# Patient Record
Sex: FEMALE | Race: WHITE | NOT HISPANIC OR LATINO | Employment: UNEMPLOYED | URBAN - METROPOLITAN AREA
[De-identification: names, ages, dates, MRNs, and addresses within clinical notes are randomized per-mention and may not be internally consistent; named-entity substitution may affect disease eponyms.]

---

## 2017-01-03 ENCOUNTER — GENERIC CONVERSION - ENCOUNTER (OUTPATIENT)
Dept: OTHER | Facility: OTHER | Age: 57
End: 2017-01-03

## 2017-01-10 ENCOUNTER — ALLSCRIPTS OFFICE VISIT (OUTPATIENT)
Dept: OTHER | Facility: OTHER | Age: 57
End: 2017-01-10

## 2017-04-15 LAB
A/G RATIO (HISTORICAL): 1.9 (ref 1.2–2.2)
ALBUMIN SERPL BCP-MCNC: 4.6 G/DL (ref 3.5–5.5)
ALP SERPL-CCNC: 64 IU/L (ref 39–117)
ALT SERPL W P-5'-P-CCNC: 49 IU/L (ref 0–32)
AST SERPL W P-5'-P-CCNC: 30 IU/L (ref 0–40)
BILIRUB SERPL-MCNC: 0.5 MG/DL (ref 0–1.2)
BUN SERPL-MCNC: 27 MG/DL (ref 6–24)
BUN/CREA RATIO (HISTORICAL): 27 (ref 9–23)
CALCIUM SERPL-MCNC: 9.8 MG/DL (ref 8.7–10.2)
CHLORIDE SERPL-SCNC: 99 MMOL/L (ref 96–106)
CO2 SERPL-SCNC: 19 MMOL/L (ref 18–29)
CREAT SERPL-MCNC: 1.01 MG/DL (ref 0.57–1)
EGFR AFRICAN AMERICAN (HISTORICAL): 71 ML/MIN/1.73
EGFR-AMERICAN CALC (HISTORICAL): 62 ML/MIN/1.73
GLUCOSE SERPL-MCNC: 213 MG/DL (ref 65–99)
HBA1C MFR BLD HPLC: 8 % (ref 4.8–5.6)
POTASSIUM SERPL-SCNC: 4.6 MMOL/L (ref 3.5–5.2)
SODIUM SERPL-SCNC: 139 MMOL/L (ref 134–144)
TOT. GLOBULIN, SERUM (HISTORICAL): 2.4 G/DL (ref 1.5–4.5)
TOTAL PROTEIN (HISTORICAL): 7 G/DL (ref 6–8.5)

## 2017-04-16 ENCOUNTER — GENERIC CONVERSION - ENCOUNTER (OUTPATIENT)
Dept: OTHER | Facility: OTHER | Age: 57
End: 2017-04-16

## 2017-04-16 LAB
CREATININE, URINE (HISTORICAL): 67.6 MG/DL
MICROALBUM.,U,RANDOM (HISTORICAL): 202.9 UG/ML
MICROALBUMIN/CREATININE RATIO (HISTORICAL): 300.1 MG/G CREAT (ref 0–30)

## 2017-04-24 ENCOUNTER — ALLSCRIPTS OFFICE VISIT (OUTPATIENT)
Dept: OTHER | Facility: OTHER | Age: 57
End: 2017-04-24

## 2017-06-07 DIAGNOSIS — M19.019 PRIMARY OSTEOARTHRITIS OF SHOULDER: ICD-10-CM

## 2017-06-07 DIAGNOSIS — M75.82 OTHER SHOULDER LESIONS, LEFT SHOULDER: ICD-10-CM

## 2017-06-07 DIAGNOSIS — Z12.31 ENCOUNTER FOR SCREENING MAMMOGRAM FOR MALIGNANT NEOPLASM OF BREAST: ICD-10-CM

## 2017-06-07 DIAGNOSIS — M25.512 PAIN IN LEFT SHOULDER: ICD-10-CM

## 2017-06-28 ENCOUNTER — HOSPITAL ENCOUNTER (OUTPATIENT)
Dept: RADIOLOGY | Facility: HOSPITAL | Age: 57
Discharge: HOME | End: 2017-06-28
Payer: COMMERCIAL

## 2017-06-28 DIAGNOSIS — Z12.31 ENCOUNTER FOR SCREENING MAMMOGRAM FOR MALIGNANT NEOPLASM OF BREAST: ICD-10-CM

## 2017-06-28 PROCEDURE — G0202 SCR MAMMO BI INCL CAD: HCPCS

## 2017-06-29 ENCOUNTER — GENERIC CONVERSION - ENCOUNTER (OUTPATIENT)
Dept: OTHER | Facility: OTHER | Age: 57
End: 2017-06-29

## 2017-07-05 ENCOUNTER — APPOINTMENT (OUTPATIENT)
Dept: RADIOLOGY | Facility: CLINIC | Age: 57
End: 2017-07-05
Payer: COMMERCIAL

## 2017-07-05 ENCOUNTER — ALLSCRIPTS OFFICE VISIT (OUTPATIENT)
Dept: OTHER | Facility: OTHER | Age: 57
End: 2017-07-05

## 2017-07-05 DIAGNOSIS — M25.512 PAIN IN LEFT SHOULDER: ICD-10-CM

## 2017-07-05 PROCEDURE — 73030 X-RAY EXAM OF SHOULDER: CPT

## 2017-07-19 ENCOUNTER — APPOINTMENT (OUTPATIENT)
Dept: PHYSICAL THERAPY | Facility: CLINIC | Age: 57
End: 2017-07-19
Payer: COMMERCIAL

## 2017-07-19 DIAGNOSIS — M75.82 OTHER SHOULDER LESIONS, LEFT SHOULDER: ICD-10-CM

## 2017-07-19 DIAGNOSIS — M19.019 PRIMARY OSTEOARTHRITIS OF SHOULDER: ICD-10-CM

## 2017-07-19 PROCEDURE — 97161 PT EVAL LOW COMPLEX 20 MIN: CPT

## 2017-07-20 ENCOUNTER — ALLSCRIPTS OFFICE VISIT (OUTPATIENT)
Dept: OTHER | Facility: OTHER | Age: 57
End: 2017-07-20

## 2017-07-31 ENCOUNTER — APPOINTMENT (OUTPATIENT)
Dept: PHYSICAL THERAPY | Facility: CLINIC | Age: 57
End: 2017-07-31
Payer: COMMERCIAL

## 2017-07-31 ENCOUNTER — ALLSCRIPTS OFFICE VISIT (OUTPATIENT)
Dept: OTHER | Facility: OTHER | Age: 57
End: 2017-07-31

## 2017-07-31 PROCEDURE — 97110 THERAPEUTIC EXERCISES: CPT

## 2017-07-31 PROCEDURE — 97140 MANUAL THERAPY 1/> REGIONS: CPT

## 2017-08-02 ENCOUNTER — APPOINTMENT (OUTPATIENT)
Dept: PHYSICAL THERAPY | Facility: CLINIC | Age: 57
End: 2017-08-02
Payer: COMMERCIAL

## 2017-08-02 PROCEDURE — 97110 THERAPEUTIC EXERCISES: CPT

## 2017-08-02 PROCEDURE — 97140 MANUAL THERAPY 1/> REGIONS: CPT

## 2017-08-15 ENCOUNTER — APPOINTMENT (OUTPATIENT)
Dept: PHYSICAL THERAPY | Facility: CLINIC | Age: 57
End: 2017-08-15
Payer: COMMERCIAL

## 2017-08-15 PROCEDURE — 97140 MANUAL THERAPY 1/> REGIONS: CPT

## 2017-08-15 PROCEDURE — 97110 THERAPEUTIC EXERCISES: CPT

## 2017-08-17 ENCOUNTER — APPOINTMENT (OUTPATIENT)
Dept: PHYSICAL THERAPY | Facility: CLINIC | Age: 57
End: 2017-08-17
Payer: COMMERCIAL

## 2017-08-17 PROCEDURE — 97140 MANUAL THERAPY 1/> REGIONS: CPT

## 2017-08-17 PROCEDURE — 97110 THERAPEUTIC EXERCISES: CPT

## 2017-08-22 ENCOUNTER — APPOINTMENT (OUTPATIENT)
Dept: PHYSICAL THERAPY | Facility: CLINIC | Age: 57
End: 2017-08-22
Payer: COMMERCIAL

## 2017-08-22 PROCEDURE — 97110 THERAPEUTIC EXERCISES: CPT

## 2017-08-22 PROCEDURE — 97140 MANUAL THERAPY 1/> REGIONS: CPT

## 2017-08-24 ENCOUNTER — APPOINTMENT (OUTPATIENT)
Dept: PHYSICAL THERAPY | Facility: CLINIC | Age: 57
End: 2017-08-24
Payer: COMMERCIAL

## 2017-08-24 ENCOUNTER — GENERIC CONVERSION - ENCOUNTER (OUTPATIENT)
Dept: OTHER | Facility: OTHER | Age: 57
End: 2017-08-24

## 2017-08-24 DIAGNOSIS — R42 DIZZINESS AND GIDDINESS: ICD-10-CM

## 2017-08-24 PROCEDURE — 97110 THERAPEUTIC EXERCISES: CPT

## 2017-08-24 PROCEDURE — 97140 MANUAL THERAPY 1/> REGIONS: CPT

## 2017-08-29 ENCOUNTER — APPOINTMENT (OUTPATIENT)
Dept: PHYSICAL THERAPY | Facility: CLINIC | Age: 57
End: 2017-08-29
Payer: COMMERCIAL

## 2017-08-29 PROCEDURE — 97140 MANUAL THERAPY 1/> REGIONS: CPT

## 2017-08-29 PROCEDURE — 97110 THERAPEUTIC EXERCISES: CPT

## 2017-08-31 ENCOUNTER — APPOINTMENT (OUTPATIENT)
Dept: PHYSICAL THERAPY | Facility: CLINIC | Age: 57
End: 2017-08-31
Payer: COMMERCIAL

## 2017-08-31 PROCEDURE — 97140 MANUAL THERAPY 1/> REGIONS: CPT

## 2017-08-31 PROCEDURE — 97110 THERAPEUTIC EXERCISES: CPT

## 2017-09-05 ENCOUNTER — APPOINTMENT (OUTPATIENT)
Dept: PHYSICAL THERAPY | Facility: CLINIC | Age: 57
End: 2017-09-05
Payer: COMMERCIAL

## 2017-09-05 PROCEDURE — 97163 PT EVAL HIGH COMPLEX 45 MIN: CPT

## 2017-09-05 PROCEDURE — 97110 THERAPEUTIC EXERCISES: CPT

## 2017-09-06 ENCOUNTER — GENERIC CONVERSION - ENCOUNTER (OUTPATIENT)
Dept: OTHER | Facility: OTHER | Age: 57
End: 2017-09-06

## 2017-09-07 ENCOUNTER — APPOINTMENT (OUTPATIENT)
Dept: PHYSICAL THERAPY | Facility: CLINIC | Age: 57
End: 2017-09-07
Payer: COMMERCIAL

## 2017-09-07 PROCEDURE — 97110 THERAPEUTIC EXERCISES: CPT

## 2017-09-07 PROCEDURE — 97112 NEUROMUSCULAR REEDUCATION: CPT

## 2017-09-11 ENCOUNTER — APPOINTMENT (OUTPATIENT)
Dept: PHYSICAL THERAPY | Facility: CLINIC | Age: 57
End: 2017-09-11
Payer: COMMERCIAL

## 2017-09-11 PROCEDURE — 97110 THERAPEUTIC EXERCISES: CPT

## 2017-09-11 PROCEDURE — 97112 NEUROMUSCULAR REEDUCATION: CPT

## 2017-09-13 ENCOUNTER — APPOINTMENT (OUTPATIENT)
Dept: PHYSICAL THERAPY | Facility: CLINIC | Age: 57
End: 2017-09-13
Payer: COMMERCIAL

## 2017-09-13 ENCOUNTER — GENERIC CONVERSION - ENCOUNTER (OUTPATIENT)
Dept: OTHER | Facility: OTHER | Age: 57
End: 2017-09-13

## 2017-09-13 PROCEDURE — 97110 THERAPEUTIC EXERCISES: CPT

## 2017-09-13 PROCEDURE — 97112 NEUROMUSCULAR REEDUCATION: CPT

## 2017-09-14 ENCOUNTER — GENERIC CONVERSION - ENCOUNTER (OUTPATIENT)
Dept: OTHER | Facility: OTHER | Age: 57
End: 2017-09-14

## 2017-09-14 LAB
A/G RATIO (HISTORICAL): 1.8 (ref 1.2–2.2)
ALBUMIN SERPL BCP-MCNC: 4.4 G/DL (ref 3.5–5.5)
ALP SERPL-CCNC: 56 IU/L (ref 39–117)
ALT SERPL W P-5'-P-CCNC: 56 IU/L (ref 0–32)
AST SERPL W P-5'-P-CCNC: 43 IU/L (ref 0–40)
BILIRUB SERPL-MCNC: 0.5 MG/DL (ref 0–1.2)
BUN SERPL-MCNC: 26 MG/DL (ref 6–24)
BUN/CREA RATIO (HISTORICAL): 28 (ref 9–23)
CALCIUM SERPL-MCNC: 10.1 MG/DL (ref 8.7–10.2)
CHLORIDE SERPL-SCNC: 98 MMOL/L (ref 96–106)
CHOLEST SERPL-MCNC: 194 MG/DL (ref 100–199)
CO2 SERPL-SCNC: 21 MMOL/L (ref 18–29)
CREAT SERPL-MCNC: 0.93 MG/DL (ref 0.57–1)
EGFR AFRICAN AMERICAN (HISTORICAL): 79 ML/MIN/1.73
EGFR-AMERICAN CALC (HISTORICAL): 68 ML/MIN/1.73
GLUCOSE SERPL-MCNC: 190 MG/DL (ref 65–99)
HBA1C MFR BLD HPLC: 7.8 % (ref 4.8–5.6)
HDLC SERPL-MCNC: 40 MG/DL
INTERPRETATION (HISTORICAL): NORMAL
LDLC SERPL CALC-MCNC: 99 MG/DL (ref 0–99)
POTASSIUM SERPL-SCNC: 4.9 MMOL/L (ref 3.5–5.2)
SODIUM SERPL-SCNC: 137 MMOL/L (ref 134–144)
TOT. GLOBULIN, SERUM (HISTORICAL): 2.4 G/DL (ref 1.5–4.5)
TOTAL PROTEIN (HISTORICAL): 6.8 G/DL (ref 6–8.5)
TRIGL SERPL-MCNC: 274 MG/DL (ref 0–149)

## 2017-09-15 ENCOUNTER — GENERIC CONVERSION - ENCOUNTER (OUTPATIENT)
Dept: OTHER | Facility: OTHER | Age: 57
End: 2017-09-15

## 2017-09-18 ENCOUNTER — APPOINTMENT (OUTPATIENT)
Dept: PHYSICAL THERAPY | Facility: CLINIC | Age: 57
End: 2017-09-18
Payer: COMMERCIAL

## 2017-09-20 ENCOUNTER — APPOINTMENT (OUTPATIENT)
Dept: PHYSICAL THERAPY | Facility: CLINIC | Age: 57
End: 2017-09-20
Payer: COMMERCIAL

## 2017-09-20 ENCOUNTER — GENERIC CONVERSION - ENCOUNTER (OUTPATIENT)
Dept: OTHER | Facility: OTHER | Age: 57
End: 2017-09-20

## 2017-09-29 ENCOUNTER — ALLSCRIPTS OFFICE VISIT (OUTPATIENT)
Dept: OTHER | Facility: OTHER | Age: 57
End: 2017-09-29

## 2018-01-09 ENCOUNTER — GENERIC CONVERSION - ENCOUNTER (OUTPATIENT)
Dept: OTHER | Facility: OTHER | Age: 58
End: 2018-01-09

## 2018-01-10 NOTE — RESULT NOTES
Discussion/Summary   Cruz Ambriz,   Your diabetes is uncontrolled, please make an appointment   Dr Gilda Coffey      Verified Results  (1) COMPREHENSIVE METABOLIC PANEL [id number] 62:48NJ Shazia Bhandari     Test Name Result Flag Reference   Glucose, Serum 190 mg/dL H 65-99   BUN 26 mg/dL H 6-24   Creatinine, Serum 0 93 mg/dL  0 57-1 00   BUN/Creatinine Ratio 28 H 9-23   Sodium, Serum 137 mmol/L  134-144   Potassium, Serum 4 9 mmol/L  3 5-5 2   Chloride, Serum 98 mmol/L     Carbon Dioxide, Total 21 mmol/L  18-29   Calcium, Serum 10 1 mg/dL  8 7-10 2   Protein, Total, Serum 6 8 g/dL  6 0-8 5   Albumin, Serum 4 4 g/dL  3 5-5 5   Globulin, Total 2 4 g/dL  1 5-4 5   A/G Ratio 1 8  1 2-2 2   Bilirubin, Total 0 5 mg/dL  0 0-1 2   Alkaline Phosphatase, S 56 IU/L     AST (SGOT) 43 IU/L H 0-40   ALT (SGPT) 56 IU/L H 0-32   eGFR If NonAfricn Am 68 mL/min/1 73  >59   eGFR If Africn Am 79 mL/min/1 73  >59     (1) LIPID PANEL FASTING W DIRECT LDL REFLEX 14Sep2017 09:19AM Shazia Chaoet     Test Name Result Flag Reference   Cholesterol, Total 194 mg/dL  100-199   Triglycerides 274 mg/dL H 0-149   HDL Cholesterol 40 mg/dL  >39   LDL Cholesterol Calc 99 mg/dL  0-99     (1) HEMOGLOBIN A1C 14Sep2017 09:19AM Shazia [employer]     Test Name Result Flag Reference   Hemoglobin A1c 7 8 % H 4 8-5 6   Pre-diabetes: 5 7 - 6 4           Diabetes: >6 4           Glycemic control for adults with diabetes: <7 0     [facility]) Cardiovascular Risk Assessment [id number] 09:19AM Shazia [place]     Test Name Result Flag Reference   Interpretation Note     Supplement report is available  PDF Image

## 2018-01-10 NOTE — RESULT NOTES
Discussion/Summary   Vonnie Cockayne,   Your diabetes has improved, hemoglobin A1c is down to 8 0; but it is still not at goal  Please make an appointment to review your lab work  Dr Janice Vazquez      Verified Results  (1) COMPREHENSIVE METABOLIC PANEL [id number] 80:48GX [employer]     Test Name Result Flag Reference   Glucose, Serum 213 mg/dL H 65-99   BUN 27 mg/dL H 6-24   Creatinine, Serum 1 01 mg/dL H 0 57-1 00   eGFR If NonAfricn Am 62 mL/min/1 73  >59   eGFR If Africn Am 71 mL/min/1 73  >59   BUN/Creatinine Ratio 27 H 9-23   Sodium, Serum 139 mmol/L  134-144   Potassium, Serum 4 6 mmol/L  3 5-5 2   Chloride, Serum 99 mmol/L     Carbon Dioxide, Total 19 mmol/L  18-29   Calcium, Serum 9 8 mg/dL  8 7-10 2   Protein, Total, Serum 7 0 g/dL  6 0-8 5   Albumin, Serum 4 6 g/dL  3 5-5 5   Globulin, Total 2 4 g/dL  1 5-4 5   A/G Ratio 1 9  1 2-2 2   Bilirubin, Total 0 5 mg/dL  0 0-1 2   Alkaline Phosphatase, S 64 IU/L     AST (SGOT) 30 IU/L  0-40   ALT (SGPT) 49 IU/L H 0-32     (1) HEMOGLOBIN A1C 14Apr2017 10:56AM [employer]     Test Name Result Flag Reference   Hemoglobin A1c 8 0 % H 4 8-5 6   Pre-diabetes: 5 7 - 6 4           Diabetes: >6 4           Glycemic control for adults with diabetes: <7 0     (1) MICROALBUMIN CREATININE RATIO, RANDOM URINE 14Apr2017 10:56AM [employer]     Test Name Result Flag Reference   Creatinine, Urine 67 6 mg/dL  Not Estab  Microalbumin, Urine 202 9 ug/mL  Not Estab     Microalb/Creat Ratio 300 1 mg/g creat H 0 0-30 0

## 2018-01-11 NOTE — RESULT NOTES
Message   Please inform the patient that 2 of the polyps were tubular adenomas, the largest polyp did have high-grade dysplasia however it was completely removed  The other polyps were hyperplastic polyps  I recommended repeat colonoscopy in 3 years, please put in for recall  Please have the patient call with any questions or concerns  Verified Results  (1) TISSUE EXAM [id number] 08:21AM Giovanna Diaz     Test Name Result Flag Reference   LAB AP CASE REPORT (Report)     Surgical Pathology Report             Case: [id number]                   Authorizing Provider: Long Jean Baptiste MD      Collected:      09/28/2016 [id number]        Ordering Location:   [facility] Floor Surgery  Received:      09/28/2016 [facility]                                     Pathologist:      Karlene Schlatter, MD                               Specimens:  A) - Polyp, Colorectal, transverse polyp bx                              B) - Polyp, Colorectal, sigmoid polyp -hot snare                            C) - Polyp, Colorectal, sigmoid polyp- cold snare/bx                          D) - Polyp, Colorectal, rectal polyp- cold snare   LAB AP FINAL DIAGNOSIS (Report)     A  Colon, transverse, biopsy:   - Tubular adenoma    B  Colon, sigmoid, biopsy:   - Tubular adenoma with focal high grade dysplasia   - Margins are free of high grade dysplasia    C  Colon, sigmoid, biopsy:   - Hyperplastic polyp    D  Rectum, biopsy:   - Hyperplastic polyp  Electronically signed by Karlene Schlatter, MD on 9/30/2016 at 3:35 PM   LAB AP SURGICAL ADDITIONAL INFORMATION (Report)     These tests were developed and their performance characteristics   determined by [facility] or [facility]  They may not be cleared or approved by the [place]  Food and   Drug Administration  The FDA has determined that such clearance or   approval is not necessary  These tests are used for clinical purposes     They should not be regarded as investigational or for research  This   laboratory has been approved by [place] 88, designated as a high-complexity   laboratory and is qualified to perform these tests  Interpretation performed at [facility], [facility]   LAB AP GROSS DESCRIPTION (Report)     A  The specimen is received in formalin, labeled with the patient's name   and hospital number, and is designated transverse polyp biopsy, are   three irregularly shaped fragments of tan soft tissue measuring 0 7 x 0 5   x 0 1 cm in aggregate  Entirely submitted  One cassette  B  The specimen is received in formalin, labeled with the patient's name   and hospital number, and is designated sigmoid polyp-hot snare, is a 1 0   x 1 0 x 0 6 cm red-brown, lobulated, and glistening polypoid structure  The resection margin is inked blue and the specimen is bisected  Entirely   submitted  One cassette  C  The specimen is received in formalin, labeled with the patient's name   and hospital number, and is designated sigmoid polyp-cold snare/BX, are   two irregularly shaped fragments of pale tan soft tissue measuring 0 3 and   0 2 cm in greatest dimension  Entirely submitted  One cassette  D  The specimen is received in formalin, labeled with the patient's name   and hospital number, and is designated rectal polyp-cold snare, are   three irregularly shaped fragments of tan soft tissue measuring 0 9 x 0 5   x 0 1 cm in aggregate  Entirely submitted  One cassette  Note: The estimated total formalin fixation time based upon information   provided by the submitting clinician and the standard processing schedule   is 20 0 hours  RLR       Discussion/Summary   A tubular adenoma is a precancerous polyp  These polyps, if left alone, have a small risk of developing a cancer over 5-10 years  Your polyp has been completely removed

## 2018-01-12 VITALS
TEMPERATURE: 97.2 F | DIASTOLIC BLOOD PRESSURE: 72 MMHG | RESPIRATION RATE: 18 BRPM | HEART RATE: 88 BPM | HEIGHT: 66 IN | SYSTOLIC BLOOD PRESSURE: 130 MMHG | BODY MASS INDEX: 34.87 KG/M2 | WEIGHT: 217 LBS

## 2018-01-12 NOTE — RESULT NOTES
Verified Results  (1) COMPREHENSIVE METABOLIC PANEL [id number] 66:84NS Shadi Alejandro     Test Name Result Flag Reference   Glucose, Serum 229 mg/dL H 65-99   BUN 19 mg/dL  6-24   Creatinine, Serum 1 00 mg/dL  0 57-1 00   eGFR If NonAfricn Am 63 mL/min/1 73  >59   eGFR If Africn Am 73 mL/min/1 73  >59   BUN/Creatinine Ratio 19  9-23   Sodium, Serum 140 mmol/L  134-144   **Please note reference interval change**   Potassium, Serum 4 7 mmol/L  3 5-5 2   Chloride, Serum 99 mmol/L     **Please note reference interval change**   Carbon Dioxide, Total 23 mmol/L  18-29   Calcium, Serum 9 9 mg/dL  8 7-10 2   Protein, Total, Serum 7 0 g/dL  6 0-8 5   Albumin, Serum 4 3 g/dL  3 5-5 5   Globulin, Total 2 7 g/dL  1 5-4 5   A/G Ratio 1 6  1 1-2 5   Bilirubin, Total 0 5 mg/dL  0 0-1 2   Alkaline Phosphatase, S 60 IU/L     AST (SGOT) 46 IU/L H 0-40   ALT (SGPT) 48 IU/L H 0-32     (1) LIPID PANEL FASTING W DIRECT LDL REFLEX [id number] 09:30AM Shadi Alejandor     Test Name Result Flag Reference   Cholesterol, Total 178 mg/dL  100-199   Triglycerides 244 mg/dL H 0-149   HDL Cholesterol 44 mg/dL  >39   LDL Cholesterol Calc 85 mg/dL  0-99     (1) HEMOGLOBIN A1C [id number] 09:30AM Shadi Alejandro     Test Name Result Flag Reference   Hemoglobin A1c 9 5 % H 4 8-5 6   Pre-diabetes: 5 7 - 6 4           Diabetes: >6 4           Glycemic control for adults with diabetes: <7 0     [facility]) Cardiovascular Risk Assessment [id number] 09:30AM Shadi Alejandro     Test Name Result Flag Reference   Interpretation Note     Supplement report is available  PDF Image   Discussion/Summary   Flores Dash,   Your diabetes is uncontrolled  Please make an appointment to review your results     Dr Diaz Pisano

## 2018-01-13 VITALS
DIASTOLIC BLOOD PRESSURE: 71 MMHG | WEIGHT: 216.13 LBS | SYSTOLIC BLOOD PRESSURE: 106 MMHG | HEIGHT: 65 IN | HEART RATE: 85 BPM | BODY MASS INDEX: 36.01 KG/M2

## 2018-01-13 VITALS
HEIGHT: 65 IN | RESPIRATION RATE: 18 BRPM | WEIGHT: 213 LBS | DIASTOLIC BLOOD PRESSURE: 76 MMHG | HEART RATE: 84 BPM | TEMPERATURE: 98 F | BODY MASS INDEX: 35.49 KG/M2 | SYSTOLIC BLOOD PRESSURE: 122 MMHG

## 2018-01-13 VITALS
BODY MASS INDEX: 35.58 KG/M2 | WEIGHT: 221.38 LBS | DIASTOLIC BLOOD PRESSURE: 76 MMHG | HEIGHT: 66 IN | RESPIRATION RATE: 18 BRPM | SYSTOLIC BLOOD PRESSURE: 142 MMHG | TEMPERATURE: 98 F | HEART RATE: 100 BPM

## 2018-01-13 VITALS
HEIGHT: 65 IN | TEMPERATURE: 97.2 F | WEIGHT: 214 LBS | RESPIRATION RATE: 18 BRPM | BODY MASS INDEX: 35.65 KG/M2 | SYSTOLIC BLOOD PRESSURE: 130 MMHG | DIASTOLIC BLOOD PRESSURE: 80 MMHG | HEART RATE: 82 BPM

## 2018-01-14 VITALS
HEART RATE: 78 BPM | HEIGHT: 65 IN | SYSTOLIC BLOOD PRESSURE: 130 MMHG | WEIGHT: 212 LBS | TEMPERATURE: 97.2 F | BODY MASS INDEX: 35.32 KG/M2 | DIASTOLIC BLOOD PRESSURE: 68 MMHG | RESPIRATION RATE: 18 BRPM

## 2018-01-14 NOTE — RESULT NOTES
Verified Results  (1) HEMOGLOBIN A1C [id number] 09:25AM Linda Martins     Test Name Result Flag Reference   Hemoglobin A1c 7 7 % H 4 8-5 6   Pre-diabetes: 5 7 - 6 4           Diabetes: >6 4           Glycemic control for adults with diabetes: <7 0                       (1) TSH [id number] 09:25AM Linda Martins     Test Name Result Flag Reference   TSH 4 250 uIU/mL  0 450-4 500                 (LC) Thyroxine (T4) Free, Direct, S [id number] 09:25AM Kennedi Carballo     Test Name Result Flag Reference   T4,Free(Direct) 1 43 ng/dL  0 82-1 77       Signatures   Electronically signed by : Julia Ruelas; Jul 21 2016 10:39AM EST                       (Author)

## 2018-01-16 NOTE — RESULT NOTES
Verified Results  * MAMMO SCREENING BILATERAL W CAD [id number] 01:06PM Ally Hodgson Order Number: [id number]     Test Name Result Flag Reference   MAMMO SCREENING BILATERAL W CAD (Report)     Patient History:   Patient is postmenopausal and had first child at age 40  Patient's BMI is 35 8  Reason for exam: screening (asymptomatic)  Mammo Screening Bilateral W CAD: June 2, 2016 - Check In #:    [de-identified]   Bilateral CC and MLO view(s) were taken  Technologist: LAURA Medina   Prior study comparison: May 5, 2015, bilateral screening    mammogram performed at [place] [address]  November 18, 2013, bilateral screening mammogram performed at [place] [address]  January 6, 2012, bilateral    screening mammogram performed at [place] [address]  October 14, 2010, bilateral screening mammogram    performed at [place] [address]  August 4, 2009, bilateral screening mammogram performed at [place] [address]  The breast tissue is almost entirely fat  No new dominant soft    tissue mass, architectural distortion or suspicious    calcifications are noted  The skin and nipple structures are    within normal limits  Benign appearing calcifications are noted  No mammographic evidence of malignancy  No    significant changes when compared with prior studies  ASSESSMENT: BiRad:2 - Benign     A reminder letter will be scheduled  Recommendation:   Routine screening mammogram of both breasts in 1 year  Analyzed by CAD     8-10% of cancers will be missed on mammography  Management of a    palpable abnormality must be based on clinical grounds  Patients   will be notified of their results via letter from our facility  Accredited by [address] [place] [address] of Radiology and FDA       Transcription Location: [facility] [address]: [id number]     Risk Value(s):   Raphael 10 Year: 4 143%, Raphael Lifetime: 12 651%,    Myriad Table: 1 5%, BERNADETTE 5 Year: 1 7%, NCI Lifetime: 10 9%   Signed by:   Panchito Price MD   6/2/16       Plan  Encounter for screening mammogram for malignant neoplasm of breast    · * MAMMO SCREENING BILATERAL W CAD ; every 1 year; Last [id number]; Next  02Jun2017; Status:Active    Discussion/Summary   [address]- your mammogram was normal   Repeat in 1 year is recommended  [address]     Signatures   Electronically signed by : Jackson Currie; Gilberto  3 2017  9:06PM EST                       (Author)

## 2018-01-16 NOTE — RESULT NOTES
Message   appt on 8/4/16 w/ Dr Robert Hercules, please print for her folder     Verified Results  (1) COMPREHENSIVE METABOLIC PANEL [id number] 49:06UI Davied Moment     Test Name Result Flag Reference   Glucose, Serum 204 mg/dL H 65-99   BUN 22 mg/dL  6-24   Creatinine, Serum 0 85 mg/dL  0 57-1 00   eGFR If NonAfricn Am 77 mL/min/1 73  >59   eGFR If Africn Am 89 mL/min/1 73  >59   BUN/Creatinine Ratio 26 H 9-23   Sodium, Serum 138 mmol/L  134-144   Potassium, Serum 4 4 mmol/L  3 5-5 2   Chloride, Serum 99 mmol/L     Carbon Dioxide, Total 21 mmol/L  18-29   Calcium, Serum 9 7 mg/dL  8 7-10 2   Protein, Total, Serum 6 7 g/dL  6 0-8 5   Albumin, Serum 4 3 g/dL  3 5-5 5   Globulin, Total 2 4 g/dL  1 5-4 5   A/G Ratio 1 8  1 1-2 5   Bilirubin, Total 0 5 mg/dL  0 0-1 2   Alkaline Phosphatase, S 61 IU/L     AST (SGOT) 35 IU/L  0-40   ALT (SGPT) 45 IU/L H 0-32   Glucose, Serum 204 mg/dL H 65-99   BUN 22 mg/dL  6-24   Creatinine, Serum 0 85 mg/dL  0 57-1 00   eGFR If NonAfricn Am 77 mL/min/1 73  >59   eGFR If Africn Am 89 mL/min/1 73  >59   BUN/Creatinine Ratio 26 H 9-23   Sodium, Serum 138 mmol/L  134-144   Potassium, Serum 4 4 mmol/L  3 5-5 2   Chloride, Serum 99 mmol/L     Carbon Dioxide, Total 21 mmol/L  18-29   Calcium, Serum 9 7 mg/dL  8 7-10 2   Protein, Total, Serum 6 7 g/dL  6 0-8 5   Albumin, Serum 4 3 g/dL  3 5-5 5   Globulin, Total 2 4 g/dL  1 5-4 5   A/G Ratio 1 8  1 1-2 5   Bilirubin, Total 0 5 mg/dL  0 0-1 2   Alkaline Phosphatase, S 61 IU/L     AST (SGOT) 35 IU/L  0-40   ALT (SGPT) 45 IU/L H 0-32             Signatures   Electronically signed by : Denae Chin; Jul 22 2016  1:39PM EST                       (Author)

## 2018-01-16 NOTE — PROGRESS NOTES
Assessment    1  Left-sided low back pain with left-sided sciatica (724 3) (M54 42)    Plan  Left-sided low back pain with left-sided sciatica    · Cyclobenzaprine HCl - 5 MG Oral Tablet; TAKE 1 TABLET 3 TIMES DAILY AS  NEEDED   · Hydrocodone-Acetaminophen 5-300 MG Oral Tablet; TAKE 1 TABLET EVERY 4 TO  6 HOURS AS NEEDED FOR PAIN   · You may apply heat using a heating pad turned on low or medium ; Status:Complete;    Done: [id number]    Discussion/Summary    Back pain- Recommended heating pad  She take Etodolac BID, advised her to avoid other NSAIDs while taking this medication  Recommended Tylenol q4-6 hours  Will try a short course of Vicodin and Flexeril  If pain is persistent, will order PT  Possible side effects of new medications were reviewed with the patient/guardian today  The treatment plan was reviewed with the patient/guardian  The patient/guardian understands and agrees with the treatment plan      Chief Complaint    1  Leg Pain  Started on Sunday with pain in her left lower back, buttock and down left leg and now her lower leg is starting to feel "numb"  Pain rated 6-7/10 when ambulating  OTC pain meds aren't alleviating her pain JMoyleLPN      History of Present Illness  HPI: Pt c/o left lower back pain that started 2 nights ago  She attributes pain to sitting on the couch with her legs crossed and no back support for a couple of hours  The pain radiates down the left leg and today she notes paresthesia in the left leg  She has tried Aleve, Tylenol, and Advil, none of which have helped  Denies bowel or bladder dysfunction  The pain is severe and she is very uncomfortable  Review of Systems    Constitutional: no fever and no chills  Cardiovascular: no chest pain  Respiratory: no shortness of breath and no cough  Musculoskeletal: as noted in HPI  Neurological: no headache  Active Problems    1  Allergic rhinitis (477 9) (J30 9)   2  Benign essential hypertension (401 1) (I10)   3  Chronic interstitial cystitis (595 1) (N30 10)   4  Colon cancer screening (V76 51) (Z12 11)   5  Diabetes mellitus out of control (250 02) (E11 65)   6  Dysuria (788 1) (R30 0)   7  Encounter for screening mammogram for malignant neoplasm of breast (V76 12)   (Z12 31)   8  Fatty liver (571 8) (K76 0)   9  [place] [employer] (727 41)   10  Hyperlipidemia (272 4) (E78 5)   11  Hypothyroidism (244 9) (E03 9)   12  Morbid or severe obesity due to excess calories (278 01) (E66 01)   13  Obesity (278 00) (E66 9)   14  Osteoarthritis of shoulder region, unspecified laterality   15  Plantar fascial fibromatosis (728 71) (M72 2)   16  Preventive measure (V07 9) (Z41 8)   17  Screening for diabetes mellitus (DM) (V77 1) (Z13 1)   18  Sensorineural hearing loss (389 10) (H90 5)   19  Shoulder joint pain, unspecified laterality (719 41) (M25 519)    Past Medical History    1  History of Achilles tendinitis, unspecified laterality (726 71) (M76 60)   2  Acute conjunctivitis (372 00) (H10 30)   3  Acute maxillary sinusitis (461 0) (J01 00)   4  History of Acute upper respiratory infection (465 9) (J06 9)   5  Acute urinary tract infection (599 0) (N39 0)   6  Allergic rhinitis due to pollen (477 0) (J30 1)   7  History of Cellulitis of toe, unspecified laterality (681 10) (L03 039)   8  Contact dermatitis due to plant (692 6) (L25 5)   9  History of acute sinusitis (V12 69) (Z87 09)   10  History of dermatitis (V13 3) (Z87 2)   11  History of dermatitis (V13 3) (Z87 2)   12  Influenza vaccine needed (V04 81) (Z23)   13  Influenza vaccine needed (V04 81) (Z23)   14  Otitis media, unspecified laterality   15  Urinary tract infection (599 0) (N39 0)   16  Urinary tract infection (599 0) (N39 0)   17  Urinary tract infection (599 0) (N39 0)   18  History of Urinary Tract Infection (V13 02)   19  History of Urinary Tract Infection (V13 02)  Active Problems And Past Medical History Reviewed:    The active problems and past medical history were reviewed and updated today  Family History    1  Family history of cerebrovascular accident (V17 1) (Z82 3)    2  Family history of cardiac disorder (V17 49) (Z80 55)    Social History    · Former smoker  The social history was reviewed and is unchanged  Current Meds   1  Aspir-81 81 MG Oral Tablet Delayed Release; 1 Every Day; Therapy: 87LFU0770 to  Requested for: 34GFM5845 Recorded   2  Atorvastatin Calcium 40 MG Oral Tablet; Take 1 tablet daily; Therapy: 13QGG2408 to (Chloe Higuera)  Requested for: 55BVC9062; Last   Rx:96Ubw1617 Ordered   3  Biotin 1000 MCG Oral Tablet; 1 Every Day; Therapy: 79JJJ9537 to  Requested for: 09WQM6633 Recorded   4  Desloratadine 5 MG Oral Tablet; Take 1 tablet daily; Therapy: 94LGX3529 to (Evaluate:10Mar2016)  Requested for: 74EUE2861; Last   Rx:39Tgh4872 Ordered   5  Etodolac 400 MG Oral Tablet; TAKE 1 TABLET twice a day with meals; Therapy: 24NYU9301 to (Last Rx:57Qkn3812)  Requested for: 18Xys0304 Ordered   6  Fish Oil 1000 MG Oral Capsule; 1 Every Day; Therapy: 92NPE3970 to  Requested for: 66OYW9048 Recorded   7  HydrOXYzine HCl - 25 MG Oral Tablet; 1 at hs as needed  Requested for: 74Syt6960; Last Rx:40Hao9060 Ordered   8  Invokana 300 MG Oral Tablet; Take 1 tablet daily; Therapy: 89DAI9885 to (Evaluate:16Mar2016)  Requested for: 61Nhy7405; Last   Rx:88Niv7757 Ordered   9  Levothyroxine Sodium 88 MCG Oral Tablet; Take 1 tablet daily; Therapy: 72UUF8267 to (Last Rx:96Nwn8589)  Requested for: 73YHY6393 Ordered   10  MetFORMIN HCl - 500 MG Oral Tablet; take 2 tablets twice a day; Therapy: 17WEX2141 to (Evaluate:79Dqc7133)  Requested for: 06Fjo7370; Last    Rx:72Qyv9837 Ordered   11  Metoprolol Succinate ER 25 MG Oral Tablet Extended Release 24 Hour; take 1 tablet    daily at bedtime; Therapy: 59XXW6852 to (Last Rx:33Fsg7435)  Requested for: 07Omm5066 Ordered   12   Montelukast Sodium 10 MG Oral Tablet; TAKE 1 TABLET AT BEDTIME; Therapy: 27DKZ1424 to (Tyler Dominik)  Requested for: 17WEX2131; Last    Rx:06Oct2015 Ordered   13  Nasonex 50 MCG/ACT Nasal Suspension; USE 2 SPRAYS IN EACH NOSTRIL DAILY; Therapy: 33HQV1810 to (Last Rx:38Qat1824)  Requested for: 06Mhb7318 Ordered   14  OneTouch Ultra Blue In Vitro Strip; TEST GLUCOSE ONCE DAILY; Therapy: 43CGN4082 to (Last Rx:77Hjh3806)  Requested for: 62GBN8468 Ordered   15  Quinapril HCl - 40 MG Oral Tablet; Take 1 tablet daily; Therapy: 31PCH6408 to (Evaluate:76Fao2101)  Requested for: 67Yep4593; Last    Rx:33Epu9668 Ordered    The medication list was reviewed and updated today  Allergies    1  No Known Drug Allergies    Vitals   Recorded: 12Jan2016 10:23AM Recorded: 12Jan2016 10:21AM   Temperature    97 9 F   Heart Rate    88   Respiration    20   Systolic    467   Diastolic    74   Patient Refused Weight  Yes Yes    Height Unobtainable  Yes     Pain Scale 6        Physical Exam    Constitutional   General appearance: No acute distress, well appearing and well nourished  Pulmonary   Respiratory effort: No increased work of breathing or signs of respiratory distress  Auscultation of lungs: Clear to auscultation  Cardiovascular   Auscultation of heart: Normal rate and rhythm, normal S1 and S2, without murmurs  Examination of extremities for edema and/or varicosities: Normal     Musculoskeletal limping with ambulation  Inspection/palpation of joints, bones, and muscles: Abnormal   left lower back nontender on palpation  Positive straight leg raise left leg  Skin   Skin and subcutaneous tissue: Normal without rashes or lesions  Psychiatric   Mood and affect: Normal          Attending Note  Collaborating Physician Note: Collaborating Note: I agree with the Advanced Practitioner note        Signatures   Electronically signed by : Tressa Hill; Jan 12 2016 10:49AM EST                       (Author)    Electronically signed by : Ankit Barfield DO; Jan 12 2016 12:50PM EST                       (Author)

## 2018-01-18 NOTE — RESULT NOTES
Verified Results  () Hgb A1c with eAG Estimation 77WZO8064 09:24AM [facility]     Test Name Result Flag Reference   Hemoglobin A1c 8 4 % H 4 8-5 6   Pre-diabetes: 5 7 - 6 4           Diabetes: >6 4           Glycemic control for adults with diabetes: <7 0   Estim  Avg Glu (eAG) 194 mg/dL       (LC) Lipid Panel With LDL/HDL Ratio 11BOY7274 09:24AM [facility]     Test Name Result Flag Reference   Cholesterol, Total 158 mg/dL  100-199   Triglycerides 223 mg/dL H 0-149   HDL Cholesterol 40 mg/dL  >39   According to ATP-III Guidelines, HDL-C >59 mg/dL is considered a  negative risk factor for CHD  VLDL Cholesterol Laron 45 mg/dL H 5-40   LDL Cholesterol Calc 73 mg/dL  0-99   LDL/HDL Ratio 1 8 ratio units  0 0-3 2   LDL/HDL Ratio                                                             Men  Women                                               1/2 Avg  Risk  1 0    1 5                                                   Avg Risk  3 6    3 2                                                2X Avg  Risk  6 2    5 0                                                3X Avg  Risk  8 0    6 1     (1) COMPREHENSIVE METABOLIC PANEL 15ITS8964 50:44SL [facility]     Test Name Result Flag Reference   Glucose, Serum 210 mg/dL H 65-99   BUN 22 mg/dL  6-24   Creatinine, Serum 0 95 mg/dL  0 57-1 00   eGFR If NonAfricn Am 67 mL/min/1 73  >59   eGFR If Africn Am 77 mL/min/1 73  >59   BUN/Creatinine Ratio 23  9-23   Sodium, Serum 136 mmol/L  134-144   Potassium, Serum 4 5 mmol/L  3 5-5 2   Chloride, Serum 99 mmol/L     Carbon Dioxide, Total 22 mmol/L  18-29   Calcium, Serum 9 7 mg/dL  8 7-10 2   Protein, Total, Serum 6 6 g/dL  6 0-8 5   Albumin, Serum 4 3 g/dL  3 5-5 5   Globulin, Total 2 3 g/dL  1 5-4 5   A/G Ratio 1 9  1 1-2 5   Bilirubin, Total 0 7 mg/dL  0 0-1 2   Alkaline Phosphatase, S 55 IU/L     AST (SGOT) 26 IU/L  0-40   ALT (SGPT) 45 IU/L H 0-32     (1) MICROALBUMIN CREATININE RATIO, RANDOM URINE 49ZBN6031 09:24AM [facility]     Test Name Result Flag Reference   Creatinine, Urine 29 5 mg/dL  15 0-278 0   Microalbumin, Urine 73 0 ug/mL H 0 0-17 0   Microalb/Creat Ratio 247 5 mg/g creat H 0 0-30 0     (LC) Cardiovascular Risk Assessment 12UKN3112 09:24AM [facility]     Test Name Result Flag Reference   Interpretation Note     Supplement report is available  PDF Image   Discussion/Summary   Hung Foreman,   Please make an appointment to review your lab work  Your Diabetes is uncontrolled     Dr Kelby Najjar

## 2018-01-18 NOTE — RESULT NOTES
Discussion/Summary   Jarrod Christiansen,   Your mammogram is normal    Dr Earnest Newell CAD [id number] 03:24PM Jorje Hoyt Order Number: [id number]    - Patient Instructions: To schedule this appointment, please contact Central Scheduling at [phone]  Do not wear any perfume, powder, lotion or deodorant on breast or underarm area  Please bring your doctors order, referral (if needed) and insurance information with you on the day of the test  Failure to bring this information may result in this test being rescheduled  Arrive 15 minutes prior to your appointment time to register  On the day of your test, please bring any prior mammogram or breast studies with you that were not performed at a [facility]  Failure to bring prior exams may result in your test needing to be rescheduled  Test Name Result Flag Reference   MAMMO SCREENING BILATERAL W CAD (Report)     Patient History:   Patient is postmenopausal and had first child at age 40  Patient's BMI is 35 8  Reason for exam: screening, asymptomatic  Mammo Screening Bilateral W CAD: June 28, 2017 - Check In #:    [de-identified]   Bilateral CC and MLO view(s) were taken  Technologist: LAURA Gomez   Prior study comparison: June 2, 2016, mammo screening bilateral W   CAD performed at [place] [address]  May 5,    2015, bilateral screening mammogram performed at [place] [address]  November 18, 2013, bilateral screening   mammogram performed at [place] [address]  January 6, 2012, bilateral screening mammogram performed at [place] [address]  The breast tissue is almost entirely fat  No new dominant soft    tissue mass, architectural distortion or suspicious    calcifications are noted  The skin and nipple structures are    within normal limits  Benign appearing calcifications are noted         No mammographic evidence of malignancy  No    significant changes when compared with prior studies  ACR BI-RADSï¾® Assessments: BiRad:2 - Benign     Recommendation:   Routine screening mammogram of both breasts in 1 year  Analyzed by CAD     The patient is scheduled in a reminder system  8-10% of cancers will be missed on mammography  Management of a    palpable abnormality must be based on clinical grounds  Patients   will be notified of their results via letter from our facility  Accredited by [employer] and FDA  Transcription Location: [facility]: [id number]     Risk Value(s):   Tyrer-Cuzick 10 Year: 4 300%, Tyrer-Cuzick Lifetime: 12 300%,    Myriad Table: 1 5%, BERNADETTE 5 Year: 1 8%, NCI Lifetime: 10 7%   Signed by:   Meño Hector MD   6/29/17       Plan  Encounter for screening mammogram for malignant neoplasm of breast    · * MAMMO SCREENING BILATERAL W CAD ; every 1 year;  Last [id number]; Next  [id number]; Status:Active

## 2018-01-24 VITALS
HEART RATE: 100 BPM | TEMPERATURE: 99.4 F | RESPIRATION RATE: 16 BRPM | HEIGHT: 65 IN | SYSTOLIC BLOOD PRESSURE: 144 MMHG | WEIGHT: 215 LBS | DIASTOLIC BLOOD PRESSURE: 88 MMHG | BODY MASS INDEX: 35.82 KG/M2

## 2018-01-25 ENCOUNTER — OFFICE VISIT (OUTPATIENT)
Dept: FAMILY MEDICINE CLINIC | Facility: CLINIC | Age: 58
End: 2018-01-25
Payer: COMMERCIAL

## 2018-01-25 VITALS
DIASTOLIC BLOOD PRESSURE: 72 MMHG | RESPIRATION RATE: 20 BRPM | TEMPERATURE: 97.9 F | HEIGHT: 65 IN | BODY MASS INDEX: 35.65 KG/M2 | HEART RATE: 82 BPM | WEIGHT: 214 LBS | SYSTOLIC BLOOD PRESSURE: 128 MMHG

## 2018-01-25 DIAGNOSIS — J01.00 ACUTE MAXILLARY SINUSITIS, RECURRENCE NOT SPECIFIED: Primary | ICD-10-CM

## 2018-01-25 PROBLEM — J30.9 ALLERGIC RHINITIS: Status: ACTIVE | Noted: 2017-07-20

## 2018-01-25 PROCEDURE — 99213 OFFICE O/P EST LOW 20 MIN: CPT | Performed by: FAMILY MEDICINE

## 2018-01-25 RX ORDER — AMOXICILLIN AND CLAVULANATE POTASSIUM 875; 125 MG/1; MG/1
1 TABLET, FILM COATED ORAL EVERY 12 HOURS SCHEDULED
Qty: 20 TABLET | Refills: 0 | Status: SHIPPED | OUTPATIENT
Start: 2018-01-25 | End: 2018-02-04

## 2018-01-25 RX ORDER — HYDROXYZINE HYDROCHLORIDE 25 MG/1
1 TABLET, FILM COATED ORAL
COMMUNITY
Start: 2017-01-11 | End: 2018-01-25 | Stop reason: SDUPTHER

## 2018-01-25 NOTE — PROGRESS NOTES
Assessment/Plan:     Diagnoses and all orders for this visit:    Acute maxillary sinusitis, recurrence not specified  Comments:  new  Orders:  -     amoxicillin-clavulanate (AUGMENTIN) 875-125 mg per tablet; Take 1 tablet by mouth every 12 (twelve) hours for 10 days          Patient Instructions   Increase fluids and use Mucinex as needed  Return in about 1 month (around 2/25/2018) for Next scheduled follow up  Subjective:      Patient ID: Ramona Roca is a 62 y o  female  Chief Complaint   Patient presents with    Cold Like Symptoms     fever and not feeling better       She has already completed the steroids and antibiotic  She has continued to cough and then developed a low grade fever yesterday  Her sugars have been running a little higher  The following portions of the patient's history were reviewed and updated as appropriate: allergies, current medications, past family history, past medical history, past social history, past surgical history and problem list     Review of Systems   Constitutional: Fever: 99 9 last night  HENT: Positive for sore throat            Current Outpatient Prescriptions   Medication Sig Dispense Refill    aspirin 81 MG tablet Take 81 mg by mouth every evening      atorvastatin (LIPITOR) 40 mg tablet Take 40 mg by mouth every evening      azelastine (ASTELIN) 0 1 % nasal spray 1 spray into each nostril 2 (two) times a day Use in each nostril as directed      BIOTIN PO Take 10,000 mcg by mouth every morning      desloratadine (CLARINEX) 5 MG tablet Take 5 mg by mouth every morning      Empagliflozin (JARDIANCE) 25 MG TABS Take 1 tablet by mouth daily      glipiZIDE (GLUCOTROL) 5 mg tablet Take 5 mg by mouth every morning      Glucose Blood (ONETOUCH ULTRA BLUE VI) by In Vitro route      Levothyroxine Sodium (L-THYROXINE SODIUM) POWD 88 mg by Does not apply route every morning      metFORMIN (GLUCOPHAGE) 500 mg tablet Take 500 mg by mouth 2 (two) times a day with meals 2 tabs each dose =1000mg      metoprolol succinate (TOPROL-XL) 25 mg 24 hr tablet Take 25 mg by mouth every morning      Omega-3 Fatty Acids (FISH OIL PO) Take by mouth every evening      quinapril (ACCUPRIL) 40 MG tablet Take 40 mg by mouth daily at bedtime      amoxicillin-clavulanate (AUGMENTIN) 875-125 mg per tablet Take 1 tablet by mouth every 12 (twelve) hours for 10 days 20 tablet 0    hydrOXYzine pamoate (VISTARIL) 25 mg capsule Take 25 mg by mouth 3 (three) times a day as needed for itching       No current facility-administered medications for this visit  Objective:    /72   Pulse 82   Temp 97 9 °F (36 6 °C)   Resp 20   Ht 5' 5" (1 651 m)   Wt 97 1 kg (214 lb)   BMI 35 61 kg/m²        Physical Exam   Constitutional: She appears well-developed and well-nourished  HENT:   Head: Normocephalic and atraumatic  Right Ear: External ear normal    Left Ear: External ear normal    Mouth/Throat: Oropharynx is clear and moist    Cardiovascular: Normal rate, regular rhythm and normal heart sounds  Exam reveals no friction rub  No murmur heard  Pulmonary/Chest: Effort normal and breath sounds normal  No respiratory distress  She has no wheezes  She has no rales  Musculoskeletal: She exhibits no edema or deformity  Nursing note and vitals reviewed               Alaina Moralez DO

## 2018-02-01 LAB
LEFT EYE DIABETIC RETINOPATHY: NORMAL
RIGHT EYE DIABETIC RETINOPATHY: NORMAL
SEVERITY (EYE EXAM): NORMAL

## 2018-02-21 LAB
ALBUMIN SERPL-MCNC: 4.4 G/DL (ref 3.5–5.5)
ALBUMIN/GLOB SERPL: 1.9 {RATIO} (ref 1.2–2.2)
ALP SERPL-CCNC: 53 IU/L (ref 39–117)
ALT SERPL-CCNC: 52 IU/L (ref 0–32)
AST SERPL-CCNC: 37 IU/L (ref 0–40)
BILIRUB SERPL-MCNC: 0.4 MG/DL (ref 0–1.2)
BUN SERPL-MCNC: 24 MG/DL (ref 6–24)
BUN/CREAT SERPL: 25 (ref 9–23)
CALCIUM SERPL-MCNC: 10 MG/DL (ref 8.7–10.2)
CHLORIDE SERPL-SCNC: 100 MMOL/L (ref 96–106)
CO2 SERPL-SCNC: 22 MMOL/L (ref 18–29)
CREAT SERPL-MCNC: 0.96 MG/DL (ref 0.57–1)
GLOBULIN SER-MCNC: 2.3 G/DL (ref 1.5–4.5)
GLUCOSE SERPL-MCNC: 216 MG/DL (ref 65–99)
HBA1C MFR BLD: 8.4 % (ref 4.8–5.6)
POTASSIUM SERPL-SCNC: 4.8 MMOL/L (ref 3.5–5.2)
PROT SERPL-MCNC: 6.7 G/DL (ref 6–8.5)
SL AMB EGFR AFRICAN AMERICAN: 75
SL AMB EGFR NON AFRICAN AMERICAN: 65
SODIUM SERPL-SCNC: 137 MMOL/L (ref 134–144)
T4 FREE SERPL-MCNC: 1.51 NG/DL (ref 0.82–1.77)
TSH SERPL DL<=0.005 MIU/L-ACNC: 4.02 UIU/ML (ref 0.45–4.5)

## 2018-02-25 NOTE — ASSESSMENT & PLAN NOTE
Worsening control, A1c is up from 7 8 to 8 4  Glipizide dose increased from 5 to 10mg  Low sugar diet and exercise encouraged

## 2018-02-26 ENCOUNTER — OFFICE VISIT (OUTPATIENT)
Dept: FAMILY MEDICINE CLINIC | Facility: CLINIC | Age: 58
End: 2018-02-26
Payer: COMMERCIAL

## 2018-02-26 VITALS
HEART RATE: 78 BPM | HEIGHT: 65 IN | DIASTOLIC BLOOD PRESSURE: 74 MMHG | TEMPERATURE: 97.2 F | BODY MASS INDEX: 35.99 KG/M2 | SYSTOLIC BLOOD PRESSURE: 126 MMHG | WEIGHT: 216 LBS | RESPIRATION RATE: 18 BRPM

## 2018-02-26 DIAGNOSIS — E78.2 MIXED HYPERLIPIDEMIA: ICD-10-CM

## 2018-02-26 DIAGNOSIS — K76.0 FATTY LIVER: ICD-10-CM

## 2018-02-26 DIAGNOSIS — E11.9 TYPE 2 DIABETES MELLITUS WITHOUT COMPLICATION, WITHOUT LONG-TERM CURRENT USE OF INSULIN (HCC): Primary | ICD-10-CM

## 2018-02-26 DIAGNOSIS — Z11.59 NEED FOR HEPATITIS C SCREENING TEST: ICD-10-CM

## 2018-02-26 DIAGNOSIS — I10 BENIGN ESSENTIAL HYPERTENSION: ICD-10-CM

## 2018-02-26 PROCEDURE — 99214 OFFICE O/P EST MOD 30 MIN: CPT | Performed by: FAMILY MEDICINE

## 2018-02-26 RX ORDER — GLIPIZIDE 10 MG/1
10 TABLET ORAL EVERY MORNING
Qty: 90 TABLET | Refills: 1 | Status: SHIPPED | OUTPATIENT
Start: 2018-02-26 | End: 2018-07-01 | Stop reason: ALTCHOICE

## 2018-02-26 NOTE — PROGRESS NOTES
Assessment/Plan:    Type 2 diabetes mellitus without complication, without long-term current use of insulin (HCC)  Worsening control, A1c is up from 7 8 to 8 4  Glipizide dose increased from 5 to 10mg  Low sugar diet and exercise encouraged    Mixed hyperlipidemia  Stable on 40 mg of atrovastatin    Benign essential hypertension  Well controlled    Fatty liver  Liver enzyme is up  Weight loss advised  Diagnoses and all orders for this visit:    Type 2 diabetes mellitus without complication, without long-term current use of insulin (HCC)  -     Hemoglobin A1c; Future  -     Microalbumin / creatinine urine ratio; Future  -     glipiZIDE (GLUCOTROL) 10 mg tablet; Take 1 tablet (10 mg total) by mouth every morning    Benign essential hypertension  -     Comprehensive metabolic panel; Future    Mixed hyperlipidemia  -     Lipid Panel with Direct LDL reflex; Future    Need for hepatitis C screening test  -     Hepatitis C antibody; Future    Fatty liver          There are no Patient Instructions on file for this visit  Return in about 3 months (around 5/26/2018) for Next scheduled follow up  Subjective:      Patient ID: Kamini Dailey is a 62 y o  female  Chief Complaint   Patient presents with    Follow-up     lab work     Blood Pressure Check      rmklpn       She has head congestion  She had her eye exam and it was good by Dr Hector Kenney  Hypertension   This is a chronic problem  The current episode started more than 1 year ago  The problem is controlled  Pertinent negatives include no chest pain or shortness of breath  The current treatment provides moderate improvement  Hyperlipidemia   This is a chronic problem  The current episode started more than 1 year ago  The problem is controlled  Pertinent negatives include no chest pain or shortness of breath  The current treatment provides moderate improvement of lipids  There are no compliance problems          The following portions of the patient's history were reviewed and updated as appropriate: allergies, current medications, past family history, past medical history, past social history, past surgical history and problem list     Review of Systems   Constitutional: Negative for fatigue  Respiratory: Negative for shortness of breath  Cardiovascular: Negative for chest pain  Current Outpatient Prescriptions   Medication Sig Dispense Refill    aspirin 81 MG tablet Take 81 mg by mouth every evening      atorvastatin (LIPITOR) 40 mg tablet Take 40 mg by mouth every evening      azelastine (ASTELIN) 0 1 % nasal spray 1 spray into each nostril 2 (two) times a day Use in each nostril as directed      BIOTIN PO Take 10,000 mcg by mouth every morning      desloratadine (CLARINEX) 5 MG tablet Take 5 mg by mouth every morning      Empagliflozin (JARDIANCE) 25 MG TABS Take 1 tablet by mouth daily      glipiZIDE (GLUCOTROL) 10 mg tablet Take 1 tablet (10 mg total) by mouth every morning 90 tablet 1    Glucose Blood (ONETOUCH ULTRA BLUE VI) by In Vitro route      hydrOXYzine pamoate (VISTARIL) 25 mg capsule Take 25 mg by mouth 3 (three) times a day as needed for itching      Levothyroxine Sodium (L-THYROXINE SODIUM) POWD 88 mg by Does not apply route every morning      metFORMIN (GLUCOPHAGE) 500 mg tablet Take 500 mg by mouth 2 (two) times a day with meals 2 tabs each dose =1000mg      metoprolol succinate (TOPROL-XL) 25 mg 24 hr tablet Take 25 mg by mouth every morning      Omega-3 Fatty Acids (FISH OIL PO) Take by mouth every evening      quinapril (ACCUPRIL) 40 MG tablet Take 40 mg by mouth daily at bedtime       No current facility-administered medications for this visit  Objective:    /74   Pulse 78   Temp (!) 97 2 °F (36 2 °C)   Resp 18   Ht 5' 5" (1 651 m)   Wt 98 kg (216 lb)   BMI 35 94 kg/m²        Physical Exam   Constitutional: She appears well-developed and well-nourished     HENT:   Head: Normocephalic and atraumatic  Right Ear: External ear normal    Left Ear: External ear normal    Mouth/Throat: Oropharynx is clear and moist    Cardiovascular: Normal rate, regular rhythm and normal heart sounds  Exam reveals no friction rub  No murmur heard  Pulmonary/Chest: Effort normal and breath sounds normal  No respiratory distress  She has no wheezes  She has no rales  Musculoskeletal: She exhibits no edema or deformity  Nursing note and vitals reviewed               Frederick Mills DO

## 2018-03-05 ENCOUNTER — TELEPHONE (OUTPATIENT)
Dept: FAMILY MEDICINE CLINIC | Facility: CLINIC | Age: 58
End: 2018-03-05

## 2018-03-14 DIAGNOSIS — E11.9 TYPE 2 DIABETES MELLITUS WITHOUT COMPLICATION, WITHOUT LONG-TERM CURRENT USE OF INSULIN (HCC): Primary | ICD-10-CM

## 2018-03-14 RX ORDER — EMPAGLIFLOZIN 25 MG/1
TABLET, FILM COATED ORAL
Qty: 90 TABLET | Refills: 1 | Status: SHIPPED | OUTPATIENT
Start: 2018-03-14 | End: 2018-09-12 | Stop reason: SDUPTHER

## 2018-03-29 PROCEDURE — 3072F LOW RISK FOR RETINOPATHY: CPT | Performed by: FAMILY MEDICINE

## 2018-05-05 DIAGNOSIS — I10 BENIGN ESSENTIAL HYPERTENSION: Primary | ICD-10-CM

## 2018-05-05 PROCEDURE — 4010F ACE/ARB THERAPY RXD/TAKEN: CPT | Performed by: FAMILY MEDICINE

## 2018-05-05 RX ORDER — QUINAPRIL 40 MG/1
TABLET ORAL
Qty: 90 TABLET | Refills: 1 | Status: SHIPPED | OUTPATIENT
Start: 2018-05-05 | End: 2018-11-01 | Stop reason: SDUPTHER

## 2018-05-29 DIAGNOSIS — E11.9 TYPE 2 DIABETES MELLITUS WITHOUT COMPLICATION, WITHOUT LONG-TERM CURRENT USE OF INSULIN (HCC): Primary | ICD-10-CM

## 2018-05-29 DIAGNOSIS — N30.10 CHRONIC INTERSTITIAL CYSTITIS: ICD-10-CM

## 2018-05-29 DIAGNOSIS — L29.9 PRURITUS: ICD-10-CM

## 2018-05-29 RX ORDER — HYDROXYZINE HYDROCHLORIDE 25 MG/1
TABLET, FILM COATED ORAL
Qty: 90 TABLET | Refills: 0 | Status: SHIPPED | OUTPATIENT
Start: 2018-05-29 | End: 2018-09-12 | Stop reason: SDUPTHER

## 2018-06-14 LAB
ALBUMIN SERPL-MCNC: 4.4 G/DL (ref 3.5–5.5)
ALBUMIN/CREAT UR: 498.2 MG/G CREAT (ref 0–30)
ALBUMIN/GLOB SERPL: 1.8 {RATIO} (ref 1.2–2.2)
ALP SERPL-CCNC: 55 IU/L (ref 39–117)
ALT SERPL-CCNC: 33 IU/L (ref 0–32)
AMBIG ABBREV DEFAULT: NORMAL
AST SERPL-CCNC: 23 IU/L (ref 0–40)
BILIRUB SERPL-MCNC: 0.4 MG/DL (ref 0–1.2)
BUN SERPL-MCNC: 26 MG/DL (ref 6–24)
BUN/CREAT SERPL: 25 (ref 9–23)
CALCIUM SERPL-MCNC: 9.7 MG/DL (ref 8.7–10.2)
CHLORIDE SERPL-SCNC: 99 MMOL/L (ref 96–106)
CHOLEST SERPL-MCNC: 165 MG/DL (ref 100–199)
CO2 SERPL-SCNC: 22 MMOL/L (ref 20–29)
CREAT SERPL-MCNC: 1.04 MG/DL (ref 0.57–1)
CREAT UR-MCNC: 67 MG/DL
GLOBULIN SER-MCNC: 2.4 G/DL (ref 1.5–4.5)
GLUCOSE SERPL-MCNC: 186 MG/DL (ref 65–99)
HBA1C MFR BLD: 8 % (ref 4.8–5.6)
HCV AB S/CO SERPL IA: <0.1 S/CO RATIO (ref 0–0.9)
HDLC SERPL-MCNC: 39 MG/DL
LDLC SERPL CALC-MCNC: 65 MG/DL (ref 0–99)
MICROALBUMIN UR-MCNC: 333.8 UG/ML
MICRODELETION SYND BLD/T FISH: NORMAL
MICRODELETION SYND BLD/T FISH: NORMAL
POTASSIUM SERPL-SCNC: 4.6 MMOL/L (ref 3.5–5.2)
PROT SERPL-MCNC: 6.8 G/DL (ref 6–8.5)
SL AMB EGFR AFRICAN AMERICAN: 68 ML/MIN/1.73
SL AMB EGFR NON AFRICAN AMERICAN: 59 ML/MIN/1.73
SL AMB PDF IMAGE: NORMAL
SODIUM SERPL-SCNC: 140 MMOL/L (ref 134–144)
TRIGL SERPL-MCNC: 307 MG/DL (ref 0–149)

## 2018-07-01 RX ORDER — LEVOTHYROXINE SODIUM 88 MCG
TABLET ORAL
COMMUNITY
Start: 2018-04-19 | End: 2018-10-15 | Stop reason: SDUPTHER

## 2018-07-02 ENCOUNTER — OFFICE VISIT (OUTPATIENT)
Dept: FAMILY MEDICINE CLINIC | Facility: CLINIC | Age: 58
End: 2018-07-02
Payer: COMMERCIAL

## 2018-07-02 VITALS
HEIGHT: 65 IN | HEART RATE: 76 BPM | SYSTOLIC BLOOD PRESSURE: 130 MMHG | TEMPERATURE: 98.2 F | RESPIRATION RATE: 18 BRPM | DIASTOLIC BLOOD PRESSURE: 72 MMHG | WEIGHT: 210 LBS | BODY MASS INDEX: 34.99 KG/M2

## 2018-07-02 DIAGNOSIS — Z12.31 SCREENING MAMMOGRAM, ENCOUNTER FOR: ICD-10-CM

## 2018-07-02 DIAGNOSIS — E11.9 TYPE 2 DIABETES MELLITUS WITHOUT COMPLICATION, WITHOUT LONG-TERM CURRENT USE OF INSULIN (HCC): Primary | ICD-10-CM

## 2018-07-02 DIAGNOSIS — E78.2 MIXED HYPERLIPIDEMIA: ICD-10-CM

## 2018-07-02 DIAGNOSIS — I10 BENIGN ESSENTIAL HYPERTENSION: ICD-10-CM

## 2018-07-02 PROCEDURE — 99214 OFFICE O/P EST MOD 30 MIN: CPT | Performed by: FAMILY MEDICINE

## 2018-07-02 RX ORDER — GLIPIZIDE 10 MG/1
10 TABLET, FILM COATED, EXTENDED RELEASE ORAL DAILY
COMMUNITY
End: 2018-07-02 | Stop reason: SDUPTHER

## 2018-07-02 RX ORDER — GLIPIZIDE 10 MG/1
TABLET, FILM COATED, EXTENDED RELEASE ORAL DAILY
COMMUNITY
Start: 2018-06-07 | End: 2018-07-02 | Stop reason: SDUPTHER

## 2018-07-02 RX ORDER — GLIPIZIDE 10 MG/1
20 TABLET, FILM COATED, EXTENDED RELEASE ORAL DAILY
Qty: 180 TABLET | Refills: 1 | Status: SHIPPED | OUTPATIENT
Start: 2018-07-02 | End: 2018-12-28 | Stop reason: SDUPTHER

## 2018-07-02 NOTE — PROGRESS NOTES
Assessment/Plan:    Problem List Items Addressed This Visit     Benign essential hypertension     Well controlled         Type 2 diabetes mellitus without complication, without long-term current use of insulin ([facility] [address] ) - Primary     Lab Results   Component Value Date    HGBA1C 8 0 (H) 06/13/2018       No results for input(s): POCGLU in the last 72 hours  Blood Sugar Average: Last 72 hrs:    A1c is down from 8 4 to 8 0   Glipizide dose increased from 10 to 20 mg a day           Relevant Medications    glipiZIDE (GLUCOTROL XL) 10 mg 24 hr tablet    Other Relevant Orders    Hemoglobin A1C    Comprehensive metabolic panel    Mixed hyperlipidemia     Stable  Atorvastatin 40 mg to be continued           Other Visit Diagnoses     Screening mammogram, encounter for        Relevant Orders    Mammo screening bilateral w cad          There are no Patient Instructions on file for this visit  Return in about 3 months (around 10/2/2018) for Next scheduled follow up  Subjective:      Patient ID: Chano Rolon is a 62 y o  female  Chief Complaint   Patient presents with    Follow-up     lab work results and blood pressure check  rmklpn       She has not had any low blood sugars  She is trying to watch her diet  She is trying to stay active  Today would be her 22th wedding anniversary  Hypertension   This is a chronic problem  The current episode started more than 1 year ago  The problem is controlled  Past treatments include ACE inhibitors and beta blockers  The current treatment provides moderate improvement  Hyperlipidemia   This is a chronic problem  The current episode started more than 1 year ago  The problem is controlled  Recent lipid tests were reviewed and are normal  Current antihyperlipidemic treatment includes statins  The current treatment provides moderate improvement of lipids  There are no compliance problems          The following portions of the patient's history were reviewed and updated as appropriate:  past social history    Review of Systems   Respiratory: Negative  Cardiovascular: Negative  Current Outpatient Prescriptions   Medication Sig Dispense Refill    aspirin 81 MG tablet Take 81 mg by mouth every evening      atorvastatin (LIPITOR) 40 mg tablet Take 40 mg by mouth every evening      azelastine (ASTELIN) 0 1 % nasal spray 1 spray into each nostril 2 (two) times a day Use in each nostril as directed      BIOTIN PO Take 10,000 mcg by mouth every morning      desloratadine (CLARINEX) 5 MG tablet Take 5 mg by mouth every morning      glipiZIDE (GLUCOTROL XL) 10 mg 24 hr tablet Take 2 tablets (20 mg total) by mouth daily 180 tablet 1    Glucose Blood (ONETOUCH ULTRA BLUE VI) by In Vitro route      hydrOXYzine HCL (ATARAX) 25 mg tablet TAKE 1 TABLET AT BEDTIME AS NEEDED 90 tablet 0    JARDIANCE 25 MG TABS TAKE 1 TABLET DAILY 90 tablet 1    metFORMIN (GLUCOPHAGE) 500 mg tablet Take 500 mg by mouth 2 (two) times a day with meals 2 tabs each dose =1000mg      metoprolol succinate (TOPROL-XL) 25 mg 24 hr tablet Take 25 mg by mouth every morning      Omega-3 Fatty Acids (FISH OIL PO) Take by mouth every evening      ONE TOUCH ULTRA TEST test strip USE TO TEST GLUCOSE TWICE A  each 1    quinapril (ACCUPRIL) 40 MG tablet TAKE 1 TABLET DAILY 90 tablet 1    SYNTHROID 88 MCG tablet       hydrOXYzine pamoate (VISTARIL) 25 mg capsule Take 25 mg by mouth 3 (three) times a day as needed for itching       No current facility-administered medications for this visit  Objective:    /72   Pulse 76   Temp 98 2 °F (36 8 °C)   Resp 18   Ht 5' 5" (1 651 m)   Wt 95 3 kg (210 lb)   BMI 34 95 kg/m²        Physical Exam   Constitutional: She appears well-developed and well-nourished  HENT:   Head: Normocephalic and atraumatic     Right Ear: External ear normal    Left Ear: External ear normal    Mouth/Throat: Oropharynx is clear and moist    Cardiovascular: Normal rate, regular rhythm and normal heart sounds  Exam reveals no friction rub  No murmur heard  Pulses:       Dorsalis pedis pulses are 2+ on the right side, and 2+ on the left side  Posterior tibial pulses are 2+ on the right side, and 2+ on the left side  Pulmonary/Chest: Effort normal and breath sounds normal  No respiratory distress  She has no wheezes  She has no rales  Musculoskeletal: She exhibits no edema or deformity  Feet:   Right Foot:   Skin Integrity: Negative for ulcer, skin breakdown, erythema, warmth, callus or dry skin  Left Foot:   Skin Integrity: Negative for ulcer, skin breakdown, erythema, warmth, callus or dry skin  Nursing note and vitals reviewed  Patient's shoes and socks removed  Right Foot/Ankle   Right Foot Inspection  Skin Exam: skin normal skin not intact, no dry skin, no warmth, no callus, no erythema, no maceration, no abnormal color, no pre-ulcer, no ulcer and no callus                          Toe Exam: ROM and strength within normal limits  Sensory       Monofilament testing: intact  Vascular  Capillary refills: < 3 seconds  The right DP pulse is 2+  The right PT pulse is 2+  Left Foot/Ankle  Left Foot Inspection  Skin Exam: skin normalskin not intact, no dry skin, no warmth, no erythema, no maceration, normal color, no pre-ulcer, no ulcer and no callus                         Toe Exam: ROM and strength within normal limits                   Sensory       Monofilament: intact  Vascular  Capillary refills: < 3 seconds  The left DP pulse is 2+  The left PT pulse is 2+     Assign Risk Category:  No deformity present; ;        Risk: 0         Douglas Pain, DO

## 2018-07-02 NOTE — ASSESSMENT & PLAN NOTE
Lab Results   Component Value Date    HGBA1C 8 0 (H) 06/13/2018       No results for input(s): POCGLU in the last 72 hours      Blood Sugar Average: Last 72 hrs:    A1c is down from 8 4 to 8 0   Glipizide dose increased from 10 to 20 mg a day

## 2018-07-10 ENCOUNTER — TELEPHONE (OUTPATIENT)
Dept: FAMILY MEDICINE CLINIC | Facility: CLINIC | Age: 58
End: 2018-07-10

## 2018-07-10 ENCOUNTER — HOSPITAL ENCOUNTER (OUTPATIENT)
Dept: RADIOLOGY | Facility: HOSPITAL | Age: 58
Discharge: HOME | End: 2018-07-10
Payer: COMMERCIAL

## 2018-07-10 DIAGNOSIS — Z12.31 SCREENING MAMMOGRAM, ENCOUNTER FOR: ICD-10-CM

## 2018-07-10 DIAGNOSIS — R92.8 ABNORMAL MAMMOGRAM OF RIGHT BREAST: Primary | ICD-10-CM

## 2018-07-10 PROCEDURE — 77067 SCR MAMMO BI INCL CAD: CPT

## 2018-07-10 NOTE — TELEPHONE ENCOUNTER
7/10/2018 4:27 PM spoke with patient regarding abnormal mammogram   She agreed to get follow-up studies done  Order for diagnostic mammogram put in her chart    Dr Kelby Najjar

## 2018-07-18 ENCOUNTER — HOSPITAL ENCOUNTER (OUTPATIENT)
Dept: RADIOLOGY | Facility: HOSPITAL | Age: 58
Discharge: HOME | End: 2018-07-18
Payer: COMMERCIAL

## 2018-07-18 ENCOUNTER — TRANSCRIBE ORDERS (OUTPATIENT)
Dept: ADMINISTRATIVE | Facility: HOSPITAL | Age: 58
End: 2018-07-18

## 2018-07-18 DIAGNOSIS — E11.9 TYPE 2 DIABETES MELLITUS WITHOUT COMPLICATION, WITHOUT LONG-TERM CURRENT USE OF INSULIN (HCC): Primary | ICD-10-CM

## 2018-07-18 DIAGNOSIS — E78.2 MIXED HYPERLIPIDEMIA: ICD-10-CM

## 2018-07-18 DIAGNOSIS — R92.8 ABNORMAL MAMMOGRAM OF RIGHT BREAST: ICD-10-CM

## 2018-07-18 DIAGNOSIS — I10 BENIGN ESSENTIAL HYPERTENSION: ICD-10-CM

## 2018-07-18 PROCEDURE — 77065 DX MAMMO INCL CAD UNI: CPT

## 2018-07-18 PROCEDURE — G0279 TOMOSYNTHESIS, MAMMO: HCPCS

## 2018-07-18 RX ORDER — METOPROLOL SUCCINATE 25 MG/1
TABLET, EXTENDED RELEASE ORAL
Qty: 90 TABLET | Refills: 0 | Status: SHIPPED | OUTPATIENT
Start: 2018-07-18 | End: 2018-10-16 | Stop reason: SDUPTHER

## 2018-07-18 RX ORDER — ATORVASTATIN CALCIUM 40 MG/1
TABLET, FILM COATED ORAL
Qty: 90 TABLET | Refills: 0 | Status: SHIPPED | OUTPATIENT
Start: 2018-07-18 | End: 2018-10-16 | Stop reason: SDUPTHER

## 2018-08-30 ENCOUNTER — OFFICE VISIT (OUTPATIENT)
Dept: FAMILY MEDICINE CLINIC | Facility: CLINIC | Age: 58
End: 2018-08-30
Payer: COMMERCIAL

## 2018-08-30 VITALS
DIASTOLIC BLOOD PRESSURE: 76 MMHG | WEIGHT: 210 LBS | HEART RATE: 84 BPM | SYSTOLIC BLOOD PRESSURE: 126 MMHG | BODY MASS INDEX: 34.99 KG/M2 | HEIGHT: 65 IN | RESPIRATION RATE: 16 BRPM | TEMPERATURE: 98 F

## 2018-08-30 DIAGNOSIS — L02.01 FACIAL ABSCESS: Primary | ICD-10-CM

## 2018-08-30 PROCEDURE — 96372 THER/PROPH/DIAG INJ SC/IM: CPT

## 2018-08-30 PROCEDURE — 99213 OFFICE O/P EST LOW 20 MIN: CPT | Performed by: FAMILY MEDICINE

## 2018-08-30 RX ORDER — IBUPROFEN 800 MG/1
800 TABLET ORAL EVERY 6 HOURS PRN
Qty: 60 TABLET | Refills: 0 | Status: SHIPPED | OUTPATIENT
Start: 2018-08-30 | End: 2018-10-16 | Stop reason: ALTCHOICE

## 2018-08-30 RX ORDER — CEPHALEXIN 500 MG/1
500 CAPSULE ORAL EVERY 12 HOURS SCHEDULED
Qty: 20 CAPSULE | Refills: 0 | Status: SHIPPED | OUTPATIENT
Start: 2018-08-30 | End: 2018-09-09

## 2018-08-30 RX ORDER — CEFTRIAXONE 1 G/1
1000 INJECTION, POWDER, FOR SOLUTION INTRAMUSCULAR; INTRAVENOUS EVERY 24 HOURS
Status: DISCONTINUED | OUTPATIENT
Start: 2018-08-30 | End: 2018-09-04

## 2018-08-30 RX ADMIN — CEFTRIAXONE 1000 MG: 1 INJECTION, POWDER, FOR SOLUTION INTRAMUSCULAR; INTRAVENOUS at 11:34

## 2018-08-30 NOTE — PROGRESS NOTES
Assessment/Plan:    Problem List Items Addressed This Visit     None      Visit Diagnoses     Facial abscess    -  Primary    Relevant Medications    cephalexin (KEFLEX) 500 mg capsule    cefTRIAXone (ROCEPHIN) injection 1,000 mg    ibuprofen (MOTRIN) 800 mg tablet          There are no Patient Instructions on file for this visit  Return in about 1 day (around 8/31/2018)  Subjective:      Patient ID: Rudy Alberts is a 62 y o  female  Chief Complaint   Patient presents with    lesion     right side of face-jaw line--lj       Pt had an ingrown hair states she was able to get it out on her own,  She felt there was something in it and now the rt side of her face is red and swollen  Warm tender  The following portions of the patient's history were reviewed and updated as appropriate: allergies, current medications, past family history, past medical history, past social history, past surgical history and problem list     Review of Systems   Constitutional: Positive for chills and fever  Negative for activity change, appetite change, diaphoresis and fatigue  HENT: Negative  Negative for dental problem, ear pain, sinus pressure and sore throat  Eyes: Negative  Negative for photophobia, pain, discharge, redness, itching and visual disturbance  Respiratory: Negative for apnea and chest tightness  Cardiovascular: Negative  Negative for chest pain, palpitations and leg swelling  Gastrointestinal: Negative  Negative for abdominal distention, abdominal pain, constipation and diarrhea  Endocrine: Negative  Negative for cold intolerance and heat intolerance  Genitourinary: Negative  Negative for difficulty urinating and dyspareunia  Musculoskeletal: Negative  Negative for arthralgias and back pain  Skin: Positive for color change, rash and wound  Allergic/Immunologic: Negative for environmental allergies  Neurological: Negative  Negative for dizziness  Psychiatric/Behavioral: Negative  Negative for agitation           Current Outpatient Prescriptions   Medication Sig Dispense Refill    aspirin 81 MG tablet Take 81 mg by mouth every evening      atorvastatin (LIPITOR) 40 mg tablet TAKE 1 TABLET DAILY 90 tablet 0    azelastine (ASTELIN) 0 1 % nasal spray 1 spray into each nostril 2 (two) times a day Use in each nostril as directed      BIOTIN PO Take 10,000 mcg by mouth every morning      desloratadine (CLARINEX) 5 MG tablet Take 5 mg by mouth every morning      glipiZIDE (GLUCOTROL XL) 10 mg 24 hr tablet Take 2 tablets (20 mg total) by mouth daily 180 tablet 1    Glucose Blood (ONETOUCH ULTRA BLUE VI) by In Vitro route      hydrOXYzine HCL (ATARAX) 25 mg tablet TAKE 1 TABLET AT BEDTIME AS NEEDED 90 tablet 0    JARDIANCE 25 MG TABS TAKE 1 TABLET DAILY 90 tablet 1    metFORMIN (GLUCOPHAGE) 500 mg tablet TAKE 2 TABLETS TWICE A  tablet 0    metoprolol succinate (TOPROL-XL) 25 mg 24 hr tablet TAKE 1 TABLET DAILY AT BEDTIME 90 tablet 0    Omega-3 Fatty Acids (FISH OIL PO) Take by mouth every evening      ONE TOUCH ULTRA TEST test strip USE TO TEST GLUCOSE TWICE A  each 1    quinapril (ACCUPRIL) 40 MG tablet TAKE 1 TABLET DAILY 90 tablet 1    SYNTHROID 88 MCG tablet       cephalexin (KEFLEX) 500 mg capsule Take 1 capsule (500 mg total) by mouth every 12 (twelve) hours for 10 days 20 capsule 0    ibuprofen (MOTRIN) 800 mg tablet Take 1 tablet (800 mg total) by mouth every 6 (six) hours as needed for mild pain 60 tablet 0     Current Facility-Administered Medications   Medication Dose Route Frequency Provider Last Rate Last Dose    cefTRIAXone (ROCEPHIN) injection 1,000 mg  1,000 mg Intravenous Q24H Nancy Whyte,            Objective:    /76   Pulse 84   Temp 98 °F (36 7 °C)   Resp 16   Ht 5' 5" (1 651 m)   Wt 95 3 kg (210 lb)   BMI 34 95 kg/m²        Physical Exam   Constitutional: She appears well-developed and well-nourished  No distress  HENT:   Head: Normocephalic and atraumatic  Right Ear: External ear normal    Left Ear: External ear normal    Nose: Nose normal    Mouth/Throat: Oropharynx is clear and moist  No oropharyngeal exudate  Eyes: EOM are normal  Pupils are equal, round, and reactive to light  Right eye exhibits no discharge  Left eye exhibits no discharge  No scleral icterus  Neck: No thyromegaly present  Cardiovascular: Normal rate and normal heart sounds  No murmur heard  Pulmonary/Chest: Effort normal and breath sounds normal  No respiratory distress  She has no wheezes  Abdominal: Soft  Bowel sounds are normal  She exhibits no distension and no mass  There is no tenderness  There is no rebound and no guarding  Musculoskeletal: Normal range of motion  Neurological: She is alert  She displays normal reflexes  Coordination normal    Skin: Skin is warm and dry  Rash noted  She is not diaphoretic  No erythema  Psychiatric: She has a normal mood and affect  Her behavior is normal    Nursing note and vitals reviewed               Kalli Bansal DO

## 2018-08-31 ENCOUNTER — OFFICE VISIT (OUTPATIENT)
Dept: FAMILY MEDICINE CLINIC | Facility: CLINIC | Age: 58
End: 2018-08-31
Payer: COMMERCIAL

## 2018-08-31 VITALS
WEIGHT: 210 LBS | SYSTOLIC BLOOD PRESSURE: 128 MMHG | HEART RATE: 92 BPM | TEMPERATURE: 98.5 F | RESPIRATION RATE: 18 BRPM | HEIGHT: 65 IN | DIASTOLIC BLOOD PRESSURE: 78 MMHG | BODY MASS INDEX: 34.99 KG/M2

## 2018-08-31 DIAGNOSIS — L02.01 FACIAL ABSCESS: Primary | ICD-10-CM

## 2018-08-31 PROCEDURE — 99213 OFFICE O/P EST LOW 20 MIN: CPT | Performed by: FAMILY MEDICINE

## 2018-08-31 RX ORDER — AZELASTINE HCL 205.5 UG/1
SPRAY NASAL DAILY
COMMUNITY
Start: 2018-07-18 | End: 2019-02-11

## 2018-08-31 NOTE — PROGRESS NOTES
Assessment/Plan:    Problem List Items Addressed This Visit     None      Visit Diagnoses     Facial abscess    -  Primary      already improved from yesterday  Indurated, not flutuant  Will cont abx pt will follow on tuesday    There are no Patient Instructions on file for this visit  Return in about 4 days (around 9/4/2018) for tuesday w Ernesto Dye  Subjective:      Patient ID: Haily Reyes is a 62 y o  female  Chief Complaint   Patient presents with    Follow-up     infection of right side of face akrma       Pt is here to follow up facial abscess  Was sen yesterday started on abx  Pt states she feels better  Less pain jj does hurt has been taking motrin  No fever no chills        The following portions of the patient's history were reviewed and updated as appropriate: allergies, current medications, past family history, past medical history, past social history, past surgical history and problem list     Review of Systems   Constitutional: Negative  Negative for activity change, appetite change, chills, diaphoresis and fatigue  HENT: Negative  Negative for dental problem, ear pain, sinus pressure and sore throat  Eyes: Negative  Negative for photophobia, pain, discharge, redness, itching and visual disturbance  Respiratory: Negative for apnea and chest tightness  Cardiovascular: Negative  Negative for chest pain, palpitations and leg swelling  Gastrointestinal: Negative  Negative for abdominal distention, abdominal pain, constipation and diarrhea  Endocrine: Negative  Negative for cold intolerance and heat intolerance  Genitourinary: Negative  Negative for difficulty urinating and dyspareunia  Musculoskeletal: Negative  Negative for arthralgias and back pain  Skin: Positive for rash and wound  Improved  Less erythema on chest   Allergic/Immunologic: Negative for environmental allergies  Neurological: Negative  Negative for dizziness     Psychiatric/Behavioral: Negative  Negative for agitation           Current Outpatient Prescriptions   Medication Sig Dispense Refill    aspirin 81 MG tablet Take 81 mg by mouth every evening      atorvastatin (LIPITOR) 40 mg tablet TAKE 1 TABLET DAILY 90 tablet 0    azelastine (ASTELIN) 0 1 % nasal spray 1 spray into each nostril 2 (two) times a day Use in each nostril as directed      BIOTIN PO Take 10,000 mcg by mouth every morning      cephalexin (KEFLEX) 500 mg capsule Take 1 capsule (500 mg total) by mouth every 12 (twelve) hours for 10 days 20 capsule 0    desloratadine (CLARINEX) 5 MG tablet Take 5 mg by mouth every morning      glipiZIDE (GLUCOTROL XL) 10 mg 24 hr tablet Take 2 tablets (20 mg total) by mouth daily 180 tablet 1    Glucose Blood (ONETOUCH ULTRA BLUE VI) by In Vitro route      hydrOXYzine HCL (ATARAX) 25 mg tablet TAKE 1 TABLET AT BEDTIME AS NEEDED 90 tablet 0    ibuprofen (MOTRIN) 800 mg tablet Take 1 tablet (800 mg total) by mouth every 6 (six) hours as needed for mild pain 60 tablet 0    JARDIANCE 25 MG TABS TAKE 1 TABLET DAILY 90 tablet 1    metFORMIN (GLUCOPHAGE) 500 mg tablet TAKE 2 TABLETS TWICE A  tablet 0    metoprolol succinate (TOPROL-XL) 25 mg 24 hr tablet TAKE 1 TABLET DAILY AT BEDTIME 90 tablet 0    Omega-3 Fatty Acids (FISH OIL PO) Take by mouth every evening      ONE TOUCH ULTRA TEST test strip USE TO TEST GLUCOSE TWICE A  each 1    quinapril (ACCUPRIL) 40 MG tablet TAKE 1 TABLET DAILY 90 tablet 1    SYNTHROID 88 MCG tablet       Azelastine HCl 0 15 % SOLN daily       Current Facility-Administered Medications   Medication Dose Route Frequency Provider Last Rate Last Dose    cefTRIAXone (ROCEPHIN) injection 1,000 mg  1,000 mg Intravenous Q24H Toy Luis Antonio, DO   1,000 mg at 08/30/18 1134       Objective:    /78   Pulse 92   Temp 98 5 °F (36 9 °C)   Resp 18   Ht 5' 5" (1 651 m)   Wt 95 3 kg (210 lb)   BMI 34 95 kg/m²        Physical Exam   Constitutional: She appears well-developed and well-nourished  No distress  HENT:   Head: Normocephalic and atraumatic  Right Ear: External ear normal    Left Ear: External ear normal    Nose: Nose normal    Mouth/Throat: Oropharynx is clear and moist  No oropharyngeal exudate  Eyes: EOM are normal  Pupils are equal, round, and reactive to light  Right eye exhibits no discharge  Left eye exhibits no discharge  No scleral icterus  Neck: No thyromegaly present  Cardiovascular: Normal rate and normal heart sounds  No murmur heard  Pulmonary/Chest: Effort normal and breath sounds normal  No respiratory distress  She has no wheezes  Abdominal: Soft  Bowel sounds are normal  She exhibits no distension and no mass  There is no tenderness  There is no rebound and no guarding  Musculoskeletal: Normal range of motion  Neurological: She is alert  She displays normal reflexes  Coordination normal    Skin: Skin is warm and dry  No rash noted  She is not diaphoretic  No erythema  Psychiatric: She has a normal mood and affect  Her behavior is normal    Nursing note and vitals reviewed               Shaw Cardenas DO

## 2018-09-04 ENCOUNTER — OFFICE VISIT (OUTPATIENT)
Dept: FAMILY MEDICINE CLINIC | Facility: CLINIC | Age: 58
End: 2018-09-04
Payer: COMMERCIAL

## 2018-09-04 VITALS
WEIGHT: 212 LBS | RESPIRATION RATE: 16 BRPM | TEMPERATURE: 98 F | BODY MASS INDEX: 35.32 KG/M2 | HEIGHT: 65 IN | HEART RATE: 84 BPM | DIASTOLIC BLOOD PRESSURE: 76 MMHG | SYSTOLIC BLOOD PRESSURE: 112 MMHG

## 2018-09-04 DIAGNOSIS — L02.01 FACIAL ABSCESS: Primary | ICD-10-CM

## 2018-09-04 PROCEDURE — 99213 OFFICE O/P EST LOW 20 MIN: CPT | Performed by: NURSE PRACTITIONER

## 2018-09-04 PROCEDURE — 3008F BODY MASS INDEX DOCD: CPT | Performed by: NURSE PRACTITIONER

## 2018-09-04 NOTE — PROGRESS NOTES
Assessment/Plan:    She will continue her antibiotics and warm compresses  She will follow up in 48 hours if swelling does not continue to improve  Problem List Items Addressed This Visit     None      Visit Diagnoses     Facial abscess    -  Primary          There are no Patient Instructions on file for this visit  Return if symptoms worsen or fail to improve  Subjective:      Patient ID: Giovanny Fuller is a 62 y o  female  Chief Complaint   Patient presents with    Follow-up     right side of face lesion--lj       Here today for f/u right fascial abscess  This started after she picked at an ingrown hair  The following day, it was extremely swollen and red  She developed red streaking onto her chest   Was evaluated in the office last week and was given Rocephin and started on PO antibiotics  She reports significant improvement  Swelling is down 75% from previous and pain has resolved  No drainage or fevers  The following portions of the patient's history were reviewed and updated as appropriate: allergies, current medications, past family history, past medical history, past social history, past surgical history and problem list     Review of Systems   Constitutional: Negative  Skin: Positive for wound (see HPI)           Current Outpatient Prescriptions   Medication Sig Dispense Refill    aspirin 81 MG tablet Take 81 mg by mouth every evening      atorvastatin (LIPITOR) 40 mg tablet TAKE 1 TABLET DAILY 90 tablet 0    azelastine (ASTELIN) 0 1 % nasal spray 1 spray into each nostril 2 (two) times a day Use in each nostril as directed      Azelastine HCl 0 15 % SOLN daily      BIOTIN PO Take 10,000 mcg by mouth every morning      cephalexin (KEFLEX) 500 mg capsule Take 1 capsule (500 mg total) by mouth every 12 (twelve) hours for 10 days 20 capsule 0    desloratadine (CLARINEX) 5 MG tablet Take 5 mg by mouth every morning      glipiZIDE (GLUCOTROL XL) 10 mg 24 hr tablet Take 2 tablets (20 mg total) by mouth daily 180 tablet 1    Glucose Blood (ONETOUCH ULTRA BLUE VI) by In Vitro route      hydrOXYzine HCL (ATARAX) 25 mg tablet TAKE 1 TABLET AT BEDTIME AS NEEDED 90 tablet 0    ibuprofen (MOTRIN) 800 mg tablet Take 1 tablet (800 mg total) by mouth every 6 (six) hours as needed for mild pain 60 tablet 0    JARDIANCE 25 MG TABS TAKE 1 TABLET DAILY 90 tablet 1    metFORMIN (GLUCOPHAGE) 500 mg tablet TAKE 2 TABLETS TWICE A  tablet 0    metoprolol succinate (TOPROL-XL) 25 mg 24 hr tablet TAKE 1 TABLET DAILY AT BEDTIME 90 tablet 0    Omega-3 Fatty Acids (FISH OIL PO) Take by mouth every evening      ONE TOUCH ULTRA TEST test strip USE TO TEST GLUCOSE TWICE A  each 1    quinapril (ACCUPRIL) 40 MG tablet TAKE 1 TABLET DAILY 90 tablet 1    SYNTHROID 88 MCG tablet        No current facility-administered medications for this visit  Objective:    /76   Pulse 84   Temp 98 °F (36 7 °C)   Resp 16   Ht 5' 5" (1 651 m)   Wt 96 2 kg (212 lb)   BMI 35 28 kg/m²        Physical Exam   Constitutional: She appears well-developed and well-nourished  Cardiovascular: Normal rate, regular rhythm and normal heart sounds  No murmur heard  Pulmonary/Chest: Effort normal and breath sounds normal    Neurological: She is alert  Skin: Skin is warm and dry  Psychiatric: She has a normal mood and affect  Nursing note and vitals reviewed                   Josseline Santana

## 2018-09-12 DIAGNOSIS — E11.9 TYPE 2 DIABETES MELLITUS WITHOUT COMPLICATION, WITHOUT LONG-TERM CURRENT USE OF INSULIN (HCC): ICD-10-CM

## 2018-09-12 DIAGNOSIS — J30.9 ALLERGIC RHINITIS, UNSPECIFIED SEASONALITY, UNSPECIFIED TRIGGER: Primary | ICD-10-CM

## 2018-09-12 DIAGNOSIS — N30.10 CHRONIC INTERSTITIAL CYSTITIS: ICD-10-CM

## 2018-09-12 RX ORDER — HYDROXYZINE HYDROCHLORIDE 25 MG/1
TABLET, FILM COATED ORAL
Qty: 90 TABLET | Refills: 0 | Status: SHIPPED | OUTPATIENT
Start: 2018-09-12 | End: 2018-12-03 | Stop reason: SDUPTHER

## 2018-09-12 RX ORDER — DESLORATADINE 5 MG/1
TABLET ORAL
Qty: 90 TABLET | Refills: 2 | Status: SHIPPED | OUTPATIENT
Start: 2018-09-12 | End: 2019-03-06 | Stop reason: SDUPTHER

## 2018-09-12 RX ORDER — EMPAGLIFLOZIN 25 MG/1
TABLET, FILM COATED ORAL
Qty: 90 TABLET | Refills: 1 | Status: SHIPPED | OUTPATIENT
Start: 2018-09-12 | End: 2019-03-06 | Stop reason: SDUPTHER

## 2018-10-13 LAB
ALBUMIN SERPL-MCNC: 4.6 G/DL (ref 3.5–5.5)
ALBUMIN/GLOB SERPL: 1.8 {RATIO} (ref 1.2–2.2)
ALP SERPL-CCNC: 62 IU/L (ref 39–117)
ALT SERPL-CCNC: 40 IU/L (ref 0–32)
AST SERPL-CCNC: 30 IU/L (ref 0–40)
BILIRUB SERPL-MCNC: 0.3 MG/DL (ref 0–1.2)
BUN SERPL-MCNC: 25 MG/DL (ref 6–24)
BUN/CREAT SERPL: 26 (ref 9–23)
CALCIUM SERPL-MCNC: 9.6 MG/DL (ref 8.7–10.2)
CHLORIDE SERPL-SCNC: 100 MMOL/L (ref 96–106)
CO2 SERPL-SCNC: 20 MMOL/L (ref 20–29)
CREAT SERPL-MCNC: 0.95 MG/DL (ref 0.57–1)
GLOBULIN SER-MCNC: 2.6 G/DL (ref 1.5–4.5)
GLUCOSE SERPL-MCNC: 227 MG/DL (ref 65–99)
HBA1C MFR BLD: 9.1 % (ref 4.8–5.6)
LABCORP COMMENT: NORMAL
POTASSIUM SERPL-SCNC: 4 MMOL/L (ref 3.5–5.2)
PROT SERPL-MCNC: 7.2 G/DL (ref 6–8.5)
SL AMB EGFR AFRICAN AMERICAN: 76 ML/MIN/1.73
SL AMB EGFR NON AFRICAN AMERICAN: 66 ML/MIN/1.73
SODIUM SERPL-SCNC: 137 MMOL/L (ref 134–144)

## 2018-10-15 ENCOUNTER — TELEPHONE (OUTPATIENT)
Dept: FAMILY MEDICINE CLINIC | Facility: CLINIC | Age: 58
End: 2018-10-15

## 2018-10-15 DIAGNOSIS — E03.9 PRIMARY HYPOTHYROIDISM: Primary | ICD-10-CM

## 2018-10-15 RX ORDER — LEVOTHYROXINE SODIUM 88 UG/1
TABLET ORAL
Qty: 90 TABLET | Refills: 1 | Status: SHIPPED | OUTPATIENT
Start: 2018-10-15 | End: 2019-02-11 | Stop reason: SDUPTHER

## 2018-10-16 ENCOUNTER — OFFICE VISIT (OUTPATIENT)
Dept: FAMILY MEDICINE CLINIC | Facility: CLINIC | Age: 58
End: 2018-10-16
Payer: COMMERCIAL

## 2018-10-16 VITALS
TEMPERATURE: 97.8 F | HEART RATE: 90 BPM | HEIGHT: 65 IN | SYSTOLIC BLOOD PRESSURE: 122 MMHG | WEIGHT: 212 LBS | RESPIRATION RATE: 18 BRPM | DIASTOLIC BLOOD PRESSURE: 86 MMHG | BODY MASS INDEX: 35.32 KG/M2

## 2018-10-16 DIAGNOSIS — Z23 NEED FOR VACCINATION: ICD-10-CM

## 2018-10-16 DIAGNOSIS — E11.9 TYPE 2 DIABETES MELLITUS WITHOUT COMPLICATION, WITHOUT LONG-TERM CURRENT USE OF INSULIN (HCC): Primary | ICD-10-CM

## 2018-10-16 DIAGNOSIS — E11.9 TYPE 2 DIABETES MELLITUS WITHOUT COMPLICATION, WITHOUT LONG-TERM CURRENT USE OF INSULIN (HCC): ICD-10-CM

## 2018-10-16 DIAGNOSIS — I10 BENIGN ESSENTIAL HYPERTENSION: ICD-10-CM

## 2018-10-16 DIAGNOSIS — E03.9 PRIMARY HYPOTHYROIDISM: ICD-10-CM

## 2018-10-16 DIAGNOSIS — E78.2 MIXED HYPERLIPIDEMIA: ICD-10-CM

## 2018-10-16 PROCEDURE — 90471 IMMUNIZATION ADMIN: CPT

## 2018-10-16 PROCEDURE — 90682 RIV4 VACC RECOMBINANT DNA IM: CPT

## 2018-10-16 PROCEDURE — 99214 OFFICE O/P EST MOD 30 MIN: CPT | Performed by: FAMILY MEDICINE

## 2018-10-16 RX ORDER — METOPROLOL SUCCINATE 25 MG/1
TABLET, EXTENDED RELEASE ORAL
Qty: 90 TABLET | Refills: 1 | Status: SHIPPED | OUTPATIENT
Start: 2018-10-16 | End: 2019-06-07 | Stop reason: SDUPTHER

## 2018-10-16 RX ORDER — ATORVASTATIN CALCIUM 40 MG/1
TABLET, FILM COATED ORAL
Qty: 90 TABLET | Refills: 1 | Status: SHIPPED | OUTPATIENT
Start: 2018-10-16 | End: 2019-04-14 | Stop reason: SDUPTHER

## 2018-10-16 NOTE — PROGRESS NOTES
Assessment/Plan:    Problem List Items Addressed This Visit     Benign essential hypertension     Well controlled         Relevant Orders    CBC    Comprehensive metabolic panel    Mixed hyperlipidemia     stable         Relevant Orders    Comprehensive metabolic panel    Lipid Panel with Direct LDL reflex    Primary hypothyroidism    Relevant Orders    TSH, 3rd generation    T4, free    Type 2 diabetes mellitus without complication, without long-term current use of insulin (HCC) - Primary     Lab Results   Component Value Date    HGBA1C 9 1 (H) 10/12/2018       No results for input(s): POCGLU in the last 72 hours  Blood Sugar Average: Last 72 hrs:    Diabetes is not controlled  I recommended that she start insulin, she is not ready  She is really going to work on diet and exercise  And recheck labs in 3 months  Relevant Orders    Comprehensive metabolic panel    Hemoglobin A1C      Other Visit Diagnoses     Need for vaccination        Relevant Orders    influenza vaccine, [id number], quadrivalent, recombinant, PF, 0 5 mL, for patients 18 yr+ (FLU[facility]OK) (Completed)          Patient Instructions           Return in about 3 months (around 1/16/2019) for Next scheduled follow up  Subjective:      Patient ID: Isael Enrique is a 62 y o  female  Chief Complaint   Patient presents with    Lab result review       She has been eating a lot of ice cream this past summer  Hypertension   This is a chronic problem  The current episode started more than 1 year ago  The problem is controlled  Past treatments include beta blockers  The current treatment provides moderate improvement  Hyperlipidemia   This is a chronic problem  The current episode started more than 1 year ago  The problem is controlled  Recent lipid tests were reviewed and are normal  Current antihyperlipidemic treatment includes statins  The current treatment provides moderate improvement of lipids  There are no compliance problems  The following portions of the patient's history were reviewed and updated as appropriate:  past social history    Review of Systems   Constitutional: Positive for fatigue  Endocrine: Positive for polydipsia  Current Outpatient Prescriptions   Medication Sig Dispense Refill    aspirin 81 MG tablet Take 81 mg by mouth every evening      atorvastatin (LIPITOR) 40 mg tablet TAKE 1 TABLET DAILY 90 tablet 1    azelastine (ASTELIN) 0 1 % nasal spray 1 spray into each nostril 2 (two) times a day Use in each nostril as directed      Azelastine HCl 0 15 % SOLN daily      BIOTIN PO Take 10,000 mcg by mouth every morning      desloratadine (CLARINEX) 5 MG tablet TAKE 1 TABLET DAILY 90 tablet 2    glipiZIDE (GLUCOTROL XL) 10 mg 24 hr tablet Take 2 tablets (20 mg total) by mouth daily 180 tablet 1    Glucose Blood (ONETOUCH ULTRA BLUE VI) by In Vitro route      hydrOXYzine HCL (ATARAX) 25 mg tablet TAKE 1 TABLET AT BEDTIME AS NEEDED 90 tablet 0    JARDIANCE 25 MG TABS TAKE 1 TABLET DAILY 90 tablet 1    levothyroxine 88 mcg tablet TAKE 1 TABLET DAILY 90 tablet 1    metFORMIN (GLUCOPHAGE) 500 mg tablet TAKE 2 TABLETS TWICE A  tablet 1    metoprolol succinate (TOPROL-XL) 25 mg 24 hr tablet TAKE 1 TABLET DAILY AT BEDTIME 90 tablet 1    Omega-3 Fatty Acids (FISH OIL PO) Take by mouth every evening      ONE TOUCH ULTRA TEST test strip USE TO TEST GLUCOSE TWICE A  each 1    quinapril (ACCUPRIL) 40 MG tablet TAKE 1 TABLET DAILY 90 tablet 1     No current facility-administered medications for this visit  Objective:    /86   Pulse 90   Temp 97 8 °F (36 6 °C)   Resp 18   Ht 5' 5" (1 651 m)   Wt 96 2 kg (212 lb)   BMI 35 28 kg/m²        Physical Exam   Constitutional: She appears well-developed and well-nourished  HENT:   Head: Normocephalic and atraumatic     Right Ear: External ear normal    Left Ear: External ear normal    Mouth/Throat: Oropharynx is clear and moist  Cardiovascular: Normal rate, regular rhythm and normal heart sounds  Exam reveals no friction rub  No murmur heard  Pulmonary/Chest: Effort normal and breath sounds normal  No respiratory distress  She has no wheezes  She has no rales  Musculoskeletal: She exhibits no edema or deformity  Psychiatric: She has a normal mood and affect  Her behavior is normal  Judgment and thought content normal    Nursing note and vitals reviewed               Trevon Torres DO

## 2018-10-16 NOTE — ASSESSMENT & PLAN NOTE
Lab Results   Component Value Date    HGBA1C 9 1 (H) 10/12/2018       No results for input(s): POCGLU in the last 72 hours  Blood Sugar Average: Last 72 hrs:    Diabetes is not controlled  I recommended that she start insulin, she is not ready  She is really going to work on diet and exercise  And recheck labs in 3 months

## 2018-11-01 DIAGNOSIS — I10 BENIGN ESSENTIAL HYPERTENSION: ICD-10-CM

## 2018-11-01 PROCEDURE — 4010F ACE/ARB THERAPY RXD/TAKEN: CPT | Performed by: FAMILY MEDICINE

## 2018-11-01 RX ORDER — QUINAPRIL 40 MG/1
TABLET ORAL
Qty: 90 TABLET | Refills: 1 | Status: SHIPPED | OUTPATIENT
Start: 2018-11-01 | End: 2019-07-14 | Stop reason: SDUPTHER

## 2018-11-23 ENCOUNTER — OFFICE VISIT (OUTPATIENT)
Dept: FAMILY MEDICINE CLINIC | Facility: CLINIC | Age: 58
End: 2018-11-23
Payer: COMMERCIAL

## 2018-11-23 VITALS
HEART RATE: 84 BPM | DIASTOLIC BLOOD PRESSURE: 88 MMHG | TEMPERATURE: 99 F | RESPIRATION RATE: 16 BRPM | SYSTOLIC BLOOD PRESSURE: 138 MMHG | BODY MASS INDEX: 34.82 KG/M2 | HEIGHT: 65 IN | WEIGHT: 209 LBS

## 2018-11-23 DIAGNOSIS — H10.9 CONJUNCTIVITIS OF BOTH EYES, UNSPECIFIED CONJUNCTIVITIS TYPE: Primary | ICD-10-CM

## 2018-11-23 DIAGNOSIS — E11.9 TYPE 2 DIABETES MELLITUS WITHOUT COMPLICATION, WITHOUT LONG-TERM CURRENT USE OF INSULIN (HCC): ICD-10-CM

## 2018-11-23 DIAGNOSIS — E78.2 MIXED HYPERLIPIDEMIA: ICD-10-CM

## 2018-11-23 DIAGNOSIS — I10 BENIGN ESSENTIAL HYPERTENSION: ICD-10-CM

## 2018-11-23 PROCEDURE — 99214 OFFICE O/P EST MOD 30 MIN: CPT | Performed by: NURSE PRACTITIONER

## 2018-11-23 PROCEDURE — 3079F DIAST BP 80-89 MM HG: CPT | Performed by: NURSE PRACTITIONER

## 2018-11-23 PROCEDURE — 3075F SYST BP GE 130 - 139MM HG: CPT | Performed by: NURSE PRACTITIONER

## 2018-11-23 PROCEDURE — 1036F TOBACCO NON-USER: CPT | Performed by: NURSE PRACTITIONER

## 2018-11-23 PROCEDURE — 3008F BODY MASS INDEX DOCD: CPT | Performed by: NURSE PRACTITIONER

## 2018-11-23 RX ORDER — TOBRAMYCIN AND DEXAMETHASONE 3; 1 MG/ML; MG/ML
1 SUSPENSION/ DROPS OPHTHALMIC
Qty: 10 ML | Refills: 0 | Status: SHIPPED | OUTPATIENT
Start: 2018-11-23 | End: 2019-05-06 | Stop reason: ALTCHOICE

## 2018-11-23 NOTE — PROGRESS NOTES
Subjective:      Patient ID: Deana Rodrigez is a 62 y o  female  Chief Complaint   Patient presents with    Eye Problem     Both eyes itch prcma       HPI  Patient stated that started with eye dryness about a week ago and now progressed to mild redness and itching of eyes and had yellow discharge this morning  Denies fever, chills, and vision changes  Currently not taking any medications for symptoms  Complaint with her BP medication and statin  Excercising and trying to loose weight  The following portions of the patient's history were reviewed and updated as appropriate: allergies, current medications, past family history, past medical history, past social history, past surgical history and problem list       Review of Systems   Constitutional: Negative  HENT: Negative  Eyes: Positive for discharge, redness and itching  Negative for photophobia, pain and visual disturbance  Respiratory: Negative  Cardiovascular: Negative  Genitourinary: Negative  Neurological: Negative  Objective:    History   Smoking Status    Former Smoker    Packs/day: 1 00    Years: 10 00    Quit date: 1987   Smokeless Tobacco    Never Used     Comment: Last  Assessed: 9/29/2017       Allergies:    Allergies   Allergen Reactions    Other        Vitals:  /88   Pulse 84   Temp 99 °F (37 2 °C)   Resp 16   Ht 5' 5" (1 651 m)   Wt 94 8 kg (209 lb)   BMI 34 78 kg/m²     Current Outpatient Prescriptions   Medication Sig Dispense Refill    aspirin 81 MG tablet Take 81 mg by mouth every evening      atorvastatin (LIPITOR) 40 mg tablet TAKE 1 TABLET DAILY 90 tablet 1    azelastine (ASTELIN) 0 1 % nasal spray 1 spray into each nostril 2 (two) times a day Use in each nostril as directed      Azelastine HCl 0 15 % SOLN daily      BIOTIN PO Take 10,000 mcg by mouth every morning      desloratadine (CLARINEX) 5 MG tablet TAKE 1 TABLET DAILY 90 tablet 2    glipiZIDE (GLUCOTROL XL) 10 mg 24 hr tablet Take 2 tablets (20 mg total) by mouth daily 180 tablet 1    Glucose Blood (ONETOUCH ULTRA BLUE VI) by In Vitro route      hydrOXYzine HCL (ATARAX) 25 mg tablet TAKE 1 TABLET AT BEDTIME AS NEEDED 90 tablet 0    JARDIANCE 25 MG TABS TAKE 1 TABLET DAILY 90 tablet 1    levothyroxine 88 mcg tablet TAKE 1 TABLET DAILY 90 tablet 1    metFORMIN (GLUCOPHAGE) 500 mg tablet TAKE 2 TABLETS TWICE A  tablet 1    metoprolol succinate (TOPROL-XL) 25 mg 24 hr tablet TAKE 1 TABLET DAILY AT BEDTIME 90 tablet 1    Omega-3 Fatty Acids (FISH OIL PO) Take by mouth every evening      ONE TOUCH ULTRA TEST test strip USE TO TEST GLUCOSE TWICE A  each 1    quinapril (ACCUPRIL) 40 MG tablet TAKE 1 TABLET DAILY 90 tablet 1    tobramycin-dexamethasone (TOBRADEX) ophthalmic suspension Administer 1 drop to both eyes every 4 (four) hours while awake 10 mL 0     No current facility-administered medications for this visit  Physical Exam   Constitutional: She is oriented to person, place, and time  She appears well-developed and well-nourished  HENT:   Head: Normocephalic  Right Ear: Tympanic membrane, external ear and ear canal normal    Left Ear: Tympanic membrane, external ear and ear canal normal    Nose: Nose normal  Right sinus exhibits no maxillary sinus tenderness and no frontal sinus tenderness  Left sinus exhibits no maxillary sinus tenderness and no frontal sinus tenderness  Mouth/Throat: Oropharynx is clear and moist and mucous membranes are normal    Eyes: Right conjunctiva is injected  Left conjunctiva is injected  Neck: Neck supple  Cardiovascular: Normal rate, regular rhythm and normal heart sounds  Pulmonary/Chest: Effort normal and breath sounds normal    Abdominal: Normal appearance and bowel sounds are normal  There is no hepatosplenomegaly  There is no tenderness  There is no rebound  Musculoskeletal: Normal range of motion     Lymphadenopathy:        Right cervical: No superficial cervical and no posterior cervical adenopathy present  Left cervical: No superficial cervical and no posterior cervical adenopathy present  Neurological: She is alert and oriented to person, place, and time  Skin: Skin is warm and dry  Psychiatric: She has a normal mood and affect  Her behavior is normal  Judgment and thought content normal    Vitals reviewed  Assessment/Plan:     Diagnoses and all orders for this visit:    Conjunctivitis of both eyes, unspecified conjunctivitis type  -     tobramycin-dexamethasone (TOBRADEX) ophthalmic suspension; Administer 1 drop to both eyes every 4 (four) hours while awake    BMI 34 0-34 9,adult    Type 2 diabetes mellitus without complication, without long-term current use of insulin ([facility])  Comments:  Last HbA1c went up and trying to loose weight and excercising and will repeat levels in 3 months  Benign essential hypertension  Comments:  Stable    Mixed hyperlipidemia  Comments: On statin and tolerating it well            Patient Instructions:  Supportive care discussed and advised  Follow up for no improvement and worsening of conditions  Patient advised and educated when to see immediate medical care  Return if symptoms worsen or fail to improve        KHOA Ryan

## 2018-12-03 DIAGNOSIS — N30.10 CHRONIC INTERSTITIAL CYSTITIS: ICD-10-CM

## 2018-12-03 DIAGNOSIS — E11.9 TYPE 2 DIABETES MELLITUS WITHOUT COMPLICATION, WITHOUT LONG-TERM CURRENT USE OF INSULIN (HCC): ICD-10-CM

## 2018-12-03 RX ORDER — HYDROXYZINE HYDROCHLORIDE 25 MG/1
TABLET, FILM COATED ORAL
Qty: 90 TABLET | Refills: 1 | Status: SHIPPED | OUTPATIENT
Start: 2018-12-03 | End: 2020-05-18 | Stop reason: SDUPTHER

## 2018-12-28 DIAGNOSIS — E11.9 TYPE 2 DIABETES MELLITUS WITHOUT COMPLICATION, WITHOUT LONG-TERM CURRENT USE OF INSULIN (HCC): ICD-10-CM

## 2018-12-28 RX ORDER — GLIPIZIDE 10 MG/1
20 TABLET, FILM COATED, EXTENDED RELEASE ORAL DAILY
Qty: 180 TABLET | Refills: 0 | Status: SHIPPED | OUTPATIENT
Start: 2018-12-28 | End: 2019-04-29 | Stop reason: SDUPTHER

## 2019-01-26 LAB
ALBUMIN SERPL-MCNC: 4.3 G/DL (ref 3.5–5.5)
ALBUMIN/GLOB SERPL: 1.7 {RATIO} (ref 1.2–2.2)
ALP SERPL-CCNC: 51 IU/L (ref 39–117)
ALT SERPL-CCNC: 31 IU/L (ref 0–32)
AST SERPL-CCNC: 20 IU/L (ref 0–40)
BASOPHILS # BLD AUTO: 0 X10E3/UL (ref 0–0.2)
BASOPHILS NFR BLD AUTO: 1 %
BILIRUB SERPL-MCNC: 0.4 MG/DL (ref 0–1.2)
BUN SERPL-MCNC: 25 MG/DL (ref 6–24)
BUN/CREAT SERPL: 24 (ref 9–23)
CALCIUM SERPL-MCNC: 9.7 MG/DL (ref 8.7–10.2)
CHLORIDE SERPL-SCNC: 103 MMOL/L (ref 96–106)
CHOLEST SERPL-MCNC: 179 MG/DL (ref 100–199)
CO2 SERPL-SCNC: 21 MMOL/L (ref 20–29)
CREAT SERPL-MCNC: 1.05 MG/DL (ref 0.57–1)
EOSINOPHIL # BLD AUTO: 0.2 X10E3/UL (ref 0–0.4)
EOSINOPHIL NFR BLD AUTO: 2 %
ERYTHROCYTE [DISTWIDTH] IN BLOOD BY AUTOMATED COUNT: 14.4 % (ref 12.3–15.4)
GLOBULIN SER-MCNC: 2.6 G/DL (ref 1.5–4.5)
GLUCOSE SERPL-MCNC: 181 MG/DL (ref 65–99)
HBA1C MFR BLD: 8.3 % (ref 4.8–5.6)
HCT VFR BLD AUTO: 39.6 % (ref 34–46.6)
HDLC SERPL-MCNC: 39 MG/DL
HGB BLD-MCNC: 12.8 G/DL (ref 11.1–15.9)
IMM GRANULOCYTES # BLD: 0 X10E3/UL (ref 0–0.1)
IMM GRANULOCYTES NFR BLD: 0 %
LABCORP COMMENT: NORMAL
LDLC SERPL CALC-MCNC: 102 MG/DL (ref 0–99)
LYMPHOCYTES # BLD AUTO: 2.5 X10E3/UL (ref 0.7–3.1)
LYMPHOCYTES NFR BLD AUTO: 30 %
MCH RBC QN AUTO: 26.6 PG (ref 26.6–33)
MCHC RBC AUTO-ENTMCNC: 32.3 G/DL (ref 31.5–35.7)
MCV RBC AUTO: 82 FL (ref 79–97)
MICRODELETION SYND BLD/T FISH: NORMAL
MICRODELETION SYND BLD/T FISH: NORMAL
MONOCYTES # BLD AUTO: 0.5 X10E3/UL (ref 0.1–0.9)
MONOCYTES NFR BLD AUTO: 7 %
NEUTROPHILS # BLD AUTO: 4.9 X10E3/UL (ref 1.4–7)
NEUTROPHILS NFR BLD AUTO: 60 %
PLATELET # BLD AUTO: 190 X10E3/UL (ref 150–379)
POTASSIUM SERPL-SCNC: 4.9 MMOL/L (ref 3.5–5.2)
PROT SERPL-MCNC: 6.9 G/DL (ref 6–8.5)
RBC # BLD AUTO: 4.82 X10E6/UL (ref 3.77–5.28)
SL AMB EGFR AFRICAN AMERICAN: 68 ML/MIN/1.73
SL AMB EGFR NON AFRICAN AMERICAN: 59 ML/MIN/1.73
SL AMB PDF IMAGE: NORMAL
SODIUM SERPL-SCNC: 140 MMOL/L (ref 134–144)
T4 FREE SERPL-MCNC: 1.46 NG/DL (ref 0.82–1.77)
TRIGL SERPL-MCNC: 189 MG/DL (ref 0–149)
TSH SERPL DL<=0.005 MIU/L-ACNC: 6.39 UIU/ML (ref 0.45–4.5)
WBC # BLD AUTO: 8.1 X10E3/UL (ref 3.4–10.8)

## 2019-02-11 ENCOUNTER — OFFICE VISIT (OUTPATIENT)
Dept: FAMILY MEDICINE CLINIC | Facility: CLINIC | Age: 59
End: 2019-02-11
Payer: COMMERCIAL

## 2019-02-11 VITALS
HEART RATE: 82 BPM | WEIGHT: 212 LBS | TEMPERATURE: 98.6 F | SYSTOLIC BLOOD PRESSURE: 126 MMHG | BODY MASS INDEX: 35.32 KG/M2 | HEIGHT: 65 IN | DIASTOLIC BLOOD PRESSURE: 90 MMHG | RESPIRATION RATE: 20 BRPM

## 2019-02-11 DIAGNOSIS — E03.9 PRIMARY HYPOTHYROIDISM: ICD-10-CM

## 2019-02-11 DIAGNOSIS — E11.9 TYPE 2 DIABETES MELLITUS WITHOUT COMPLICATION, WITHOUT LONG-TERM CURRENT USE OF INSULIN (HCC): Primary | ICD-10-CM

## 2019-02-11 DIAGNOSIS — I10 BENIGN ESSENTIAL HYPERTENSION: ICD-10-CM

## 2019-02-11 DIAGNOSIS — E78.2 MIXED HYPERLIPIDEMIA: ICD-10-CM

## 2019-02-11 PROCEDURE — 1036F TOBACCO NON-USER: CPT | Performed by: FAMILY MEDICINE

## 2019-02-11 PROCEDURE — 99214 OFFICE O/P EST MOD 30 MIN: CPT | Performed by: FAMILY MEDICINE

## 2019-02-11 PROCEDURE — 3008F BODY MASS INDEX DOCD: CPT | Performed by: FAMILY MEDICINE

## 2019-02-11 RX ORDER — LEVOTHYROXINE SODIUM 0.1 MG/1
100 TABLET ORAL DAILY
Qty: 90 TABLET | Refills: 1 | Status: SHIPPED | OUTPATIENT
Start: 2019-02-11 | End: 2019-04-13 | Stop reason: SDUPTHER

## 2019-02-11 NOTE — ASSESSMENT & PLAN NOTE
Lab Results   Component Value Date    HGBA1C 8 3 (H) 01/25/2019       No results for input(s): POCGLU in the last 72 hours      Blood Sugar Average: Last 72 hrs:    A1c is down to 8 3   Will keep working on diet  Trulicity added  Risks and benefits of medication discussed

## 2019-02-11 NOTE — PROGRESS NOTES
Assessment/Plan:    Problem List Items Addressed This Visit     Benign essential hypertension     Well controlled          Relevant Orders    CBC    Mixed hyperlipidemia     stable         Relevant Orders    Comprehensive metabolic panel    Lipid Panel with Direct LDL reflex    Primary hypothyroidism     TSH is elevated  Dose of levothyroxine increased from 88 to 100         Relevant Medications    levothyroxine 100 mcg tablet    Other Relevant Orders    T4, free    TSH, 3rd generation    Type 2 diabetes mellitus without complication, without long-term current use of insulin (HCC) - Primary     Lab Results   Component Value Date    HGBA1C 8 3 (H) 01/25/2019       No results for input(s): POCGLU in the last 72 hours  Blood Sugar Average: Last 72 hrs:    A1c is down to 8 3   Will keep working on diet  Trulicity added           Relevant Medications    Dulaglutide (TRULICITY) 3 44 FY/5 5UM SOPN    Insulin Pen Needle 31G X 6 MM MISC    Other Relevant Orders    Comprehensive metabolic panel    Hemoglobin A1C    Microalbumin / creatinine urine ratio          BMI Counseling: Body mass index is 35 28 kg/m²  Discussed with patient's BMI with her  The BMI is above average  BMI counseling and education was provided to the patient  Nutrition recommendations include moderation in carbohydrate intake  Exercise recommendations include exercising 3-5 times per week  There are no Patient Instructions on file for this visit  Return in about 3 months (around 5/11/2019) for Annual physical with pap smear   Subjective:      Patient ID: Meme Abrams is a 61 y o  female  Chief Complaint   Patient presents with    Follow-up     lab work review and blood pressure check  [facility]       She has been trying to eat better since January  She checking her sugars regularly and has not gone low  She has been feeling tired  Hypertension   This is a chronic problem  The current episode started more than 1 year ago   The problem is unchanged  The problem is controlled  Pertinent negatives include no headaches or peripheral edema  Past treatments include ACE inhibitors  The current treatment provides moderate improvement  There are no compliance problems  Hyperlipidemia   This is a chronic problem  The current episode started more than 1 year ago  The problem is controlled  Recent lipid tests were reviewed and are normal  Current antihyperlipidemic treatment includes statins  The current treatment provides moderate improvement of lipids  There are no compliance problems  The following portions of the patient's history were reviewed and updated as appropriate:  past social history    Review of Systems   Neurological: Negative for headaches           Current Outpatient Medications   Medication Sig Dispense Refill    aspirin 81 MG tablet Take 81 mg by mouth every evening      atorvastatin (LIPITOR) 40 mg tablet TAKE 1 TABLET DAILY 90 tablet 1    azelastine (ASTELIN) 0 1 % nasal spray 1 spray into each nostril 2 (two) times a day Use in each nostril as directed      BIOTIN PO Take 10,000 mcg by mouth every morning      desloratadine (CLARINEX) 5 MG tablet TAKE 1 TABLET DAILY 90 tablet 2    glipiZIDE (GLUCOTROL XL) 10 mg 24 hr tablet Take 2 tablets (20 mg total) by mouth daily 180 tablet 0    hydrOXYzine HCL (ATARAX) 25 mg tablet TAKE 1 TABLET AT BEDTIME AS NEEDED 90 tablet 1    JARDIANCE 25 MG TABS TAKE 1 TABLET DAILY 90 tablet 1    levothyroxine 100 mcg tablet Take 1 tablet (100 mcg total) by mouth daily 90 tablet 1    metFORMIN (GLUCOPHAGE) 500 mg tablet TAKE 2 TABLETS TWICE A  tablet 1    metoprolol succinate (TOPROL-XL) 25 mg 24 hr tablet TAKE 1 TABLET DAILY AT BEDTIME 90 tablet 1    Omega-3 Fatty Acids (FISH OIL PO) Take by mouth every evening      ONE TOUCH ULTRA TEST test strip USE TO TEST GLUCOSE TWICE A  each 1    quinapril (ACCUPRIL) 40 MG tablet TAKE 1 TABLET DAILY 90 tablet 1    Dulaglutide (TRULICITY) 2 77 SN/5 5HH SOPN Inject 0 5 mL (0 75 mg total) under the skin once a week 12 pen 1    Insulin Pen Needle 31G X 6 MM MISC by Does not apply route once a week For use with Trulicity 30 each 1    tobramycin-dexamethasone (TOBRADEX) ophthalmic suspension Administer 1 drop to both eyes every 4 (four) hours while awake (Patient not taking: Reported on 2/11/2019) 10 mL 0     No current facility-administered medications for this visit  Objective:    /90   Pulse 82   Temp 98 6 °F (37 °C)   Resp 20   Ht 5' 5" (1 651 m)   Wt 96 2 kg (212 lb)   BMI 35 28 kg/m²        Physical Exam   Constitutional: She appears well-developed and well-nourished  HENT:   Head: Normocephalic and atraumatic  Right Ear: External ear normal    Left Ear: External ear normal    Mouth/Throat: Oropharynx is clear and moist    Cardiovascular: Normal rate, regular rhythm and normal heart sounds  Exam reveals no friction rub  No murmur heard  Pulmonary/Chest: Effort normal and breath sounds normal  No respiratory distress  She has no wheezes  She has no rales  Musculoskeletal: She exhibits no edema or deformity  Nursing note and vitals reviewed               Jacinda Armenta DO

## 2019-03-06 DIAGNOSIS — J30.9 ALLERGIC RHINITIS, UNSPECIFIED SEASONALITY, UNSPECIFIED TRIGGER: ICD-10-CM

## 2019-03-06 DIAGNOSIS — E11.9 TYPE 2 DIABETES MELLITUS WITHOUT COMPLICATION, WITHOUT LONG-TERM CURRENT USE OF INSULIN (HCC): ICD-10-CM

## 2019-03-06 RX ORDER — DESLORATADINE 5 MG/1
5 TABLET ORAL DAILY
Qty: 90 TABLET | Refills: 1 | Status: SHIPPED | OUTPATIENT
Start: 2019-03-06 | End: 2019-06-19 | Stop reason: SDUPTHER

## 2019-03-08 ENCOUNTER — TELEPHONE (OUTPATIENT)
Dept: FAMILY MEDICINE CLINIC | Facility: CLINIC | Age: 59
End: 2019-03-08

## 2019-03-08 NOTE — TELEPHONE ENCOUNTER
3/8/2019 4:25 PM Called Carlyn See  Her sugars are down to 638 with Trulicity and watching her diet  Due to cost will stop the [place]  Even Kelsey Umesh would be up to $250 per 90 day supply  If her numbers go up significantly off the Jardiance she will let me know  Otherwise she will follow up as scheduled      Message complete  Cathleen Giraldo, DO

## 2019-03-08 NOTE — TELEPHONE ENCOUNTER
Regarding: RE: Prescription Question  Contact: [phone]  ----- Message from Wolfgang Flores LPN sent at 5/5/0224  3:51 PM EST -----       ----- Message sent from Omaira Ruth to Alondra Apa at 3/8/2019  3:51 PM -----       ----- Message -----     From: Francisco J Yeung     Sent: 3/8/2019  9:49 AM EST       To: Claudy Foster DO  Subject: Prescription Question    Good morning  I contacted [employer]  Fox Valerio is a Tier3  medication  They wont give a price without prior authorization  They faxed a form to your office    Once returned they will provide the cost and I will let you know    Thanks  Erica Meek

## 2019-04-09 LAB
LEFT EYE DIABETIC RETINOPATHY: NORMAL
RIGHT EYE DIABETIC RETINOPATHY: NORMAL

## 2019-04-09 PROCEDURE — 3072F LOW RISK FOR RETINOPATHY: CPT | Performed by: FAMILY MEDICINE

## 2019-04-13 DIAGNOSIS — E03.9 PRIMARY HYPOTHYROIDISM: Primary | ICD-10-CM

## 2019-04-13 RX ORDER — LEVOTHYROXINE SODIUM 0.1 MG/1
100 TABLET ORAL DAILY
Qty: 90 TABLET | Refills: 1 | Status: SHIPPED | OUTPATIENT
Start: 2019-04-13 | End: 2019-04-29 | Stop reason: SDUPTHER

## 2019-04-14 DIAGNOSIS — E78.2 MIXED HYPERLIPIDEMIA: ICD-10-CM

## 2019-04-14 DIAGNOSIS — E11.9 TYPE 2 DIABETES MELLITUS WITHOUT COMPLICATION, WITHOUT LONG-TERM CURRENT USE OF INSULIN (HCC): ICD-10-CM

## 2019-04-14 RX ORDER — ATORVASTATIN CALCIUM 40 MG/1
TABLET, FILM COATED ORAL
Qty: 90 TABLET | Refills: 0 | Status: SHIPPED | OUTPATIENT
Start: 2019-04-14 | End: 2019-04-29 | Stop reason: SDUPTHER

## 2019-04-29 DIAGNOSIS — E03.9 PRIMARY HYPOTHYROIDISM: ICD-10-CM

## 2019-04-29 DIAGNOSIS — E11.9 TYPE 2 DIABETES MELLITUS WITHOUT COMPLICATION, WITHOUT LONG-TERM CURRENT USE OF INSULIN (HCC): ICD-10-CM

## 2019-04-29 DIAGNOSIS — E78.2 MIXED HYPERLIPIDEMIA: ICD-10-CM

## 2019-04-29 RX ORDER — ATORVASTATIN CALCIUM 40 MG/1
40 TABLET, FILM COATED ORAL DAILY
Qty: 90 TABLET | Refills: 1 | Status: SHIPPED | OUTPATIENT
Start: 2019-04-29 | End: 2019-09-15 | Stop reason: SDUPTHER

## 2019-04-29 RX ORDER — LEVOTHYROXINE SODIUM 0.1 MG/1
100 TABLET ORAL DAILY
Qty: 90 TABLET | Refills: 1 | Status: SHIPPED | OUTPATIENT
Start: 2019-04-29 | End: 2019-09-15 | Stop reason: SDUPTHER

## 2019-04-29 RX ORDER — GLIPIZIDE 10 MG/1
20 TABLET, FILM COATED, EXTENDED RELEASE ORAL DAILY
Qty: 180 TABLET | Refills: 1 | Status: SHIPPED | OUTPATIENT
Start: 2019-04-29 | End: 2019-09-15 | Stop reason: SDUPTHER

## 2019-05-03 ENCOUNTER — TELEPHONE (OUTPATIENT)
Dept: FAMILY MEDICINE CLINIC | Facility: CLINIC | Age: 59
End: 2019-05-03

## 2019-05-03 DIAGNOSIS — E11.9 TYPE 2 DIABETES MELLITUS WITHOUT COMPLICATION, WITHOUT LONG-TERM CURRENT USE OF INSULIN (HCC): Primary | ICD-10-CM

## 2019-06-07 DIAGNOSIS — I10 BENIGN ESSENTIAL HYPERTENSION: ICD-10-CM

## 2019-06-07 RX ORDER — METOPROLOL SUCCINATE 25 MG/1
25 TABLET, EXTENDED RELEASE ORAL
Qty: 90 TABLET | Refills: 1 | Status: SHIPPED | OUTPATIENT
Start: 2019-06-07 | End: 2019-10-22 | Stop reason: SDUPTHER

## 2019-06-14 LAB
ALBUMIN SERPL-MCNC: 4.3 G/DL (ref 3.5–5.5)
ALBUMIN/GLOB SERPL: 1.6 {RATIO} (ref 1.2–2.2)
ALP SERPL-CCNC: 58 IU/L (ref 39–117)
ALT SERPL-CCNC: 37 IU/L (ref 0–32)
AST SERPL-CCNC: 44 IU/L (ref 0–40)
BASOPHILS # BLD AUTO: 0 X10E3/UL (ref 0–0.2)
BASOPHILS NFR BLD AUTO: 0 %
BILIRUB SERPL-MCNC: 0.3 MG/DL (ref 0–1.2)
BUN SERPL-MCNC: 24 MG/DL (ref 6–24)
BUN/CREAT SERPL: 28 (ref 9–23)
CALCIUM SERPL-MCNC: 9.9 MG/DL (ref 8.7–10.2)
CHLORIDE SERPL-SCNC: 101 MMOL/L (ref 96–106)
CHOLEST SERPL-MCNC: 175 MG/DL (ref 100–199)
CO2 SERPL-SCNC: 22 MMOL/L (ref 20–29)
CREAT SERPL-MCNC: 0.87 MG/DL (ref 0.57–1)
EOSINOPHIL # BLD AUTO: 0.4 X10E3/UL (ref 0–0.4)
EOSINOPHIL NFR BLD AUTO: 4 %
ERYTHROCYTE [DISTWIDTH] IN BLOOD BY AUTOMATED COUNT: 14 % (ref 12.3–15.4)
GLOBULIN SER-MCNC: 2.7 G/DL (ref 1.5–4.5)
GLUCOSE SERPL-MCNC: 189 MG/DL (ref 65–99)
HBA1C MFR BLD: 7.7 % (ref 4.8–5.6)
HCT VFR BLD AUTO: 36.3 % (ref 34–46.6)
HDLC SERPL-MCNC: 36 MG/DL
HGB BLD-MCNC: 12.5 G/DL (ref 11.1–15.9)
IMM GRANULOCYTES # BLD: 0 X10E3/UL (ref 0–0.1)
IMM GRANULOCYTES NFR BLD: 0 %
LABCORP COMMENT: NORMAL
LDLC SERPL CALC-MCNC: 69 MG/DL (ref 0–99)
LYMPHOCYTES # BLD AUTO: 2.5 X10E3/UL (ref 0.7–3.1)
LYMPHOCYTES NFR BLD AUTO: 24 %
MCH RBC QN AUTO: 27.8 PG (ref 26.6–33)
MCHC RBC AUTO-ENTMCNC: 34.4 G/DL (ref 31.5–35.7)
MCV RBC AUTO: 81 FL (ref 79–97)
MICRODELETION SYND BLD/T FISH: NORMAL
MONOCYTES # BLD AUTO: 0.6 X10E3/UL (ref 0.1–0.9)
MONOCYTES NFR BLD AUTO: 6 %
NEUTROPHILS # BLD AUTO: 6.8 X10E3/UL (ref 1.4–7)
NEUTROPHILS NFR BLD AUTO: 66 %
PLATELET # BLD AUTO: 276 X10E3/UL (ref 150–450)
POTASSIUM SERPL-SCNC: 5.1 MMOL/L (ref 3.5–5.2)
PROT SERPL-MCNC: 7 G/DL (ref 6–8.5)
RBC # BLD AUTO: 4.49 X10E6/UL (ref 3.77–5.28)
SL AMB EGFR AFRICAN AMERICAN: 84 ML/MIN/1.73
SL AMB EGFR NON AFRICAN AMERICAN: 73 ML/MIN/1.73
SODIUM SERPL-SCNC: 140 MMOL/L (ref 134–144)
T4 FREE SERPL-MCNC: 1.4 NG/DL (ref 0.82–1.77)
TRIGL SERPL-MCNC: 351 MG/DL (ref 0–149)
TSH SERPL DL<=0.005 MIU/L-ACNC: 4.07 UIU/ML (ref 0.45–4.5)
WBC # BLD AUTO: 10.4 X10E3/UL (ref 3.4–10.8)

## 2019-06-19 DIAGNOSIS — E11.9 TYPE 2 DIABETES MELLITUS WITHOUT COMPLICATION, WITHOUT LONG-TERM CURRENT USE OF INSULIN (HCC): ICD-10-CM

## 2019-06-19 DIAGNOSIS — J30.9 ALLERGIC RHINITIS, UNSPECIFIED SEASONALITY, UNSPECIFIED TRIGGER: ICD-10-CM

## 2019-06-19 RX ORDER — DESLORATADINE 5 MG/1
5 TABLET ORAL DAILY
Qty: 90 TABLET | Refills: 1 | Status: SHIPPED | OUTPATIENT
Start: 2019-06-19 | End: 2020-01-07 | Stop reason: SDUPTHER

## 2019-07-14 DIAGNOSIS — I10 BENIGN ESSENTIAL HYPERTENSION: ICD-10-CM

## 2019-07-15 PROCEDURE — 4010F ACE/ARB THERAPY RXD/TAKEN: CPT | Performed by: FAMILY MEDICINE

## 2019-07-15 RX ORDER — QUINAPRIL 40 MG/1
40 TABLET ORAL DAILY
Qty: 90 TABLET | Refills: 1 | Status: SHIPPED | OUTPATIENT
Start: 2019-07-15 | End: 2019-11-24 | Stop reason: SDUPTHER

## 2019-07-29 DIAGNOSIS — E11.9 TYPE 2 DIABETES MELLITUS WITHOUT COMPLICATION, WITHOUT LONG-TERM CURRENT USE OF INSULIN (HCC): ICD-10-CM

## 2019-08-05 ENCOUNTER — OFFICE VISIT (OUTPATIENT)
Dept: FAMILY MEDICINE CLINIC | Facility: CLINIC | Age: 59
End: 2019-08-05
Payer: COMMERCIAL

## 2019-08-05 VITALS
BODY MASS INDEX: 35.16 KG/M2 | DIASTOLIC BLOOD PRESSURE: 80 MMHG | HEIGHT: 65 IN | WEIGHT: 211 LBS | TEMPERATURE: 98.4 F | SYSTOLIC BLOOD PRESSURE: 130 MMHG | RESPIRATION RATE: 18 BRPM | HEART RATE: 84 BPM

## 2019-08-05 DIAGNOSIS — Z12.4 CERVICAL CANCER SCREENING: ICD-10-CM

## 2019-08-05 DIAGNOSIS — K76.0 FATTY LIVER: ICD-10-CM

## 2019-08-05 DIAGNOSIS — E11.9 TYPE 2 DIABETES MELLITUS WITHOUT COMPLICATION, WITHOUT LONG-TERM CURRENT USE OF INSULIN (HCC): ICD-10-CM

## 2019-08-05 DIAGNOSIS — I10 BENIGN ESSENTIAL HYPERTENSION: Primary | ICD-10-CM

## 2019-08-05 DIAGNOSIS — E03.9 PRIMARY HYPOTHYROIDISM: ICD-10-CM

## 2019-08-05 DIAGNOSIS — L30.9 DERMATITIS: ICD-10-CM

## 2019-08-05 DIAGNOSIS — E78.2 MIXED HYPERLIPIDEMIA: ICD-10-CM

## 2019-08-05 DIAGNOSIS — Z12.11 COLON CANCER SCREENING: ICD-10-CM

## 2019-08-05 LAB — SL AMB POCT FECES OCC BLD: NEGATIVE

## 2019-08-05 PROCEDURE — 99214 OFFICE O/P EST MOD 30 MIN: CPT | Performed by: FAMILY MEDICINE

## 2019-08-05 PROCEDURE — 82270 OCCULT BLOOD FECES: CPT | Performed by: FAMILY MEDICINE

## 2019-08-05 PROCEDURE — 3075F SYST BP GE 130 - 139MM HG: CPT | Performed by: FAMILY MEDICINE

## 2019-08-05 PROCEDURE — 3008F BODY MASS INDEX DOCD: CPT | Performed by: FAMILY MEDICINE

## 2019-08-05 PROCEDURE — 1036F TOBACCO NON-USER: CPT | Performed by: FAMILY MEDICINE

## 2019-08-05 NOTE — PROGRESS NOTES
Assessment/Plan:    Problem List Items Addressed This Visit     Benign essential hypertension - Primary     improved         Relevant Orders    CBC    Fatty liver     Liver enzymes are back up again          Mixed hyperlipidemia     atorvastatin 40 mg working well         Relevant Orders    Comprehensive metabolic panel    Lipid Panel with Direct LDL reflex    Primary hypothyroidism    Relevant Orders    T4, free    TSH, 3rd generation    Type 2 diabetes mellitus without complication, without long-term current use of insulin (HCC)     Lab Results   Component Value Date    HGBA1C 7 7 (H) 06/13/2019       No results for input(s): POCGLU in the last 72 hours  Blood Sugar Average: Last 72 hrs:    Improved control           Relevant Orders    Hemoglobin A1C    Microalbumin / creatinine urine ratio      Other Visit Diagnoses     Cervical cancer screening        Relevant Orders    Pap IG, HPV-hr    Dermatitis        3 months of rash on her face    Relevant Orders    Ambulatory referral to Dermatology    Colon cancer screening        Relevant Orders    POCT hemoccult screening (Completed)          BMI Counseling: Body mass index is 35 11 kg/m²  Discussed the patient's BMI with her  The BMI is above average  BMI counseling and education was provided to the patient  Exercise recommendations include exercising 3-5 times per week  There are no Patient Instructions on file for this visit  Return in about 3 months (around 11/5/2019) for Next scheduled follow up  Subjective:      Patient ID: Alvaro Quiros is a 61 y o  female  Chief Complaint   Patient presents with    Follow-up     lab work results Dimitri Wheeler Gynecologic Exam       She has been feeling well  She is concerned that her blood sugar goes up overnight  She is not having any low blood sugars during the day  She knows that before lunch her blood sugars are normal     Hypertension-patient is tolerating her quinapril and metoprolol well    She is taking it every day  She is not having problems with headaches or dizziness  Hyperlipidemia-patient is taking her atorvastatin daily  She is not having muscle aches from  The following portions of the patient's history were reviewed and updated as appropriate:  past social history    Review of Systems   Respiratory: Negative  Cardiovascular: Negative            Current Outpatient Medications   Medication Sig Dispense Refill    aspirin 81 MG tablet Take 81 mg by mouth every evening      atorvastatin (LIPITOR) 40 mg tablet Take 1 tablet (40 mg total) by mouth daily 90 tablet 1    azelastine (ASTELIN) 0 1 % nasal spray 1 spray into each nostril 2 (two) times a day Use in each nostril as directed      BIOTIN PO Take 10,000 mcg by mouth every morning      desloratadine (CLARINEX) 5 MG tablet Take 1 tablet (5 mg total) by mouth daily 90 tablet 1    Dulaglutide (TRULICITY) 7 93 YH/2 8RZ SOPN Inject 0 5 mL (0 75 mg total) under the skin once a week 12 pen 1    glipiZIDE (GLUCOTROL XL) 10 mg 24 hr tablet Take 2 tablets (20 mg total) by mouth daily 180 tablet 1    hydrOXYzine HCL (ATARAX) 25 mg tablet TAKE 1 TABLET AT BEDTIME AS NEEDED 90 tablet 1    Insulin Pen Needle 31G X 6 MM MISC by Does not apply route once a week For use with Trulicity 30 each 1    levothyroxine 100 mcg tablet Take 1 tablet (100 mcg total) by mouth daily 90 tablet 1    metFORMIN (GLUCOPHAGE) 500 mg tablet Take 2 tablets (1,000 mg total) by mouth 2 (two) times a day 360 tablet 1    metoprolol succinate (TOPROL-XL) 25 mg 24 hr tablet Take 1 tablet (25 mg total) by mouth daily at bedtime 90 tablet 1    Omega-3 Fatty Acids (FISH OIL PO) Take by mouth every evening      ONE TOUCH ULTRA TEST test strip USE TO TEST GLUCOSE TWICE A  each 1    quinapril (ACCUPRIL) 40 MG tablet Take 1 tablet (40 mg total) by mouth daily 90 tablet 1    sitaGLIPtin (JANUVIA) 100 mg tablet Take 1 tablet (100 mg total) by mouth daily 30 tablet 0 No current facility-administered medications for this visit  Objective:    /80   Pulse 84   Temp 98 4 °F (36 9 °C)   Resp 18   Ht 5' 5" (1 651 m)   Wt 95 7 kg (211 lb)   BMI 35 11 kg/m²        Physical Exam   Constitutional: She appears well-developed and well-nourished  HENT:   Head: Normocephalic and atraumatic  Right Ear: External ear normal    Left Ear: External ear normal    Mouth/Throat: Oropharynx is clear and moist    Neck: Normal range of motion  Cardiovascular: Normal rate, regular rhythm and normal heart sounds  No murmur heard  Pulses:       Dorsalis pedis pulses are 2+ on the right side, and 2+ on the left side  Posterior tibial pulses are 2+ on the right side, and 2+ on the left side  Pulmonary/Chest: Effort normal and breath sounds normal  No respiratory distress  She has no wheezes  She has no rales  No breast tenderness, discharge or bleeding  Abdominal: She exhibits no distension  There is no tenderness  There is no rebound  Genitourinary: Rectum normal and vagina normal  Rectal exam shows no tenderness and guaiac negative stool  No breast tenderness, discharge or bleeding  There is no rash, tenderness or lesion on the right labia  There is no rash, tenderness or lesion on the left labia  No tenderness in the vagina  No vaginal discharge found  Genitourinary Comments: Uterus - non-tender  Ovaries - no palpable masses   Feet:   Right Foot:   Skin Integrity: Negative for ulcer, skin breakdown, erythema, warmth, callus or dry skin  Left Foot:   Skin Integrity: Negative for ulcer, skin breakdown, erythema, warmth, callus or dry skin  Nursing note and vitals reviewed  Patient's shoes and socks removed  Right Foot/Ankle   Right Foot Inspection  Skin Exam: skin normal skin not intact, no dry skin, no warmth, no callus, no erythema, no maceration, no abnormal color, no pre-ulcer, no ulcer and no callus                          Toe Exam: ROM and strength within normal limits  Sensory       Monofilament testing: intact  Vascular  Capillary refills: < 3 seconds  The right DP pulse is 2+  The right PT pulse is 2+  Left Foot/Ankle  Left Foot Inspection  Skin Exam: skin normalskin not intact, no dry skin, no warmth, no erythema, no maceration, normal color, no pre-ulcer, no ulcer and no callus                         Toe Exam: ROM and strength within normal limits                   Sensory       Monofilament: intact  Vascular  Capillary refills: < 3 seconds  The left DP pulse is 2+  The left PT pulse is 2+     Assign Risk Category:  No deformity present; ;        Risk: 0       Bruce Carpenter DO

## 2019-08-05 NOTE — ASSESSMENT & PLAN NOTE
Lab Results   Component Value Date    HGBA1C 7 7 (H) 06/13/2019       No results for input(s): POCGLU in the last 72 hours      Blood Sugar Average: Last 72 hrs:    Improved control

## 2019-08-07 LAB
CYTOLOGIST CVX/VAG CYTO: NORMAL
DX ICD CODE: NORMAL
HPV I/H RISK 1 DNA CVX QL PROBE+SIG AMP: NEGATIVE
OTHER STN SPEC: NORMAL
PATH REPORT.FINAL DX SPEC: NORMAL
SL AMB NOTE:: NORMAL
SL AMB SPECIMEN ADEQUACY: NORMAL
SL AMB TEST METHODOLOGY: NORMAL

## 2019-09-15 DIAGNOSIS — E03.9 PRIMARY HYPOTHYROIDISM: ICD-10-CM

## 2019-09-15 DIAGNOSIS — E78.2 MIXED HYPERLIPIDEMIA: ICD-10-CM

## 2019-09-15 DIAGNOSIS — E11.9 TYPE 2 DIABETES MELLITUS WITHOUT COMPLICATION, WITHOUT LONG-TERM CURRENT USE OF INSULIN (HCC): ICD-10-CM

## 2019-09-15 RX ORDER — ATORVASTATIN CALCIUM 40 MG/1
TABLET, FILM COATED ORAL
Qty: 90 TABLET | Refills: 1 | Status: SHIPPED | OUTPATIENT
Start: 2019-09-15 | End: 2020-02-21 | Stop reason: SDUPTHER

## 2019-09-15 RX ORDER — LEVOTHYROXINE SODIUM 0.1 MG/1
TABLET ORAL
Qty: 90 TABLET | Refills: 1 | Status: SHIPPED | OUTPATIENT
Start: 2019-09-15 | End: 2020-01-07 | Stop reason: SDUPTHER

## 2019-09-15 RX ORDER — GLIPIZIDE 10 MG/1
TABLET, FILM COATED, EXTENDED RELEASE ORAL
Qty: 180 TABLET | Refills: 1 | Status: SHIPPED | OUTPATIENT
Start: 2019-09-15 | End: 2020-01-07 | Stop reason: SDUPTHER

## 2019-09-23 DIAGNOSIS — E11.9 TYPE 2 DIABETES MELLITUS WITHOUT COMPLICATION, WITHOUT LONG-TERM CURRENT USE OF INSULIN (HCC): ICD-10-CM

## 2019-09-23 RX ORDER — DULAGLUTIDE 0.75 MG/.5ML
INJECTION, SOLUTION SUBCUTANEOUS
Qty: 6 ML | Refills: 0 | Status: SHIPPED | OUTPATIENT
Start: 2019-09-23 | End: 2019-12-02 | Stop reason: ALTCHOICE

## 2019-10-22 DIAGNOSIS — I10 BENIGN ESSENTIAL HYPERTENSION: ICD-10-CM

## 2019-10-22 DIAGNOSIS — E11.9 TYPE 2 DIABETES MELLITUS WITHOUT COMPLICATION, WITHOUT LONG-TERM CURRENT USE OF INSULIN (HCC): ICD-10-CM

## 2019-10-22 RX ORDER — METOPROLOL SUCCINATE 25 MG/1
TABLET, EXTENDED RELEASE ORAL
Qty: 90 TABLET | Refills: 1 | Status: SHIPPED | OUTPATIENT
Start: 2019-10-22 | End: 2020-01-21 | Stop reason: SDUPTHER

## 2019-10-22 RX ORDER — SITAGLIPTIN 100 MG/1
TABLET, FILM COATED ORAL
Qty: 90 TABLET | Refills: 1 | Status: SHIPPED | OUTPATIENT
Start: 2019-10-22 | End: 2020-02-05

## 2019-11-14 LAB
ALBUMIN SERPL-MCNC: 4.4 G/DL (ref 3.5–5.5)
ALBUMIN/CREAT UR: 1191.8 MG/G CREAT (ref 0–30)
ALBUMIN/GLOB SERPL: 1.8 {RATIO} (ref 1.2–2.2)
ALP SERPL-CCNC: 58 IU/L (ref 39–117)
ALT SERPL-CCNC: 71 IU/L (ref 0–32)
AST SERPL-CCNC: 83 IU/L (ref 0–40)
BASOPHILS # BLD AUTO: 0 X10E3/UL (ref 0–0.2)
BASOPHILS NFR BLD AUTO: 0 %
BILIRUB SERPL-MCNC: 0.5 MG/DL (ref 0–1.2)
BUN SERPL-MCNC: 15 MG/DL (ref 6–24)
BUN/CREAT SERPL: 15 (ref 9–23)
CALCIUM SERPL-MCNC: 9.8 MG/DL (ref 8.7–10.2)
CHLORIDE SERPL-SCNC: 99 MMOL/L (ref 96–106)
CHOLEST SERPL-MCNC: 141 MG/DL (ref 100–199)
CO2 SERPL-SCNC: 22 MMOL/L (ref 20–29)
CREAT SERPL-MCNC: 0.97 MG/DL (ref 0.57–1)
CREAT UR-MCNC: 181.5 MG/DL
EOSINOPHIL # BLD AUTO: 0.1 X10E3/UL (ref 0–0.4)
EOSINOPHIL NFR BLD AUTO: 2 %
ERYTHROCYTE [DISTWIDTH] IN BLOOD BY AUTOMATED COUNT: 14.4 % (ref 12.3–15.4)
GLOBULIN SER-MCNC: 2.5 G/DL (ref 1.5–4.5)
GLUCOSE SERPL-MCNC: 226 MG/DL (ref 65–99)
HBA1C MFR BLD: 8.3 % (ref 4.8–5.6)
HCT VFR BLD AUTO: 34.5 % (ref 34–46.6)
HDLC SERPL-MCNC: 34 MG/DL
HGB BLD-MCNC: 11.9 G/DL (ref 11.1–15.9)
IMM GRANULOCYTES # BLD: 0 X10E3/UL (ref 0–0.1)
IMM GRANULOCYTES NFR BLD: 0 %
LDLC SERPL CALC-MCNC: 60 MG/DL (ref 0–99)
LYMPHOCYTES # BLD AUTO: 2.1 X10E3/UL (ref 0.7–3.1)
LYMPHOCYTES NFR BLD AUTO: 27 %
MCH RBC QN AUTO: 27.3 PG (ref 26.6–33)
MCHC RBC AUTO-ENTMCNC: 34.5 G/DL (ref 31.5–35.7)
MCV RBC AUTO: 79 FL (ref 79–97)
MICROALBUMIN UR-MCNC: 2163.2 UG/ML
MICRODELETION SYND BLD/T FISH: NORMAL
MONOCYTES # BLD AUTO: 0.4 X10E3/UL (ref 0.1–0.9)
MONOCYTES NFR BLD AUTO: 5 %
NEUTROPHILS # BLD AUTO: 5 X10E3/UL (ref 1.4–7)
NEUTROPHILS NFR BLD AUTO: 66 %
PLATELET # BLD AUTO: 252 X10E3/UL (ref 150–450)
POTASSIUM SERPL-SCNC: 4.6 MMOL/L (ref 3.5–5.2)
PROT SERPL-MCNC: 6.9 G/DL (ref 6–8.5)
RBC # BLD AUTO: 4.36 X10E6/UL (ref 3.77–5.28)
SL AMB EGFR AFRICAN AMERICAN: 74 ML/MIN/1.73
SL AMB EGFR NON AFRICAN AMERICAN: 64 ML/MIN/1.73
SODIUM SERPL-SCNC: 136 MMOL/L (ref 134–144)
T4 FREE SERPL-MCNC: 1.52 NG/DL (ref 0.82–1.77)
TRIGL SERPL-MCNC: 233 MG/DL (ref 0–149)
TSH SERPL DL<=0.005 MIU/L-ACNC: 4.35 UIU/ML (ref 0.45–4.5)
WBC # BLD AUTO: 7.7 X10E3/UL (ref 3.4–10.8)

## 2019-11-24 DIAGNOSIS — I10 BENIGN ESSENTIAL HYPERTENSION: ICD-10-CM

## 2019-11-24 PROCEDURE — 4010F ACE/ARB THERAPY RXD/TAKEN: CPT | Performed by: FAMILY MEDICINE

## 2019-11-24 RX ORDER — QUINAPRIL 40 MG/1
TABLET ORAL
Qty: 90 TABLET | Refills: 1 | Status: SHIPPED | OUTPATIENT
Start: 2019-11-24 | End: 2020-01-07 | Stop reason: SDUPTHER

## 2019-11-26 PROBLEM — E66.01 MORBID OBESITY DUE TO EXCESS CALORIES (HCC): Status: ACTIVE | Noted: 2019-11-26

## 2019-12-02 ENCOUNTER — OFFICE VISIT (OUTPATIENT)
Dept: FAMILY MEDICINE CLINIC | Facility: CLINIC | Age: 59
End: 2019-12-02
Payer: COMMERCIAL

## 2019-12-02 VITALS
HEART RATE: 104 BPM | OXYGEN SATURATION: 96 % | SYSTOLIC BLOOD PRESSURE: 126 MMHG | HEIGHT: 65 IN | RESPIRATION RATE: 16 BRPM | DIASTOLIC BLOOD PRESSURE: 70 MMHG | TEMPERATURE: 99.4 F | WEIGHT: 211 LBS | BODY MASS INDEX: 35.16 KG/M2

## 2019-12-02 DIAGNOSIS — J01.00 ACUTE MAXILLARY SINUSITIS, RECURRENCE NOT SPECIFIED: ICD-10-CM

## 2019-12-02 DIAGNOSIS — E11.65 TYPE 2 DIABETES MELLITUS WITH HYPERGLYCEMIA, WITHOUT LONG-TERM CURRENT USE OF INSULIN (HCC): Primary | ICD-10-CM

## 2019-12-02 DIAGNOSIS — E78.2 MIXED HYPERLIPIDEMIA: ICD-10-CM

## 2019-12-02 DIAGNOSIS — E11.9 TYPE 2 DIABETES MELLITUS WITHOUT COMPLICATION, WITHOUT LONG-TERM CURRENT USE OF INSULIN (HCC): ICD-10-CM

## 2019-12-02 DIAGNOSIS — Z86.010 HISTORY OF COLON POLYPS: ICD-10-CM

## 2019-12-02 DIAGNOSIS — Z23 NEED FOR VACCINATION: ICD-10-CM

## 2019-12-02 DIAGNOSIS — E66.01 MORBID OBESITY DUE TO EXCESS CALORIES (HCC): ICD-10-CM

## 2019-12-02 DIAGNOSIS — I10 BENIGN ESSENTIAL HYPERTENSION: ICD-10-CM

## 2019-12-02 DIAGNOSIS — K76.0 FATTY LIVER: ICD-10-CM

## 2019-12-02 DIAGNOSIS — Z12.31 SCREENING MAMMOGRAM, ENCOUNTER FOR: ICD-10-CM

## 2019-12-02 DIAGNOSIS — E03.9 PRIMARY HYPOTHYROIDISM: ICD-10-CM

## 2019-12-02 PROBLEM — Z86.0100 HISTORY OF COLON POLYPS: Status: ACTIVE | Noted: 2019-12-02

## 2019-12-02 PROCEDURE — 1036F TOBACCO NON-USER: CPT | Performed by: FAMILY MEDICINE

## 2019-12-02 PROCEDURE — 90682 RIV4 VACC RECOMBINANT DNA IM: CPT

## 2019-12-02 PROCEDURE — 3074F SYST BP LT 130 MM HG: CPT | Performed by: FAMILY MEDICINE

## 2019-12-02 PROCEDURE — 3008F BODY MASS INDEX DOCD: CPT | Performed by: FAMILY MEDICINE

## 2019-12-02 PROCEDURE — 99214 OFFICE O/P EST MOD 30 MIN: CPT | Performed by: FAMILY MEDICINE

## 2019-12-02 PROCEDURE — 3078F DIAST BP <80 MM HG: CPT | Performed by: FAMILY MEDICINE

## 2019-12-02 PROCEDURE — 90471 IMMUNIZATION ADMIN: CPT

## 2019-12-02 RX ORDER — AMOXICILLIN 875 MG/1
875 TABLET, COATED ORAL 2 TIMES DAILY
Qty: 14 TABLET | Refills: 0 | Status: SHIPPED | OUTPATIENT
Start: 2019-12-02 | End: 2019-12-09

## 2019-12-02 NOTE — PROGRESS NOTES
Assessment/Plan:    1  Type 2 diabetes mellitus with hyperglycemia, without long-term current use of insulin (HCC)  Assessment & Plan:    Lab Results   Component Value Date    HGBA1C 8 3 (H) 11/13/2019     Not controlled   Dose of trulicity increased from 0 75 to 1 5  Exercise encouraged  Orders:  -     Dulaglutide 1 5 MG/0 5ML SOPN; Inject 0 5 mL (1 5 mg total) under the skin once a week  -     Comprehensive metabolic panel; Future; Expected date: 02/15/2020  -     Hemoglobin A1C; Future; Expected date: 02/15/2020  -     Comprehensive metabolic panel  -     Hemoglobin A1C    2  Mixed hyperlipidemia  Assessment & Plan:  Stable  Continue atorvastatin 40 mg a day    Orders:  -     Comprehensive metabolic panel; Future; Expected date: 02/15/2020  -     Lipid Panel with Direct LDL reflex; Future; Expected date: 02/15/2020  -     Comprehensive metabolic panel  -     Lipid Panel with Direct LDL reflex    3  Benign essential hypertension  Assessment & Plan:  Well controlled     Orders:  -     CBC; Future; Expected date: 02/15/2020  -     Comprehensive metabolic panel; Future; Expected date: 02/15/2020  -     Lipid Panel with Direct LDL reflex; Future; Expected date: 02/15/2020  -     CBC  -     Comprehensive metabolic panel  -     Lipid Panel with Direct LDL reflex    4  Primary hypothyroidism    5  Fatty liver    6  Type 2 diabetes mellitus without complication, without long-term current use of insulin (HCC)    7  Morbid obesity due to excess calories [facility])  Assessment & Plan:  Unchanged  Exercise encouraged  8  History of colon polyps  -     Ambulatory referral to Gastroenterology; Future    9  Screening mammogram, encounter for  -     Mammo screening bilateral w 3d & cad; Future; Expected date: 12/02/2019    10  Need for vaccination  -     influenza vaccine, quadrivalent, recombinant, PF, 0 5 mL    11  Acute maxillary sinusitis, recurrence not specified  -     amoxicillin (AMOXIL) 875 mg tablet;  Take 1 tablet (875 mg total) by mouth 2 (two) times a day for 7 days               There are no Patient Instructions on file for this visit  Return in about 3 months (around 3/2/2020) for Next scheduled follow up  Subjective:      Patient ID: Tiffanie Castle is a 61 y o  female  Chief Complaint   Patient presents with    Follow-up     review Sherly Summers       She has had sinus pressure for the past 3 weeks  She has a low grade fever today  She has tried Mucinex and her nasal spray  Hypertension   This is a chronic problem  The current episode started more than 1 year ago  The problem is unchanged  The problem is controlled  Pertinent negatives include no peripheral edema or shortness of breath  Past treatments include ACE inhibitors  The current treatment provides moderate improvement  Compliance problems include exercise  Hyperlipidemia   This is a chronic problem  The current episode started more than 1 year ago  The problem is controlled  Recent lipid tests were reviewed and are normal  Pertinent negatives include no myalgias or shortness of breath  Current antihyperlipidemic treatment includes statins  The current treatment provides moderate improvement of lipids  Compliance problems include adherence to exercise  The following portions of the patient's history were reviewed and updated as appropriate:  past social history    Review of Systems   Respiratory: Negative for shortness of breath  Cardiovascular: Negative  Musculoskeletal: Negative for myalgias           Current Outpatient Medications   Medication Sig Dispense Refill    aspirin 81 MG tablet Take 81 mg by mouth every evening      atorvastatin (LIPITOR) 40 mg tablet TAKE 1 TABLET BY MOUTH  DAILY 90 tablet 1    azelastine (ASTELIN) 0 1 % nasal spray 1 spray into each nostril 2 (two) times a day Use in each nostril as directed      BIOTIN PO Take 10,000 mcg by mouth every morning      desloratadine (CLARINEX) 5 MG tablet Take 1 tablet (5 mg total) by mouth daily 90 tablet 1    glipiZIDE (GLUCOTROL XL) 10 mg 24 hr tablet TAKE 2 TABLETS BY MOUTH  DAILY 180 tablet 1    hydrOXYzine HCL (ATARAX) 25 mg tablet TAKE 1 TABLET AT BEDTIME AS NEEDED 90 tablet 1    Insulin Pen Needle 31G X 6 MM MISC by Does not apply route once a week For use with Trulicity 30 each 1    JANUVIA 100 MG tablet TAKE 1 TABLET BY MOUTH  DAILY 90 tablet 1    levothyroxine 100 mcg tablet TAKE 1 TABLET BY MOUTH  DAILY 90 tablet 1    metFORMIN (GLUCOPHAGE) 500 mg tablet TAKE 2 TABLETS BY MOUTH TWO TIMES DAILY 360 tablet 1    metoprolol succinate (TOPROL-XL) 25 mg 24 hr tablet TAKE 1 TABLET BY MOUTH  DAILY AT BEDTIME 90 tablet 1    Omega-3 Fatty Acids (FISH OIL PO) Take by mouth every evening      ONE TOUCH ULTRA TEST test strip USE TO TEST GLUCOSE TWICE A  each 1    quinapril (ACCUPRIL) 40 MG tablet TAKE 1 TABLET BY MOUTH  DAILY 90 tablet 1    sitaGLIPtin (JANUVIA) 100 mg tablet Take 1 tablet (100 mg total) by mouth daily 30 tablet 0    amoxicillin (AMOXIL) 875 mg tablet Take 1 tablet (875 mg total) by mouth 2 (two) times a day for 7 days 14 tablet 0    Dulaglutide 1 5 MG/0 5ML SOPN Inject 0 5 mL (1 5 mg total) under the skin once a week 12 pen 1     No current facility-administered medications for this visit  Objective:    /70   Pulse 104   Temp 99 4 °F (37 4 °C)   Resp 16   Ht 5' 5" (1 651 m)   Wt 95 7 kg (211 lb)   SpO2 96%   BMI 35 11 kg/m²      Physical Exam   Constitutional: She appears well-developed and well-nourished  HENT:   Head: Normocephalic and atraumatic  Right Ear: External ear normal    Left Ear: External ear normal    Mouth/Throat: Oropharynx is clear and moist    Maxillary sinus tenderness bilaterally   Cardiovascular: Normal rate, regular rhythm and normal heart sounds  Exam reveals no friction rub  No murmur heard  Pulmonary/Chest: Effort normal and breath sounds normal  No respiratory distress  She has no wheezes   She has no rales  Musculoskeletal: She exhibits no edema or deformity  Nursing note and vitals reviewed            Madeline Duggan DO

## 2019-12-02 NOTE — ASSESSMENT & PLAN NOTE
Lab Results   Component Value Date    HGBA1C 8 3 (H) 11/13/2019     Not controlled   Dose of trulicity increased from 0 75 to 1 5  Exercise encouraged  no

## 2019-12-12 DIAGNOSIS — E11.9 TYPE 2 DIABETES MELLITUS WITHOUT COMPLICATION, WITHOUT LONG-TERM CURRENT USE OF INSULIN (HCC): ICD-10-CM

## 2019-12-12 RX ORDER — DULAGLUTIDE 0.75 MG/.5ML
INJECTION, SOLUTION SUBCUTANEOUS
Qty: 6 ML | Refills: 0 | OUTPATIENT
Start: 2019-12-12

## 2019-12-12 NOTE — TELEPHONE ENCOUNTER
Please call her pharmacy  She is no longer on the 0 78 mg dose of trulicity she is on the 1 5 mg dose  Alli Flores DO

## 2019-12-16 ENCOUNTER — TELEPHONE (OUTPATIENT)
Dept: FAMILY MEDICINE CLINIC | Facility: CLINIC | Age: 59
End: 2019-12-16

## 2019-12-16 DIAGNOSIS — E11.65 TYPE 2 DIABETES MELLITUS WITH HYPERGLYCEMIA, WITHOUT LONG-TERM CURRENT USE OF INSULIN (HCC): ICD-10-CM

## 2019-12-16 NOTE — TELEPHONE ENCOUNTER
----- Message from Talat Ash sent at 12/16/2019 11:00 AM EST -----  Regarding: Prescription Question  Contact: [phone]  Please send the new script for Trulicity to the [facility] pharmacy  I wont have enough to wait for the new insurance      Thank you  Fuentes Ellison

## 2020-01-07 DIAGNOSIS — E11.65 TYPE 2 DIABETES MELLITUS WITH HYPERGLYCEMIA, WITHOUT LONG-TERM CURRENT USE OF INSULIN (HCC): ICD-10-CM

## 2020-01-07 DIAGNOSIS — I10 BENIGN ESSENTIAL HYPERTENSION: ICD-10-CM

## 2020-01-07 DIAGNOSIS — E11.9 TYPE 2 DIABETES MELLITUS WITHOUT COMPLICATION, WITHOUT LONG-TERM CURRENT USE OF INSULIN (HCC): ICD-10-CM

## 2020-01-07 DIAGNOSIS — J30.9 ALLERGIC RHINITIS, UNSPECIFIED SEASONALITY, UNSPECIFIED TRIGGER: ICD-10-CM

## 2020-01-07 DIAGNOSIS — E03.9 PRIMARY HYPOTHYROIDISM: ICD-10-CM

## 2020-01-07 RX ORDER — QUINAPRIL 40 MG/1
40 TABLET ORAL DAILY
Qty: 90 TABLET | Refills: 1 | Status: SHIPPED | OUTPATIENT
Start: 2020-01-07 | End: 2020-07-20

## 2020-01-07 RX ORDER — DESLORATADINE 5 MG/1
5 TABLET ORAL DAILY
Qty: 90 TABLET | Refills: 1 | Status: SHIPPED | OUTPATIENT
Start: 2020-01-07 | End: 2020-01-21 | Stop reason: SDUPTHER

## 2020-01-07 RX ORDER — GLIPIZIDE 10 MG/1
20 TABLET, FILM COATED, EXTENDED RELEASE ORAL DAILY
Qty: 180 TABLET | Refills: 1 | Status: SHIPPED | OUTPATIENT
Start: 2020-01-07 | End: 2020-05-06 | Stop reason: SDUPTHER

## 2020-01-07 RX ORDER — LEVOTHYROXINE SODIUM 0.1 MG/1
100 TABLET ORAL DAILY
Qty: 90 TABLET | Refills: 1 | Status: SHIPPED | OUTPATIENT
Start: 2020-01-07 | End: 2020-05-06 | Stop reason: SDUPTHER

## 2020-01-07 NOTE — TELEPHONE ENCOUNTER
----- Message from Sean Zuñiga sent at 1/7/2020 12:37 PM EST -----  Regarding: Prescription Question  Contact: [phone]  Good afternoon  My new insurance is [employer]  The mail order pharmacy is [facility]  My member number is [id number]  I am running low on:    Desloratadine 5 mg 1 per day  Levothyroxine    1  1 per day  Glipizide XL       10       2 per day  Quinapril  40       1 per day  Trulicity  1 5  1 per week  This medication is a Tier2 and requires prior authorization      Thank you for your help  Christina Paulino

## 2020-01-10 ENCOUNTER — TELEPHONE (OUTPATIENT)
Dept: GASTROENTEROLOGY | Facility: CLINIC | Age: 60
End: 2020-01-10

## 2020-01-10 NOTE — TELEPHONE ENCOUNTER
Patients GI provider:  Dr Lenz Neigh    Number to return call: [phone]    Reason for call: Pt calling to schedule a colonoscopy   Please return her call     Scheduled procedure/appointment date if applicable: Apt/procedure - n/a

## 2020-01-14 DIAGNOSIS — Z12.11 SCREEN FOR COLON CANCER: Primary | ICD-10-CM

## 2020-01-14 NOTE — TELEPHONE ENCOUNTER
Spoke with pt, she has been scheduled with Dr Kemar Hyatt 2/26/20 [facility] [place]  Please send Suprep to [employer] on file

## 2020-01-20 ENCOUNTER — TELEPHONE (OUTPATIENT)
Dept: FAMILY MEDICINE CLINIC | Facility: CLINIC | Age: 60
End: 2020-01-20

## 2020-01-21 DIAGNOSIS — J30.9 ALLERGIC RHINITIS, UNSPECIFIED SEASONALITY, UNSPECIFIED TRIGGER: ICD-10-CM

## 2020-01-21 DIAGNOSIS — I10 BENIGN ESSENTIAL HYPERTENSION: ICD-10-CM

## 2020-01-21 RX ORDER — DESLORATADINE 5 MG/1
5 TABLET ORAL DAILY
Qty: 90 TABLET | Refills: 1 | Status: SHIPPED | OUTPATIENT
Start: 2020-01-21 | End: 2020-05-06 | Stop reason: SDUPTHER

## 2020-01-21 RX ORDER — METOPROLOL SUCCINATE 25 MG/1
25 TABLET, EXTENDED RELEASE ORAL
Qty: 90 TABLET | Refills: 1 | Status: SHIPPED | OUTPATIENT
Start: 2020-01-21 | End: 2020-06-29 | Stop reason: SDUPTHER

## 2020-01-31 ENCOUNTER — HOSPITAL ENCOUNTER (OUTPATIENT)
Dept: RADIOLOGY | Facility: HOSPITAL | Age: 60
Discharge: HOME | End: 2020-01-31
Payer: COMMERCIAL

## 2020-01-31 VITALS — WEIGHT: 211 LBS | HEIGHT: 65 IN | BODY MASS INDEX: 35.16 KG/M2

## 2020-01-31 DIAGNOSIS — Z12.31 SCREENING MAMMOGRAM, ENCOUNTER FOR: ICD-10-CM

## 2020-01-31 PROCEDURE — 77067 SCR MAMMO BI INCL CAD: CPT

## 2020-01-31 PROCEDURE — 77063 BREAST TOMOSYNTHESIS BI: CPT

## 2020-02-04 NOTE — TELEPHONE ENCOUNTER
Prior Auth completed on Cover [employer] [id number]  ID #[id number]  With [employer]  Approved until 01/20/2021  Pt aware  Task Complete  rmklpn Purpose:  Focus was on understanding pt's current relationship with her  and identifying patterns of healthy and unhealthy behaviors in the relationship.   Pt became tearful as she expressed still having feelings for her , despite the pain and suffering he caused her.      Intervention:  Clinician reflected pt's struggle to hold both loving her  and disliking his behaviors towards her, at the same time. Clinician guided pt through reframing her reported confusion of her feelings about her  as dialectics that reflect the reality of her relationship with her  over time.  Clinician supported pt in identifying self-care strategies to engage in as an mechanism for alleviated distress and increasing experience of positive emotions.     Mental status:    Pt appeared guarded as writer presented prompts about her relationship with her partner. Pt presented with both sad and bright affect at various times during the session. Pt presented as oriented 4x with focused concentration, intact immediate, recent, and remote memory, linear and goal-directed speech, WNL thoughts, unremarkable behavior, and moderate judgment and insight. Pt denied SI, HI, and psychosis.       Plan:  Pt is tasked with engaging in at least one self-care activity by next week. Next session scheduled for 2/6/20 at 3pm.

## 2020-02-05 NOTE — PRE-PROCEDURE INSTRUCTIONS
Pre-Surgery Instructions:   Medication Instructions    aspirin 81 MG tablet Patient was instructed by Physician and understands   atorvastatin (LIPITOR) 40 mg tablet Patient was instructed by Physician and understands   azelastine (ASTELIN) 0 1 % nasal spray Patient was instructed by Physician and understands   BIOTIN PO Patient was instructed by Physician and understands   desloratadine (CLARINEX) 5 MG tablet Patient was instructed by Physician and understands   Dulaglutide 1 5 MG/0 5ML SOPN Patient was instructed by Physician and understands   glipiZIDE (GLUCOTROL XL) 10 mg 24 hr tablet Patient was instructed by Physician and understands   hydrOXYzine HCL (ATARAX) 25 mg tablet Patient was instructed by Physician and understands   Insulin Pen Needle 31G X 6 MM MISC Patient was instructed by Physician and understands   levothyroxine 100 mcg tablet Patient was instructed by Physician and understands   metFORMIN (GLUCOPHAGE) 1000 MG tablet Patient was instructed by Physician and understands   metoprolol succinate (TOPROL-XL) 25 mg 24 hr tablet Patient was instructed by Physician and understands   Na Sulfate-K Sulfate-Mg Sulf 17 5-3 13-1 6 GM/177ML SOLN Patient was instructed by Physician and understands   Omega-3 Fatty Acids (FISH OIL PO) Patient was instructed by Physician and understands   ONE TOUCH ULTRA TEST test strip Patient was instructed by Physician and understands   quinapril (ACCUPRIL) 40 MG tablet Patient was instructed by Physician and understands   sitaGLIPtin (JANUVIA) 100 mg tablet Patient was instructed by Physician and understands  Pt to follow Dr Jose Beasley instructions  Pt to take blood sugar, azelastine the am of the procedure   romina Hill

## 2020-02-06 ENCOUNTER — ANESTHESIA EVENT (OUTPATIENT)
Dept: GASTROENTEROLOGY | Facility: AMBULARY SURGERY CENTER | Age: 60
End: 2020-02-06

## 2020-02-07 ENCOUNTER — HOSPITAL ENCOUNTER (OUTPATIENT)
Dept: GASTROENTEROLOGY | Facility: AMBULARY SURGERY CENTER | Age: 60
Setting detail: OUTPATIENT SURGERY
Discharge: HOME | End: 2020-02-07
Attending: INTERNAL MEDICINE | Admitting: INTERNAL MEDICINE
Payer: COMMERCIAL

## 2020-02-07 ENCOUNTER — ANESTHESIA (OUTPATIENT)
Dept: GASTROENTEROLOGY | Facility: AMBULARY SURGERY CENTER | Age: 60
End: 2020-02-07

## 2020-02-07 VITALS
TEMPERATURE: 99 F | OXYGEN SATURATION: 95 % | HEART RATE: 93 BPM | BODY MASS INDEX: 34.49 KG/M2 | WEIGHT: 207 LBS | RESPIRATION RATE: 16 BRPM | HEIGHT: 65 IN | SYSTOLIC BLOOD PRESSURE: 142 MMHG | DIASTOLIC BLOOD PRESSURE: 74 MMHG

## 2020-02-07 DIAGNOSIS — Z86.010 HX OF COLONIC POLYPS: ICD-10-CM

## 2020-02-07 PROCEDURE — G0121 COLON CA SCRN NOT HI RSK IND: HCPCS | Performed by: INTERNAL MEDICINE

## 2020-02-07 RX ORDER — PROPOFOL 10 MG/ML
INJECTION, EMULSION INTRAVENOUS AS NEEDED
Status: DISCONTINUED | OUTPATIENT
Start: 2020-02-07 | End: 2020-02-07 | Stop reason: SURG

## 2020-02-07 RX ORDER — LIDOCAINE HYDROCHLORIDE 10 MG/ML
INJECTION, SOLUTION EPIDURAL; INFILTRATION; INTRACAUDAL; PERINEURAL AS NEEDED
Status: DISCONTINUED | OUTPATIENT
Start: 2020-02-07 | End: 2020-02-07 | Stop reason: SURG

## 2020-02-07 RX ORDER — SODIUM CHLORIDE, SODIUM LACTATE, POTASSIUM CHLORIDE, CALCIUM CHLORIDE 600; 310; 30; 20 MG/100ML; MG/100ML; MG/100ML; MG/100ML
75 INJECTION, SOLUTION INTRAVENOUS CONTINUOUS
Status: DISCONTINUED | OUTPATIENT
Start: 2020-02-07 | End: 2020-02-11 | Stop reason: HOSPADM

## 2020-02-07 RX ADMIN — LIDOCAINE HYDROCHLORIDE 40 MG: 10 INJECTION, SOLUTION EPIDURAL; INFILTRATION; INTRACAUDAL; PERINEURAL at 11:08

## 2020-02-07 RX ADMIN — SODIUM CHLORIDE, SODIUM LACTATE, POTASSIUM CHLORIDE, AND CALCIUM CHLORIDE 75 ML/HR: .6; .31; .03; .02 INJECTION, SOLUTION INTRAVENOUS at 09:22

## 2020-02-07 RX ADMIN — PROPOFOL 50 MG: 10 INJECTION, EMULSION INTRAVENOUS at 11:12

## 2020-02-07 RX ADMIN — PROPOFOL 40 MG: 10 INJECTION, EMULSION INTRAVENOUS at 11:18

## 2020-02-07 RX ADMIN — PROPOFOL 70 MG: 10 INJECTION, EMULSION INTRAVENOUS at 11:08

## 2020-02-07 RX ADMIN — PROPOFOL 30 MG: 10 INJECTION, EMULSION INTRAVENOUS at 11:25

## 2020-02-07 RX ADMIN — PROPOFOL 50 MG: 10 INJECTION, EMULSION INTRAVENOUS at 11:10

## 2020-02-07 NOTE — H&P
History and Physical -  [facility]  Zeeshan Loyola 61 y o  female MRN: [id number]    HPI: Zeeshan Loyola is a 61y o  year old female who presents with hx of colon polyps  Review of Systems    Historical Information   Past Medical History:   Diagnosis Date    Colon polyp     Deviated nasal septum     Diabetes mellitus ([address] )     Disease of thyroid gland     hypo    Hearing aid worn     right ear only    Hyperlipidemia     Hypertension      Past Surgical History:   Procedure Laterality Date     SECTION, LOW TRANSVERSE      x2    COLONOSCOPY N/A 2016    Procedure: COLONOSCOPY;  Surgeon: Raza Chacon MD;  [facility];   Service:     CYST REMOVAL Left     elbow    DILATION AND CURETTAGE OF UTERUS      GANGLION CYST EXCISION Right     RESECTION TONSIL RADICAL      TUBAL LIGATION      VARICOSE VEIN SURGERY Bilateral 2009    x3 procedures    WISDOM TOOTH EXTRACTION       Social History   Social History     Substance and Sexual Activity   Alcohol Use Not Currently    Comment: rare     Social History     Substance and Sexual Activity   Drug Use No     Social History     Tobacco Use   Smoking Status Former Smoker    Packs/day: 1 00    Years: 10 00    Pack years: 10 00    Last attempt to quit: Verline Rosmery Years since quittin 1   Smokeless Tobacco Never Used     Family History   Problem Relation Age of Onset    Stroke Mother     Diabetes Mother     Arthritis Father     Heart disease Father         CABG    Heart disease Daughter         congenital-truncus arteriosus    Congenital heart disease Daughter     Heart disease Son         congenital-"hole" repair    Other Sister         meningioma       Meds/Allergies       (Not in a hospital admission)    No Known Allergies    Objective     /80   Pulse 98   Temp 99 °F (37 2 °C) (Tympanic)   Resp 20   Ht 5' 5" (1 651 m)   Wt 93 9 kg (207 lb)   SpO2 99%   BMI 34 45 kg/m²       PHYSICAL EXAM    Gen: NAD  CV: RRR  CHEST: Clear  ABD: soft, NT/ND  EXT: no edema  Neuro: AAO      ASSESSMENT/PLAN:  This is a 61y o  year old female here for  hx of colon polyps         PLAN:   Procedure: colonoscopy

## 2020-02-07 NOTE — ANESTHESIA PREPROCEDURE EVALUATION
Review of Systems/Medical History  Patient summary reviewed  Chart reviewed  No history of anesthetic complications     Cardiovascular  Hyperlipidemia, Hypertension ,    Pulmonary  Smoker ex-smoker  ,        GI/Hepatic            Endo/Other  Diabetes type 2 , History of thyroid disease , hypothyroidism,      GYN      Comment: S/p tubal     Hematology   Musculoskeletal       Neurology      Comment: Right hearing aid Psychology           Physical Exam    Airway    Mallampati score: III  TM Distance: >3 FB  Neck ROM: full     Dental       Cardiovascular  Rhythm: regular, Rate: normal,     Pulmonary  Breath sounds clear to auscultation,     Other Findings        Anesthesia Plan  ASA Score- 2     Anesthesia Type- IV sedation with anesthesia with ASA Monitors  Additional Monitors:   Airway Plan:         Plan Factors-    Induction- intravenous  Postoperative Plan-     Informed Consent- Anesthetic plan and risks discussed with patient  I personally reviewed this patient with the CRNA  Discussed and agreed on the Anesthesia Plan with the CRNA  Ra Chaney

## 2020-02-07 NOTE — ANESTHESIA POSTPROCEDURE EVALUATION
Post-Op Assessment Note    CV Status:  Stable  Pain Score: 0    Pain management: adequate     Mental Status:  Awake   Hydration Status:  Stable   PONV Controlled:  None   Airway Patency:  Patent   Post Op Vitals Reviewed: Yes      Staff: CRNA           BP   123/74   Temp      Pulse  72   Resp   14   SpO2   100

## 2020-02-17 DIAGNOSIS — E11.9 TYPE 2 DIABETES MELLITUS WITHOUT COMPLICATION, WITHOUT LONG-TERM CURRENT USE OF INSULIN (HCC): ICD-10-CM

## 2020-02-18 ENCOUNTER — TELEPHONE (OUTPATIENT)
Dept: FAMILY MEDICINE CLINIC | Facility: CLINIC | Age: 60
End: 2020-02-18

## 2020-02-18 NOTE — TELEPHONE ENCOUNTER
Prior Auth done on Cover My [facility] Sit ID [id number]  With [employer] forms completed and approved until 02/17/2021  Pharmacy will contact pt when prescription filled    Task Complete  maria a

## 2020-02-21 DIAGNOSIS — E78.2 MIXED HYPERLIPIDEMIA: ICD-10-CM

## 2020-02-21 RX ORDER — ATORVASTATIN CALCIUM 40 MG/1
40 TABLET, FILM COATED ORAL DAILY
Qty: 90 TABLET | Refills: 1 | Status: SHIPPED | OUTPATIENT
Start: 2020-02-21 | End: 2020-05-18 | Stop reason: SDUPTHER

## 2020-02-28 LAB
ALBUMIN SERPL-MCNC: 4.3 G/DL (ref 3.8–4.9)
ALBUMIN/GLOB SERPL: 1.5 {RATIO} (ref 1.2–2.2)
ALP SERPL-CCNC: 60 IU/L (ref 39–117)
ALT SERPL-CCNC: 65 IU/L (ref 0–32)
AST SERPL-CCNC: 65 IU/L (ref 0–40)
BILIRUB SERPL-MCNC: 0.5 MG/DL (ref 0–1.2)
BUN SERPL-MCNC: 19 MG/DL (ref 8–27)
BUN/CREAT SERPL: 19 (ref 12–28)
CALCIUM SERPL-MCNC: 10.3 MG/DL (ref 8.7–10.3)
CHLORIDE SERPL-SCNC: 99 MMOL/L (ref 96–106)
CHOLEST SERPL-MCNC: 151 MG/DL (ref 100–199)
CO2 SERPL-SCNC: 22 MMOL/L (ref 20–29)
CREAT SERPL-MCNC: 1 MG/DL (ref 0.57–1)
ERYTHROCYTE [DISTWIDTH] IN BLOOD BY AUTOMATED COUNT: 13.6 % (ref 11.7–15.4)
EST. AVERAGE GLUCOSE BLD GHB EST-MCNC: 197 MG/DL
GLOBULIN SER-MCNC: 2.8 G/DL (ref 1.5–4.5)
GLUCOSE SERPL-MCNC: 212 MG/DL (ref 65–99)
HBA1C MFR BLD: 8.5 % (ref 4.8–5.6)
HCT VFR BLD AUTO: 36.6 % (ref 34–46.6)
HDLC SERPL-MCNC: 36 MG/DL
HGB BLD-MCNC: 12 G/DL (ref 11.1–15.9)
LDLC SERPL CALC-MCNC: 69 MG/DL (ref 0–99)
LDLC/HDLC SERPL: 1.9 RATIO (ref 0–3.2)
MCH RBC QN AUTO: 26.4 PG (ref 26.6–33)
MCHC RBC AUTO-ENTMCNC: 32.8 G/DL (ref 31.5–35.7)
MCV RBC AUTO: 80 FL (ref 79–97)
MICRODELETION SYND BLD/T FISH: NORMAL
PLATELET # BLD AUTO: 256 X10E3/UL (ref 150–450)
POTASSIUM SERPL-SCNC: 4.9 MMOL/L (ref 3.5–5.2)
PROT SERPL-MCNC: 7.1 G/DL (ref 6–8.5)
RBC # BLD AUTO: 4.55 X10E6/UL (ref 3.77–5.28)
SL AMB EGFR AFRICAN AMERICAN: 71 ML/MIN/1.73
SL AMB EGFR NON AFRICAN AMERICAN: 61 ML/MIN/1.73
SL AMB VLDL CHOLESTEROL CALC: 46 MG/DL (ref 5–40)
SODIUM SERPL-SCNC: 138 MMOL/L (ref 134–144)
TRIGL SERPL-MCNC: 232 MG/DL (ref 0–149)
WBC # BLD AUTO: 8.7 X10E3/UL (ref 3.4–10.8)

## 2020-03-02 ENCOUNTER — TELEPHONE (OUTPATIENT)
Dept: FAMILY MEDICINE CLINIC | Facility: CLINIC | Age: 60
End: 2020-03-02

## 2020-03-02 NOTE — TELEPHONE ENCOUNTER
----- Message from Sarah Milian DO sent at 3/2/2020 12:40 PM EST -----  Please call patient and have her to schedule an appointment to discuss her lab results    Diabetes is not controlled  Sarah Milian DO

## 2020-03-03 ENCOUNTER — OFFICE VISIT (OUTPATIENT)
Dept: FAMILY MEDICINE CLINIC | Facility: CLINIC | Age: 60
End: 2020-03-03
Payer: COMMERCIAL

## 2020-03-03 VITALS
SYSTOLIC BLOOD PRESSURE: 112 MMHG | BODY MASS INDEX: 34.66 KG/M2 | WEIGHT: 208 LBS | OXYGEN SATURATION: 97 % | DIASTOLIC BLOOD PRESSURE: 64 MMHG | HEIGHT: 65 IN | RESPIRATION RATE: 16 BRPM | HEART RATE: 68 BPM | TEMPERATURE: 97.9 F

## 2020-03-03 DIAGNOSIS — E11.65 TYPE 2 DIABETES MELLITUS WITH HYPERGLYCEMIA, WITHOUT LONG-TERM CURRENT USE OF INSULIN (HCC): ICD-10-CM

## 2020-03-03 DIAGNOSIS — E03.9 PRIMARY HYPOTHYROIDISM: ICD-10-CM

## 2020-03-03 DIAGNOSIS — K76.0 FATTY LIVER: ICD-10-CM

## 2020-03-03 DIAGNOSIS — I10 BENIGN ESSENTIAL HYPERTENSION: Primary | ICD-10-CM

## 2020-03-03 PROBLEM — E66.01 SEVERE OBESITY (BMI 35.0-39.9) WITH COMORBIDITY (HCC): Status: RESOLVED | Noted: 2019-11-26 | Resolved: 2020-03-03

## 2020-03-03 PROCEDURE — 99214 OFFICE O/P EST MOD 30 MIN: CPT | Performed by: FAMILY MEDICINE

## 2020-03-03 PROCEDURE — 3008F BODY MASS INDEX DOCD: CPT | Performed by: FAMILY MEDICINE

## 2020-03-03 PROCEDURE — 3074F SYST BP LT 130 MM HG: CPT | Performed by: FAMILY MEDICINE

## 2020-03-03 PROCEDURE — 1036F TOBACCO NON-USER: CPT | Performed by: FAMILY MEDICINE

## 2020-03-03 PROCEDURE — 2022F DILAT RTA XM EVC RTNOPTHY: CPT | Performed by: FAMILY MEDICINE

## 2020-03-03 PROCEDURE — 3078F DIAST BP <80 MM HG: CPT | Performed by: FAMILY MEDICINE

## 2020-03-03 RX ORDER — DULAGLUTIDE 1.5 MG/.5ML
INJECTION, SOLUTION SUBCUTANEOUS
Qty: 4 ML | Refills: 3 | Status: SHIPPED | OUTPATIENT
Start: 2020-03-03 | End: 2020-05-19

## 2020-03-03 NOTE — ASSESSMENT & PLAN NOTE
Lab Results   Component Value Date    HGBA1C 8 5 (H) 02/28/2020     Not controlled  She doesn't want more medication but agreed to diabetic education

## 2020-03-03 NOTE — PROGRESS NOTES
Assessment/Plan:    1  Benign essential hypertension  Assessment & Plan:  Well controlled    Orders:  -     Comprehensive metabolic panel; Future; Expected date: 05/17/2020  -     Lipid Panel with Direct LDL reflex; Future; Expected date: 05/17/2020  -     Comprehensive metabolic panel  -     Lipid Panel with Direct LDL reflex    2  Type 2 diabetes mellitus with hyperglycemia, without long-term current use of insulin [facility])  Assessment & Plan:    Lab Results   Component Value Date    HGBA1C 8 5 (H) 02/28/2020     Not controlled  She doesn't want more medication but agreed to diabetic education  Orders:  -     Ambulatory referral to Diabetic Education; Future  -     Comprehensive metabolic panel; Future; Expected date: 05/17/2020  -     Hemoglobin A1C; Future; Expected date: 05/17/2020  -     Lipid Panel with Direct LDL reflex; Future; Expected date: 05/17/2020  -     Comprehensive metabolic panel  -     Hemoglobin A1C  -     Lipid Panel with Direct LDL reflex  -     Dulaglutide 1 5 MG/0 5ML SOPN; Inject 0 5 mL (1 5 mg total) under the skin once a week    3  Primary hypothyroidism  Assessment & Plan:  At goal     Orders:  -     TSH, 3rd generation; Future; Expected date: 05/17/2020  -     T4, free; Future; Expected date: 05/17/2020  -     TSH, 3rd generation  -     T4, free    4  Fatty liver  Assessment & Plan:  Liver enzymes are still elevated       BMI Counseling: Body mass index is 34 61 kg/m²  The BMI is above normal  Nutrition recommendations include moderation in carbohydrate intake  Exercise recommendations include exercising 3-5 times per week  There are no Patient Instructions on file for this visit  Return in about 3 months (around 6/3/2020) for Annual physical     Subjective:      Patient ID: Lenka Li is a 61 y o  female  Chief Complaint   Patient presents with    review labs     [facility]       She was not eating well over the holidays    She has been able to lose a few pounds recently  Hypertension   This is a chronic problem  The current episode started more than 1 year ago  The problem is unchanged  The problem is controlled  Pertinent negatives include no peripheral edema or shortness of breath  Risk factors for coronary artery disease include obesity  Past treatments include ACE inhibitors  The current treatment provides moderate improvement  Compliance problems include exercise  The following portions of the patient's history were reviewed and updated as appropriate:  past social history    Review of Systems   Respiratory: Negative for shortness of breath  Cardiovascular: Negative for leg swelling           Current Outpatient Medications   Medication Sig Dispense Refill    aspirin 81 MG tablet Take 81 mg by mouth every evening      atorvastatin (LIPITOR) 40 mg tablet Take 1 tablet (40 mg total) by mouth daily 90 tablet 1    azelastine (ASTELIN) 0 1 % nasal spray 1 spray into each nostril every morning Use in each nostril as directed       BIOTIN PO Take 10,000 mcg by mouth every morning      desloratadine (CLARINEX) 5 MG tablet Take 1 tablet (5 mg total) by mouth daily (Patient taking differently: Take 5 mg by mouth every morning ) 90 tablet 1    Dulaglutide 1 5 MG/0 5ML SOPN Inject 0 5 mL (1 5 mg total) under the skin once a week 12 pen 1    glipiZIDE (GLUCOTROL XL) 10 mg 24 hr tablet Take 2 tablets (20 mg total) by mouth daily (Patient taking differently: Take 20 mg by mouth every morning ) 180 tablet 1    hydrOXYzine HCL (ATARAX) 25 mg tablet TAKE 1 TABLET AT BEDTIME AS NEEDED (Patient taking differently: 25 mg daily at bedtime as needed ) 90 tablet 1    levothyroxine 100 mcg tablet Take 1 tablet (100 mcg total) by mouth daily (Patient taking differently: Take 100 mcg by mouth every morning ) 90 tablet 1    metFORMIN (GLUCOPHAGE) 1000 MG tablet Take 1,000 mg by mouth 2 (two) times a day with meals      metoprolol succinate (TOPROL-XL) 25 mg 24 hr tablet Take 1 tablet (25 mg total) by mouth daily at bedtime 90 tablet 1    Omega-3 Fatty Acids (FISH OIL PO) Take 1,400 mg by mouth every evening       ONE TOUCH ULTRA TEST test strip USE TO TEST GLUCOSE TWICE A  each 1    quinapril (ACCUPRIL) 40 MG tablet Take 1 tablet (40 mg total) by mouth daily (Patient taking differently: Take 40 mg by mouth daily at bedtime ) 90 tablet 1    sitaGLIPtin (Januvia) 100 mg tablet Take 1 tablet (100 mg total) by mouth daily 30 tablet 3    Na Sulfate-K Sulfate-Mg Sulf 17 5-3 13-1 6 GM/177ML SOLN Take 1 kit by mouth see administration instructions for 1 dose (Patient not taking: Reported on 3/3/2020) 2 Bottle 0     No current facility-administered medications for this visit  Objective:    /64   Pulse 68   Temp 97 9 °F (36 6 °C)   Resp 16   Ht 5' 5" (1 651 m)   Wt 94 3 kg (208 lb)   SpO2 97%   BMI 34 61 kg/m²      Physical Exam   Constitutional: She appears well-developed and well-nourished  HENT:   Head: Normocephalic and atraumatic  Right Ear: External ear normal    Left Ear: External ear normal    Mouth/Throat: Oropharynx is clear and moist    Cardiovascular: Normal rate, regular rhythm and normal heart sounds  Exam reveals no friction rub  No murmur heard  Pulmonary/Chest: Effort normal and breath sounds normal  No respiratory distress  She has no wheezes  She has no rales  Musculoskeletal: She exhibits no edema or deformity  Nursing note and vitals reviewed            Debora Mcallister, DO

## 2020-03-10 DIAGNOSIS — E11.65 TYPE 2 DIABETES MELLITUS WITH HYPERGLYCEMIA, WITHOUT LONG-TERM CURRENT USE OF INSULIN (HCC): Primary | ICD-10-CM

## 2020-03-10 NOTE — TELEPHONE ENCOUNTER
----- Message from Jayy Gallardo sent at 3/10/2020 11:22 AM EDT -----  Regarding: Prescription Question  Contact: [phone]  Good morning  I need a refill of the Metformin 500mg from the [facility] mailorder  Not an emergency      Thank you

## 2020-03-10 NOTE — TELEPHONE ENCOUNTER
Requested medication(s) are due for refill today: Yes  Patient has already received a courtesy refill: No  Other reason request has been forwarded to provider:failed protocol

## 2020-03-16 DIAGNOSIS — E11.65 TYPE 2 DIABETES MELLITUS WITH HYPERGLYCEMIA, WITHOUT LONG-TERM CURRENT USE OF INSULIN (HCC): ICD-10-CM

## 2020-05-06 DIAGNOSIS — J30.9 ALLERGIC RHINITIS, UNSPECIFIED SEASONALITY, UNSPECIFIED TRIGGER: ICD-10-CM

## 2020-05-06 DIAGNOSIS — E03.9 PRIMARY HYPOTHYROIDISM: ICD-10-CM

## 2020-05-06 DIAGNOSIS — E11.65 TYPE 2 DIABETES MELLITUS WITH HYPERGLYCEMIA, WITHOUT LONG-TERM CURRENT USE OF INSULIN (HCC): ICD-10-CM

## 2020-05-06 DIAGNOSIS — E11.9 TYPE 2 DIABETES MELLITUS WITHOUT COMPLICATION, WITHOUT LONG-TERM CURRENT USE OF INSULIN (HCC): ICD-10-CM

## 2020-05-07 RX ORDER — LEVOTHYROXINE SODIUM 0.1 MG/1
100 TABLET ORAL DAILY
Qty: 90 TABLET | Refills: 1 | Status: SHIPPED | OUTPATIENT
Start: 2020-05-07 | End: 2020-07-28 | Stop reason: DRUGHIGH

## 2020-05-07 RX ORDER — GLIPIZIDE 10 MG/1
20 TABLET, FILM COATED, EXTENDED RELEASE ORAL DAILY
Qty: 180 TABLET | Refills: 1 | Status: SHIPPED | OUTPATIENT
Start: 2020-05-07 | End: 2021-01-25

## 2020-05-07 RX ORDER — DESLORATADINE 5 MG/1
5 TABLET ORAL DAILY
Qty: 90 TABLET | Refills: 1 | Status: SHIPPED | OUTPATIENT
Start: 2020-05-07 | End: 2020-12-04 | Stop reason: SDUPTHER

## 2020-05-18 DIAGNOSIS — N30.10 CHRONIC INTERSTITIAL CYSTITIS: ICD-10-CM

## 2020-05-18 DIAGNOSIS — E78.2 MIXED HYPERLIPIDEMIA: ICD-10-CM

## 2020-05-18 RX ORDER — ATORVASTATIN CALCIUM 40 MG/1
40 TABLET, FILM COATED ORAL DAILY
Qty: 90 TABLET | Refills: 1 | Status: SHIPPED | OUTPATIENT
Start: 2020-05-18 | End: 2020-10-21 | Stop reason: SDUPTHER

## 2020-05-18 RX ORDER — HYDROXYZINE HYDROCHLORIDE 25 MG/1
25 TABLET, FILM COATED ORAL
Qty: 90 TABLET | Refills: 1 | Status: SHIPPED | OUTPATIENT
Start: 2020-05-18 | End: 2021-04-14

## 2020-05-19 DIAGNOSIS — E11.65 TYPE 2 DIABETES MELLITUS WITH HYPERGLYCEMIA, WITHOUT LONG-TERM CURRENT USE OF INSULIN (HCC): ICD-10-CM

## 2020-05-19 RX ORDER — DULAGLUTIDE 1.5 MG/.5ML
INJECTION, SOLUTION SUBCUTANEOUS
Qty: 6 ML | Refills: 3 | Status: SHIPPED | OUTPATIENT
Start: 2020-05-19 | End: 2021-02-02 | Stop reason: ALTCHOICE

## 2020-06-24 DIAGNOSIS — I10 BENIGN ESSENTIAL HYPERTENSION: ICD-10-CM

## 2020-06-24 NOTE — TELEPHONE ENCOUNTER
She is due for her CPE with Dr Gómez Becerril   Return in about 3 months (around 6/3/2020) for Annual physical    [facility] LPN

## 2020-06-29 ENCOUNTER — TELEPHONE (OUTPATIENT)
Dept: FAMILY MEDICINE CLINIC | Facility: CLINIC | Age: 60
End: 2020-06-29

## 2020-06-29 DIAGNOSIS — I10 BENIGN ESSENTIAL HYPERTENSION: ICD-10-CM

## 2020-06-29 RX ORDER — METOPROLOL SUCCINATE 25 MG/1
25 TABLET, EXTENDED RELEASE ORAL
Qty: 90 TABLET | Refills: 1 | Status: SHIPPED | OUTPATIENT
Start: 2020-06-29 | End: 2020-07-08 | Stop reason: SDUPTHER

## 2020-07-07 DIAGNOSIS — E11.65 TYPE 2 DIABETES MELLITUS WITH HYPERGLYCEMIA, WITHOUT LONG-TERM CURRENT USE OF INSULIN (HCC): ICD-10-CM

## 2020-07-07 NOTE — TELEPHONE ENCOUNTER
Requested medication(s) are due for refill today: Yes  Patient has already received a courtesy refill: No  Other reason request has been forwarded to provider: Any protocol     Funmilayo Jones MA

## 2020-07-08 RX ORDER — METOPROLOL SUCCINATE 25 MG/1
TABLET, EXTENDED RELEASE ORAL
Qty: 90 TABLET | Refills: 1 | Status: SHIPPED | OUTPATIENT
Start: 2020-07-08 | End: 2020-12-04 | Stop reason: SDUPTHER

## 2020-07-10 DIAGNOSIS — E11.9 TYPE 2 DIABETES MELLITUS WITHOUT COMPLICATION, WITHOUT LONG-TERM CURRENT USE OF INSULIN (HCC): ICD-10-CM

## 2020-07-10 NOTE — TELEPHONE ENCOUNTER
She needs a CPE with Dr Dexter Moreno     Return in about 3 months (around 6/3/2020) for Annual physical    Julee Soho

## 2020-07-13 ENCOUNTER — TELEPHONE (OUTPATIENT)
Dept: FAMILY MEDICINE CLINIC | Facility: CLINIC | Age: 60
End: 2020-07-13

## 2020-07-13 NOTE — TELEPHONE ENCOUNTER
Spoke with pt because she needs a refill of her medication but there is a note in refill requests that she needs to make an apt for a physical, she will be out of meds in about three days and Dr Guerline Ashley does not have an opening until 9/3/20 and that is only for a 15min   Please advise

## 2020-07-13 NOTE — TELEPHONE ENCOUNTER
Her CPE can be when Dr Sb Salinas has an availability  Please schedule her CPE when she does have the first availability and we will send Dr Sb Salinas the refill request   Micah Washington

## 2020-07-17 ENCOUNTER — TELEPHONE (OUTPATIENT)
Dept: FAMILY MEDICINE CLINIC | Facility: CLINIC | Age: 60
End: 2020-07-17

## 2020-07-17 DIAGNOSIS — I10 BENIGN ESSENTIAL HYPERTENSION: ICD-10-CM

## 2020-07-17 LAB
ALBUMIN SERPL-MCNC: 4.4 G/DL (ref 3.8–4.9)
ALBUMIN/GLOB SERPL: 1.6 {RATIO} (ref 1.2–2.2)
ALP SERPL-CCNC: 59 IU/L (ref 39–117)
ALT SERPL-CCNC: 74 IU/L (ref 0–32)
AST SERPL-CCNC: 73 IU/L (ref 0–40)
BILIRUB SERPL-MCNC: 0.3 MG/DL (ref 0–1.2)
BUN SERPL-MCNC: 27 MG/DL (ref 8–27)
BUN/CREAT SERPL: 25 (ref 12–28)
CALCIUM SERPL-MCNC: 10.8 MG/DL (ref 8.7–10.3)
CHLORIDE SERPL-SCNC: 99 MMOL/L (ref 96–106)
CHOLEST SERPL-MCNC: 157 MG/DL (ref 100–199)
CO2 SERPL-SCNC: 19 MMOL/L (ref 20–29)
CREAT SERPL-MCNC: 1.06 MG/DL (ref 0.57–1)
EST. AVERAGE GLUCOSE BLD GHB EST-MCNC: 212 MG/DL
GLOBULIN SER-MCNC: 2.7 G/DL (ref 1.5–4.5)
GLUCOSE SERPL-MCNC: 219 MG/DL (ref 65–99)
HBA1C MFR BLD: 9 % (ref 4.8–5.6)
HDLC SERPL-MCNC: 34 MG/DL
LDLC SERPL CALC-MCNC: 67 MG/DL (ref 0–99)
LDLC/HDLC SERPL: 2 RATIO (ref 0–3.2)
MICRODELETION SYND BLD/T FISH: NORMAL
MICRODELETION SYND BLD/T FISH: NORMAL
POTASSIUM SERPL-SCNC: 5.4 MMOL/L (ref 3.5–5.2)
PROT SERPL-MCNC: 7.1 G/DL (ref 6–8.5)
SL AMB EGFR AFRICAN AMERICAN: 66 ML/MIN/1.73
SL AMB EGFR NON AFRICAN AMERICAN: 57 ML/MIN/1.73
SL AMB PDF IMAGE: NORMAL
SL AMB VLDL CHOLESTEROL CALC: 56 MG/DL (ref 5–40)
SODIUM SERPL-SCNC: 135 MMOL/L (ref 134–144)
T4 FREE SERPL-MCNC: 1.39 NG/DL (ref 0.82–1.77)
TRIGL SERPL-MCNC: 280 MG/DL (ref 0–149)
TSH SERPL DL<=0.005 MIU/L-ACNC: 5.68 UIU/ML (ref 0.45–4.5)

## 2020-07-17 PROCEDURE — 3052F HG A1C>EQUAL 8.0%<EQUAL 9.0%: CPT | Performed by: FAMILY MEDICINE

## 2020-07-17 NOTE — TELEPHONE ENCOUNTER
I am concerned about her diabetes  Her hemoglobin A1c is up to 9 0     Please have her move up her follow-up appointment   Ezekiel Gaspar, DO     She needs a sooner appt with Dr Renae Rosenthal as per above copied/ pasted message from Dr Abdelrahman Correia

## 2020-07-17 NOTE — TELEPHONE ENCOUNTER
Is there a specific date you would like the pt to be moved up to? You are booked for the next 3-4 weeks?  pls advise

## 2020-07-17 NOTE — TELEPHONE ENCOUNTER
1 month is fine, I was worried about waiting until October or if she is willing to see someone else    Ever Roldan, DO

## 2020-07-20 PROCEDURE — 4010F ACE/ARB THERAPY RXD/TAKEN: CPT | Performed by: FAMILY MEDICINE

## 2020-07-20 RX ORDER — QUINAPRIL 40 MG/1
TABLET ORAL
Qty: 90 TABLET | Refills: 1 | Status: SHIPPED | OUTPATIENT
Start: 2020-07-20 | End: 2021-01-25 | Stop reason: SDUPTHER

## 2020-07-21 NOTE — TELEPHONE ENCOUNTER
Appt scheduled with Dr Vivienne Nugent for 7/28/20  No openings with Dr Sb Salinas   [facility]

## 2020-07-28 ENCOUNTER — OFFICE VISIT (OUTPATIENT)
Dept: FAMILY MEDICINE CLINIC | Facility: CLINIC | Age: 60
End: 2020-07-28
Payer: COMMERCIAL

## 2020-07-28 ENCOUNTER — TELEPHONE (OUTPATIENT)
Dept: FAMILY MEDICINE CLINIC | Facility: CLINIC | Age: 60
End: 2020-07-28

## 2020-07-28 VITALS
HEIGHT: 65 IN | WEIGHT: 203 LBS | RESPIRATION RATE: 18 BRPM | DIASTOLIC BLOOD PRESSURE: 72 MMHG | TEMPERATURE: 97.6 F | HEART RATE: 70 BPM | SYSTOLIC BLOOD PRESSURE: 120 MMHG | BODY MASS INDEX: 33.82 KG/M2

## 2020-07-28 DIAGNOSIS — E11.9 TYPE 2 DIABETES MELLITUS WITHOUT COMPLICATION, WITHOUT LONG-TERM CURRENT USE OF INSULIN (HCC): Primary | ICD-10-CM

## 2020-07-28 DIAGNOSIS — E03.9 PRIMARY HYPOTHYROIDISM: ICD-10-CM

## 2020-07-28 PROCEDURE — 2022F DILAT RTA XM EVC RTNOPTHY: CPT | Performed by: FAMILY MEDICINE

## 2020-07-28 PROCEDURE — 1036F TOBACCO NON-USER: CPT | Performed by: FAMILY MEDICINE

## 2020-07-28 PROCEDURE — 99214 OFFICE O/P EST MOD 30 MIN: CPT | Performed by: FAMILY MEDICINE

## 2020-07-28 PROCEDURE — 3074F SYST BP LT 130 MM HG: CPT | Performed by: FAMILY MEDICINE

## 2020-07-28 PROCEDURE — 3078F DIAST BP <80 MM HG: CPT | Performed by: FAMILY MEDICINE

## 2020-07-28 PROCEDURE — 3008F BODY MASS INDEX DOCD: CPT | Performed by: FAMILY MEDICINE

## 2020-07-28 RX ORDER — LEVOTHYROXINE SODIUM 112 UG/1
112 TABLET ORAL
Qty: 30 TABLET | Refills: 5 | Status: SHIPPED | OUTPATIENT
Start: 2020-07-28 | End: 2020-09-14 | Stop reason: SDUPTHER

## 2020-07-28 RX ORDER — INSULIN GLARGINE 100 [IU]/ML
10 INJECTION, SOLUTION SUBCUTANEOUS
Qty: 10 ML | Refills: 1 | Status: SHIPPED | OUTPATIENT
Start: 2020-07-28 | End: 2020-07-28 | Stop reason: CLARIF

## 2020-07-28 NOTE — PROGRESS NOTES
Assessment/Plan:     Diagnoses and all orders for this visit:    Type 2 diabetes mellitus without complication, without long-term current use of insulin ([facility] [address] )        - Waleska Turner is currently on metformin, glipizide, sitagliptin, and dulaglutide with worsening blood sugars  A1c has increased from 8 5 to 9  Discussed that she is already on moderate-high doses of her current medications and at this point, she will need insulin  Pt is apprehensive, but did agree to start at a low dose once daily of Lantus          - Discussed diabetic diet and exercise which pt has not been compliant with  She will be making an appointment with diabetic education which she has not done yet  - Foot exam done in office today  Pt has appointment scheduled for diabetic eye exam coming up soon  - Will repeat blood work in 3 months  Follow up DM in 1 month  - Discussed monitoring blood sugars daily and explained to look out of signs of hypoglycemia  -     insulin glargine (LANTUS) 100 units/mL subcutaneous injection; Inject 10 Units under the skin daily at bedtime  -     Insulin Pen Needle (Prevent Safety Pen Needles) 31G X 6 MM MISC; by Does not apply route 2 (two) times a day  -     CBC; Future  -     Comprehensive metabolic panel; Future  -     Hemoglobin A1C; Future  -     Ambulatory referral to Diabetic Education; Future    Primary hypothyroidism  -     TSH, 3rd generation; Future  -     T4, free; Future  - TSH reviewed which was elevated  Pt asymptomatic    -     Will increase dose of levothyroxine to 112 mcg tablet; Take 1 tablet (112 mcg total) by mouth daily in the early morning  Repeat blood work in 12 weeks  Subjective:      Patient ID: Isabelle Dempsey is a 61 y o  female  Diabetes   She presents for her follow-up diabetic visit  She has type 2 diabetes mellitus  Her disease course has been worsening  There are no hypoglycemic associated symptoms   Pertinent negatives for hypoglycemia include no dizziness, headaches, nervousness/anxiousness or tremors  Associated symptoms include foot paresthesias  Pertinent negatives for diabetes include no blurred vision, no chest pain, no fatigue, no foot ulcerations, no polydipsia, no polyphagia, no polyuria, no visual change, no weakness and no weight loss  There are no hypoglycemic complications  Symptoms are stable  Risk factors for coronary artery disease include dyslipidemia, obesity, sedentary lifestyle and family history  Current diabetic treatments: metformin, glipizide, januvia, trulicity  She is compliant with treatment all of the time  Her breakfast blood glucose range is generally >200 mg/dl  Her lunch blood glucose range is generally >200 mg/dl  Her dinner blood glucose range is generally >200 mg/dl  Her bedtime blood glucose range is generally >200 mg/dl  Her overall blood glucose range is >200 mg/dl  Thyroid Problem   Presents for follow-up visit  Patient reports no anxiety, cold intolerance, constipation, depressed mood, diaphoresis, diarrhea, fatigue, menstrual problem, nail problem, palpitations, tremors, visual change, weight gain or weight loss  The symptoms have been stable  The following portions of the patient's history were reviewed and updated as appropriate: allergies, current medications, past family history, past medical history, past social history, past surgical history and problem list     Review of Systems   Constitutional: Negative for activity change, appetite change, chills, diaphoresis, fatigue, fever, weight gain and weight loss  HENT: Negative  Eyes: Negative for blurred vision  Respiratory: Negative for cough, choking, chest tightness, shortness of breath and wheezing  Cardiovascular: Negative for chest pain, palpitations and leg swelling  Gastrointestinal: Negative for abdominal distention, abdominal pain, constipation, diarrhea, nausea and vomiting     Endocrine: Negative for cold intolerance, polydipsia, polyphagia and polyuria  Genitourinary: Negative for difficulty urinating, dyspareunia, dysuria, enuresis, flank pain, frequency, menstrual problem, pelvic pain and urgency  Musculoskeletal: Negative for arthralgias, back pain, gait problem, joint swelling, myalgias, neck pain and neck stiffness  Skin: Negative  Neurological: Negative for dizziness, tremors, syncope, facial asymmetry, weakness, light-headedness, numbness and headaches  Psychiatric/Behavioral: Negative  The patient is not nervous/anxious  Objective:      /72   Pulse 70   Temp 97 6 °F (36 4 °C)   Resp 18   Ht 5' 5" (1 651 m)   Wt 92 1 kg (203 lb)   BMI 33 78 kg/m²          Physical Exam   Constitutional: She is oriented to person, place, and time  She appears well-developed and well-nourished  No distress  HENT:   Head: Normocephalic and atraumatic  Neck: Normal range of motion  Neck supple  Cardiovascular: Normal rate, regular rhythm, normal heart sounds and intact distal pulses  Exam reveals no gallop and no friction rub  Pulses are no weak pulses  No murmur heard  Pulses:       Dorsalis pedis pulses are 2+ on the right side, and 2+ on the left side  Posterior tibial pulses are 2+ on the right side, and 2+ on the left side  Pulmonary/Chest: Effort normal and breath sounds normal  No respiratory distress  She has no wheezes  She has no rales  She exhibits no tenderness  Musculoskeletal: Normal range of motion  She exhibits no edema or deformity  Feet:   Right Foot:   Skin Integrity: Negative for ulcer, skin breakdown, erythema, warmth, callus or dry skin  Left Foot:   Skin Integrity: Negative for ulcer, skin breakdown, erythema, warmth, callus or dry skin  Neurological: She is alert and oriented to person, place, and time  Skin: Skin is warm and dry  She is not diaphoretic  Psychiatric: She has a normal mood and affect   Her behavior is normal  Judgment and thought content normal  Patient's shoes and socks removed  Right Foot/Ankle   Right Foot Inspection  Skin Exam: skin normal and skin intact no dry skin, no warmth, no callus, no erythema, no maceration, no abnormal color, no pre-ulcer, no ulcer and no callus                          Toe Exam: ROM and strength within normal limits  Sensory       Monofilament testing: intact  Vascular  Capillary refills: < 3 seconds  The right DP pulse is 2+  The right PT pulse is 2+  Left Foot/Ankle  Left Foot Inspection  Skin Exam: skin normal and skin intactno dry skin, no warmth, no erythema, no maceration, normal color, no pre-ulcer, no ulcer and no callus                         Toe Exam: ROM and strength within normal limits                   Sensory       Monofilament: intact  Vascular  Capillary refills: < 3 seconds  The left DP pulse is 2+  The left PT pulse is 2+  Assign Risk Category:  No deformity present; No loss of protective sensation;  No weak pulses       Risk: 0

## 2020-07-28 NOTE — TELEPHONE ENCOUNTER
I sent in the correct prescription for the lantus pen now and would like to cancel the vials  Can we please let pharmacy know? Thank you

## 2020-07-28 NOTE — TELEPHONE ENCOUNTER
nell called requesting clarity on recent order sent for  lantus vials  If the vial is correct, patient will need a script for syringes   Please advise Regina Manjarrez MA

## 2020-08-13 ENCOUNTER — TELEMEDICINE (OUTPATIENT)
Dept: DIABETES SERVICES | Facility: CLINIC | Age: 60
End: 2020-08-13
Payer: COMMERCIAL

## 2020-08-13 DIAGNOSIS — E11.65 TYPE 2 DIABETES MELLITUS WITH HYPERGLYCEMIA, WITHOUT LONG-TERM CURRENT USE OF INSULIN (HCC): Primary | ICD-10-CM

## 2020-08-13 PROCEDURE — 97802 MEDICAL NUTRITION INDIV IN: CPT | Performed by: DIETITIAN, REGISTERED

## 2020-08-13 NOTE — PROGRESS NOTES
Virtual Regular Visit      Assessment/Plan:    Problem List Items Addressed This Visit     None            Reason for visit is   Chief Complaint   Patient presents with    Virtual Regular Visit        Encounter provider Berhane Cortez RD    Provider located at [address] [address]      Recent Visits  No visits were found meeting these conditions  Showing recent visits within past 7 days and meeting all other requirements     Future Appointments  No visits were found meeting these conditions  Showing future appointments within next 150 days and meeting all other requirements        The patient was identified by name and date of birth  Dorie Ferreira was informed that this is a telemedicine visit and that the visit is being conducted through [employer]  My office door was closed  No one else was in the room  She acknowledged consent and understanding of privacy and security of the video platform  The patient has agreed to participate and understands they can discontinue the visit at any time  Patient is aware this is a billable service  Subjective  Dorie Ferreira is a 61 y o  female with history of T2DM  See note below  There were no vitals filed for this visit  I spent 63 minutes with patient today in which greater than 50% of the time was spent in counseling/coordination of care regarding Medical Nutrition Therapy for Type 2 Diabetes      VIRTUAL VISIT DISCLAIMER    Dorie Ferreira acknowledges that she has consented to an online visit or consultation  She understands that the online visit is based solely on information provided by her, and that, in the absence of a face-to-face physical evaluation by the physician, the diagnosis she receives is both limited and provisional in terms of accuracy and completeness  This is not intended to replace a full medical face-to-face evaluation by the physician  Johanna Roa understands and accepts these terms  Medical Nutrition Therapy        Assessment    Visit Type: Initial visit    Chief complaint T2DM    HPI: Johanna Roa Is a 68-year-old female with a history of type 2 diabetes  Most recent HbA1c 9 0%  Amrit Estevez was originally scheduled for a class assessment today for diabetes classes  However, when we began the appointment she stated that she does not wish to participate in diabetes classes and is really looking for nutrition guidance  Therefore, today's appointment was conducted as her medical nutrition therapy appointment  Lokesh diet history reveals inadequate carbohydrate intake and inadequate intake of high-fiber fruit and low-fat dairy  Currently meals range from 0 to 20 grams of carbohydrate  Kirstens diet recall is not reflective of a HbA1c of 9 0%  When asked if she has drastically changed her diet recently she stated that she goes through phases of eating the way that she is now and also phases where her carb intake could be significantly higher  Explained basic pathophysiology of diabetes and impact of diet on blood glucose levels  Together we discussed what foods contain CHO, reading a food label, timing of meals and snacks, serving sizes, the role of fiber in BG control and overall health, and the importance of consistent carbohydrate intake in achieving and maintaining target BG levels  Used the portion booklet to teach Amrit Estevez more about food groups and basic carbohydrate counting  Created an individualized meal plan for Amrit Estevez with 3 meals and 2 snacks providing 30-45 g carb per meal and 15 g carb per snack  This plan will help promote weight loss  Discussed sample meals for Luis's reference  Amrit Estevez states that she does not engage in any routine exercise  Discussed starting gradually such as going for a 15 minutes walk 2 times per week  Amrit Estevez states that it has been too high out    Discussed alternative means of exercise such as using her basement stairs to perform repetitions of going up and down to get more exercise  Chandan Pozo demonstrated good understanding and will call with any questions prior to follow up appointment in 6 weeks  Ht Readings from Last 1 Encounters:   07/28/20 5' 5" (1 651 m)     Wt Readings from Last 2 Encounters:   07/28/20 92 1 kg (203 lb)   03/03/20 94 3 kg (208 lb)     Weight Change: No    Medical Diagnosis/ICD10: E11 9 (ICD-10-CM) - Type 2 diabetes mellitus without complication, without long-term current use of insulin    Barriers to Learning: no barriers    Do you follow any special diet presently?: No, but tries to limit carbs and stay away from sweets and "the junky stuff"  Who shops: patient  Who cooks: patient    Food Log: Completed via the method of food recall    Breakfast:wakes 9-10 am  Eats 10:30 am  Cottage cheese and a slice of pineapple (1 ring)  Coffee (half and half) 8 oz  Morning Snack:not usually but sometimes around 12-13:20  plum or a small piece of fruit (half a large peach) or some nuts  Lunch:2-2:30 pm  [place] on low carb bread -13 g per slice (chicken or turkey or tuna) sometimes 1 slice low sodium American or Swiss, sometimes a little jimenez or a salad (lettuce, cucumber, tomatoes) with some protein (chicken or tuna)  Decaf iced tea (unsweetned) , water or diet soda  Afternoon Snack: 4-5 pm  a piece of fruit  Dinner:6:30-7 pm  Pork chop and brussel sprouts  Diet soda  Evening Snack: 9-10 pm  nuts, a piece of cheese, popcorn, pretzels with cheese (15 g carb per pack), sandwich crackers (4)  Sometimes more than one of these    Beverages: coffee, water, decaf tea, diet soda  Eating out/Take out:once every 2 weeks  Exercise none    Calorie needs 1300  kcals/day Carbs: 30-45g/meal, 15 g/snack         Nutrition Diagnosis:  Inadequate carbohydrate intake  related to Food and nutrition related knowledge deficit concerning appropriate amount of dietary carbohydrate as evidenced by Estimated carbohydrate intake less than recommended amounts    Intervention: plate method, increased fiber intake, label reading, carbohydrate counting, increased plant based foods, meal timing, meal planning, individualized meal plan and food diary     Treatment Goals: Patient understands education and recommendations, Patient will monitor food intake daily with tracker, Patient will consume 25-35 grams of fiber a day, Patient will increase their intake of plant based foods, Patient will count carbohydrates and Patient will exercise    Monitoring and evaluation:    Term code indicator  FH 1 6 3 Carbohydrate Intake Criteria: 30-45 grams of Carbohydrate per meal, 15 g of carbohydrate per snack  Term code indicator  FH 4 4 Mealtime Behavior Criteria: Three meals per day, 4-5 hours apart  Two snacks per day, at least 2 hours apart from meals  Patients Response to Instruction:  Comprehensiongood  Motivationgood  Expected Compliancegood     Start- Stop: 1:00 pm - 2:03 pm  Total Minutes: 63 Minutes  Group or Individual Instruction: MNT - I  Other: Kaci Calrk MD    Thank you for referring your patient to the [address] [place] [address] for education today  Please feel free to call with any questions or concerns      [facility], [address] [place] [address] [facility] [address] [facility] [address]

## 2020-08-13 NOTE — PATIENT INSTRUCTIONS
30-45 g carbohydrate per meal, 15 g of carbohydrate per snack  Three meals per day, 4-5 hours apart  1-2 snacks per day, at least 2 hours apart from meals  Use the portion book and label reading to determine carbohydrate amounts in food and beverages

## 2020-09-10 LAB
ALBUMIN SERPL-MCNC: 4.2 G/DL (ref 3.8–4.9)
ALBUMIN/GLOB SERPL: 1.4 {RATIO} (ref 1.2–2.2)
ALP SERPL-CCNC: 65 IU/L (ref 39–117)
ALT SERPL-CCNC: 77 IU/L (ref 0–32)
AST SERPL-CCNC: 66 IU/L (ref 0–40)
BILIRUB SERPL-MCNC: 0.3 MG/DL (ref 0–1.2)
BUN SERPL-MCNC: 23 MG/DL (ref 8–27)
BUN/CREAT SERPL: 23 (ref 12–28)
CALCIUM SERPL-MCNC: 9.7 MG/DL (ref 8.7–10.3)
CHLORIDE SERPL-SCNC: 98 MMOL/L (ref 96–106)
CO2 SERPL-SCNC: 21 MMOL/L (ref 20–29)
CREAT SERPL-MCNC: 0.98 MG/DL (ref 0.57–1)
ERYTHROCYTE [DISTWIDTH] IN BLOOD BY AUTOMATED COUNT: 14.5 % (ref 11.7–15.4)
EST. AVERAGE GLUCOSE BLD GHB EST-MCNC: 203 MG/DL
GLOBULIN SER-MCNC: 2.9 G/DL (ref 1.5–4.5)
GLUCOSE SERPL-MCNC: 217 MG/DL (ref 65–99)
HBA1C MFR BLD: 8.7 % (ref 4.8–5.6)
HCT VFR BLD AUTO: 33.3 % (ref 34–46.6)
HGB BLD-MCNC: 10.7 G/DL (ref 11.1–15.9)
MCH RBC QN AUTO: 25.8 PG (ref 26.6–33)
MCHC RBC AUTO-ENTMCNC: 32.1 G/DL (ref 31.5–35.7)
MCV RBC AUTO: 80 FL (ref 79–97)
PLATELET # BLD AUTO: 229 X10E3/UL (ref 150–450)
POTASSIUM SERPL-SCNC: 5.1 MMOL/L (ref 3.5–5.2)
PROT SERPL-MCNC: 7.1 G/DL (ref 6–8.5)
RBC # BLD AUTO: 4.14 X10E6/UL (ref 3.77–5.28)
SL AMB EGFR AFRICAN AMERICAN: 73 ML/MIN/1.73
SL AMB EGFR NON AFRICAN AMERICAN: 63 ML/MIN/1.73
SODIUM SERPL-SCNC: 134 MMOL/L (ref 134–144)
T4 FREE SERPL-MCNC: 1.6 NG/DL (ref 0.82–1.77)
TSH SERPL DL<=0.005 MIU/L-ACNC: 1.87 UIU/ML (ref 0.45–4.5)
WBC # BLD AUTO: 8.4 X10E3/UL (ref 3.4–10.8)

## 2020-09-10 PROCEDURE — 3052F HG A1C>EQUAL 8.0%<EQUAL 9.0%: CPT | Performed by: FAMILY MEDICINE

## 2020-09-14 ENCOUNTER — OFFICE VISIT (OUTPATIENT)
Dept: FAMILY MEDICINE CLINIC | Facility: CLINIC | Age: 60
End: 2020-09-14
Payer: COMMERCIAL

## 2020-09-14 VITALS
HEART RATE: 88 BPM | HEIGHT: 65 IN | BODY MASS INDEX: 34.82 KG/M2 | TEMPERATURE: 97.8 F | SYSTOLIC BLOOD PRESSURE: 124 MMHG | DIASTOLIC BLOOD PRESSURE: 76 MMHG | RESPIRATION RATE: 16 BRPM | WEIGHT: 209 LBS

## 2020-09-14 DIAGNOSIS — E11.65 TYPE 2 DIABETES MELLITUS WITH HYPERGLYCEMIA, WITHOUT LONG-TERM CURRENT USE OF INSULIN (HCC): Primary | ICD-10-CM

## 2020-09-14 DIAGNOSIS — D64.9 ANEMIA, UNSPECIFIED TYPE: ICD-10-CM

## 2020-09-14 DIAGNOSIS — E78.2 MIXED HYPERLIPIDEMIA: ICD-10-CM

## 2020-09-14 DIAGNOSIS — E11.9 TYPE 2 DIABETES MELLITUS WITHOUT COMPLICATION, WITHOUT LONG-TERM CURRENT USE OF INSULIN (HCC): ICD-10-CM

## 2020-09-14 DIAGNOSIS — I10 BENIGN ESSENTIAL HYPERTENSION: ICD-10-CM

## 2020-09-14 DIAGNOSIS — N39.0 ACUTE UTI: ICD-10-CM

## 2020-09-14 LAB
SL AMB  POCT GLUCOSE, UA: 100
SL AMB LEUKOCYTE ESTERASE,UA: ABNORMAL
SL AMB POCT BILIRUBIN,UA: ABNORMAL
SL AMB POCT BLOOD,UA: ABNORMAL
SL AMB POCT CLARITY,UA: ABNORMAL
SL AMB POCT COLOR,UA: YELLOW
SL AMB POCT KETONES,UA: ABNORMAL
SL AMB POCT NITRITE,UA: ABNORMAL
SL AMB POCT PH,UA: 5.5
SL AMB POCT SPECIFIC GRAVITY,UA: 1.02
SL AMB POCT URINE PROTEIN: 300
SL AMB POCT UROBILINOGEN: 0.2

## 2020-09-14 PROCEDURE — 99214 OFFICE O/P EST MOD 30 MIN: CPT | Performed by: FAMILY MEDICINE

## 2020-09-14 PROCEDURE — 3074F SYST BP LT 130 MM HG: CPT | Performed by: FAMILY MEDICINE

## 2020-09-14 PROCEDURE — 3061F NEG MICROALBUMINURIA REV: CPT | Performed by: FAMILY MEDICINE

## 2020-09-14 PROCEDURE — 3078F DIAST BP <80 MM HG: CPT | Performed by: FAMILY MEDICINE

## 2020-09-14 PROCEDURE — 81003 URINALYSIS AUTO W/O SCOPE: CPT | Performed by: FAMILY MEDICINE

## 2020-09-14 RX ORDER — LEVOTHYROXINE SODIUM 112 UG/1
112 TABLET ORAL
Qty: 90 TABLET | Refills: 1 | Status: SHIPPED | OUTPATIENT
Start: 2020-09-14 | End: 2021-02-22

## 2020-09-14 RX ORDER — INSULIN GLARGINE 100 [IU]/ML
15 INJECTION, SOLUTION SUBCUTANEOUS
Qty: 5 PEN | Refills: 1 | Status: SHIPPED | OUTPATIENT
Start: 2020-09-14 | End: 2020-10-02 | Stop reason: SDUPTHER

## 2020-09-14 RX ORDER — CEPHALEXIN 500 MG/1
500 CAPSULE ORAL EVERY 12 HOURS SCHEDULED
Qty: 14 CAPSULE | Refills: 0 | Status: SHIPPED | OUTPATIENT
Start: 2020-09-14 | End: 2020-09-21

## 2020-09-14 NOTE — ASSESSMENT & PLAN NOTE
New  Will have patient get urinalysis recheck since there was some blood in her urine although she is symptomatic of infection today    Will also get iron studies and repeat her CBC before her next appointment in about 2 weeks

## 2020-09-14 NOTE — ASSESSMENT & PLAN NOTE
Lab Results   Component Value Date    HGBA1C 8 7 (H) 09/09/2020     Not controlled- dose in lantus increased from 10 to 15 units, will go up by 1 unit a day    Has eye exam scheduled next month, form given to bring with her to her appointment  Advised patient to hold her diabetic medication except for her Lantus 1 fasting for upcoming holiday    Advised to take half dose of her Lantus the night before she is going to fast

## 2020-09-14 NOTE — PROGRESS NOTES
Assessment/Plan:    1  Type 2 diabetes mellitus with hyperglycemia, without long-term current use of insulin (formerly [facility])  Assessment & Plan:    Lab Results   Component Value Date    HGBA1C 8 7 (H) 09/09/2020     Not controlled- dose in lantus increased from 10 to 15 units, will go up by 1 unit a day    Has eye exam scheduled next month, form given to bring with her to her appointment  Advised patient to hold her diabetic medication except for her Lantus 1 fasting for upcoming holiday  Advised to take half dose of her Lantus the night before she is going to fast        2  Benign essential hypertension  Assessment & Plan:  Blood pressure is well controlled  Continue metoprolol 25 mg daily and quinapril 40 mg daily      3  Mixed hyperlipidemia  Assessment & Plan:  Stable  Continue atorvastatin 40 mg daily      4  Type 2 diabetes mellitus without complication, without long-term current use of insulin (formerly [facility])  -     insulin glargine (Lantus SoloStar) 100 units/mL injection pen; Inject 15 Units under the skin daily at bedtime  -     levothyroxine 112 mcg tablet; Take 1 tablet (112 mcg total) by mouth daily in the early morning  -     Insulin Pen Needle (Prevent Safety Pen Needles) 31G X 6 MM MISC; by Does not apply route 2 (two) times a day    5  Acute UTI  -     POCT urine dip auto non-scope  -     Urine culture  -     cephalexin (KEFLEX) 500 mg capsule; Take 1 capsule (500 mg total) by mouth every 12 (twelve) hours for 7 days  -     Urinalysis with microscopic  -     Urinalysis with microscopic; Future  -     Urinalysis with microscopic    6  Anemia, unspecified type  Assessment & Plan:  New  Will have patient get urinalysis recheck since there was some blood in her urine although she is symptomatic of infection today  Will also get iron studies and repeat her CBC before her next appointment in about 2 weeks    Orders:  -     CBC and differential; Future  -     TIBC; Future  -     Iron;  Future  -     Vitamin B12; Future  - Ferritin; Future  -     CBC and differential  -     TIBC  -     Iron  -     Vitamin B12  -     Ferritin         There are no Patient Instructions on file for this visit  Return for October 2 as scheduled   Subjective:      Patient ID: Gurpreet Mcneill is a 61 y o  female  Chief Complaint   Patient presents with    Follow-up     med change-lj    Urinary Frequency       She has started her insulin and she did meet with a diabetic educator  She is keep a log of her sugars  She has has dysuria and frequency for the past week  She has had a low grade fever  She denies any flank pain  Hypertension - patient has been taking her blood pressure medication regularly  Associated symptoms:  Swelling:  no  Leg cramps: no      The following portions of the patient's history were reviewed and updated as appropriate:  past social history    Review of Systems   Respiratory: Negative  Cardiovascular: Negative            Current Outpatient Medications   Medication Sig Dispense Refill    aspirin 81 MG tablet Take 81 mg by mouth every evening      atorvastatin (LIPITOR) 40 mg tablet Take 1 tablet (40 mg total) by mouth daily 90 tablet 1    azelastine (ASTELIN) 0 1 % nasal spray 1 spray into each nostril every morning Use in each nostril as directed       BIOTIN PO Take 10,000 mcg by mouth every morning      desloratadine (CLARINEX) 5 MG tablet Take 1 tablet (5 mg total) by mouth daily 90 tablet 1    glipiZIDE (GLUCOTROL XL) 10 mg 24 hr tablet Take 2 tablets (20 mg total) by mouth daily 180 tablet 1    hydrOXYzine HCL (ATARAX) 25 mg tablet Take 1 tablet (25 mg total) by mouth daily at bedtime as needed for itching 90 tablet 1    insulin glargine (Lantus SoloStar) 100 units/mL injection pen Inject 15 Units under the skin daily at bedtime 5 pen 1    Insulin Pen Needle (Prevent Safety Pen Needles) 31G X 6 MM MISC by Does not apply route 2 (two) times a day 200 each 1    levothyroxine 112 mcg tablet Take 1 tablet (112 mcg total) by mouth daily in the early morning 90 tablet 1    metFORMIN (GLUCOPHAGE) 1000 MG tablet Take 1 tablet (1,000 mg total) by mouth 2 (two) times a day with meals 60 tablet 1    metoprolol succinate (TOPROL-XL) 25 mg 24 hr tablet TAKE 1 TABLET BY MOUTH DAILY AT BEDTIME  GENERIC EQUIVALENT FOR TOPROL XL 90 tablet 1    Omega-3 Fatty Acids (FISH OIL PO) Take 1,400 mg by mouth every evening       ONE TOUCH ULTRA TEST test strip USE TO TEST GLUCOSE TWICE A  each 1    quinapril (ACCUPRIL) 40 MG tablet TAKE 1 TABLET BY MOUTH DAILY 90 tablet 1    sitaGLIPtin (Januvia) 100 mg tablet Take 1 tablet (100 mg total) by mouth daily 30 tablet 5    TRULICITY 1 5 GE/7 5JY SOPN INJECT 1 5MG UNDER THE SKIN ONCE A WEEK 6 mL 3    cephalexin (KEFLEX) 500 mg capsule Take 1 capsule (500 mg total) by mouth every 12 (twelve) hours for 7 days 14 capsule 0     No current facility-administered medications for this visit  Objective:    /76   Pulse 88   Temp 97 8 °F (36 6 °C)   Resp 16   Ht 5' 5" (1 651 m)   Wt 94 8 kg (209 lb)   BMI 34 78 kg/m²      Physical Exam  Vitals signs and nursing note reviewed  Constitutional:       Appearance: She is well-developed  HENT:      Head: Normocephalic and atraumatic  Right Ear: External ear normal       Left Ear: External ear normal    Cardiovascular:      Rate and Rhythm: Normal rate and regular rhythm  Heart sounds: Normal heart sounds  No murmur  No friction rub  Pulmonary:      Effort: Pulmonary effort is normal  No respiratory distress  Breath sounds: Normal breath sounds  No wheezing or rales  Musculoskeletal:         General: No deformity               Delmy Flomaton, DO

## 2020-09-16 LAB
BACTERIA UR CULT: NORMAL
Lab: NORMAL

## 2020-09-30 LAB
BASOPHILS # BLD AUTO: 0.1 X10E3/UL (ref 0–0.2)
BASOPHILS NFR BLD AUTO: 1 %
EOSINOPHIL # BLD AUTO: 0.2 X10E3/UL (ref 0–0.4)
EOSINOPHIL NFR BLD AUTO: 3 %
ERYTHROCYTE [DISTWIDTH] IN BLOOD BY AUTOMATED COUNT: 14.1 % (ref 11.7–15.4)
FERRITIN SERPL-MCNC: 26 NG/ML (ref 15–150)
HCT VFR BLD AUTO: 35 % (ref 34–46.6)
HGB BLD-MCNC: 11.3 G/DL (ref 11.1–15.9)
IMM GRANULOCYTES # BLD: 0 X10E3/UL (ref 0–0.1)
IMM GRANULOCYTES NFR BLD: 0 %
IRON SATN MFR SERPL: 13 % (ref 15–55)
IRON SERPL-MCNC: 50 UG/DL (ref 27–159)
LYMPHOCYTES # BLD AUTO: 1.8 X10E3/UL (ref 0.7–3.1)
LYMPHOCYTES NFR BLD AUTO: 21 %
MCH RBC QN AUTO: 25.8 PG (ref 26.6–33)
MCHC RBC AUTO-ENTMCNC: 32.3 G/DL (ref 31.5–35.7)
MCV RBC AUTO: 80 FL (ref 79–97)
MONOCYTES # BLD AUTO: 0.5 X10E3/UL (ref 0.1–0.9)
MONOCYTES NFR BLD AUTO: 5 %
NEUTROPHILS # BLD AUTO: 6 X10E3/UL (ref 1.4–7)
NEUTROPHILS NFR BLD AUTO: 70 %
PLATELET # BLD AUTO: 245 X10E3/UL (ref 150–450)
RBC # BLD AUTO: 4.38 X10E6/UL (ref 3.77–5.28)
TIBC SERPL-MCNC: 390 UG/DL (ref 250–450)
UIBC SERPL-MCNC: 340 UG/DL (ref 131–425)
VIT B12 SERPL-MCNC: 374 PG/ML (ref 232–1245)
WBC # BLD AUTO: 8.6 X10E3/UL (ref 3.4–10.8)

## 2020-10-02 ENCOUNTER — OFFICE VISIT (OUTPATIENT)
Dept: FAMILY MEDICINE CLINIC | Facility: CLINIC | Age: 60
End: 2020-10-02
Payer: COMMERCIAL

## 2020-10-02 VITALS
BODY MASS INDEX: 35.16 KG/M2 | WEIGHT: 211 LBS | HEART RATE: 86 BPM | TEMPERATURE: 96.5 F | SYSTOLIC BLOOD PRESSURE: 122 MMHG | RESPIRATION RATE: 18 BRPM | HEIGHT: 65 IN | DIASTOLIC BLOOD PRESSURE: 78 MMHG

## 2020-10-02 DIAGNOSIS — E11.65 TYPE 2 DIABETES MELLITUS WITH HYPERGLYCEMIA, WITHOUT LONG-TERM CURRENT USE OF INSULIN (HCC): ICD-10-CM

## 2020-10-02 DIAGNOSIS — I10 BENIGN ESSENTIAL HYPERTENSION: ICD-10-CM

## 2020-10-02 DIAGNOSIS — E11.9 TYPE 2 DIABETES MELLITUS WITHOUT COMPLICATION, WITHOUT LONG-TERM CURRENT USE OF INSULIN (HCC): ICD-10-CM

## 2020-10-02 DIAGNOSIS — Z23 ENCOUNTER FOR IMMUNIZATION: ICD-10-CM

## 2020-10-02 DIAGNOSIS — Z00.00 ANNUAL PHYSICAL EXAM: Primary | ICD-10-CM

## 2020-10-02 PROCEDURE — 3725F SCREEN DEPRESSION PERFORMED: CPT | Performed by: FAMILY MEDICINE

## 2020-10-02 PROCEDURE — 99396 PREV VISIT EST AGE 40-64: CPT | Performed by: FAMILY MEDICINE

## 2020-10-02 PROCEDURE — 90715 TDAP VACCINE 7 YRS/> IM: CPT

## 2020-10-02 PROCEDURE — 1036F TOBACCO NON-USER: CPT | Performed by: FAMILY MEDICINE

## 2020-10-02 PROCEDURE — 90471 IMMUNIZATION ADMIN: CPT

## 2020-10-02 RX ORDER — INSULIN GLARGINE 100 [IU]/ML
25 INJECTION, SOLUTION SUBCUTANEOUS
Qty: 5 PEN | Refills: 1 | Status: SHIPPED | OUTPATIENT
Start: 2020-10-02 | End: 2020-12-11 | Stop reason: SDUPTHER

## 2020-10-12 LAB
LEFT EYE DIABETIC RETINOPATHY: NORMAL
RIGHT EYE DIABETIC RETINOPATHY: NORMAL

## 2020-10-21 DIAGNOSIS — E78.2 MIXED HYPERLIPIDEMIA: ICD-10-CM

## 2020-10-21 RX ORDER — ATORVASTATIN CALCIUM 40 MG/1
40 TABLET, FILM COATED ORAL DAILY
Qty: 90 TABLET | Refills: 1 | Status: SHIPPED | OUTPATIENT
Start: 2020-10-21 | End: 2020-12-04 | Stop reason: SDUPTHER

## 2020-12-04 DIAGNOSIS — I10 BENIGN ESSENTIAL HYPERTENSION: ICD-10-CM

## 2020-12-04 DIAGNOSIS — J30.9 ALLERGIC RHINITIS, UNSPECIFIED SEASONALITY, UNSPECIFIED TRIGGER: ICD-10-CM

## 2020-12-04 DIAGNOSIS — E11.65 TYPE 2 DIABETES MELLITUS WITH HYPERGLYCEMIA, WITHOUT LONG-TERM CURRENT USE OF INSULIN (HCC): ICD-10-CM

## 2020-12-04 DIAGNOSIS — E78.2 MIXED HYPERLIPIDEMIA: ICD-10-CM

## 2020-12-04 RX ORDER — METOPROLOL SUCCINATE 25 MG/1
25 TABLET, EXTENDED RELEASE ORAL DAILY
Qty: 90 TABLET | Refills: 1 | Status: SHIPPED | OUTPATIENT
Start: 2020-12-04 | End: 2021-07-06 | Stop reason: SDUPTHER

## 2020-12-04 RX ORDER — DESLORATADINE 5 MG/1
5 TABLET ORAL DAILY
Qty: 90 TABLET | Refills: 1 | Status: SHIPPED | OUTPATIENT
Start: 2020-12-04 | End: 2021-07-02 | Stop reason: SDUPTHER

## 2020-12-04 RX ORDER — ATORVASTATIN CALCIUM 40 MG/1
40 TABLET, FILM COATED ORAL DAILY
Qty: 90 TABLET | Refills: 1 | Status: SHIPPED | OUTPATIENT
Start: 2020-12-04 | End: 2021-07-02 | Stop reason: SDUPTHER

## 2020-12-05 ENCOUNTER — TELEPHONE (OUTPATIENT)
Dept: FAMILY MEDICINE CLINIC | Facility: CLINIC | Age: 60
End: 2020-12-05

## 2020-12-05 ENCOUNTER — HOSPITAL ENCOUNTER (EMERGENCY)
Facility: HOSPITAL | Age: 60
Discharge: HOME | End: 2020-12-05
Attending: EMERGENCY MEDICINE
Payer: COMMERCIAL

## 2020-12-05 VITALS
OXYGEN SATURATION: 98 % | DIASTOLIC BLOOD PRESSURE: 82 MMHG | HEART RATE: 100 BPM | RESPIRATION RATE: 18 BRPM | SYSTOLIC BLOOD PRESSURE: 161 MMHG | TEMPERATURE: 97.1 F

## 2020-12-05 DIAGNOSIS — M77.8 TENDINITIS OF RIGHT WRIST: ICD-10-CM

## 2020-12-05 DIAGNOSIS — N30.01 ACUTE CYSTITIS WITH HEMATURIA: Primary | ICD-10-CM

## 2020-12-05 LAB
BACTERIA UR QL AUTO: ABNORMAL /HPF
BILIRUB UR QL STRIP: NEGATIVE
CLARITY UR: ABNORMAL
COLOR UR: ABNORMAL
GLUCOSE UR STRIP-MCNC: ABNORMAL MG/DL
HGB UR QL STRIP.AUTO: ABNORMAL
KETONES UR STRIP-MCNC: ABNORMAL MG/DL
LEUKOCYTE ESTERASE UR QL STRIP: ABNORMAL
NITRITE UR QL STRIP: POSITIVE
NON-SQ EPI CELLS URNS QL MICRO: ABNORMAL /HPF
PH UR STRIP.AUTO: 6.5 [PH]
PROT UR STRIP-MCNC: >=300 MG/DL
RBC #/AREA URNS AUTO: ABNORMAL /HPF
SP GR UR STRIP.AUTO: 1.01 (ref 1–1.03)
UROBILINOGEN UR QL STRIP.AUTO: 1 E.U./DL
WBC #/AREA URNS AUTO: ABNORMAL /HPF

## 2020-12-05 PROCEDURE — 99283 EMERGENCY DEPT VISIT LOW MDM: CPT

## 2020-12-05 PROCEDURE — 81001 URINALYSIS AUTO W/SCOPE: CPT | Performed by: PHYSICIAN ASSISTANT

## 2020-12-05 PROCEDURE — 87186 SC STD MICRODIL/AGAR DIL: CPT | Performed by: PHYSICIAN ASSISTANT

## 2020-12-05 PROCEDURE — 87077 CULTURE AEROBIC IDENTIFY: CPT | Performed by: PHYSICIAN ASSISTANT

## 2020-12-05 PROCEDURE — 99284 EMERGENCY DEPT VISIT MOD MDM: CPT | Performed by: PHYSICIAN ASSISTANT

## 2020-12-05 PROCEDURE — 87086 URINE CULTURE/COLONY COUNT: CPT | Performed by: PHYSICIAN ASSISTANT

## 2020-12-05 RX ORDER — PHENAZOPYRIDINE HYDROCHLORIDE 100 MG/1
100 TABLET, FILM COATED ORAL ONCE
Status: COMPLETED | OUTPATIENT
Start: 2020-12-05 | End: 2020-12-05

## 2020-12-05 RX ORDER — CEPHALEXIN 500 MG/1
500 CAPSULE ORAL ONCE
Status: COMPLETED | OUTPATIENT
Start: 2020-12-05 | End: 2020-12-05

## 2020-12-05 RX ORDER — PHENAZOPYRIDINE HYDROCHLORIDE 200 MG/1
200 TABLET, FILM COATED ORAL 3 TIMES DAILY
Qty: 5 TABLET | Refills: 0 | Status: SHIPPED | OUTPATIENT
Start: 2020-12-05 | End: 2020-12-11 | Stop reason: ALTCHOICE

## 2020-12-05 RX ORDER — CEPHALEXIN 500 MG/1
500 CAPSULE ORAL 2 TIMES DAILY
Qty: 13 CAPSULE | Refills: 0 | Status: SHIPPED | OUTPATIENT
Start: 2020-12-05 | End: 2020-12-12

## 2020-12-05 RX ADMIN — CEPHALEXIN 500 MG: 500 CAPSULE ORAL at 11:18

## 2020-12-05 RX ADMIN — PHENAZOPYRIDINE 100 MG: 100 TABLET ORAL at 11:18

## 2020-12-07 DIAGNOSIS — E11.65 TYPE 2 DIABETES MELLITUS WITH HYPERGLYCEMIA, WITHOUT LONG-TERM CURRENT USE OF INSULIN (HCC): ICD-10-CM

## 2020-12-07 LAB
BACTERIA UR CULT: ABNORMAL
BACTERIA UR CULT: ABNORMAL

## 2020-12-11 ENCOUNTER — OFFICE VISIT (OUTPATIENT)
Dept: FAMILY MEDICINE CLINIC | Facility: CLINIC | Age: 60
End: 2020-12-11
Payer: COMMERCIAL

## 2020-12-11 VITALS
WEIGHT: 213 LBS | HEART RATE: 88 BPM | RESPIRATION RATE: 16 BRPM | DIASTOLIC BLOOD PRESSURE: 62 MMHG | BODY MASS INDEX: 35.49 KG/M2 | TEMPERATURE: 97.1 F | HEIGHT: 65 IN | SYSTOLIC BLOOD PRESSURE: 116 MMHG

## 2020-12-11 DIAGNOSIS — E11.9 TYPE 2 DIABETES MELLITUS WITHOUT COMPLICATION, WITHOUT LONG-TERM CURRENT USE OF INSULIN (HCC): ICD-10-CM

## 2020-12-11 DIAGNOSIS — N30.01 ACUTE CYSTITIS WITH HEMATURIA: ICD-10-CM

## 2020-12-11 DIAGNOSIS — E11.65 TYPE 2 DIABETES MELLITUS WITH HYPERGLYCEMIA, WITHOUT LONG-TERM CURRENT USE OF INSULIN (HCC): Primary | ICD-10-CM

## 2020-12-11 PROCEDURE — 3074F SYST BP LT 130 MM HG: CPT | Performed by: FAMILY MEDICINE

## 2020-12-11 PROCEDURE — 1036F TOBACCO NON-USER: CPT | Performed by: FAMILY MEDICINE

## 2020-12-11 PROCEDURE — 99213 OFFICE O/P EST LOW 20 MIN: CPT | Performed by: FAMILY MEDICINE

## 2020-12-11 PROCEDURE — 3008F BODY MASS INDEX DOCD: CPT | Performed by: FAMILY MEDICINE

## 2020-12-11 PROCEDURE — 3078F DIAST BP <80 MM HG: CPT | Performed by: FAMILY MEDICINE

## 2020-12-11 RX ORDER — INSULIN GLARGINE 100 [IU]/ML
35 INJECTION, SOLUTION SUBCUTANEOUS
Qty: 10 PEN | Refills: 1 | Status: SHIPPED | OUTPATIENT
Start: 2020-12-11 | End: 2021-05-11 | Stop reason: SDUPTHER

## 2020-12-18 DIAGNOSIS — E11.65 TYPE 2 DIABETES MELLITUS WITH HYPERGLYCEMIA, WITHOUT LONG-TERM CURRENT USE OF INSULIN (HCC): ICD-10-CM

## 2020-12-20 LAB
APPEARANCE UR: CLEAR
BACTERIA UR CULT: ABNORMAL
BACTERIA URNS QL MICRO: NORMAL
BILIRUB UR QL STRIP: NEGATIVE
COLOR UR: YELLOW
EPI CELLS #/AREA URNS HPF: NORMAL /HPF (ref 0–10)
GLUCOSE UR QL: NEGATIVE
HGB UR QL STRIP: NEGATIVE
KETONES UR QL STRIP: NEGATIVE
LEUKOCYTE ESTERASE UR QL STRIP: NEGATIVE
Lab: ABNORMAL
Lab: ABNORMAL
MICRO URNS: ABNORMAL
MUCOUS THREADS URNS QL MICRO: PRESENT
NITRITE UR QL STRIP: NEGATIVE
PH UR STRIP: 5.5 [PH] (ref 5–7.5)
PROT UR QL STRIP: ABNORMAL
RBC #/AREA URNS HPF: NORMAL /HPF (ref 0–2)
SL AMB ANTIMICROBIAL SUSCEPTIBILITY: ABNORMAL
SP GR UR: 1.02 (ref 1–1.03)
UROBILINOGEN UR STRIP-ACNC: 0.2 MG/DL (ref 0.2–1)
WBC #/AREA URNS HPF: NORMAL /HPF (ref 0–5)

## 2020-12-21 DIAGNOSIS — J30.9 ALLERGIC RHINITIS, UNSPECIFIED SEASONALITY, UNSPECIFIED TRIGGER: Primary | ICD-10-CM

## 2020-12-21 RX ORDER — AZELASTINE 1 MG/ML
1 SPRAY, METERED NASAL EVERY MORNING
Qty: 3 BOTTLE | Refills: 3 | Status: SHIPPED | OUTPATIENT
Start: 2020-12-21 | End: 2022-02-28

## 2020-12-22 ENCOUNTER — TELEPHONE (OUTPATIENT)
Dept: FAMILY MEDICINE CLINIC | Facility: CLINIC | Age: 60
End: 2020-12-22

## 2020-12-30 DIAGNOSIS — E11.9 TYPE 2 DIABETES MELLITUS WITHOUT COMPLICATION, WITHOUT LONG-TERM CURRENT USE OF INSULIN (HCC): ICD-10-CM

## 2020-12-30 DIAGNOSIS — E11.65 TYPE 2 DIABETES MELLITUS WITH HYPERGLYCEMIA, WITHOUT LONG-TERM CURRENT USE OF INSULIN (HCC): ICD-10-CM

## 2020-12-30 RX ORDER — SITAGLIPTIN 100 MG/1
TABLET, FILM COATED ORAL
Qty: 90 TABLET | Refills: 0 | Status: SHIPPED | OUTPATIENT
Start: 2020-12-30 | End: 2021-04-16 | Stop reason: SDUPTHER

## 2021-01-25 DIAGNOSIS — E11.9 TYPE 2 DIABETES MELLITUS WITHOUT COMPLICATION, WITHOUT LONG-TERM CURRENT USE OF INSULIN (HCC): ICD-10-CM

## 2021-01-25 DIAGNOSIS — I10 BENIGN ESSENTIAL HYPERTENSION: ICD-10-CM

## 2021-01-25 DIAGNOSIS — E11.65 TYPE 2 DIABETES MELLITUS WITH HYPERGLYCEMIA, WITHOUT LONG-TERM CURRENT USE OF INSULIN (HCC): ICD-10-CM

## 2021-01-25 PROCEDURE — 4010F ACE/ARB THERAPY RXD/TAKEN: CPT | Performed by: FAMILY MEDICINE

## 2021-01-25 RX ORDER — GLIPIZIDE 10 MG/1
TABLET, FILM COATED, EXTENDED RELEASE ORAL
Qty: 180 TABLET | Refills: 1 | Status: SHIPPED | OUTPATIENT
Start: 2021-01-25 | End: 2021-05-07 | Stop reason: SDUPTHER

## 2021-01-25 RX ORDER — QUINAPRIL 40 MG/1
40 TABLET ORAL DAILY
Qty: 90 TABLET | Refills: 0 | Status: SHIPPED | OUTPATIENT
Start: 2021-01-25 | End: 2021-04-24 | Stop reason: SDUPTHER

## 2021-01-26 ENCOUNTER — TELEPHONE (OUTPATIENT)
Dept: FAMILY MEDICINE CLINIC | Facility: CLINIC | Age: 61
End: 2021-01-26

## 2021-01-26 LAB
ALBUMIN SERPL-MCNC: 4.1 G/DL (ref 3.8–4.9)
ALBUMIN/GLOB SERPL: 1.5 {RATIO} (ref 1.2–2.2)
ALP SERPL-CCNC: 67 IU/L (ref 39–117)
ALT SERPL-CCNC: 64 IU/L (ref 0–32)
AST SERPL-CCNC: 55 IU/L (ref 0–40)
BILIRUB SERPL-MCNC: 0.3 MG/DL (ref 0–1.2)
BUN SERPL-MCNC: 16 MG/DL (ref 8–27)
BUN/CREAT SERPL: 17 (ref 12–28)
CALCIUM SERPL-MCNC: 9.8 MG/DL (ref 8.7–10.3)
CHLORIDE SERPL-SCNC: 99 MMOL/L (ref 96–106)
CHOLEST SERPL-MCNC: 145 MG/DL (ref 100–199)
CO2 SERPL-SCNC: 22 MMOL/L (ref 20–29)
CREAT SERPL-MCNC: 0.96 MG/DL (ref 0.57–1)
ERYTHROCYTE [DISTWIDTH] IN BLOOD BY AUTOMATED COUNT: 14.5 % (ref 11.7–15.4)
EST. AVERAGE GLUCOSE BLD GHB EST-MCNC: 229 MG/DL
GLOBULIN SER-MCNC: 2.7 G/DL (ref 1.5–4.5)
GLUCOSE SERPL-MCNC: 224 MG/DL (ref 65–99)
HBA1C MFR BLD: 9.6 % (ref 4.8–5.6)
HCT VFR BLD AUTO: 32.7 % (ref 34–46.6)
HDLC SERPL-MCNC: 37 MG/DL
HGB BLD-MCNC: 10.2 G/DL (ref 11.1–15.9)
LDLC SERPL CALC-MCNC: 72 MG/DL (ref 0–99)
LDLC/HDLC SERPL: 1.9 RATIO (ref 0–3.2)
MCH RBC QN AUTO: 24.3 PG (ref 26.6–33)
MCHC RBC AUTO-ENTMCNC: 31.2 G/DL (ref 31.5–35.7)
MCV RBC AUTO: 78 FL (ref 79–97)
MICRODELETION SYND BLD/T FISH: NORMAL
PLATELET # BLD AUTO: 242 X10E3/UL (ref 150–450)
POTASSIUM SERPL-SCNC: 4.9 MMOL/L (ref 3.5–5.2)
PROT SERPL-MCNC: 6.8 G/DL (ref 6–8.5)
RBC # BLD AUTO: 4.19 X10E6/UL (ref 3.77–5.28)
SL AMB EGFR AFRICAN AMERICAN: 74 ML/MIN/1.73
SL AMB EGFR NON AFRICAN AMERICAN: 64 ML/MIN/1.73
SL AMB VLDL CHOLESTEROL CALC: 36 MG/DL (ref 5–40)
SODIUM SERPL-SCNC: 136 MMOL/L (ref 134–144)
TRIGL SERPL-MCNC: 218 MG/DL (ref 0–149)
TSH SERPL DL<=0.005 MIU/L-ACNC: 4.18 UIU/ML (ref 0.45–4.5)
WBC # BLD AUTO: 8.5 X10E3/UL (ref 3.4–10.8)

## 2021-01-26 PROCEDURE — 3046F HEMOGLOBIN A1C LEVEL >9.0%: CPT | Performed by: FAMILY MEDICINE

## 2021-01-26 NOTE — TELEPHONE ENCOUNTER
----- Message from Jose Daniel Larson DO sent at 1/26/2021  8:09 AM EST -----  Please call patient regarding her lab results  I am concerned because her diabetes is uncontrolled  Her hemoglobin A1c is up to 9 6  Please have the patient schedule an appoint with me to go over her results    Thank you,  Dr Vargas Schooling

## 2021-02-02 ENCOUNTER — TELEMEDICINE (OUTPATIENT)
Dept: FAMILY MEDICINE CLINIC | Facility: CLINIC | Age: 61
End: 2021-02-02
Payer: COMMERCIAL

## 2021-02-02 DIAGNOSIS — E78.2 MIXED HYPERLIPIDEMIA: ICD-10-CM

## 2021-02-02 DIAGNOSIS — K76.0 FATTY LIVER: ICD-10-CM

## 2021-02-02 DIAGNOSIS — E03.9 PRIMARY HYPOTHYROIDISM: ICD-10-CM

## 2021-02-02 DIAGNOSIS — M65.4 TENOSYNOVITIS, DE QUERVAIN: ICD-10-CM

## 2021-02-02 DIAGNOSIS — I10 BENIGN ESSENTIAL HYPERTENSION: ICD-10-CM

## 2021-02-02 DIAGNOSIS — E11.65 TYPE 2 DIABETES MELLITUS WITH HYPERGLYCEMIA, WITHOUT LONG-TERM CURRENT USE OF INSULIN (HCC): Primary | ICD-10-CM

## 2021-02-02 PROCEDURE — 1036F TOBACCO NON-USER: CPT | Performed by: FAMILY MEDICINE

## 2021-02-02 PROCEDURE — 99214 OFFICE O/P EST MOD 30 MIN: CPT | Performed by: FAMILY MEDICINE

## 2021-02-02 NOTE — ASSESSMENT & PLAN NOTE
Lab Results   Component Value Date    HGBA1C 9 6 (H) 01/25/2021     Not controlled  Dose of trulicity increased to 3

## 2021-02-02 NOTE — PROGRESS NOTES
Virtual Regular Visit      Assessment/Plan:    Problem List Items Addressed This Visit     Benign essential hypertension     Stable  Continue metoprolol and quinapril         Relevant Orders    CBC    Fatty liver     Liver enzymes are elevated  We discussed that this is most likely from her elevated sugar  She is going to start walking on her treadmill         Relevant Orders    Comprehensive metabolic panel    Mixed hyperlipidemia     Stable  Continue atorvastatin 40 a day         Relevant Orders    Comprehensive metabolic panel    Lipid Panel with Direct LDL reflex    Primary hypothyroidism     Stable  Continue levothyroxine         Relevant Orders    T4, free    TSH, 3rd generation    Type 2 diabetes mellitus with hyperglycemia, without long-term current use of insulin (HCC) - Primary       Lab Results   Component Value Date    HGBA1C 9 6 (H) 01/25/2021     Not controlled  Dose of trulicity increased to 3         Relevant Medications    Dulaglutide 3 MG/0 5ML SOPN    Other Relevant Orders    Hemoglobin A1C    Microalbumin / creatinine urine ratio      Other Visit Diagnoses     Tenosynovitis, de Quervain        Relevant Orders    Ambulatory referral to Physical Therapy          BMI Counseling: There is no height or weight on file to calculate BMI  The BMI is above normal  Nutrition recommendations include moderation in carbohydrate intake  Exercise recommendations include exercising 3-5 times per week             Reason for visit is   Chief Complaint   Patient presents with    Virtual Regular Visit        Encounter provider Mariano Rojas DO    Provider located at [address] [address]      Recent Visits  Date Type Provider Dept   01/26/21 Telephone Huyen Singh, [facility] [place] recent visits within past 7 days and meeting all other requirements     Today's Visits  Date Type Provider Dept   02/02/21 Telemedicine Mariano Rojas DO [facility]   Showing today's visits and meeting all other requirements     Future Appointments  No visits were found meeting these conditions  Showing future appointments within next 150 days and meeting all other requirements        The patient was identified by name and date of birth  Sean Zuñiga was informed that this is a telemedicine visit and that the visit is being conducted through [employer] [place] and patient was informed that this is not a secure, HIPAA-compliant platform  She agrees to proceed     My office door was closed  No one else was in the room  She acknowledged consent and understanding of privacy and security of the video platform  The patient has agreed to participate and understands they can discontinue the visit at any time  Patient is aware this is a billable service  Subjective  Sean Zuñiga is a 64 y o  female    She has been trying to eat better  She did get a treadmill as well  She knows she will never be perfect with her diet, but is trying  She is monitoring her sugar and has been above 200 for months  Hypertension - patient has been taking her blood pressure medication regularly  Associated symptoms:  Swelling:  no  Leg cramps: no    Hyperlipidemia-  she  has been taking their cholesterol medication regularly  Associated symptoms:  Myalgias: no       Past Medical History:   Diagnosis Date    Colon polyp     Deviated nasal septum     Diabetes mellitus ([address] )     Disease of thyroid gland     hypo    Hearing aid worn     right ear only    Hyperlipidemia     Hypertension        Past Surgical History:   Procedure Laterality Date     SECTION, LOW TRANSVERSE      x2    COLONOSCOPY N/A 2016    Procedure: COLONOSCOPY;  Surgeon: Juni Mcarthur MD;  Location: [place] [address] [facility] LAB;   Service:     CYST REMOVAL Left     elbow    DILATION AND CURETTAGE OF UTERUS      GANGLION CYST EXCISION Right     RESECTION TONSIL RADICAL      TUBAL LIGATION      VARICOSE VEIN SURGERY Bilateral 2009    x3 procedures    WISDOM TOOTH EXTRACTION         Current Outpatient Medications   Medication Sig Dispense Refill    aspirin 81 MG tablet Take 81 mg by mouth every evening      atorvastatin (LIPITOR) 40 mg tablet Take 1 tablet (40 mg total) by mouth daily 90 tablet 1    azelastine (ASTELIN) 0 1 % nasal spray 1 spray into each nostril every morning Use in each nostril as directed 3 Bottle 3    BIOTIN PO Take 10,000 mcg by mouth every morning      desloratadine (CLARINEX) 5 MG tablet Take 1 tablet (5 mg total) by mouth daily 90 tablet 1    glipiZIDE (GLUCOTROL XL) 10 mg 24 hr tablet TAKE 2 TABLETS BY MOUTH DAILY 180 tablet 1    hydrOXYzine HCL (ATARAX) 25 mg tablet Take 1 tablet (25 mg total) by mouth daily at bedtime as needed for itching 90 tablet 1    insulin glargine (Lantus SoloStar) 100 units/mL injection pen Inject 35 Units under the skin daily at bedtime 10 pen 1    Januvia 100 MG tablet TAKE 1 TABLET BY MOUTH EVERY DAY 90 tablet 0    levothyroxine 112 mcg tablet Take 1 tablet (112 mcg total) by mouth daily in the early morning 90 tablet 1    metFORMIN (GLUCOPHAGE) 1000 MG tablet TAKE 1 TABLET BY MOUTH TWICE DAILY WITH MEALS GENERIC EQUIVALENT FOR GLUCOPHAGE 180 tablet 1    metoprolol succinate (TOPROL-XL) 25 mg 24 hr tablet Take 1 tablet (25 mg total) by mouth daily 90 tablet 1    Omega-3 Fatty Acids (FISH OIL PO) Take 1,400 mg by mouth every evening       quinapril (ACCUPRIL) 40 MG tablet Take 1 tablet (40 mg total) by mouth daily 90 tablet 0    Dulaglutide 3 MG/0 5ML SOPN Inject 0 5 mL (3 mg total) under the skin once a week 12 pen 1    Insulin Pen Needle (Prevent Safety Pen Needles) 31G X 6 MM MISC by Does not apply route 2 (two) times a day 200 each 1    ONE TOUCH ULTRA TEST test strip USE TO TEST GLUCOSE TWICE A  each 1     No current facility-administered medications for this visit           No Known Allergies    Review of Systems Respiratory: Negative  Cardiovascular: Negative  Video Exam    There were no vitals filed for this visit  Physical Exam  Vitals signs reviewed  Constitutional:       General: She is not in acute distress  Pulmonary:      Effort: Pulmonary effort is normal    Neurological:      Mental Status: She is alert  Psychiatric:         Behavior: Behavior normal          Thought Content: Thought content normal          Judgment: Judgment normal           I spent 14 minutes directly with the patient during this visit      VIRTUAL VISIT DISCLAIMER    [place] Nadine acknowledges that she has consented to an online visit or consultation  She understands that the online visit is based solely on information provided by her, and that, in the absence of a face-to-face physical evaluation by the physician, the diagnosis she receives is both limited and provisional in terms of accuracy and completeness  This is not intended to replace a full medical face-to-face evaluation by the physician  Clay Mccoy understands and accepts these terms

## 2021-02-02 NOTE — ASSESSMENT & PLAN NOTE
Liver enzymes are elevated  We discussed that this is most likely from her elevated sugar  She is going to start walking on her treadmill

## 2021-02-15 ENCOUNTER — EVALUATION (OUTPATIENT)
Dept: OCCUPATIONAL THERAPY | Facility: CLINIC | Age: 61
End: 2021-02-15

## 2021-02-15 DIAGNOSIS — M65.4 TENOSYNOVITIS, DE QUERVAIN: ICD-10-CM

## 2021-02-15 PROCEDURE — 97165 OT EVAL LOW COMPLEX 30 MIN: CPT

## 2021-02-15 NOTE — PROGRESS NOTES
OT Evaluation     Today's date: 2/15/2021  Patient name: Tiffanie Castle  : 1960  MRN: [id number]  Referring provider: Manjit Quinn DO  Dx:   Encounter Diagnosis     ICD-10-CM    1  Tenosynovitis, de Quervain  M65 4 Ambulatory referral to Physical Therapy       Start Time: 7776  Stop Time: 1005  Total time in clinic (min): 40 minutes    Assessment  Assessment details: Patient reported that she began having pain in November  "I had been knitting a lot so I thought it had to do with that " Patient reported that she wore a thumb spica for awhile and that she now only wears it at night  Patient reported difficulty with any activities utilizing her right hand, especially activities that require forearm supination while holding any weight  Patient reported difficulty donning a bra  Tiffanie Castle is a 64 y o  female who presents with Tenosynovitis, de Quervain  Patient tolerated session well  Regi Sanchez reported difficulty with activities of daily living secondary to pain with function  Provided home exercise program for digit and wrist range of motion, heat, and massage  Patient was able to demonstrate home program past instruction with use of handouts  Patient advised to contact doctor if there is a change of status  Patient advised to discontinue any exercise which cause increased pain  Patient is a good candidate to benefit from skilled occupational therapy to address impairments and return to maximal level of function with minimal symptoms      Impairments: abnormal or restricted ROM, impaired physical strength, lacks appropriate home exercise program and pain with function    Symptom irritability: lowUnderstanding of Dx/Px/POC: good   Prognosis: good    Goals  Short term goals:  Patient to demonstrate understanding of home exercise program in 2 weeks for decreased pain with activities of daily living  Patient to demonstrate increased active range of motion of wrist RD/UD to 10/15 degrees in 4 weeks to aid in showering  Patient to report a decrease in pain by at least 1 point on a 0-10 scale in 2 weeks to aid in dressing  Patient to demonstrate increased  strength to 3 lbs in 4 weeks to aid in opening a jar    Long term goals:  Patient to demonstrate functional active range of motion for independent ADL's by time of discharge  Patient to demonstrate functional strength for independent ADL's by time of discharge        Plan  Patient would benefit from: skilled occupational therapy  Planned modality interventions: thermotherapy: hydrocollator packs and ultrasound  Planned therapy interventions: therapeutic exercise, home exercise program and stretching  Frequency: 1x week  Duration in weeks: 6  Plan of Care beginning date: 2/15/2021  Plan of Care expiration date: 3/29/2021  Treatment plan discussed with: patient        Subjective Evaluation    History of Present Illness  Onset date: 2020  Mechanism of injury: Patient reported that she began having pain in November  "I had been knitting a lot so I thought it had to do with that " Patient reported that she wore a thumb spica for awhile and that she now only wears it at night  Patient reported difficulty with any activities utilizing her right hand, especially activities that require forearm supination while holding any weight  Patient reported difficulty donning a bra     Quality of life: good    Pain  Current pain ratin  At best pain ratin  At worst pain ratin  Location: right radial wrist  Quality: sharp  Relieving factors: rest  Aggravating factors: lifting    Social Support    Employment status: not working (Hobbies: knitting/crocheting)  Hand dominance: ambidextrous ("mostly left handed, but I do a lot with my right")    Treatments  Previous treatment: immobilization  Patient Goals  Patient goals for therapy: increased motion, decreased pain and independence with ADLs/IADLs  Patient goal: For my hand not to hurt when I do things        Objective     Tenderness     Right Wrist/Hand   Tenderness in the first dorsal compartment  Active Range of Motion     Left Wrist   Wrist flexion: 53 degrees   Wrist extension: 52 degrees   Radial deviation: 11 degrees   Ulnar deviation: 20 degrees     Right Wrist   Wrist flexion: 54 degrees   Wrist extension: 55 degrees with pain  Radial deviation: 9 degrees   Ulnar deviation: 11 degrees with pain    Left Thumb   Palmar Abduction     CMC: 45 degrees  Radial abduction    CMC: 49 degrees    Right Thumb   Palmar Abduction    CMC: 35  Radial Abduction    CMC: 50    Strength/Myotome Testing     Left Wrist/Hand      (2nd hand position)     Trial 1: 42    Thumb Strength  Key/Lateral Pinch     Trial 1: 16  Palmar/Three-Point Pinch     Trial 1: 12    Right Wrist/Hand      (2nd hand position)     Trial 1: 25    Thumb Strength   Key/Lateral Pinch     Trial 1: 14  Palmar/Three-Point Pinch     Trial 1: 8    Tests     Right Wrist/Hand   Positive Finkelstein's  Swelling     Left Wrist/Hand   Circumference MCP: 21 2 cm  Circumference wrist: 16 9 cm    Right Wrist/Hand   Circumference MCP: 20 5 cm  Circumference wrist: 17 8 cm      Flowsheet Rows      Most Recent Value   PT/OT G-Codes   Current Score  50   Projected Score  65            Precautions:        Manuals HEP    Soft tissue mobilization HEP         Ther Ex     1st dorsal compartment stretch 10 secs x5    Digit ext/abd x10    AROM thumb all planes x10    AROM wrist all planes x10              Ther Activity                         Modalities                   Assessment: Patient tolerated session well  Provided home exercise program for heat, range of motion, massage, and gentle stretches  Patient demonstrated understanding with use of handouts  Plan: Focus on range of motion and gentle strengthening for independence with ADLs with minimal symptoms

## 2021-02-22 DIAGNOSIS — E11.9 TYPE 2 DIABETES MELLITUS WITHOUT COMPLICATION, WITHOUT LONG-TERM CURRENT USE OF INSULIN (HCC): ICD-10-CM

## 2021-02-22 RX ORDER — LEVOTHYROXINE SODIUM 112 UG/1
TABLET ORAL
Qty: 90 TABLET | Refills: 1 | Status: SHIPPED | OUTPATIENT
Start: 2021-02-22 | End: 2021-08-27

## 2021-02-24 ENCOUNTER — OFFICE VISIT (OUTPATIENT)
Dept: OCCUPATIONAL THERAPY | Facility: CLINIC | Age: 61
End: 2021-02-24

## 2021-02-24 DIAGNOSIS — M65.4 TENOSYNOVITIS, DE QUERVAIN: Primary | ICD-10-CM

## 2021-02-24 PROCEDURE — 97110 THERAPEUTIC EXERCISES: CPT

## 2021-03-03 ENCOUNTER — OFFICE VISIT (OUTPATIENT)
Dept: OCCUPATIONAL THERAPY | Facility: CLINIC | Age: 61
End: 2021-03-03

## 2021-03-03 DIAGNOSIS — M65.4 TENOSYNOVITIS, DE QUERVAIN: Primary | ICD-10-CM

## 2021-03-03 PROCEDURE — 97110 THERAPEUTIC EXERCISES: CPT

## 2021-03-03 NOTE — PROGRESS NOTES
Daily Note     Today's date: 3/3/2021  Patient name: Lenka Li  : 1960  MRN: [id number]  Referring provider: Philly Loving DO  Dx:   Encounter Diagnosis     ICD-10-CM    1  Tenosynovitis, de Quervain  M65 4        Start Time: 1105  Stop Time: 1129  Total time in clinic (min): 24 minutes    Subjective: Patient reported a pain of 5-6/10  "It felt good after last time I was here so I did more around the house and I crocheted " Patient reported that she "overdid it" after our last session and that it has been sore for the last week  Objective: See treatment diary below       Precautions: Universal       Manuals HEP 3/3/2021   Soft tissue mobilization HEP         Ther Ex     1st dorsal compartment stretch 10 secs x5 10 sec x3   Digit ext/abd x10    AROM thumb all planes x10 Gentle PROM x10   AROM wrist all planes x10 Gentle PROM x10   Dice- in hand  2x15   Juxtacier  x10   Iron balls  x15 each   Wrist rotator  x15 each   STM  x3 min        Modalities     Moist heat  5 min   Ultrasound  5 min       Assessment: Patient tolerated session well  Session focused on range of motion and modalities for pain reduction and increased ability to perform activities  Patient reported that she has increased pain due to overuse  It was recommended that she ease into her daily activities, such as only crocheting for 5-10 minutes at a time until her pain decreases  She reported being compliant with home exercise program  She reported discomfort and fatigue at 1st dorsal compartment post exercises  Patient benefiting from skilled occupational therapy for decreased pain with ADLs  Plan: Focus on range of motion and gentle strengthening for independence with ADLs with minimal symptoms

## 2021-03-10 ENCOUNTER — OFFICE VISIT (OUTPATIENT)
Dept: OCCUPATIONAL THERAPY | Facility: CLINIC | Age: 61
End: 2021-03-10

## 2021-03-10 DIAGNOSIS — Z23 ENCOUNTER FOR IMMUNIZATION: ICD-10-CM

## 2021-03-10 DIAGNOSIS — M65.4 TENOSYNOVITIS, DE QUERVAIN: Primary | ICD-10-CM

## 2021-03-10 PROCEDURE — 97110 THERAPEUTIC EXERCISES: CPT

## 2021-03-10 NOTE — PROGRESS NOTES
Daily Note     Today's date: 3/10/2021  Patient name: Burgess Nieves  : 1960  MRN: [id number]  Referring provider: Sandy Shanks DO  Dx:   Encounter Diagnosis     ICD-10-CM    1  Tenosynovitis, de Quervain  M65 4        Start Time: 930  Stop Time: 8052  Total time in clinic (min): 25 minutes    Subjective: Patient reported a pain of 2-3/10  "It's much better  I've been careful trying not to use it as much  I didn't do anything crafty "      Objective: See treatment diary below        Precautions: Universal       Manuals HEP 3/10/2021   Soft tissue mobilization HEP         Ther Ex     1st dorsal compartment stretch 10 secs x5 10 sec x3   Digit ext/abd x10    AROM thumb all planes x10 Gentle PROM x10   AROM wrist all planes x10 Gentle PROM x10   Dice- in hand  2x15   Juxtacier  x10   Iron balls  x15 each   Wrist rotator  x20 each   STM          Modalities     Moist heat  5 min   Ultrasound  5 min       Assessment: Patient tolerated session well  Session focused on range of motion and modalities for pain reduction and increased ability to perform activities  Patient reported decreased pain secondary to doing less at home  She reported being compliant with home exercise program  Patient benefiting from skilled occupational therapy for decreased pain with ADLs  Plan to begin gentle strengthening next session if pain continues to be 2/10 or less  Plan: Focus on range of motion and gentle strengthening for independence with ADLs with minimal symptoms

## 2021-03-17 ENCOUNTER — OFFICE VISIT (OUTPATIENT)
Dept: OCCUPATIONAL THERAPY | Facility: CLINIC | Age: 61
End: 2021-03-17

## 2021-03-17 ENCOUNTER — IMMUNIZATIONS (OUTPATIENT)
Dept: FAMILY MEDICINE CLINIC | Facility: HOSPITAL | Age: 61
End: 2021-03-17

## 2021-03-17 DIAGNOSIS — Z23 ENCOUNTER FOR IMMUNIZATION: Primary | ICD-10-CM

## 2021-03-17 DIAGNOSIS — M65.4 TENOSYNOVITIS, DE QUERVAIN: Primary | ICD-10-CM

## 2021-03-17 PROCEDURE — 97110 THERAPEUTIC EXERCISES: CPT

## 2021-03-17 PROCEDURE — 0001A SARS-COV-2 / COVID-19 MRNA VACCINE (PFIZER-BIONTECH) 30 MCG: CPT

## 2021-03-17 PROCEDURE — 91300 SARS-COV-2 / COVID-19 MRNA VACCINE (PFIZER-BIONTECH) 30 MCG: CPT

## 2021-03-17 NOTE — PROGRESS NOTES
Daily Note     Today's date: 3/17/2021  Patient name: Claribel Christian  : 1960  MRN: [id number]  Referring provider: Harish Ortiz DO  Dx:   Encounter Diagnosis     ICD-10-CM    1  Tenosynovitis, de Quervain  M65 4        Start Time: 1053  Stop Time: 1130  Total time in clinic (min): 37 minutes    Subjective: Patient reported a pain of 1/10  "It's much better  I've been careful trying not to use it as much  I didn't do anything crafty "      Objective: See treatment diary below        Precautions: Universal       Manuals HEP 3/17/2021   Soft tissue mobilization HEP         Ther Ex     1st dorsal compartment stretch 10 secs x5 10 sec x3   Digit ext/abd x10    AROM thumb all planes x10 Gentle PROM x10   AROM wrist all planes x10 Gentle PROM x10   Dice- in hand  2x15   Juxtacier  x10   Iron balls     Wrist rotator  x20 each   STM                         Modalities     Moist heat  5 min   Ultrasound         Assessment: Patient tolerated session well  Session focused on range of motion and modalities for pain reduction and increased ability to perform activities  Patient tolerated introduction of gentle strengthening without reports of pain  Provided gentle strengthening home exercises  Patient demonstrated understanding with use of handout  Patient reported decreased pain secondary to doing less at home  She reported being compliant with home exercise program  Patient benefiting from skilled occupational therapy for decreased pain with ADLs  Plan: Focus on range of motion and gentle strengthening for independence with ADLs with minimal symptoms   POC: 2/15/2021  - 3/29/2021

## 2021-03-24 ENCOUNTER — OFFICE VISIT (OUTPATIENT)
Dept: OCCUPATIONAL THERAPY | Facility: CLINIC | Age: 61
End: 2021-03-24

## 2021-03-24 DIAGNOSIS — M65.4 TENOSYNOVITIS, DE QUERVAIN: Primary | ICD-10-CM

## 2021-03-24 PROCEDURE — 97110 THERAPEUTIC EXERCISES: CPT

## 2021-03-24 NOTE — PROGRESS NOTES
Daily Note     Today's date: 3/24/2021  Patient name: Lenka Li  : 1960  MRN: [id number]  Referring provider: Philly Loving DO  Dx:   Encounter Diagnosis     ICD-10-CM    1  Tenosynovitis, de Quervain  M65 4        Start Time: 1055  Stop Time: 1133  Total time in clinic (min): 38 minutes    Subjective: Patient reported that her thumb is sore on the days she does strengthening, but that the pain goes away by the next day  Objective: See treatment diary below        Precautions: Universal       Manuals HEP 3/24/2021   Soft tissue mobilization HEP         Ther Ex     1st dorsal compartment stretch 10 secs x5 10 sec x3   Digit ext/abd x10    AROM thumb all planes x10 Gentle PROM x10   AROM wrist all planes x10 Gentle PROM x10   Dice- in hand  2x15   Juxtacier  x10   Iron balls     Wrist rotator  x20 each   STM     Theraputty- , roll, pinch     Resisted digit extension  [id number]             Modalities     Moist heat  5 min   Ultrasound         Assessment: Patient tolerated session well  Session focused on range of motion and modalities for pain reduction and increased ability to perform activities  Patient reported being compliant with strengthening home exercise program every other day  Patient reported decreased pain secondary to doing less at home  She reported being compliant with home exercise program  Patient benefiting from skilled occupational therapy for decreased pain with ADLs  Plan: Focus on range of motion and gentle strengthening for independence with ADLs with minimal symptoms   POC: 2/15/2021  - 3/29/2021

## 2021-03-31 ENCOUNTER — OFFICE VISIT (OUTPATIENT)
Dept: OCCUPATIONAL THERAPY | Facility: CLINIC | Age: 61
End: 2021-03-31

## 2021-03-31 DIAGNOSIS — M65.4 TENOSYNOVITIS, DE QUERVAIN: Primary | ICD-10-CM

## 2021-03-31 PROCEDURE — 97110 THERAPEUTIC EXERCISES: CPT

## 2021-03-31 NOTE — PROGRESS NOTES
OT Re-Evaluation /Discharge    Today's date: 3/31/2021  Patient name: Lenka Li  : 1960  MRN: [id number]  Referring provider: Philly Loving DO  Dx:   Encounter Diagnosis     ICD-10-CM    1  Tenosynovitis, de Quervain  M65 4        Start Time: 1105  Stop Time: 1140  Total time in clinic (min): 35 minutes    Assessment  Assessment details: Measurements taken for progress report  Patient presented with increased range of motion and strength since last assessed  Impairments: pain with function    Symptom irritability: lowUnderstanding of Dx/Px/POC: good   Prognosis: good    Goals  Short term goals:  Patient to demonstrate understanding of home exercise program in 2 weeks for decreased pain with activities of daily living -MET  Patient to demonstrate increased active range of motion of wrist RD/UD to 10/15 degrees in 4 weeks to aid in showering -PARTIALLY MET  Patient to report a decrease in pain by at least 1 point on a 0-10 scale in 2 weeks to aid in dressing -MET  Patient to demonstrate increased  strength to 3 lbs in 4 weeks to aid in opening a jar -MET    Long term goals:  Patient to demonstrate functional active range of motion for independent ADL's by time of discharge  Patient to demonstrate functional strength for independent ADL's by time of discharge        Plan  Plan details: Patient discharging to home exercise program as requested today        Subjective Evaluation    History of Present Illness  Onset date: 2020  Mechanism of injury: "I did a lot over the weekend   It was really sore, but it's better now "   Quality of life: good    Pain  Current pain ratin  At best pain ratin  At worst pain ratin  Location: right radial wrist  Quality: discomfort and dull ache  Relieving factors: rest  Aggravating factors: lifting    Social Support    Employment status: not working (Hobbies: knitting/crocheting)  Hand dominance: ambidextrous ("mostly left handed, but I do a lot with my right")    Treatments  Previous treatment: immobilization  Patient Goals  Patient goals for therapy: increased motion, decreased pain and independence with ADLs/IADLs  Patient goal: For my hand not to hurt when I do things        Objective     Tenderness     Right Wrist/Hand   Tenderness in the first dorsal compartment  Active Range of Motion     Left Wrist   Wrist flexion: 53 degrees   Wrist extension: 52 degrees   Radial deviation: 11 degrees   Ulnar deviation: 20 degrees     Right Wrist   Wrist flexion: 54 degrees   Wrist extension: 55 degrees   Radial deviation: 10 degrees   Ulnar deviation: 12 degrees with pain    Left Thumb   Palmar Abduction     CMC: 45 degrees  Radial abduction    CMC: 49 degrees    Right Thumb   Palmar Abduction    CMC: 42  Radial Abduction    CMC: 50    Strength/Myotome Testing     Left Wrist/Hand      (2nd hand position)     Trial 1: 42    Thumb Strength  Key/Lateral Pinch     Trial 1: 16  Palmar/Three-Point Pinch     Trial 1: 12    Right Wrist/Hand      (2nd hand position)     Trial 1: 46    Thumb Strength   Key/Lateral Pinch     Trial 1: 20  Palmar/Three-Point Pinch     Trial 1: 16    Tests     Right Wrist/Hand   Positive Finkelstein's  Swelling     Left Wrist/Hand   Circumference MCP: 21 2 cm  Circumference wrist: 16 9 cm    Right Wrist/Hand   Circumference MCP: 20 5 cm  Circumference wrist: 17 8 cm        Precautions: Universal     Manuals HEP 3/31/2021   Soft tissue mobilization HEP         Ther Ex     1st dorsal compartment stretch 10 secs x5 10 sec x3   Digit ext/abd x10    AROM thumb all planes x10 Gentle PROM x10   AROM wrist all planes x10 Gentle PROM x10   Dice- in hand  2x15   Juxtacier  x10   Iron balls     Wrist rotator  x20 each   STM     Theraputty- , roll, pinch Tan    Resisted digit extension Yellow x10 [id number]             Modalities     Moist heat  5 min   Ultrasound         Assessment: Patient tolerated session well   Session focused on range of motion and modalities for pain reduction and increased ability to perform activities  Patient reported being compliant with strengthening home exercise program every other day  Patient reported decreased pain secondary to doing less at home   She reported being compliant with home exercise program  Patient requested to be discharged to home exercise program      Plan: Discharge to home exercise program

## 2021-04-08 ENCOUNTER — IMMUNIZATIONS (OUTPATIENT)
Dept: FAMILY MEDICINE CLINIC | Facility: HOSPITAL | Age: 61
End: 2021-04-08

## 2021-04-08 DIAGNOSIS — Z23 ENCOUNTER FOR IMMUNIZATION: Primary | ICD-10-CM

## 2021-04-08 PROCEDURE — 91300 SARS-COV-2 / COVID-19 MRNA VACCINE (PFIZER-BIONTECH) 30 MCG: CPT

## 2021-04-08 PROCEDURE — 0002A SARS-COV-2 / COVID-19 MRNA VACCINE (PFIZER-BIONTECH) 30 MCG: CPT

## 2021-04-08 NOTE — PROGRESS NOTES
Daily Note     Today's date: 2021  Patient name: Burgess Nieves  : 1960  MRN: [id number]  Referring provider: Sandy Shanks DO  Dx:   Encounter Diagnosis     ICD-10-CM    1  Tenosynovitis, de Quervain  M65 4        Start Time: 1055  Stop Time: 1130  Total time in clinic (min): 35 minutes    Subjective: "It feels better, it's not gone but it feels better " "The stretch feels good " Patient reported a pain of 4/10  "It comes on more suddenly when I go to reach for something and I turn my arm "      Objective: See treatment diary below       Precautions: Universal       Manuals HEP 2021   Soft tissue mobilization HEP 5 min        Ther Ex     1st dorsal compartment stretch 10 secs x5 10 sec x3   Digit ext/abd x10    AROM thumb all planes x10 Gentle PROM x10   AROM wrist all planes x10 Gentle PROM x10             Ther Activity     Dice- in hand  2x15   Juxtacier  x10   Iron balls  x15 each   Wrist rotator  x15 each             Modalities     Moist heat  5 min   Ultrasound  5 min       Assessment: Patient tolerated session well  Session focused on range of motion and modalities for pain reduction and increased ability to perform activities  Patient reported that she no longer utilizes the brace at night  Patient reported that she has been having her children shovel and she has been trying not to over work the hand  She reported being compliant with home exercise program  Patient tolerated new range of motion exercises well  She reported discomfort and fatigue at 1st dorsal compartment post exercises  Reviewed home exercise program  Patient benefiting from skilled occupational therapy for decreased pain with ADLs  Plan: Focus on range of motion and gentle strengthening for independence with ADLs with minimal symptoms 
no ear pain/no nose bleeds/no gum bleeding/no throat pain/no dysphagia

## 2021-04-14 DIAGNOSIS — N30.10 CHRONIC INTERSTITIAL CYSTITIS: ICD-10-CM

## 2021-04-14 RX ORDER — HYDROXYZINE HYDROCHLORIDE 25 MG/1
TABLET, FILM COATED ORAL
Qty: 90 TABLET | Refills: 1 | Status: SHIPPED | OUTPATIENT
Start: 2021-04-14 | End: 2021-12-08

## 2021-04-16 DIAGNOSIS — E11.9 TYPE 2 DIABETES MELLITUS WITHOUT COMPLICATION, WITHOUT LONG-TERM CURRENT USE OF INSULIN (HCC): ICD-10-CM

## 2021-04-16 NOTE — TELEPHONE ENCOUNTER
Diabetes f/u needed with Dr Kang Cole     Return in about 3 months (around 5/2/2021) for Diabetic follow up      Stacey Quijano

## 2021-04-23 NOTE — TELEPHONE ENCOUNTER
Requested medication(s) are due for refill today: Yes  Patient has already received a courtesy refill: No  Other reason request has been forwarded to provider:Failed protocol- see messages

## 2021-04-24 DIAGNOSIS — E11.9 TYPE 2 DIABETES MELLITUS WITHOUT COMPLICATION, WITHOUT LONG-TERM CURRENT USE OF INSULIN (HCC): ICD-10-CM

## 2021-04-24 DIAGNOSIS — I10 BENIGN ESSENTIAL HYPERTENSION: ICD-10-CM

## 2021-04-26 RX ORDER — QUINAPRIL 40 MG/1
40 TABLET ORAL DAILY
Qty: 90 TABLET | Refills: 0 | Status: SHIPPED | OUTPATIENT
Start: 2021-04-26 | End: 2021-07-26 | Stop reason: SDUPTHER

## 2021-04-27 DIAGNOSIS — E11.9 TYPE 2 DIABETES MELLITUS WITHOUT COMPLICATION, WITHOUT LONG-TERM CURRENT USE OF INSULIN (HCC): ICD-10-CM

## 2021-05-05 LAB
ALBUMIN SERPL-MCNC: 4.3 G/DL (ref 3.8–4.8)
ALBUMIN/CREAT UR: 1670 MG/G CREAT (ref 0–29)
ALBUMIN/GLOB SERPL: 1.4 {RATIO} (ref 1.2–2.2)
ALP SERPL-CCNC: 72 IU/L (ref 39–117)
ALT SERPL-CCNC: 53 IU/L (ref 0–32)
AST SERPL-CCNC: 62 IU/L (ref 0–40)
BILIRUB SERPL-MCNC: 0.3 MG/DL (ref 0–1.2)
BUN SERPL-MCNC: 16 MG/DL (ref 8–27)
BUN/CREAT SERPL: 16 (ref 12–28)
CALCIUM SERPL-MCNC: 10.1 MG/DL (ref 8.7–10.3)
CHLORIDE SERPL-SCNC: 100 MMOL/L (ref 96–106)
CHOLEST SERPL-MCNC: 154 MG/DL (ref 100–199)
CO2 SERPL-SCNC: 21 MMOL/L (ref 20–29)
CREAT SERPL-MCNC: 0.99 MG/DL (ref 0.57–1)
CREAT UR-MCNC: 90.5 MG/DL
ERYTHROCYTE [DISTWIDTH] IN BLOOD BY AUTOMATED COUNT: 15.6 % (ref 11.7–15.4)
EST. AVERAGE GLUCOSE BLD GHB EST-MCNC: 240 MG/DL
GLOBULIN SER-MCNC: 3.1 G/DL (ref 1.5–4.5)
GLUCOSE SERPL-MCNC: 265 MG/DL (ref 65–99)
HBA1C MFR BLD: 10 % (ref 4.8–5.6)
HCT VFR BLD AUTO: 30.9 % (ref 34–46.6)
HDLC SERPL-MCNC: 31 MG/DL
HGB BLD-MCNC: 9.6 G/DL (ref 11.1–15.9)
LDLC SERPL CALC-MCNC: 78 MG/DL (ref 0–99)
LDLC/HDLC SERPL: 2.5 RATIO (ref 0–3.2)
MCH RBC QN AUTO: 23.3 PG (ref 26.6–33)
MCHC RBC AUTO-ENTMCNC: 31.1 G/DL (ref 31.5–35.7)
MCV RBC AUTO: 75 FL (ref 79–97)
MICROALBUMIN UR-MCNC: 1511.2 UG/ML
MICRODELETION SYND BLD/T FISH: NORMAL
PLATELET # BLD AUTO: 288 X10E3/UL (ref 150–450)
POTASSIUM SERPL-SCNC: 5 MMOL/L (ref 3.5–5.2)
PROT SERPL-MCNC: 7.4 G/DL (ref 6–8.5)
RBC # BLD AUTO: 4.12 X10E6/UL (ref 3.77–5.28)
SL AMB EGFR AFRICAN AMERICAN: 71 ML/MIN/1.73
SL AMB EGFR NON AFRICAN AMERICAN: 62 ML/MIN/1.73
SL AMB VLDL CHOLESTEROL CALC: 45 MG/DL (ref 5–40)
SODIUM SERPL-SCNC: 136 MMOL/L (ref 134–144)
T4 FREE SERPL-MCNC: 1.34 NG/DL (ref 0.82–1.77)
TRIGL SERPL-MCNC: 271 MG/DL (ref 0–149)
TSH SERPL DL<=0.005 MIU/L-ACNC: 4.32 UIU/ML (ref 0.45–4.5)
WBC # BLD AUTO: 10.6 X10E3/UL (ref 3.4–10.8)

## 2021-05-05 PROCEDURE — 3046F HEMOGLOBIN A1C LEVEL >9.0%: CPT | Performed by: FAMILY MEDICINE

## 2021-05-07 DIAGNOSIS — E11.9 TYPE 2 DIABETES MELLITUS WITHOUT COMPLICATION, WITHOUT LONG-TERM CURRENT USE OF INSULIN (HCC): ICD-10-CM

## 2021-05-07 DIAGNOSIS — E11.65 TYPE 2 DIABETES MELLITUS WITH HYPERGLYCEMIA, WITHOUT LONG-TERM CURRENT USE OF INSULIN (HCC): ICD-10-CM

## 2021-05-07 RX ORDER — GLIPIZIDE 10 MG/1
20 TABLET, FILM COATED, EXTENDED RELEASE ORAL DAILY
Qty: 180 TABLET | Refills: 1 | Status: SHIPPED | OUTPATIENT
Start: 2021-05-07 | End: 2021-12-10 | Stop reason: SDUPTHER

## 2021-05-11 ENCOUNTER — OFFICE VISIT (OUTPATIENT)
Dept: FAMILY MEDICINE CLINIC | Facility: CLINIC | Age: 61
End: 2021-05-11
Payer: COMMERCIAL

## 2021-05-11 VITALS
TEMPERATURE: 98.1 F | DIASTOLIC BLOOD PRESSURE: 60 MMHG | HEART RATE: 90 BPM | RESPIRATION RATE: 18 BRPM | HEIGHT: 65 IN | WEIGHT: 211 LBS | BODY MASS INDEX: 35.16 KG/M2 | SYSTOLIC BLOOD PRESSURE: 122 MMHG

## 2021-05-11 DIAGNOSIS — E78.2 MIXED HYPERLIPIDEMIA: ICD-10-CM

## 2021-05-11 DIAGNOSIS — E03.9 PRIMARY HYPOTHYROIDISM: ICD-10-CM

## 2021-05-11 DIAGNOSIS — E11.9 TYPE 2 DIABETES MELLITUS WITHOUT COMPLICATION, WITHOUT LONG-TERM CURRENT USE OF INSULIN (HCC): ICD-10-CM

## 2021-05-11 DIAGNOSIS — E11.65 TYPE 2 DIABETES MELLITUS WITH HYPERGLYCEMIA, WITHOUT LONG-TERM CURRENT USE OF INSULIN (HCC): Primary | ICD-10-CM

## 2021-05-11 DIAGNOSIS — M65.4 TENOSYNOVITIS, DE QUERVAIN: ICD-10-CM

## 2021-05-11 DIAGNOSIS — I10 BENIGN ESSENTIAL HYPERTENSION: ICD-10-CM

## 2021-05-11 PROCEDURE — 3725F SCREEN DEPRESSION PERFORMED: CPT | Performed by: FAMILY MEDICINE

## 2021-05-11 PROCEDURE — 99214 OFFICE O/P EST MOD 30 MIN: CPT | Performed by: FAMILY MEDICINE

## 2021-05-11 RX ORDER — INSULIN GLARGINE 100 [IU]/ML
45 INJECTION, SOLUTION SUBCUTANEOUS
Qty: 45 ML | Refills: 1 | Status: SHIPPED | OUTPATIENT
Start: 2021-05-11 | End: 2021-06-03 | Stop reason: SDUPTHER

## 2021-05-11 NOTE — PROGRESS NOTES
Assessment/Plan:    1  Type 2 diabetes mellitus with hyperglycemia, without long-term current use of insulin (Formerly [facility])  Assessment & Plan:    Lab Results   Component Value Date    HGBA1C 10 0 (H) 05/04/2021     Not controlled  Dose of lantus increased from 35 to 45 units  Advised to increase by 1 unit daily    Orders:  -     Hemoglobin A1C; Future; Expected date: 07/25/2021  -     Hemoglobin A1C    2  Type 2 diabetes mellitus without complication, without long-term current use of insulin (Former[facility])  -     insulin glargine (Lantus SoloStar) 100 units/mL injection pen; Inject 45 Units under the skin daily at bedtime  -     Hemoglobin A1C; Future; Expected date: 07/25/2021  -     Hemoglobin A1C    3  Benign essential hypertension  Assessment & Plan:  Well controlled  Continue metoprolol and quinapril    Orders:  -     CBC; Future; Expected date: 07/25/2021  -     Comprehensive metabolic panel; Future; Expected date: 07/25/2021  -     Lipid Panel with Direct LDL reflex; Future; Expected date: 07/25/2021  -     CBC  -     Comprehensive metabolic panel  -     Lipid Panel with Direct LDL reflex    4  Mixed hyperlipidemia  Assessment & Plan:  Stable   Continue atorvastatin 40 mg daily      5  Primary hypothyroidism  Assessment & Plan:  Stable   Continue levothyroxine 112 mcg daily    Orders:  -     T4, free; Future; Expected date: 07/25/2021  -     TSH, 3rd generation; Future; Expected date: 07/25/2021  -     T4, free  -     TSH, 3rd generation    6  Tenosynovitis, de Quervain  Comments:  improving, but if it doesn't resolve will see Dr Aishwarya Fortune           There are no Patient Instructions on file for this visit  Return in about 3 months (around 8/11/2021) for Diabetic follow up  Subjective:      Patient ID: Isael Enrique is a 64 y o  female  Chief Complaint   Patient presents with    Follow-up     hospitals/[facility]    Diabetes       Diabetes  She has type 2 diabetes mellitus  No MedicAlert identification noted   The initial diagnosis of diabetes was made 26 years ago  Pertinent negatives for hypoglycemia include no confusion, dizziness, headaches, hunger, mood changes, nervousness/anxiousness, pallor, seizures, sleepiness, speech difficulty, sweats or tremors  Pertinent negatives for diabetes include no blurred vision, no chest pain, no fatigue, no foot paresthesias, no foot ulcerations, no polydipsia, no polyphagia, no polyuria, no visual change, no weakness and no weight loss  Pertinent negatives for hypoglycemia complications include no blackouts, no hospitalization, no nocturnal hypoglycemia, no required assistance and no required glucagon injection  Pertinent negatives for diabetic complications include no CVA, heart disease, impotence, nephropathy, peripheral neuropathy, PVD or retinopathy  Risk factors for coronary artery disease include obesity, post-menopausal and sedentary lifestyle  Current diabetic treatment includes insulin injections and oral agent (triple therapy)  She is compliant with treatment some of the time  She is currently taking insulin at bedtime  Insulin injections are given by patient  Rotation sites for injection include the abdominal wall  Her weight is fluctuating minimally  She is following a generally healthy diet  Meal planning includes carbohydrate counting  She has not had a previous visit with a dietitian  She rarely participates in exercise  She monitors blood glucose at home 1-2 x per day  She monitors urine at home <1 x per month  Blood glucose monitoring compliance is adequate  Her home blood glucose trend is fluctuating minimally  Her highest blood glucose is >200 mg/dl  Her overall blood glucose range is >200 mg/dl  She does not see a podiatrist Eye exam is current         The following portions of the patient's history were reviewed and updated as appropriate: allergies, current medications, past family history, past medical history, past social history, past surgical history and problem list     Review of Systems   Constitutional: Negative for fatigue and weight loss  Eyes: Negative for blurred vision  Cardiovascular: Negative for chest pain  Endocrine: Negative for polydipsia, polyphagia and polyuria  Genitourinary: Negative for impotence  Skin: Negative for pallor  Neurological: Negative for dizziness, tremors, seizures, speech difficulty, weakness and headaches  Psychiatric/Behavioral: Negative for confusion  The patient is not nervous/anxious            Current Outpatient Medications   Medication Sig Dispense Refill    aspirin 81 MG tablet Take 81 mg by mouth every evening      atorvastatin (LIPITOR) 40 mg tablet Take 1 tablet (40 mg total) by mouth daily 90 tablet 1    azelastine (ASTELIN) 0 1 % nasal spray 1 spray into each nostril every morning Use in each nostril as directed 3 Bottle 3    BIOTIN PO Take 10,000 mcg by mouth every morning      desloratadine (CLARINEX) 5 MG tablet Take 1 tablet (5 mg total) by mouth daily 90 tablet 1    Dulaglutide 3 MG/0 5ML SOPN Inject 0 5 mL (3 mg total) under the skin once a week 12 pen 1    glipiZIDE (GLUCOTROL XL) 10 mg 24 hr tablet Take 2 tablets (20 mg total) by mouth daily 180 tablet 1    hydrOXYzine HCL (ATARAX) 25 mg tablet TAKE 1 TABLET BY MOUTH EVERY NIGHT AT BEDTIME AS NEEDED FOR ITCHING 90 tablet 1    insulin glargine (Lantus SoloStar) 100 units/mL injection pen Inject 45 Units under the skin daily at bedtime 45 mL 1    Insulin Pen Needle (Prevent Safety Pen Needles) 31G X 6 MM MISC by Does not apply route 2 (two) times a day 200 each 1    levothyroxine 112 mcg tablet TAKE 1 TABLET BY MOUTH DAILY EARLY IN THE MORNING 90 tablet 1    metFORMIN (GLUCOPHAGE) 1000 MG tablet Take 1 tablet (1,000 mg total) by mouth 2 (two) times a day with meals 180 tablet 1    metoprolol succinate (TOPROL-XL) 25 mg 24 hr tablet Take 1 tablet (25 mg total) by mouth daily 90 tablet 1    Omega-3 Fatty Acids (FISH OIL PO) Take 1,400 mg by mouth every evening       ONE TOUCH ULTRA TEST test strip USE TO TEST GLUCOSE TWICE A  each 1    quinapril (ACCUPRIL) 40 MG tablet Take 1 tablet (40 mg total) by mouth daily 90 tablet 0    sitaGLIPtin (Januvia) 100 mg tablet Take 1 tablet (100 mg total) by mouth daily 90 tablet 1     No current facility-administered medications for this visit  Objective:    /60   Pulse 90   Temp 98 1 °F (36 7 °C)   Resp 18   Ht 5' 5" (1 651 m)   Wt 95 7 kg (211 lb)   BMI 35 11 kg/m²        Physical Exam  Vitals signs and nursing note reviewed  Constitutional:       Appearance: She is well-developed  HENT:      Head: Normocephalic and atraumatic  Right Ear: Tympanic membrane and external ear normal       Left Ear: Tympanic membrane and external ear normal    Cardiovascular:      Rate and Rhythm: Normal rate and regular rhythm  Pulses:           Dorsalis pedis pulses are 2+ on the right side and 2+ on the left side  Posterior tibial pulses are 2+ on the right side and 2+ on the left side  Heart sounds: Normal heart sounds  No murmur  No friction rub  Pulmonary:      Effort: Pulmonary effort is normal  No respiratory distress  Breath sounds: Normal breath sounds  No wheezing or rales  Musculoskeletal:      Right lower leg: No edema  Left lower leg: No edema  Feet:      Right foot:      Skin integrity: No ulcer, skin breakdown, erythema, warmth, callus or dry skin  Left foot:      Skin integrity: No ulcer, skin breakdown, erythema, warmth, callus or dry skin  Patient's shoes and socks removed  Right Foot/Ankle   Right Foot Inspection  Skin Exam: skin normal skin not intact, no dry skin, no warmth, no callus, no erythema, no maceration, no abnormal color, no pre-ulcer, no ulcer and no callus                          Toe Exam: ROM and strength within normal limits  Sensory       Monofilament testing: intact  Vascular  Capillary refills: < 3 seconds  The right DP pulse is 2+  The right PT pulse is 2+  Left Foot/Ankle  Left Foot Inspection  Skin Exam: skin normalskin not intact, no dry skin, no warmth, no erythema, no maceration, normal color, no pre-ulcer, no ulcer and no callus                         Toe Exam: ROM and strength within normal limits                   Sensory       Monofilament: intact  Vascular  Capillary refills: < 3 seconds  The left DP pulse is 2+  The left PT pulse is 2+     Assign Risk Category:  No deformity present; ;        Risk: 0       Christina Bolaños, DO

## 2021-05-11 NOTE — ASSESSMENT & PLAN NOTE
Lab Results   Component Value Date    HGBA1C 10 0 (H) 05/04/2021     Not controlled  Dose of lantus increased from 35 to 45 units  Advised to increase by 1 unit daily

## 2021-05-17 ENCOUNTER — TELEPHONE (OUTPATIENT)
Dept: FAMILY MEDICINE CLINIC | Facility: CLINIC | Age: 61
End: 2021-05-17

## 2021-05-17 NOTE — TELEPHONE ENCOUNTER
----- Message from Warden Minaya sent at 5/17/2021  1:51 PM EDT -----  Regarding: Prescription Question  Contact: [phone]  Received a notice from [facility] regarding the new Lantus script  They need clarification      Thank you

## 2021-05-17 NOTE — TELEPHONE ENCOUNTER
I called [facility] and they stated that they dont need anything from us    Pablo Jasso MA  Patient informed

## 2021-06-03 DIAGNOSIS — E11.9 TYPE 2 DIABETES MELLITUS WITHOUT COMPLICATION, WITHOUT LONG-TERM CURRENT USE OF INSULIN (HCC): ICD-10-CM

## 2021-06-03 RX ORDER — INSULIN GLARGINE 100 [IU]/ML
50 INJECTION, SOLUTION SUBCUTANEOUS
Qty: 45 ML | Refills: 1
Start: 2021-06-03 | End: 2021-08-16 | Stop reason: SDUPTHER

## 2021-06-12 ENCOUNTER — OFFICE VISIT (OUTPATIENT)
Dept: URGENT CARE | Facility: CLINIC | Age: 61
End: 2021-06-12
Payer: COMMERCIAL

## 2021-06-12 VITALS
HEIGHT: 65 IN | OXYGEN SATURATION: 99 % | SYSTOLIC BLOOD PRESSURE: 139 MMHG | BODY MASS INDEX: 34.99 KG/M2 | WEIGHT: 210 LBS | TEMPERATURE: 98.6 F | DIASTOLIC BLOOD PRESSURE: 70 MMHG | RESPIRATION RATE: 18 BRPM | HEART RATE: 101 BPM

## 2021-06-12 DIAGNOSIS — R30.0 DYSURIA: Primary | ICD-10-CM

## 2021-06-12 LAB
SL AMB  POCT GLUCOSE, UA: 250
SL AMB LEUKOCYTE ESTERASE,UA: ABNORMAL
SL AMB POCT BILIRUBIN,UA: ABNORMAL
SL AMB POCT BLOOD,UA: ABNORMAL
SL AMB POCT CLARITY,UA: ABNORMAL
SL AMB POCT COLOR,UA: YELLOW
SL AMB POCT KETONES,UA: ABNORMAL
SL AMB POCT NITRITE,UA: ABNORMAL
SL AMB POCT PH,UA: 5
SL AMB POCT SPECIFIC GRAVITY,UA: 1.01
SL AMB POCT URINE PROTEIN: 2000
SL AMB POCT UROBILINOGEN: ABNORMAL

## 2021-06-12 PROCEDURE — 1036F TOBACCO NON-USER: CPT | Performed by: FAMILY MEDICINE

## 2021-06-12 PROCEDURE — 3061F NEG MICROALBUMINURIA REV: CPT | Performed by: FAMILY MEDICINE

## 2021-06-12 PROCEDURE — 3075F SYST BP GE 130 - 139MM HG: CPT | Performed by: FAMILY MEDICINE

## 2021-06-12 PROCEDURE — 3008F BODY MASS INDEX DOCD: CPT | Performed by: FAMILY MEDICINE

## 2021-06-12 PROCEDURE — 3078F DIAST BP <80 MM HG: CPT | Performed by: FAMILY MEDICINE

## 2021-06-12 PROCEDURE — 81002 URINALYSIS NONAUTO W/O SCOPE: CPT | Performed by: FAMILY MEDICINE

## 2021-06-12 PROCEDURE — 99213 OFFICE O/P EST LOW 20 MIN: CPT | Performed by: FAMILY MEDICINE

## 2021-06-12 NOTE — PROGRESS NOTES
[facility]        NAME: Ramona Roca is a 64 y o  female  : 1960    MRN: [id number]  DATE: 2021  TIME: 3:46 PM    Assessment and Plan   Dysuria [R30 0]  1  Dysuria  POCT urine dip    Urine culture     Urine dip not concerning for UTI  However she has proteinuria and glycosuria  Recommended that she talk with her PCP about starting low-dose ACE-inhibitor for renal protection  Advised to take pyridium for pain control  Urine culture sent  If culture positive will contact patient and start antibiotic  Patient Instructions     Follow up with PCP in 3-5 days  Proceed to  ER if symptoms worsen  Chief Complaint     Chief Complaint   Patient presents with    Possible UTI     dysuria, abdominal pain, bladder pressure, temp 99 5 Wednesday         History of Present Illness       44-year-old female with history of DM 2 and interstitial cystitis presents today with concerns for a UTI  Has been experiencing increased urinary urgency and pelvic pain for the past 2-3 days  Had a fever which resolved  Denies any respiratory symptoms, dizziness or chest pain  Practices good hygiene with bowel movements  Review of Systems   Review of Systems   Constitutional: Positive for fever  Negative for chills  Respiratory: Negative for cough, shortness of breath and wheezing  Cardiovascular: Negative for chest pain  Gastrointestinal: Negative for abdominal pain and nausea  Genitourinary: Positive for pelvic pain and urgency  Negative for dysuria and frequency  Neurological: Negative for dizziness and headaches           Current Medications       Current Outpatient Medications:     aspirin 81 MG tablet, Take 81 mg by mouth every evening, Disp: , Rfl:     atorvastatin (LIPITOR) 40 mg tablet, Take 1 tablet (40 mg total) by mouth daily, Disp: 90 tablet, Rfl: 1    azelastine (ASTELIN) 0 1 % nasal spray, 1 spray into each nostril every morning Use in each nostril as directed, Disp: 3 Bottle, Rfl: 3    BIOTIN PO, Take 10,000 mcg by mouth every morning, Disp: , Rfl:     desloratadine (CLARINEX) 5 MG tablet, Take 1 tablet (5 mg total) by mouth daily, Disp: 90 tablet, Rfl: 1    Dulaglutide 3 MG/0 5ML SOPN, Inject 0 5 mL (3 mg total) under the skin once a week, Disp: 12 pen, Rfl: 1    glipiZIDE (GLUCOTROL XL) 10 mg 24 hr tablet, Take 2 tablets (20 mg total) by mouth daily, Disp: 180 tablet, Rfl: 1    insulin glargine (Lantus SoloStar) 100 units/mL injection pen, Inject 50 Units under the skin daily at bedtime, Disp: 45 mL, Rfl: 1    levothyroxine 112 mcg tablet, TAKE 1 TABLET BY MOUTH DAILY EARLY IN THE MORNING, Disp: 90 tablet, Rfl: 1    metFORMIN (GLUCOPHAGE) 1000 MG tablet, Take 1 tablet (1,000 mg total) by mouth 2 (two) times a day with meals, Disp: 180 tablet, Rfl: 1    metoprolol succinate (TOPROL-XL) 25 mg 24 hr tablet, Take 1 tablet (25 mg total) by mouth daily, Disp: 90 tablet, Rfl: 1    Omega-3 Fatty Acids (FISH OIL PO), Take 1,400 mg by mouth every evening , Disp: , Rfl:     ONE TOUCH ULTRA TEST test strip, USE TO TEST GLUCOSE TWICE A DAY, Disp: 200 each, Rfl: 1    quinapril (ACCUPRIL) 40 MG tablet, Take 1 tablet (40 mg total) by mouth daily, Disp: 90 tablet, Rfl: 0    sitaGLIPtin (Januvia) 100 mg tablet, Take 1 tablet (100 mg total) by mouth daily, Disp: 90 tablet, Rfl: 1    hydrOXYzine HCL (ATARAX) 25 mg tablet, TAKE 1 TABLET BY MOUTH EVERY NIGHT AT BEDTIME AS NEEDED FOR ITCHING (Patient not taking: Reported on 6/12/2021), Disp: 90 tablet, Rfl: 1    Insulin Pen Needle (Prevent Safety Pen Needles) 31G X 6 MM MISC, by Does not apply route 2 (two) times a day, Disp: 200 each, Rfl: 1    Current Allergies     Allergies as of 06/12/2021    (No Known Allergies)            The following portions of the patient's history were reviewed and updated as appropriate: allergies, current medications, past family history, past medical history, past social history, past surgical history and problem list      Past Medical History:   Diagnosis Date    Colon polyp     Deviated nasal septum     Diabetes mellitus ([address] )     Disease of thyroid gland     hypo    Hearing aid worn     right ear only    Hyperlipidemia     Hypertension        Past Surgical History:   Procedure Laterality Date     SECTION, LOW TRANSVERSE      x2    COLONOSCOPY N/A 2016    Procedure: COLONOSCOPY;  Surgeon: Nick Cotter MD;  Location: [facility]; Service:     CYST REMOVAL Left     elbow    DILATION AND CURETTAGE OF UTERUS      GANGLION CYST EXCISION Right     RESECTION TONSIL RADICAL      TUBAL LIGATION      VARICOSE VEIN SURGERY Bilateral 2009    x3 procedures    WISDOM TOOTH EXTRACTION         Family History   Problem Relation Age of Onset    Stroke Mother     Diabetes Mother     Arthritis Father     Heart disease Father         CABG    Heart disease Daughter         congenital-truncus arteriosus    Congenital heart disease Daughter     Heart disease Son         congenital-"hole" repair    Other Sister         meningioma         Medications have been verified  Objective   /70   Pulse 101   Temp 98 6 °F (37 °C)   Resp 18   Ht 5' 5" (1 651 m)   Wt 95 3 kg (210 lb)   SpO2 99%   BMI 34 95 kg/m²   No LMP recorded  Patient is postmenopausal        Physical Exam     Physical Exam  Vitals signs and nursing note reviewed  Constitutional:       General: She is in acute distress  Appearance: Normal appearance  She is not ill-appearing, toxic-appearing or diaphoretic  HENT:      Head: Normocephalic and atraumatic  Eyes:      General:         Right eye: No discharge  Left eye: No discharge  Conjunctiva/sclera: Conjunctivae normal    Cardiovascular:      Rate and Rhythm: Regular rhythm  Tachycardia present  Pulmonary:      Effort: Pulmonary effort is normal  No respiratory distress  Breath sounds: Normal breath sounds  No wheezing, rhonchi or rales  Skin:     General: Skin is warm  Findings: No erythema  Neurological:      General: No focal deficit present  Mental Status: She is alert and oriented to person, place, and time  Psychiatric:         Mood and Affect: Mood normal          Behavior: Behavior normal          Thought Content:  Thought content normal          Judgment: Judgment normal

## 2021-06-15 LAB
BACTERIA UR CULT: NORMAL
Lab: NO GROWTH

## 2021-07-02 DIAGNOSIS — J30.9 ALLERGIC RHINITIS, UNSPECIFIED SEASONALITY, UNSPECIFIED TRIGGER: ICD-10-CM

## 2021-07-02 DIAGNOSIS — E78.2 MIXED HYPERLIPIDEMIA: ICD-10-CM

## 2021-07-02 RX ORDER — ATORVASTATIN CALCIUM 40 MG/1
40 TABLET, FILM COATED ORAL DAILY
Qty: 90 TABLET | Refills: 1 | Status: SHIPPED | OUTPATIENT
Start: 2021-07-02 | End: 2021-12-08

## 2021-07-02 RX ORDER — DESLORATADINE 5 MG/1
5 TABLET ORAL DAILY
Qty: 90 TABLET | Refills: 1 | Status: SHIPPED | OUTPATIENT
Start: 2021-07-02 | End: 2021-12-12

## 2021-07-06 DIAGNOSIS — I10 BENIGN ESSENTIAL HYPERTENSION: ICD-10-CM

## 2021-07-06 RX ORDER — METOPROLOL SUCCINATE 25 MG/1
25 TABLET, EXTENDED RELEASE ORAL DAILY
Qty: 90 TABLET | Refills: 1 | Status: SHIPPED | OUTPATIENT
Start: 2021-07-06 | End: 2021-12-08

## 2021-07-26 DIAGNOSIS — I10 BENIGN ESSENTIAL HYPERTENSION: ICD-10-CM

## 2021-07-26 PROCEDURE — 4010F ACE/ARB THERAPY RXD/TAKEN: CPT | Performed by: FAMILY MEDICINE

## 2021-07-26 RX ORDER — QUINAPRIL 40 MG/1
40 TABLET ORAL DAILY
Qty: 90 TABLET | Refills: 0 | Status: SHIPPED | OUTPATIENT
Start: 2021-07-26 | End: 2021-08-16 | Stop reason: SDUPTHER

## 2021-08-12 LAB
ALBUMIN SERPL-MCNC: 4 G/DL (ref 3.8–4.8)
ALBUMIN/GLOB SERPL: 1.3 {RATIO} (ref 1.2–2.2)
ALP SERPL-CCNC: 73 IU/L (ref 48–121)
ALT SERPL-CCNC: 76 IU/L (ref 0–32)
AST SERPL-CCNC: 87 IU/L (ref 0–40)
BILIRUB SERPL-MCNC: 0.3 MG/DL (ref 0–1.2)
BUN SERPL-MCNC: 16 MG/DL (ref 8–27)
BUN/CREAT SERPL: 15 (ref 12–28)
CALCIUM SERPL-MCNC: 9.4 MG/DL (ref 8.7–10.3)
CHLORIDE SERPL-SCNC: 98 MMOL/L (ref 96–106)
CHOLEST SERPL-MCNC: 128 MG/DL (ref 100–199)
CO2 SERPL-SCNC: 22 MMOL/L (ref 20–29)
CREAT SERPL-MCNC: 1.05 MG/DL (ref 0.57–1)
ERYTHROCYTE [DISTWIDTH] IN BLOOD BY AUTOMATED COUNT: 16.3 % (ref 11.7–15.4)
EST. AVERAGE GLUCOSE BLD GHB EST-MCNC: 260 MG/DL
GLOBULIN SER-MCNC: 3.1 G/DL (ref 1.5–4.5)
GLUCOSE SERPL-MCNC: 263 MG/DL (ref 65–99)
HBA1C MFR BLD: 10.7 % (ref 4.8–5.6)
HCT VFR BLD AUTO: 28.4 % (ref 34–46.6)
HDLC SERPL-MCNC: 27 MG/DL
HGB BLD-MCNC: 8.5 G/DL (ref 11.1–15.9)
LDLC SERPL CALC-MCNC: 67 MG/DL (ref 0–99)
LDLC/HDLC SERPL: 2.5 RATIO (ref 0–3.2)
MCH RBC QN AUTO: 20.8 PG (ref 26.6–33)
MCHC RBC AUTO-ENTMCNC: 29.9 G/DL (ref 31.5–35.7)
MCV RBC AUTO: 69 FL (ref 79–97)
MICRODELETION SYND BLD/T FISH: NORMAL
MICRODELETION SYND BLD/T FISH: NORMAL
PLATELET # BLD AUTO: 277 X10E3/UL (ref 150–450)
POTASSIUM SERPL-SCNC: 5.1 MMOL/L (ref 3.5–5.2)
PROT SERPL-MCNC: 7.1 G/DL (ref 6–8.5)
RBC # BLD AUTO: 4.09 X10E6/UL (ref 3.77–5.28)
SL AMB EGFR AFRICAN AMERICAN: 66 ML/MIN/1.73
SL AMB EGFR NON AFRICAN AMERICAN: 57 ML/MIN/1.73
SL AMB VLDL CHOLESTEROL CALC: 34 MG/DL (ref 5–40)
SODIUM SERPL-SCNC: 134 MMOL/L (ref 134–144)
T4 FREE SERPL-MCNC: 1.26 NG/DL (ref 0.82–1.77)
TRIGL SERPL-MCNC: 201 MG/DL (ref 0–149)
TSH SERPL DL<=0.005 MIU/L-ACNC: 5 UIU/ML (ref 0.45–4.5)
WBC # BLD AUTO: 9.5 X10E3/UL (ref 3.4–10.8)

## 2021-08-12 PROCEDURE — 3046F HEMOGLOBIN A1C LEVEL >9.0%: CPT | Performed by: FAMILY MEDICINE

## 2021-08-16 ENCOUNTER — OFFICE VISIT (OUTPATIENT)
Dept: FAMILY MEDICINE CLINIC | Facility: CLINIC | Age: 61
End: 2021-08-16
Payer: COMMERCIAL

## 2021-08-16 VITALS
HEIGHT: 65 IN | TEMPERATURE: 98 F | DIASTOLIC BLOOD PRESSURE: 64 MMHG | BODY MASS INDEX: 34.66 KG/M2 | WEIGHT: 208 LBS | SYSTOLIC BLOOD PRESSURE: 132 MMHG | OXYGEN SATURATION: 98 % | HEART RATE: 114 BPM | RESPIRATION RATE: 14 BRPM

## 2021-08-16 DIAGNOSIS — I10 BENIGN ESSENTIAL HYPERTENSION: ICD-10-CM

## 2021-08-16 DIAGNOSIS — D64.9 ANEMIA, UNSPECIFIED TYPE: ICD-10-CM

## 2021-08-16 DIAGNOSIS — E11.9 TYPE 2 DIABETES MELLITUS WITHOUT COMPLICATION, WITHOUT LONG-TERM CURRENT USE OF INSULIN (HCC): ICD-10-CM

## 2021-08-16 DIAGNOSIS — E78.2 MIXED HYPERLIPIDEMIA: ICD-10-CM

## 2021-08-16 DIAGNOSIS — E03.9 PRIMARY HYPOTHYROIDISM: ICD-10-CM

## 2021-08-16 DIAGNOSIS — E11.65 TYPE 2 DIABETES MELLITUS WITH HYPERGLYCEMIA, WITHOUT LONG-TERM CURRENT USE OF INSULIN (HCC): Primary | ICD-10-CM

## 2021-08-16 PROCEDURE — 1036F TOBACCO NON-USER: CPT | Performed by: FAMILY MEDICINE

## 2021-08-16 PROCEDURE — 3078F DIAST BP <80 MM HG: CPT | Performed by: FAMILY MEDICINE

## 2021-08-16 PROCEDURE — 99214 OFFICE O/P EST MOD 30 MIN: CPT | Performed by: FAMILY MEDICINE

## 2021-08-16 PROCEDURE — 4010F ACE/ARB THERAPY RXD/TAKEN: CPT | Performed by: FAMILY MEDICINE

## 2021-08-16 PROCEDURE — 3008F BODY MASS INDEX DOCD: CPT | Performed by: FAMILY MEDICINE

## 2021-08-16 PROCEDURE — 3075F SYST BP GE 130 - 139MM HG: CPT | Performed by: FAMILY MEDICINE

## 2021-08-16 RX ORDER — INSULIN GLARGINE 100 [IU]/ML
60 INJECTION, SOLUTION SUBCUTANEOUS
Qty: 60 ML | Refills: 1
Start: 2021-08-16 | End: 2021-08-23 | Stop reason: SDUPTHER

## 2021-08-16 RX ORDER — QUINAPRIL 40 MG/1
40 TABLET ORAL DAILY
Qty: 90 TABLET | Refills: 1 | Status: SHIPPED | OUTPATIENT
Start: 2021-08-16 | End: 2021-09-22

## 2021-08-16 RX ORDER — FERROUS SULFATE TAB EC 324 MG (65 MG FE EQUIVALENT) 324 (65 FE) MG
324 TABLET DELAYED RESPONSE ORAL
Qty: 90 TABLET | Refills: 1
Start: 2021-08-16

## 2021-08-16 NOTE — PROGRESS NOTES
Assessment/Plan:    1  Type 2 diabetes mellitus with hyperglycemia, without long-term current use of insulin ([facility])  Assessment & Plan:    Lab Results   Component Value Date    HGBA1C 10 7 (H) 08/12/2021     lantus dose increased to 60 units   Will go up by 1 unit daily    Orders:  -     Hemoglobin A1C; Future; Expected date: 10/30/2021  -     Hemoglobin A1C    2  Anemia, unspecified type  Assessment & Plan:  Worsening  Denies any abnormal bleeding     Orders:  -     Occult Blood, Fecal, IA; Future  -     ferrous sulfate 324 (65 Fe) mg; Take 1 tablet (324 mg total) by mouth daily before breakfast  -     Occult Blood, Fecal, IA  -     CBC; Future; Expected date: 10/30/2021  -     CBC  -     Ambulatory referral to Gastroenterology; Future    3  Benign essential hypertension  Assessment & Plan:  Well controlled  Continue quinapril and metoprolol     Orders:  -     CBC; Future; Expected date: 10/30/2021  -     Comprehensive metabolic panel; Future; Expected date: 10/30/2021  -     Lipid Panel with Direct LDL reflex; Future; Expected date: 10/30/2021  -     quinapril (ACCUPRIL) 40 MG tablet; Take 1 tablet (40 mg total) by mouth daily  -     CBC  -     Comprehensive metabolic panel  -     Lipid Panel with Direct LDL reflex    4  Mixed hyperlipidemia  Assessment & Plan:  Stable  Continue atorvastatin      5  Primary hypothyroidism  -     TSH, 3rd generation; Future; Expected date: 10/30/2021  -     T4, free; Future; Expected date: 10/30/2021  -     TSH, 3rd generation  -     T4, free    6  Type 2 diabetes mellitus without complication, without long-term current use of insulin ([facility])  -     insulin glargine (Lantus SoloStar) 100 units/mL injection pen; Inject 60 Units under the skin daily at bedtime          There are no Patient Instructions on file for this visit  Return in about 3 months (around 11/16/2021) for Annual physical     Subjective:      Patient ID: Leno Kim is a 64 y o  female      Chief Complaint Patient presents with    Follow-up     labwork       Hypertension - patient has been taking her blood pressure medication regularly  Associated symptoms:  Swelling:  no  Leg cramps: no        Diabetes  She has type 2 diabetes mellitus  No MedicAlert identification noted  The initial diagnosis of diabetes was made 26 years ago  Pertinent negatives for hypoglycemia include no confusion, dizziness, headaches, hunger, mood changes, nervousness/anxiousness, pallor, seizures, sleepiness, speech difficulty, sweats or tremors  Pertinent negatives for diabetes include no blurred vision, no chest pain, no fatigue, no foot paresthesias, no foot ulcerations, no polydipsia, no polyphagia, no polyuria, no visual change, no weakness and no weight loss  Pertinent negatives for hypoglycemia complications include no blackouts, no hospitalization, no nocturnal hypoglycemia, no required assistance and no required glucagon injection  Pertinent negatives for diabetic complications include no CVA, heart disease, impotence, nephropathy, peripheral neuropathy, PVD or retinopathy  Risk factors for coronary artery disease include dyslipidemia, family history, obesity and post-menopausal  Current diabetic treatment includes insulin injections and oral agent (triple therapy)  She is compliant with treatment most of the time  She is currently taking insulin at bedtime  Insulin injections are given by patient  Rotation sites for injection include the abdominal wall  Her weight is stable  She is following a generally healthy diet  Meal planning includes carbohydrate counting  She has not had a previous visit with a dietitian  She rarely participates in exercise  She monitors blood glucose at home 3-4 x per week  She monitors urine at home <1 x per month  Blood glucose monitoring compliance is adequate  Her home blood glucose trend is fluctuating minimally   Her breakfast blood glucose is taken after 10 am  Her breakfast blood glucose range is generally >200 mg/dl  Her lunch blood glucose is taken between 2-3 pm  Her lunch blood glucose range is generally >200 mg/dl  Her dinner blood glucose is taken between 6-7 pm  Her dinner blood glucose range is generally >200 mg/dl  Her highest blood glucose is >200 mg/dl  Her overall blood glucose range is >200 mg/dl  She does not see a podiatrist Eye exam is current  The following portions of the patient's history were reviewed and updated as appropriate: allergies, current medications, past family history, past medical history, past social history, past surgical history and problem list     Review of Systems   Constitutional: Negative for fatigue and weight loss  Eyes: Negative for blurred vision  Cardiovascular: Negative for chest pain  Endocrine: Negative for polydipsia, polyphagia and polyuria  Genitourinary: Negative for impotence  Skin: Negative for pallor  Neurological: Negative for dizziness, tremors, seizures, speech difficulty, weakness and headaches  Psychiatric/Behavioral: Negative for confusion  The patient is not nervous/anxious            Current Outpatient Medications   Medication Sig Dispense Refill    aspirin 81 MG tablet Take 81 mg by mouth every evening      atorvastatin (LIPITOR) 40 mg tablet Take 1 tablet (40 mg total) by mouth daily 90 tablet 1    azelastine (ASTELIN) 0 1 % nasal spray 1 spray into each nostril every morning Use in each nostril as directed 3 Bottle 3    BIOTIN PO Take 10,000 mcg by mouth every morning      desloratadine (CLARINEX) 5 MG tablet Take 1 tablet (5 mg total) by mouth daily 90 tablet 1    Dulaglutide 3 MG/0 5ML SOPN Inject 0 5 mL (3 mg total) under the skin once a week 12 pen 1    glipiZIDE (GLUCOTROL XL) 10 mg 24 hr tablet Take 2 tablets (20 mg total) by mouth daily 180 tablet 1    hydrOXYzine HCL (ATARAX) 25 mg tablet TAKE 1 TABLET BY MOUTH EVERY NIGHT AT BEDTIME AS NEEDED FOR ITCHING 90 tablet 1    insulin glargine (Lantus SoloStar) 100 units/mL injection pen Inject 60 Units under the skin daily at bedtime 60 mL 1    Insulin Pen Needle (Prevent Safety Pen Needles) 31G X 6 MM MISC by Does not apply route 2 (two) times a day 200 each 1    levothyroxine 112 mcg tablet TAKE 1 TABLET BY MOUTH DAILY EARLY IN THE MORNING 90 tablet 1    metFORMIN (GLUCOPHAGE) 1000 MG tablet Take 1 tablet (1,000 mg total) by mouth 2 (two) times a day with meals 180 tablet 1    metoprolol succinate (TOPROL-XL) 25 mg 24 hr tablet Take 1 tablet (25 mg total) by mouth daily 90 tablet 1    Omega-3 Fatty Acids (FISH OIL PO) Take 1,400 mg by mouth every evening       ONE TOUCH ULTRA TEST test strip USE TO TEST GLUCOSE TWICE A  each 1    quinapril (ACCUPRIL) 40 MG tablet Take 1 tablet (40 mg total) by mouth daily 90 tablet 1    sitaGLIPtin (Januvia) 100 mg tablet Take 1 tablet (100 mg total) by mouth daily 90 tablet 1    ferrous sulfate 324 (65 Fe) mg Take 1 tablet (324 mg total) by mouth daily before breakfast 90 tablet 1     No current facility-administered medications for this visit  Objective:    /64   Pulse (!) 114   Temp 98 °F (36 7 °C)   Resp 14   Ht 5' 5" (1 651 m)   Wt 94 3 kg (208 lb)   SpO2 98%   BMI 34 61 kg/m²        Physical Exam  Vitals and nursing note reviewed  Constitutional:       Appearance: She is well-developed  HENT:      Head: Normocephalic and atraumatic  Right Ear: Tympanic membrane and external ear normal       Left Ear: Tympanic membrane and external ear normal    Cardiovascular:      Rate and Rhythm: Normal rate and regular rhythm  Heart sounds: Normal heart sounds  No murmur heard  No friction rub  Pulmonary:      Effort: Pulmonary effort is normal  No respiratory distress  Breath sounds: Normal breath sounds  No wheezing or rales  Musculoskeletal:      Right lower leg: No edema  Left lower leg: No edema                  [place] Callow, DO

## 2021-08-16 NOTE — ASSESSMENT & PLAN NOTE
Lab Results   Component Value Date    HGBA1C 10 7 (H) 08/12/2021     lantus dose increased to 60 units   Will go up by 1 unit daily

## 2021-08-21 LAB — HEMOCCULT STL QL IA: NEGATIVE

## 2021-08-23 DIAGNOSIS — E11.9 TYPE 2 DIABETES MELLITUS WITHOUT COMPLICATION, WITHOUT LONG-TERM CURRENT USE OF INSULIN (HCC): ICD-10-CM

## 2021-08-23 DIAGNOSIS — E11.65 TYPE 2 DIABETES MELLITUS WITH HYPERGLYCEMIA, WITHOUT LONG-TERM CURRENT USE OF INSULIN (HCC): ICD-10-CM

## 2021-08-23 RX ORDER — INSULIN GLARGINE 100 [IU]/ML
60 INJECTION, SOLUTION SUBCUTANEOUS
Qty: 60 ML | Refills: 0
Start: 2021-08-23 | End: 2021-08-30 | Stop reason: SDUPTHER

## 2021-08-24 RX ORDER — DULAGLUTIDE 3 MG/.5ML
INJECTION, SOLUTION SUBCUTANEOUS
Qty: 6 ML | Refills: 1 | Status: SHIPPED | OUTPATIENT
Start: 2021-08-24 | End: 2022-02-01

## 2021-08-27 DIAGNOSIS — E11.9 TYPE 2 DIABETES MELLITUS WITHOUT COMPLICATION, WITHOUT LONG-TERM CURRENT USE OF INSULIN (HCC): ICD-10-CM

## 2021-08-27 RX ORDER — LEVOTHYROXINE SODIUM 112 UG/1
TABLET ORAL
Qty: 90 TABLET | Refills: 1 | Status: SHIPPED | OUTPATIENT
Start: 2021-08-27 | End: 2022-02-07

## 2021-08-30 DIAGNOSIS — E11.9 TYPE 2 DIABETES MELLITUS WITHOUT COMPLICATION, WITHOUT LONG-TERM CURRENT USE OF INSULIN (HCC): ICD-10-CM

## 2021-08-30 RX ORDER — INSULIN GLARGINE 100 [IU]/ML
60 INJECTION, SOLUTION SUBCUTANEOUS
Qty: 60 ML | Refills: 0 | Status: SHIPPED | OUTPATIENT
Start: 2021-08-30 | End: 2021-10-26 | Stop reason: SDUPTHER

## 2021-09-21 DIAGNOSIS — I10 BENIGN ESSENTIAL HYPERTENSION: ICD-10-CM

## 2021-09-22 PROCEDURE — 4010F ACE/ARB THERAPY RXD/TAKEN: CPT | Performed by: INTERNAL MEDICINE

## 2021-09-22 RX ORDER — QUINAPRIL 40 MG/1
TABLET ORAL
Qty: 90 TABLET | Refills: 1 | Status: SHIPPED | OUTPATIENT
Start: 2021-09-22 | End: 2022-04-19

## 2021-09-28 ENCOUNTER — CONSULT (OUTPATIENT)
Dept: GASTROENTEROLOGY | Facility: CLINIC | Age: 61
End: 2021-09-28
Payer: COMMERCIAL

## 2021-09-28 VITALS
DIASTOLIC BLOOD PRESSURE: 72 MMHG | HEIGHT: 65 IN | BODY MASS INDEX: 34.02 KG/M2 | HEART RATE: 94 BPM | WEIGHT: 204.2 LBS | SYSTOLIC BLOOD PRESSURE: 130 MMHG

## 2021-09-28 DIAGNOSIS — D64.9 ANEMIA, UNSPECIFIED TYPE: ICD-10-CM

## 2021-09-28 DIAGNOSIS — K76.0 FATTY LIVER: ICD-10-CM

## 2021-09-28 DIAGNOSIS — Z86.010 HISTORY OF COLON POLYPS: ICD-10-CM

## 2021-09-28 DIAGNOSIS — R79.89 LFT ELEVATION: Primary | ICD-10-CM

## 2021-09-28 PROCEDURE — 3075F SYST BP GE 130 - 139MM HG: CPT | Performed by: INTERNAL MEDICINE

## 2021-09-28 PROCEDURE — 3078F DIAST BP <80 MM HG: CPT | Performed by: INTERNAL MEDICINE

## 2021-09-28 PROCEDURE — 1036F TOBACCO NON-USER: CPT | Performed by: INTERNAL MEDICINE

## 2021-09-28 PROCEDURE — 3008F BODY MASS INDEX DOCD: CPT | Performed by: INTERNAL MEDICINE

## 2021-09-28 PROCEDURE — 99214 OFFICE O/P EST MOD 30 MIN: CPT | Performed by: INTERNAL MEDICINE

## 2021-09-28 RX ORDER — PANTOPRAZOLE SODIUM 20 MG/1
20 TABLET, DELAYED RELEASE ORAL DAILY
Qty: 90 TABLET | Refills: 1 | Status: SHIPPED | OUTPATIENT
Start: 2021-09-28 | End: 2022-03-15 | Stop reason: SDUPTHER

## 2021-09-28 NOTE — PROGRESS NOTES
[facility] - Outpatient Follow-up Note  Diego Napier 64 y o  female MRN: [id number]  Encounter: [id number]          ASSESSMENT AND PLAN:      1  Anemia, unspecified type   microcytic with suggestion of iron deficiency  Iron studies normal 1 year ago  Will recheck  Has been on oral iron supplementation for the past month and tolerating this  Will  Also check sprue serologies and plan EGD for further evaluation  Will treat empirically with a PPI for now  Contingency might include capsule endoscopy or repeat colonoscopy if EGD is unrevealing     - Iron Panel (Includes Ferritin, Iron Sat%, Iron, and TIBC); Future  - Celiac Disease Panel; Future  - CBC; Future  - PAT Covid Screening; Future  - pantoprazole (PROTONIX) 20 mg tablet; Take 1 tablet (20 mg total) by mouth daily  Dispense: 90 tablet; Refill: 1    2  LFT elevation    Longstanding and likely secondary to nonalcoholic steatohepatitis  Will check sprue studies    3  Fatty liver   will continue to work toward weight loss through healthy diet and exercise    4  History of colon polyps   repeat colonoscopy in 5 years as per Dr Yanci White    ______________________________________________________________________    SUBJECTIVE:   Patient with history of  adenomatous polyps in the past, status post recent colonoscopy earlier this year with no significant abnormalities now presents with new diagnosis of anemia  Baseline hemoglobin around 12 in years past noted to have hemoglobin  Around 11 one year ago with normal MCV and normal iron studies at that time  Since then has had progressive drop in hemoglobin to 10 2, 9 6 and most recently 8 5 with corresponding it has microcytosis  Denies bruising or bleeding  No melena or hematochezia  Has had some epigastric discomfort intermittently for which she took course of OTC PPI which was helpful  After 2 weeks she discontinued this and symptoms have returned    No nausea or vomiting, fever chills, jaundice or rashes  No change in bowel habit or unexplained weight loss  No family history of anemia or gastrointestinal malignancy      REVIEW OF SYSTEMS:    Review of Systems   Gastrointestinal: Positive for bloating and abdominal pain  Negative for change in bowel habit, bowel incontinence, constipation, diarrhea, dysphagia, heartburn, hematemesis, hematochezia, hemorrhoids, jaundice, melena, nausea and vomiting  All other systems reviewed and are negative  Historical Information   Past Medical History:   Diagnosis Date    Colon polyp     Deviated nasal septum     Diabetes mellitus ([address] )     Disease of thyroid gland     hypo    Hearing aid worn     right ear only    Hyperlipidemia     Hypertension      Past Surgical History:   Procedure Laterality Date     SECTION, LOW TRANSVERSE      x2    COLONOSCOPY N/A 2016    Procedure: COLONOSCOPY;  Surgeon: Nicky Woo MD;  Location: [facility];   Service:     CYST REMOVAL Left     elbow    DILATION AND CURETTAGE OF UTERUS      GANGLION CYST EXCISION Right     RESECTION TONSIL RADICAL      TUBAL LIGATION      VARICOSE VEIN SURGERY Bilateral 2009    x3 procedures    WISDOM TOOTH EXTRACTION       Social History   Social History     Substance and Sexual Activity   Alcohol Use Yes    Comment: rare     Social History     Substance and Sexual Activity   Drug Use Never     Social History     Tobacco Use   Smoking Status Former Smoker    Packs/day: 1 00    Years: 10 00    Pack years: 10 00    Quit date: 26    Years since quittin 7   Smokeless Tobacco Never Used     Family History   Problem Relation Age of Onset    Stroke Mother     Diabetes Mother     Arthritis Father     Heart disease Father         CABG    Heart disease Daughter         congenital-truncus arteriosus    Congenital heart disease Daughter     Heart disease Son         congenital-"hole" repair    Other Sister         meningioma Meds/Allergies       Current Outpatient Medications:     aspirin 81 MG tablet    atorvastatin (LIPITOR) 40 mg tablet    azelastine (ASTELIN) 0 1 % nasal spray    BIOTIN PO    desloratadine (CLARINEX) 5 MG tablet    ferrous sulfate 324 (65 Fe) mg    glipiZIDE (GLUCOTROL XL) 10 mg 24 hr tablet    hydrOXYzine HCL (ATARAX) 25 mg tablet    insulin glargine (Lantus SoloStar) 100 units/mL injection pen    levothyroxine 112 mcg tablet    metFORMIN (GLUCOPHAGE) 1000 MG tablet    metoprolol succinate (TOPROL-XL) 25 mg 24 hr tablet    Omega-3 Fatty Acids (FISH OIL PO)    ONE TOUCH ULTRA TEST test strip    quinapril (ACCUPRIL) 40 MG tablet    sitaGLIPtin (Januvia) 100 mg tablet    Trulicity 3 LH/4 4KW SOPN    Insulin Pen Needle (Prevent Safety Pen Needles) 31G X 6 MM MISC    pantoprazole (PROTONIX) 20 mg tablet    No Known Allergies        Objective     Blood pressure 130/72, pulse 94, height 5' 5" (1 651 m), weight 92 6 kg (204 lb 3 2 oz), not currently breastfeeding  Body mass index is 33 98 kg/m²  PHYSICAL EXAM:      Physical Exam  Vitals and nursing note reviewed  Constitutional:       General: She is not in acute distress  Appearance: She is not ill-appearing  HENT:      Head: Normocephalic and atraumatic  Eyes:      General: No scleral icterus  Extraocular Movements: Extraocular movements intact  Cardiovascular:      Rate and Rhythm: Normal rate and regular rhythm  Pulmonary:      Effort: Pulmonary effort is normal  No respiratory distress  Abdominal:      General: There is no distension  Palpations: Abdomen is soft  Tenderness: There is no abdominal tenderness  There is no guarding or rebound  Skin:     General: Skin is warm and dry  Coloration: Skin is not cyanotic  Findings: No erythema  Neurological:      General: No focal deficit present  Mental Status: She is alert and oriented to person, place, and time     Psychiatric:         Mood and Affect: Mood normal          Behavior: Behavior normal           Lab Results:   No visits with results within 1 Day(s) from this visit  Latest known visit with results is:   Office Visit on 08/16/2021   Component Date Value    Occult Blood, Fecal IA 08/20/2021 Negative          Radiology Results:   No results found

## 2021-10-25 ENCOUNTER — VBI (OUTPATIENT)
Dept: ADMINISTRATIVE | Facility: OTHER | Age: 61
End: 2021-10-25

## 2021-10-26 DIAGNOSIS — E11.9 TYPE 2 DIABETES MELLITUS WITHOUT COMPLICATION, WITHOUT LONG-TERM CURRENT USE OF INSULIN (HCC): ICD-10-CM

## 2021-10-26 RX ORDER — INSULIN GLARGINE 100 [IU]/ML
60 INJECTION, SOLUTION SUBCUTANEOUS
Qty: 60 ML | Refills: 0 | Status: SHIPPED | OUTPATIENT
Start: 2021-10-26 | End: 2021-12-23 | Stop reason: SDUPTHER

## 2021-11-03 DIAGNOSIS — E11.9 TYPE 2 DIABETES MELLITUS WITHOUT COMPLICATION, WITHOUT LONG-TERM CURRENT USE OF INSULIN (HCC): ICD-10-CM

## 2021-11-03 RX ORDER — PEN NEEDLE, DIABETIC 31 G X1/4"
NEEDLE, DISPOSABLE MISCELLANEOUS
Qty: 200 EACH | Refills: 1 | Status: SHIPPED | OUTPATIENT
Start: 2021-11-03 | End: 2022-05-19 | Stop reason: SDUPTHER

## 2021-11-03 RX ORDER — PEN NEEDLE, DIABETIC 31 G X1/4"
NEEDLE, DISPOSABLE MISCELLANEOUS
Qty: 200 EACH | Refills: 1 | Status: SHIPPED | OUTPATIENT
Start: 2021-11-03 | End: 2021-11-03

## 2021-11-05 ENCOUNTER — TELEPHONE (OUTPATIENT)
Dept: GASTROENTEROLOGY | Facility: CLINIC | Age: 61
End: 2021-11-05

## 2021-11-22 ENCOUNTER — TELEPHONE (OUTPATIENT)
Dept: GASTROENTEROLOGY | Facility: CLINIC | Age: 61
End: 2021-11-22

## 2021-11-25 LAB
ALBUMIN SERPL-MCNC: 4.1 G/DL (ref 3.8–4.8)
ALBUMIN/GLOB SERPL: 1.4 {RATIO} (ref 1.2–2.2)
ALP SERPL-CCNC: 80 IU/L (ref 44–121)
ALT SERPL-CCNC: 51 IU/L (ref 0–32)
AST SERPL-CCNC: 56 IU/L (ref 0–40)
BILIRUB SERPL-MCNC: 0.4 MG/DL (ref 0–1.2)
BUN SERPL-MCNC: 14 MG/DL (ref 8–27)
BUN/CREAT SERPL: 14 (ref 12–28)
CALCIUM SERPL-MCNC: 9.7 MG/DL (ref 8.7–10.3)
CHLORIDE SERPL-SCNC: 97 MMOL/L (ref 96–106)
CHOLEST SERPL-MCNC: 152 MG/DL (ref 100–199)
CO2 SERPL-SCNC: 21 MMOL/L (ref 20–29)
CREAT SERPL-MCNC: 1 MG/DL (ref 0.57–1)
ERYTHROCYTE [DISTWIDTH] IN BLOOD BY AUTOMATED COUNT: 17 % (ref 11.7–15.4)
EST. AVERAGE GLUCOSE BLD GHB EST-MCNC: 220 MG/DL
GLOBULIN SER-MCNC: 3 G/DL (ref 1.5–4.5)
GLUCOSE SERPL-MCNC: 273 MG/DL (ref 65–99)
HBA1C MFR BLD: 9.3 % (ref 4.8–5.6)
HCT VFR BLD AUTO: 30.2 % (ref 34–46.6)
HDLC SERPL-MCNC: 31 MG/DL
HGB BLD-MCNC: 9.1 G/DL (ref 11.1–15.9)
LDLC SERPL CALC-MCNC: 83 MG/DL (ref 0–99)
LDLC/HDLC SERPL: 2.7 RATIO (ref 0–3.2)
MCH RBC QN AUTO: 21.8 PG (ref 26.6–33)
MCHC RBC AUTO-ENTMCNC: 30.1 G/DL (ref 31.5–35.7)
MCV RBC AUTO: 72 FL (ref 79–97)
MICRODELETION SYND BLD/T FISH: NORMAL
PLATELET # BLD AUTO: 227 X10E3/UL (ref 150–450)
POTASSIUM SERPL-SCNC: 4.9 MMOL/L (ref 3.5–5.2)
PROT SERPL-MCNC: 7.1 G/DL (ref 6–8.5)
RBC # BLD AUTO: 4.18 X10E6/UL (ref 3.77–5.28)
SL AMB EGFR AFRICAN AMERICAN: 70 ML/MIN/1.73
SL AMB EGFR NON AFRICAN AMERICAN: 61 ML/MIN/1.73
SL AMB VLDL CHOLESTEROL CALC: 38 MG/DL (ref 5–40)
SODIUM SERPL-SCNC: 135 MMOL/L (ref 134–144)
T4 FREE SERPL-MCNC: 1.54 NG/DL (ref 0.82–1.77)
TRIGL SERPL-MCNC: 229 MG/DL (ref 0–149)
TSH SERPL DL<=0.005 MIU/L-ACNC: 3.35 UIU/ML (ref 0.45–4.5)
WBC # BLD AUTO: 9.3 X10E3/UL (ref 3.4–10.8)

## 2021-11-26 ENCOUNTER — TELEPHONE (OUTPATIENT)
Dept: FAMILY MEDICINE CLINIC | Facility: CLINIC | Age: 61
End: 2021-11-26

## 2021-12-08 DIAGNOSIS — E78.2 MIXED HYPERLIPIDEMIA: ICD-10-CM

## 2021-12-08 DIAGNOSIS — N30.10 CHRONIC INTERSTITIAL CYSTITIS: ICD-10-CM

## 2021-12-08 DIAGNOSIS — I10 BENIGN ESSENTIAL HYPERTENSION: ICD-10-CM

## 2021-12-08 RX ORDER — ATORVASTATIN CALCIUM 40 MG/1
TABLET, FILM COATED ORAL
Qty: 90 TABLET | Refills: 1 | Status: SHIPPED | OUTPATIENT
Start: 2021-12-08 | End: 2021-12-12

## 2021-12-08 RX ORDER — METOPROLOL SUCCINATE 25 MG/1
TABLET, EXTENDED RELEASE ORAL
Qty: 90 TABLET | Refills: 1 | Status: SHIPPED | OUTPATIENT
Start: 2021-12-08 | End: 2022-07-05

## 2021-12-08 RX ORDER — HYDROXYZINE HYDROCHLORIDE 25 MG/1
TABLET, FILM COATED ORAL
Qty: 90 TABLET | Refills: 1 | Status: SHIPPED | OUTPATIENT
Start: 2021-12-08

## 2021-12-09 ENCOUNTER — TELEPHONE (OUTPATIENT)
Dept: PREADMISSION TESTING | Facility: HOSPITAL | Age: 61
End: 2021-12-09

## 2021-12-09 VITALS — HEIGHT: 65 IN | BODY MASS INDEX: 33.99 KG/M2 | WEIGHT: 204 LBS

## 2021-12-09 NOTE — PRE-PROCEDURE INSTRUCTIONS
Pre-Surgery Instructions:   Medication Instructions    Artificial Saliva (BIOTENE MOISTURIZING MOUTH MT) Instructed patient per Anesthesia Guidelines   aspirin 81 MG tablet Instructed patient per Anesthesia Guidelines   atorvastatin (LIPITOR) 40 mg tablet Instructed patient per Anesthesia Guidelines   azelastine (ASTELIN) 0 1 % nasal spray use prn    BIOTIN PO Instructed patient per Anesthesia Guidelines   desloratadine (CLARINEX) 5 MG tablet Instructed patient per Anesthesia Guidelines   ferrous sulfate 324 (65 Fe) mg Instructed patient per Anesthesia Guidelines   glipiZIDE (GLUCOTROL XL) 10 mg 24 hr tablet Instructed patient per Anesthesia Guidelines   hydrOXYzine HCL (ATARAX) 25 mg tablet Instructed patient per Anesthesia Guidelines   insulin glargine (Lantus SoloStar) 100 units/mL injection pen Instructed patient per Anesthesia Guidelines   levothyroxine 112 mcg tablet take the am of the EGD    metFORMIN (GLUCOPHAGE) 1000 MG tablet Instructed patient per Anesthesia Guidelines   metoprolol succinate (TOPROL-XL) 25 mg 24 hr tablet Instructed patient per Anesthesia Guidelines   Omega-3 Fatty Acids (FISH OIL PO) Instructed patient per Anesthesia Guidelines   ONE TOUCH ULTRA TEST test strip Instructed patient per Anesthesia Guidelines   pantoprazole (PROTONIX) 20 mg tablet Instructed patient per Anesthesia Guidelines   quinapril (ACCUPRIL) 40 MG tablet Instructed patient per Anesthesia Guidelines   sitaGLIPtin (Januvia) 100 mg tablet Instructed patient per Anesthesia Guidelines   TRUEplus Pen Needles 31G X 6 MM MISC Instructed patient per Anesthesia Guidelines   Trulicity 3 XB/1 0BG SOPN Instructed patient per Anesthesia Guidelines  Pt to follow Dr Jm Bran instructions  Pt to take levothyroxine, azelastine the am of the EGD    romina Salgado [phone]

## 2021-12-10 DIAGNOSIS — J30.9 ALLERGIC RHINITIS, UNSPECIFIED SEASONALITY, UNSPECIFIED TRIGGER: ICD-10-CM

## 2021-12-10 DIAGNOSIS — E11.9 TYPE 2 DIABETES MELLITUS WITHOUT COMPLICATION, WITHOUT LONG-TERM CURRENT USE OF INSULIN (HCC): ICD-10-CM

## 2021-12-10 DIAGNOSIS — E78.2 MIXED HYPERLIPIDEMIA: ICD-10-CM

## 2021-12-12 RX ORDER — ATORVASTATIN CALCIUM 40 MG/1
TABLET, FILM COATED ORAL
Qty: 90 TABLET | Refills: 1 | Status: SHIPPED | OUTPATIENT
Start: 2021-12-12

## 2021-12-12 RX ORDER — DESLORATADINE 5 MG/1
TABLET ORAL
Qty: 90 TABLET | Refills: 1 | Status: SHIPPED | OUTPATIENT
Start: 2021-12-12 | End: 2022-06-06

## 2021-12-12 RX ORDER — GLIPIZIDE 10 MG/1
20 TABLET, FILM COATED, EXTENDED RELEASE ORAL DAILY
Qty: 180 TABLET | Refills: 1 | Status: SHIPPED | OUTPATIENT
Start: 2021-12-12 | End: 2022-04-14

## 2021-12-13 ENCOUNTER — ANESTHESIA (OUTPATIENT)
Dept: ANESTHESIOLOGY | Facility: HOSPITAL | Age: 61
End: 2021-12-13

## 2021-12-13 ENCOUNTER — ANESTHESIA EVENT (OUTPATIENT)
Dept: GASTROENTEROLOGY | Facility: AMBULARY SURGERY CENTER | Age: 61
End: 2021-12-13

## 2021-12-13 ENCOUNTER — ANESTHESIA EVENT (OUTPATIENT)
Dept: ANESTHESIOLOGY | Facility: HOSPITAL | Age: 61
End: 2021-12-13

## 2021-12-13 PROBLEM — E66.811 OBESITY (BMI 30.0-34.9): Status: ACTIVE | Noted: 2021-12-13

## 2021-12-13 PROBLEM — E66.9 OBESITY (BMI 30.0-34.9): Status: ACTIVE | Noted: 2021-12-13

## 2021-12-14 ENCOUNTER — ANESTHESIA (OUTPATIENT)
Dept: GASTROENTEROLOGY | Facility: AMBULARY SURGERY CENTER | Age: 61
End: 2021-12-14

## 2021-12-14 ENCOUNTER — HOSPITAL ENCOUNTER (OUTPATIENT)
Dept: GASTROENTEROLOGY | Facility: AMBULARY SURGERY CENTER | Age: 61
Setting detail: OUTPATIENT SURGERY
Discharge: HOME | End: 2021-12-14
Attending: INTERNAL MEDICINE
Payer: COMMERCIAL

## 2021-12-14 VITALS
RESPIRATION RATE: 18 BRPM | SYSTOLIC BLOOD PRESSURE: 137 MMHG | TEMPERATURE: 96.2 F | HEIGHT: 65 IN | BODY MASS INDEX: 33.99 KG/M2 | OXYGEN SATURATION: 98 % | DIASTOLIC BLOOD PRESSURE: 73 MMHG | HEART RATE: 93 BPM | WEIGHT: 204 LBS

## 2021-12-14 DIAGNOSIS — D64.9 ANEMIA, UNSPECIFIED TYPE: ICD-10-CM

## 2021-12-14 PROCEDURE — 88305 TISSUE EXAM BY PATHOLOGIST: CPT | Performed by: PATHOLOGY

## 2021-12-14 PROCEDURE — 43239 EGD BIOPSY SINGLE/MULTIPLE: CPT | Performed by: INTERNAL MEDICINE

## 2021-12-14 PROCEDURE — 88342 IMHCHEM/IMCYTCHM 1ST ANTB: CPT | Performed by: PATHOLOGY

## 2021-12-14 RX ORDER — PROPOFOL 10 MG/ML
INJECTION, EMULSION INTRAVENOUS AS NEEDED
Status: DISCONTINUED | OUTPATIENT
Start: 2021-12-14 | End: 2021-12-14

## 2021-12-14 RX ORDER — SODIUM CHLORIDE, SODIUM LACTATE, POTASSIUM CHLORIDE, CALCIUM CHLORIDE 600; 310; 30; 20 MG/100ML; MG/100ML; MG/100ML; MG/100ML
75 INJECTION, SOLUTION INTRAVENOUS CONTINUOUS
Status: DISCONTINUED | OUTPATIENT
Start: 2021-12-14 | End: 2021-12-18 | Stop reason: HOSPADM

## 2021-12-14 RX ORDER — LIDOCAINE HYDROCHLORIDE 20 MG/ML
INJECTION, SOLUTION EPIDURAL; INFILTRATION; INTRACAUDAL; PERINEURAL AS NEEDED
Status: DISCONTINUED | OUTPATIENT
Start: 2021-12-14 | End: 2021-12-14

## 2021-12-14 RX ORDER — SODIUM CHLORIDE, SODIUM LACTATE, POTASSIUM CHLORIDE, CALCIUM CHLORIDE 600; 310; 30; 20 MG/100ML; MG/100ML; MG/100ML; MG/100ML
INJECTION, SOLUTION INTRAVENOUS CONTINUOUS PRN
Status: DISCONTINUED | OUTPATIENT
Start: 2021-12-14 | End: 2021-12-14

## 2021-12-14 RX ADMIN — LIDOCAINE HYDROCHLORIDE 100 MG: 20 INJECTION, SOLUTION EPIDURAL; INFILTRATION; INTRACAUDAL; PERINEURAL at 08:06

## 2021-12-14 RX ADMIN — SODIUM CHLORIDE, SODIUM LACTATE, POTASSIUM CHLORIDE, AND CALCIUM CHLORIDE: .6; .31; .03; .02 INJECTION, SOLUTION INTRAVENOUS at 07:49

## 2021-12-14 RX ADMIN — LIDOCAINE HYDROCHLORIDE 30 MG: 20 INJECTION, SOLUTION EPIDURAL; INFILTRATION; INTRACAUDAL; PERINEURAL at 08:10

## 2021-12-14 RX ADMIN — PROPOFOL 130 MG: 10 INJECTION, EMULSION INTRAVENOUS at 08:06

## 2021-12-15 ENCOUNTER — RA CDI HCC (OUTPATIENT)
Dept: OTHER | Facility: HOSPITAL | Age: 61
End: 2021-12-15

## 2021-12-16 PROBLEM — E11.36 DIABETES MELLITUS WITH DIABETIC CATARACT (HCC): Status: ACTIVE | Noted: 2021-12-16

## 2021-12-22 ENCOUNTER — OFFICE VISIT (OUTPATIENT)
Dept: FAMILY MEDICINE CLINIC | Facility: CLINIC | Age: 61
End: 2021-12-22
Payer: COMMERCIAL

## 2021-12-22 VITALS
HEIGHT: 65 IN | HEART RATE: 94 BPM | BODY MASS INDEX: 34.16 KG/M2 | TEMPERATURE: 97.4 F | SYSTOLIC BLOOD PRESSURE: 130 MMHG | RESPIRATION RATE: 16 BRPM | DIASTOLIC BLOOD PRESSURE: 70 MMHG | WEIGHT: 205 LBS | OXYGEN SATURATION: 98 %

## 2021-12-22 DIAGNOSIS — K31.84 DIABETIC GASTROPARESIS (HCC): Primary | ICD-10-CM

## 2021-12-22 DIAGNOSIS — E11.43 DIABETIC GASTROPARESIS (HCC): Primary | ICD-10-CM

## 2021-12-22 DIAGNOSIS — Z12.31 ENCOUNTER FOR SCREENING MAMMOGRAM FOR MALIGNANT NEOPLASM OF BREAST: ICD-10-CM

## 2021-12-22 DIAGNOSIS — I10 BENIGN ESSENTIAL HYPERTENSION: ICD-10-CM

## 2021-12-22 DIAGNOSIS — E03.9 PRIMARY HYPOTHYROIDISM: ICD-10-CM

## 2021-12-22 PROCEDURE — 3078F DIAST BP <80 MM HG: CPT | Performed by: FAMILY MEDICINE

## 2021-12-22 PROCEDURE — 99214 OFFICE O/P EST MOD 30 MIN: CPT | Performed by: FAMILY MEDICINE

## 2021-12-22 PROCEDURE — 3725F SCREEN DEPRESSION PERFORMED: CPT | Performed by: FAMILY MEDICINE

## 2021-12-22 PROCEDURE — 1036F TOBACCO NON-USER: CPT | Performed by: FAMILY MEDICINE

## 2021-12-22 PROCEDURE — 3075F SYST BP GE 130 - 139MM HG: CPT | Performed by: FAMILY MEDICINE

## 2021-12-22 PROCEDURE — 3008F BODY MASS INDEX DOCD: CPT | Performed by: FAMILY MEDICINE

## 2021-12-23 DIAGNOSIS — E11.9 TYPE 2 DIABETES MELLITUS WITHOUT COMPLICATION, WITHOUT LONG-TERM CURRENT USE OF INSULIN (HCC): ICD-10-CM

## 2021-12-23 RX ORDER — INSULIN GLARGINE 100 [IU]/ML
60 INJECTION, SOLUTION SUBCUTANEOUS
Qty: 60 ML | Refills: 1 | Status: SHIPPED | OUTPATIENT
Start: 2021-12-23 | End: 2022-05-19 | Stop reason: SDUPTHER

## 2022-01-25 ENCOUNTER — HOSPITAL ENCOUNTER (OUTPATIENT)
Dept: RADIOLOGY | Facility: HOSPITAL | Age: 62
Discharge: HOME | End: 2022-01-25
Payer: COMMERCIAL

## 2022-01-25 VITALS — BODY MASS INDEX: 33.66 KG/M2 | WEIGHT: 202 LBS | HEIGHT: 65 IN

## 2022-01-25 DIAGNOSIS — Z12.31 ENCOUNTER FOR SCREENING MAMMOGRAM FOR MALIGNANT NEOPLASM OF BREAST: ICD-10-CM

## 2022-01-25 PROCEDURE — 77067 SCR MAMMO BI INCL CAD: CPT

## 2022-01-25 PROCEDURE — 77063 BREAST TOMOSYNTHESIS BI: CPT

## 2022-02-01 ENCOUNTER — OFFICE VISIT (OUTPATIENT)
Dept: ENDOCRINOLOGY | Facility: CLINIC | Age: 62
End: 2022-02-01
Payer: COMMERCIAL

## 2022-02-01 ENCOUNTER — OFFICE VISIT (OUTPATIENT)
Dept: GASTROENTEROLOGY | Facility: CLINIC | Age: 62
End: 2022-02-01
Payer: COMMERCIAL

## 2022-02-01 VITALS
DIASTOLIC BLOOD PRESSURE: 78 MMHG | BODY MASS INDEX: 34.32 KG/M2 | HEIGHT: 65 IN | WEIGHT: 206 LBS | SYSTOLIC BLOOD PRESSURE: 153 MMHG | HEART RATE: 94 BPM

## 2022-02-01 VITALS
SYSTOLIC BLOOD PRESSURE: 140 MMHG | WEIGHT: 205.3 LBS | DIASTOLIC BLOOD PRESSURE: 88 MMHG | HEART RATE: 95 BPM | BODY MASS INDEX: 34.2 KG/M2 | HEIGHT: 65 IN

## 2022-02-01 DIAGNOSIS — Z86.010 HISTORY OF COLON POLYPS: ICD-10-CM

## 2022-02-01 DIAGNOSIS — K76.0 FATTY LIVER: ICD-10-CM

## 2022-02-01 DIAGNOSIS — E11.9 TYPE 2 DIABETES MELLITUS WITHOUT COMPLICATION, WITHOUT LONG-TERM CURRENT USE OF INSULIN (HCC): ICD-10-CM

## 2022-02-01 DIAGNOSIS — E03.9 PRIMARY HYPOTHYROIDISM: ICD-10-CM

## 2022-02-01 DIAGNOSIS — K31.84 DIABETIC GASTROPARESIS (HCC): ICD-10-CM

## 2022-02-01 DIAGNOSIS — I10 BENIGN ESSENTIAL HYPERTENSION: ICD-10-CM

## 2022-02-01 DIAGNOSIS — E78.2 MIXED HYPERLIPIDEMIA: ICD-10-CM

## 2022-02-01 DIAGNOSIS — K21.9 GASTROESOPHAGEAL REFLUX DISEASE WITHOUT ESOPHAGITIS: ICD-10-CM

## 2022-02-01 DIAGNOSIS — E11.65 TYPE 2 DIABETES MELLITUS WITH HYPERGLYCEMIA, WITHOUT LONG-TERM CURRENT USE OF INSULIN (HCC): Primary | ICD-10-CM

## 2022-02-01 DIAGNOSIS — E66.9 CLASS 1 OBESITY WITH SERIOUS COMORBIDITY AND BODY MASS INDEX (BMI) OF 34.0 TO 34.9 IN ADULT, UNSPECIFIED OBESITY TYPE: ICD-10-CM

## 2022-02-01 DIAGNOSIS — R79.89 LFT ELEVATION: ICD-10-CM

## 2022-02-01 DIAGNOSIS — E11.43 DIABETIC GASTROPARESIS (HCC): ICD-10-CM

## 2022-02-01 DIAGNOSIS — D50.9 IRON DEFICIENCY ANEMIA, UNSPECIFIED IRON DEFICIENCY ANEMIA TYPE: Primary | ICD-10-CM

## 2022-02-01 PROCEDURE — 3077F SYST BP >= 140 MM HG: CPT | Performed by: INTERNAL MEDICINE

## 2022-02-01 PROCEDURE — 99205 OFFICE O/P NEW HI 60 MIN: CPT | Performed by: INTERNAL MEDICINE

## 2022-02-01 PROCEDURE — 3079F DIAST BP 80-89 MM HG: CPT | Performed by: INTERNAL MEDICINE

## 2022-02-01 PROCEDURE — 3008F BODY MASS INDEX DOCD: CPT | Performed by: INTERNAL MEDICINE

## 2022-02-01 PROCEDURE — 1036F TOBACCO NON-USER: CPT | Performed by: INTERNAL MEDICINE

## 2022-02-01 PROCEDURE — 99214 OFFICE O/P EST MOD 30 MIN: CPT | Performed by: INTERNAL MEDICINE

## 2022-02-01 RX ORDER — EMPAGLIFLOZIN 25 MG/1
25 TABLET, FILM COATED ORAL EVERY MORNING
Qty: 90 TABLET | Refills: 3 | Status: SHIPPED | OUTPATIENT
Start: 2022-02-01 | End: 2022-06-03

## 2022-02-01 NOTE — PROGRESS NOTES
[facility] - Outpatient Follow-up Note  Luis Ortiz 58 y o  female MRN: [id number]  Encounter: [id number]          ASSESSMENT AND PLAN:      1  Iron deficiency anemia, unspecified iron deficiency anemia type  2  History of colon polyps  Patient is postmenopausal   Discussed possible causes  Sprue serologies unremarkable  EGD without obvious bleeding source though visualization significantly compromised by retained food  Prior colonoscopy in 2020 performed prior to the onset of anemia  Will plan colonoscopy and repeat EGD  Contingency would include capsule endoscopy  - Colonoscopy; Future  - EGD; Future    3  Diabetic gastroparesis ([facility] [address] )  4  Gastroesophageal reflux disease without esophagitis  We discussed gastroparesis diet  She is having some trouble with this in combination with diet better diet  Will refer to nutritional services for consultation and guidance  Continue pantoprazole    5  LFT elevation  6  Fatty liver  Likely steatohepatitis related to metabolic syndrome  Will consider further evaluation for other possible concomitant etiologies    ______________________________________________________________________    SUBJECTIVE:  Recently underwent EGD for evaluation of anemia  Had a considerable amount of retained food in her stomach consistent with gastroparesis    Has been on pantoprazole and a gastroparesis diet with significant improvement of her symptoms      REVIEW OF SYSTEMS:    ROS       Historical Information   Past Medical History:   Diagnosis Date    Anemia     Colon polyp     Deviated nasal septum     Diabetes mellitus ([facility] [address] )     Disease of thyroid gland     hypo    Gastroparesis     Hearing aid worn     right ear only    Hyperlipidemia     Hypertension      Past Surgical History:   Procedure Laterality Date     SECTION, LOW TRANSVERSE      x2    COLONOSCOPY N/A 2016    Procedure: COLONOSCOPY;  Surgeon: Mark Escoto MD; Location[facility];   Service:     CYST REMOVAL Left     elbow    DILATION AND CURETTAGE OF UTERUS      GANGLION CYST EXCISION Right     RESECTION TONSIL RADICAL      TUBAL LIGATION      VARICOSE VEIN SURGERY Bilateral 2009    x3 procedures    WISDOM TOOTH EXTRACTION       Social History   Social History     Substance and Sexual Activity   Alcohol Use Yes    Comment: rare     Social History     Substance and Sexual Activity   Drug Use Never     Social History     Tobacco Use   Smoking Status Former Smoker    Packs/day: 1 00    Years: 10 00    Pack years: 10 00    Quit date: 26    Years since quittin 1   Smokeless Tobacco Never Used     Family History   Problem Relation Age of Onset    Stroke Mother     Diabetes Mother     Arthritis Father     Heart disease Father         CABG    Heart disease Daughter         congenital-truncus arteriosus    Congenital heart disease Daughter     Heart disease Son         congenital-"hole" repair    Other Sister         meningioma       Meds/Allergies       Current Outpatient Medications:     Artificial Saliva (BIOTENE MOISTURIZING MOUTH MT)    atorvastatin (LIPITOR) 40 mg tablet    azelastine (ASTELIN) 0 1 % nasal spray    BIOTIN PO    desloratadine (CLARINEX) 5 MG tablet    ferrous sulfate 324 (65 Fe) mg    glipiZIDE (GLUCOTROL XL) 10 mg 24 hr tablet    hydrOXYzine HCL (ATARAX) 25 mg tablet    insulin glargine (Lantus SoloStar) 100 units/mL injection pen    levothyroxine 112 mcg tablet    metFORMIN (GLUCOPHAGE) 1000 MG tablet    metoprolol succinate (TOPROL-XL) 25 mg 24 hr tablet    Omega-3 Fatty Acids (FISH OIL PO)    ONE TOUCH ULTRA TEST test strip    pantoprazole (PROTONIX) 20 mg tablet    quinapril (ACCUPRIL) 40 MG tablet    sitaGLIPtin (Januvia) 100 mg tablet    TRUEplus Pen Needles 31G X 6 MM MISC    Trulicity 3 XY/3 5JX SOPN    No Known Allergies        Objective     Blood pressure 153/78, pulse 94, height 5' 5" (1 651 m), weight 93 4 kg (206 lb), not currently breastfeeding  Body mass index is 34 28 kg/m²  PHYSICAL EXAM:      Physical Exam  Vitals and nursing note reviewed  Constitutional:       General: She is not in acute distress  Appearance: She is not ill-appearing  HENT:      Head: Normocephalic and atraumatic  Eyes:      General: No scleral icterus  Extraocular Movements: Extraocular movements intact  Cardiovascular:      Rate and Rhythm: Normal rate and regular rhythm  Pulmonary:      Effort: Pulmonary effort is normal  No respiratory distress  Abdominal:      General: There is no distension  Palpations: Abdomen is soft  Skin:     General: Skin is warm and dry  Coloration: Skin is not cyanotic  Findings: No erythema  Neurological:      General: No focal deficit present  Mental Status: She is alert and oriented to person, place, and time  Psychiatric:         Mood and Affect: Mood normal          Behavior: Behavior normal           Lab Results:   No visits with results within 1 Day(s) from this visit  Latest known visit with results is:   Hospital Outpatient Visit on 12/14/2021   Component Date Value    Case Report 12/14/2021                      Value:Surgical Pathology Report                         Case: [id number]                                   Authorizing Provider:  Rekha Dubose MD         Collected:           12/14/2021 0813              Ordering Location:     [facility]   Received:            12/14/2021 1207                                     Center                                                                       Pathologist:           James Bush MD                                                                Specimen:    Stomach, Antrum check for H  Pylori                                                        Final Diagnosis 12/14/2021                      Value: This result contains rich text formatting which cannot be displayed here   Additional Information 12/14/2021                      Value: This result contains rich text formatting which cannot be displayed here  [facility] Description 12/14/2021                      Value: This result contains rich text formatting which cannot be displayed here  Radiology Results:   Mammo screening bilateral w 3d & cad    Result Date: 1/26/2022  Narrative: DIAGNOSIS: Encounter for screening mammogram for malignant neoplasm of breast TECHNIQUE: Digital screening mammography was performed  Computer Aided Detection (CAD) analyzed all applicable images  COMPARISONS: Prior breast imaging dated: 01/31/2020, 07/18/2018, 07/10/2018, 06/28/2017, and 06/02/2016 RELEVANT HISTORY: Family Breast Cancer History: No known family history of breast cancer  Family Medical History: No known relevant family medical history  Personal History: Hormone history includes birth control  No known relevant surgical history  No known relevant medical history  The patient is scheduled in a reminder system for screening mammography  8-10% of cancers will be missed on mammography  Management of a palpable abnormality must be based on clinical grounds  Patients will be notified of their results via letter from our facility  Accredited by [employer] of Rad[employer] and FDA  RISK ASSESSMENT: 5 Year Tyrer-Cuzick: 2 15 % 10 Year Tyrer-Cuzick: 4 31 % Lifetime Tyrer-Cuzick: 10 28 % TISSUE DENSITY: There are scattered areas of fibroglandular density  INDICATION: Lawyer Juárez is a 64 y o  female presenting for screening mammography  FINDINGS: Bilateral There are no suspicious masses, grouped microcalcifications or areas of unexplained architectural distortion  The skin and nipple areolar complex are unremarkable  There are scattered calcifications present bilaterally  Impression: No mammographic evidence of malignancy   ASSESSMENT/BI-RADS CATEGORY: Left: 2 - Benign Right: 2 - Benign Overall: 2 - Benign RECOMMENDATION:      - Routine screening mammogram in 1 year for both breasts   Workstation ID: [id number]

## 2022-02-01 NOTE — PATIENT INSTRUCTIONS
Discontinue Trulicity   Continue rest of current medications   Start Jardiance 25 mg orally daily   Please keep well hydrated   Check blood sugars at least twice a day before meals and at bedtime, keep a log, send log for review   Follow-up with your nutritionist  Follow-up in 1-2 months with repeat labs

## 2022-02-01 NOTE — PROGRESS NOTES
Francisco J Yeung 58 y o  female MRN: [id number]    Encounter: [id number]  Referring Provider  Claudy Foster, Do  [address] [facility]   [place], [address]    Assessment/Plan     1  Type 2 diabetes mellitus long-term insulin therapy with hyperglycemia   2  Obesity   3  Nausea, vomiting, abdominal pain-improved with dietary modifications  Poorly controlled diabetes mellitus    Glp 1 agonist are known to cause delayed gastric emptying, diagnosis of gastroparesis should not be made without holding the medication, doing a gastric emptying study    Discussed different medications that can be used for glycemic management-oral hypoglycemic agents as well as injectable insulin and non-insulin medications (GLP 1 agonist) along with risks, benefits, side effect profile  No h/o pancreatitis/ MEN syndrome/ Medullary thyroid cancer     At this time since patient does not have any gastric symptoms, technically could increase dose of glp 1 agonist which may provide further glycemic, weight loss benefit  Based on discussion, it was decided to hold the glp 1 agonist, will start S GLT 2 inhibitor    Patient will be following up with Gastroenterology, has a repeat EGD in 04/2022    Recommend the following at this time  Discontinue Trulicity   Continue rest of current medications   Start Jardiance 25 mg orally daily   Please keep well hydrated   Needs follow up  With nutritionist   - check blood sugars qAC and HS   - Follow up in 1-2 months with labs     - Recommended a consistent carbohydrate diet   - weight control and exercise as discussed ( ideal is 30 min, at least 5 times a week)   - home glucose monitoring and goals emphasized, requested patient bring in glucose monitor or log sheets to next visit   - discussed signs and symptoms of hypoglycemia and how to correct them appropriately  - counseled about the long term complications of uncontrolled diabetes, including, Nephropathy, Neuropathy, CVD, Retinopathy and importance of adherence to diet, treatment plan and life style modifications   - importance of following up with Opthalmology and podiatry   - glycohemoglobin and other lab monitoring discussed, A1c goal value reviewed  - long term diabetic complications discussed    4  Hyperlipidemia  - continue statin therapy    5  Levothyroxine-continue current dose  6  Hypertension-continue ACE-inhibitor    CC: Diabetes    History of Present Illness     HPI:  Isabelle Dempsey is a 58 y o  female presents for a new visit regarding diabetes management  Also has a h/o hypertension, hyperlipidemia, obesity, gastroparesis, fatty liver, GERD, hypothyroidism     DM history:   Diagnosed > 25 years, insulin les than 1 year   FH - maternal side  No known complications of CAD/ CVA/CKD  Last Eye exam: no DR;  9/2021    Current regimen:  Metformin 1000 mg orally twice a day  Januvia 100 mg orally daily  Trulicity 3 mg subcutaneously weekly  Lantus 60 units at bedtime   Glipizide XL 20 mg orally daily     EGD 12/2021 - was told she has gastroparesis    Stomach pain, discomfort a few times a week, nausea and occasional vomiting , usually overnight x few months,   Has not happened recently   Feels full soon after eating  no bloating, no naudsea/ vomiting after eating   changed diet to a gastroparesis diet and feels better     Levothyroxine 112 mcg orally daily   Complaint, empty stomach     Statin:  Lipitor  ACE-I/ARB: Quinapril     Appetite is good  Denies recent weight gain/ loss  Denies numbness or tingling in the hands or feet  Denies changes in vision  No known retinopathy  No chest pain/ SOB  No diarrhea/ constipation  No urinary complaints     Exercise:  no    Diet:   B:  Cream of wheat, skim milk   L:  Tuna sandwich, 1/2 grapefruit   D: pork chop, spinach, rice,   S: ice-cream, diet soda     Home glucose monitoring: on recall, fasting only   Before breakfast: 117 when she was not feeling well and not eating, 180-210 312 mg/dl  This tmorning  Symptoms of hypoglycemia :     All other systems were reviewed and are negative  Review of Systems      Historical Information   Past Medical History:   Diagnosis Date    Anemia     Colon polyp     Deviated nasal septum     Diabetes mellitus ([address] )     Disease of thyroid gland     hypo    Gastroparesis     Hearing aid worn     right ear only    Hyperlipidemia     Hypertension      Past Surgical History:   Procedure Laterality Date     SECTION, LOW TRANSVERSE      x2    COLONOSCOPY N/A 2016    Procedure: COLONOSCOPY;  Surgeon: Pipe Holly MD;  Location: [facility];   Service:     CYST REMOVAL Left     elbow    DILATION AND CURETTAGE OF UTERUS      GANGLION CYST EXCISION Right     RESECTION TONSIL RADICAL      TUBAL LIGATION      VARICOSE VEIN SURGERY Bilateral 2009    x3 procedures    WISDOM TOOTH EXTRACTION       Social History   Social History     Substance and Sexual Activity   Alcohol Use Yes    Comment: rare     Social History     Substance and Sexual Activity   Drug Use Never     Social History     Tobacco Use   Smoking Status Former Smoker    Packs/day:     Years: 10 00    Pack years: 10 00    Quit date: [place] [employer] Years since quittin 1   Smokeless Tobacco Never Used     Family History:   Family History   Problem Relation Age of Onset    Stroke Mother     Diabetes Mother     Arthritis Father     Heart disease Father         CABG    Heart disease Daughter         congenital-truncus arteriosus    Congenital heart disease Daughter     Heart disease Son         congenital-"hole" repair    Other Sister         meningioma       Meds/Allergies   Current Outpatient Medications   Medication Sig Dispense Refill    Artificial Saliva (BIOTENE MOISTURIZING MOUTH MT) Apply to the mouth or throat as needed      atorvastatin (LIPITOR) 40 mg tablet TAKE 1 TABLET BY MOUTH DAILY 90 tablet 1    azelastine (ASTELIN) 0 1 % nasal spray 1 spray into each nostril every morning Use in each nostril as directed 3 Bottle 3    BIOTIN PO Take 10,000 mcg by mouth every morning      desloratadine (CLARINEX) 5 MG tablet TAKE 1 TABLET BY MOUTH DAILY 90 tablet 1    ferrous sulfate 324 (65 Fe) mg Take 1 tablet (324 mg total) by mouth daily before breakfast 90 tablet 1    glipiZIDE (GLUCOTROL XL) 10 mg 24 hr tablet Take 2 tablets (20 mg total) by mouth daily 180 tablet 1    hydrOXYzine HCL (ATARAX) 25 mg tablet TAKE 1 TABLET BY MOUTH EVERY NIGHT AT BEDTIME AS NEEDED FOR ITCHING 90 tablet 1    insulin glargine (Lantus SoloStar) 100 units/mL injection pen Inject 60 Units under the skin daily at bedtime 60 mL 1    levothyroxine 112 mcg tablet TAKE 1 TABLET BY MOUTH DAILY EARLY IN THE MORNING 90 tablet 1    metFORMIN (GLUCOPHAGE) 1000 MG tablet Take 1 tablet (1,000 mg total) by mouth 2 (two) times a day with meals 180 tablet 1    metoprolol succinate (TOPROL-XL) 25 mg 24 hr tablet TAKE 1 TABLET BY MOUTH DAILY (Patient taking differently: 25 mg daily at bedtime  ) 90 tablet 1    Omega-3 Fatty Acids (FISH OIL PO) Take 1,400 mg by mouth every evening       ONE TOUCH ULTRA TEST test strip USE TO TEST GLUCOSE TWICE A  each 1    pantoprazole (PROTONIX) 20 mg tablet Take 1 tablet (20 mg total) by mouth daily 90 tablet 1    quinapril (ACCUPRIL) 40 MG tablet TAKE 1 TABLET BY MOUTH DAILY 90 tablet 1    sitaGLIPtin (Januvia) 100 mg tablet Take 1 tablet (100 mg total) by mouth daily 90 tablet 1    TRUEplus Pen Needles 31G X 6 MM MISC USE TWICE DAILY  714 each 1    Trulicity 3 JN/6 3PE SOPN INJECT 0 5ML(3MG TOTAL) UNDER THE SKIN ONCE A WEEK (Patient taking differently: On Monday ) 6 mL 1     No current facility-administered medications for this visit  No Known Allergies    Objective   Vitals: Blood pressure 140/88, pulse 95, height 5' 5" (1 651 m), weight 93 1 kg (205 lb 4 8 oz), not currently breastfeeding      Physical Exam  Constitutional:       Appearance: She is well-developed  HENT:      Head: Normocephalic and atraumatic  Eyes:      Conjunctiva/sclera: Conjunctivae normal       Pupils: Pupils are equal, round, and reactive to light  Neck:      Thyroid: No thyromegaly  Cardiovascular:      Rate and Rhythm: Normal rate and regular rhythm  Heart sounds: Normal heart sounds  No murmur heard  Pulmonary:      Effort: Pulmonary effort is normal       Breath sounds: Normal breath sounds  No wheezing  Abdominal:      General: There is no distension  Palpations: Abdomen is soft  Tenderness: There is no abdominal tenderness  Musculoskeletal:         General: Normal range of motion  Cervical back: Normal range of motion and neck supple  Skin:     General: Skin is warm and dry  Neurological:      Mental Status: She is alert and oriented to person, place, and time  Psychiatric:         Behavior: Behavior normal          The history was obtained from the review of the chart, patient      Lab Results:   Lab Results   Component Value Date/Time    Hemoglobin A1C 9 3 (H) 11/24/2021 11:06 AM    Hemoglobin A1C 10 7 (H) 08/12/2021 10:51 AM    Hemoglobin A1C 10 0 (H) 05/04/2021 11:14 AM    White Blood Cell Count 9 3 11/24/2021 11:06 AM    White Blood Cell Count 9 5 08/12/2021 10:51 AM    White Blood Cell Count 10 6 05/04/2021 11:14 AM    Hemoglobin 9 1 (L) 11/24/2021 11:06 AM    Hemoglobin 8 5 (L) 08/12/2021 10:51 AM    Hemoglobin 9 6 (L) 05/04/2021 11:14 AM    HCT 30 2 (L) 11/24/2021 11:06 AM    HCT 28 4 (L) 08/12/2021 10:51 AM    HCT 30 9 (L) 05/04/2021 11:14 AM    MCV 72 (L) 11/24/2021 11:06 AM    MCV 69 (L) 08/12/2021 10:51 AM    MCV 75 (L) 05/04/2021 11:14 AM    Platelet Count 321 15/77/3071 11:06 AM    Platelet Count 473 91/47/1902 10:51 AM    Platelet Count 279 55/52/1382 11:14 AM    BUN 14 11/24/2021 11:06 AM    BUN 16 08/12/2021 10:51 AM    BUN 16 05/04/2021 11:14 AM    Potassium 4 9 11/24/2021 11:06 AM    Potassium 5 1 08/12/2021 10:51 AM Potassium 5 0 05/04/2021 11:14 AM    Chloride 97 11/24/2021 11:06 AM    Chloride 98 08/12/2021 10:51 AM    Chloride 100 05/04/2021 11:14 AM    CO2 21 11/24/2021 11:06 AM    CO2 22 08/12/2021 10:51 AM    CO2 21 05/04/2021 11:14 AM    Creatinine 1 00 11/24/2021 11:06 AM    Creatinine 1 05 (H) 08/12/2021 10:51 AM    Creatinine 0 99 05/04/2021 11:14 AM    AST 56 (H) 11/24/2021 11:06 AM    AST 87 (H) 08/12/2021 10:51 AM    AST 62 (H) 05/04/2021 11:14 AM    ALT 51 (H) 11/24/2021 11:06 AM    ALT 76 (H) 08/12/2021 10:51 AM    ALT 53 (H) 05/04/2021 11:14 AM    Albumin 4 1 11/24/2021 11:06 AM    Albumin 4 0 08/12/2021 10:51 AM    Albumin 4 3 05/04/2021 11:14 AM    Globulin, Total 3 0 11/24/2021 11:06 AM    Globulin, Total 3 1 08/12/2021 10:51 AM    Globulin, Total 3 1 05/04/2021 11:14 AM    HDL 31 (L) 11/24/2021 11:06 AM    HDL 27 (L) 08/12/2021 10:51 AM    HDL 31 (L) 05/04/2021 11:14 AM    Triglycerides 229 (H) 11/24/2021 11:06 AM    Triglycerides 201 (H) 08/12/2021 10:51 AM    Triglycerides 271 (H) 05/04/2021 11:14 AM           Imaging Studies: I have personally reviewed pertinent reports  Portions of the record may have been created with voice recognition software  Occasional wrong word or "sound a like" substitutions may have occurred due to the inherent limitations of voice recognition software  Read the chart carefully and recognize, using context, where substitutions have occurred

## 2022-02-01 NOTE — PATIENT INSTRUCTIONS
Scheduled date of EGD/colonoscopy (as of today): 4/5/22  Physician performing EGD/colonoscopy: DR Martinez Loges  Location of EGD/colonoscopy: [place] [id number]  Desired bowel prep reviewed with patient:  Miralax/Dulcolax  Instructions reviewed with patient by: Kanchan  Clearances:  N/A

## 2022-02-05 DIAGNOSIS — E11.65 TYPE 2 DIABETES MELLITUS WITH HYPERGLYCEMIA, WITHOUT LONG-TERM CURRENT USE OF INSULIN (HCC): ICD-10-CM

## 2022-02-07 DIAGNOSIS — E11.9 TYPE 2 DIABETES MELLITUS WITHOUT COMPLICATION, WITHOUT LONG-TERM CURRENT USE OF INSULIN (HCC): ICD-10-CM

## 2022-02-07 DIAGNOSIS — E11.65 TYPE 2 DIABETES MELLITUS WITH HYPERGLYCEMIA, WITHOUT LONG-TERM CURRENT USE OF INSULIN (HCC): ICD-10-CM

## 2022-02-07 RX ORDER — LEVOTHYROXINE SODIUM 112 UG/1
TABLET ORAL
Qty: 90 TABLET | Refills: 1 | Status: SHIPPED | OUTPATIENT
Start: 2022-02-07 | End: 2022-07-18 | Stop reason: SDUPTHER

## 2022-02-21 ENCOUNTER — TELEPHONE (OUTPATIENT)
Dept: ENDOCRINOLOGY | Facility: CLINIC | Age: 62
End: 2022-02-21

## 2022-02-22 NOTE — TELEPHONE ENCOUNTER
Gwen Moore started on 2/14, BG log until 2/18     Would like to see more BG before making any medication changes

## 2022-02-28 DIAGNOSIS — J30.9 ALLERGIC RHINITIS, UNSPECIFIED SEASONALITY, UNSPECIFIED TRIGGER: ICD-10-CM

## 2022-02-28 RX ORDER — AZELASTINE 1 MG/ML
SPRAY, METERED NASAL
Qty: 30 ML | Refills: 3 | Status: SHIPPED | OUTPATIENT
Start: 2022-02-28

## 2022-03-15 DIAGNOSIS — D64.9 ANEMIA, UNSPECIFIED TYPE: ICD-10-CM

## 2022-03-16 RX ORDER — PANTOPRAZOLE SODIUM 20 MG/1
20 TABLET, DELAYED RELEASE ORAL DAILY
Qty: 90 TABLET | Refills: 0 | Status: SHIPPED | OUTPATIENT
Start: 2022-03-16 | End: 2022-08-04 | Stop reason: SDUPTHER

## 2022-03-21 ENCOUNTER — RA CDI HCC (OUTPATIENT)
Dept: OTHER | Facility: HOSPITAL | Age: 62
End: 2022-03-21

## 2022-03-21 NOTE — PROGRESS NOTES
[facility]  coding opportunities          Chart Reviewed number of suggestions sent to Provider: 2     Patients Insurance        Commercial Insurance: [address] [id number]     Please review the following Dx as per the active problem list:    E11 65 Type 2 diabetes mellitus with hyperglycemia    E11 36 Type 2 diabetes mellitus with diabetic cataract    If this is correct, please assess and document during your next visit, March 28

## 2022-03-23 LAB
ALBUMIN SERPL-MCNC: 4.3 G/DL (ref 3.8–4.8)
ALBUMIN/GLOB SERPL: 1.3 {RATIO} (ref 1.2–2.2)
ALP SERPL-CCNC: 84 IU/L (ref 44–121)
ALT SERPL-CCNC: 45 IU/L (ref 0–32)
AST SERPL-CCNC: 43 IU/L (ref 0–40)
BILIRUB SERPL-MCNC: 0.4 MG/DL (ref 0–1.2)
BUN SERPL-MCNC: 22 MG/DL (ref 8–27)
BUN/CREAT SERPL: 20 (ref 12–28)
CALCIUM SERPL-MCNC: 10.1 MG/DL (ref 8.7–10.3)
CHLORIDE SERPL-SCNC: 98 MMOL/L (ref 96–106)
CHOLEST SERPL-MCNC: 221 MG/DL (ref 100–199)
CO2 SERPL-SCNC: 21 MMOL/L (ref 20–29)
CREAT SERPL-MCNC: 1.12 MG/DL (ref 0.57–1)
EGFR: 56 ML/MIN/1.73
ERYTHROCYTE [DISTWIDTH] IN BLOOD BY AUTOMATED COUNT: 16.7 % (ref 11.7–15.4)
EST. AVERAGE GLUCOSE BLD GHB EST-MCNC: 235 MG/DL
GLOBULIN SER-MCNC: 3.2 G/DL (ref 1.5–4.5)
GLUCOSE SERPL-MCNC: 189 MG/DL (ref 65–99)
HBA1C MFR BLD: 9.8 % (ref 4.8–5.6)
HCT VFR BLD AUTO: 35.9 % (ref 34–46.6)
HDLC SERPL-MCNC: 31 MG/DL
HGB BLD-MCNC: 11.3 G/DL (ref 11.1–15.9)
LDLC SERPL CALC-MCNC: 131 MG/DL (ref 0–99)
LDLC/HDLC SERPL: 4.2 RATIO (ref 0–3.2)
MCH RBC QN AUTO: 23.9 PG (ref 26.6–33)
MCHC RBC AUTO-ENTMCNC: 31.5 G/DL (ref 31.5–35.7)
MCV RBC AUTO: 76 FL (ref 79–97)
MICRODELETION SYND BLD/T FISH: NORMAL
MICRODELETION SYND BLD/T FISH: NORMAL
PLATELET # BLD AUTO: 212 X10E3/UL (ref 150–450)
POTASSIUM SERPL-SCNC: 5 MMOL/L (ref 3.5–5.2)
PROT SERPL-MCNC: 7.5 G/DL (ref 6–8.5)
RBC # BLD AUTO: 4.73 X10E6/UL (ref 3.77–5.28)
SL AMB VLDL CHOLESTEROL CALC: 59 MG/DL (ref 5–40)
SODIUM SERPL-SCNC: 137 MMOL/L (ref 134–144)
T4 FREE SERPL-MCNC: 1.36 NG/DL (ref 0.82–1.77)
TRIGL SERPL-MCNC: 331 MG/DL (ref 0–149)
TSH SERPL DL<=0.005 MIU/L-ACNC: 4.41 UIU/ML (ref 0.45–4.5)
WBC # BLD AUTO: 9.9 X10E3/UL (ref 3.4–10.8)

## 2022-03-23 PROCEDURE — 3046F HEMOGLOBIN A1C LEVEL >9.0%: CPT | Performed by: FAMILY MEDICINE

## 2022-03-28 ENCOUNTER — OFFICE VISIT (OUTPATIENT)
Dept: FAMILY MEDICINE CLINIC | Facility: CLINIC | Age: 62
End: 2022-03-28
Payer: COMMERCIAL

## 2022-03-28 VITALS
TEMPERATURE: 97.8 F | HEIGHT: 65 IN | RESPIRATION RATE: 18 BRPM | BODY MASS INDEX: 33.82 KG/M2 | HEART RATE: 91 BPM | WEIGHT: 203 LBS | SYSTOLIC BLOOD PRESSURE: 126 MMHG | OXYGEN SATURATION: 99 % | DIASTOLIC BLOOD PRESSURE: 68 MMHG

## 2022-03-28 DIAGNOSIS — K76.0 FATTY LIVER: ICD-10-CM

## 2022-03-28 DIAGNOSIS — L98.9 SKIN LESION: ICD-10-CM

## 2022-03-28 DIAGNOSIS — E78.2 MIXED HYPERLIPIDEMIA: ICD-10-CM

## 2022-03-28 DIAGNOSIS — I10 BENIGN ESSENTIAL HYPERTENSION: ICD-10-CM

## 2022-03-28 DIAGNOSIS — E03.9 PRIMARY HYPOTHYROIDISM: ICD-10-CM

## 2022-03-28 DIAGNOSIS — E11.65 TYPE 2 DIABETES MELLITUS WITH HYPERGLYCEMIA, WITHOUT LONG-TERM CURRENT USE OF INSULIN (HCC): Primary | ICD-10-CM

## 2022-03-28 DIAGNOSIS — E11.43 DIABETIC GASTROPARESIS (HCC): ICD-10-CM

## 2022-03-28 DIAGNOSIS — K31.84 DIABETIC GASTROPARESIS (HCC): ICD-10-CM

## 2022-03-28 PROCEDURE — 3078F DIAST BP <80 MM HG: CPT | Performed by: FAMILY MEDICINE

## 2022-03-28 PROCEDURE — 99214 OFFICE O/P EST MOD 30 MIN: CPT | Performed by: FAMILY MEDICINE

## 2022-03-28 PROCEDURE — 3074F SYST BP LT 130 MM HG: CPT | Performed by: FAMILY MEDICINE

## 2022-03-28 PROCEDURE — 3008F BODY MASS INDEX DOCD: CPT | Performed by: FAMILY MEDICINE

## 2022-03-28 PROCEDURE — 3725F SCREEN DEPRESSION PERFORMED: CPT | Performed by: FAMILY MEDICINE

## 2022-03-28 PROCEDURE — 1036F TOBACCO NON-USER: CPT | Performed by: FAMILY MEDICINE

## 2022-03-28 NOTE — ASSESSMENT & PLAN NOTE
Eye exam (8/31): grade 0, zone 3, No Plus    Plan:  -Recommend Follow up PRN. Prediction: should do well   Stable   Continue levothyroxine 112 mcg daily

## 2022-03-28 NOTE — PROGRESS NOTES
Assessment/Plan:    1  Type 2 diabetes mellitus with hyperglycemia, without long-term current use of insulin [facility])  Assessment & Plan:    Lab Results   Component Value Date    HGBA1C 9 8 (H) 03/22/2022   Diabetes is uncontrolled  Patient continue follow-up with endocrinology   Referred to diabetic Education  Encouraged her to ask got continues glucose monitoring    Orders:  -     Ambulatory referral to Diabetic Education; Future; Expected date: 03/28/2022  -     Hemoglobin A1C; Future; Expected date: 09/09/2022  -     Hemoglobin A1C    2  Fatty liver  Assessment & Plan:  Liver enzymes remain elevated, but have improved   FIB 4 score is 1 87      3  Benign essential hypertension  Assessment & Plan:  Well controlled  Continue quinapril 40 mg daily and metoprolol 25 mg at bedtime    Orders:  -     CBC; Future; Expected date: 09/09/2022  -     Comprehensive metabolic panel; Future; Expected date: 09/09/2022  -     Lipid Panel with Direct LDL reflex; Future; Expected date: 09/09/2022  -     CBC  -     Comprehensive metabolic panel  -     Lipid Panel with Direct LDL reflex    4  Primary hypothyroidism  Assessment & Plan:  Stable   Continue levothyroxine 112 mcg daily    Orders:  -     TSH, 3rd generation; Future; Expected date: 09/09/2022  -     T4, free; Future; Expected date: 09/09/2022  -     TSH, 3rd generation  -     T4, free    5  Mixed hyperlipidemia  Assessment & Plan:  Not controlled  Advised to stop eating grape fruit  Continue atorvastatin 40 mg a day    Orders:  -     Lipid Panel with Direct LDL reflex; Future; Expected date: 09/09/2022  -     Lipid Panel with Direct LDL reflex    6  Diabetic gastroparesis ([facility] [address] )  -     NM gastric emptying; Future; Expected date: 03/28/2022    7  Skin lesion  Comments:  New lesion on right arm  Orders:  -     Ambulatory referral to Dermatology;  Future          Patient Instructions     10% - bad control"> 10% - bad control,Hemoglobin A1c (HbA1c) greater than 10% indicating poor diabetic control,Haemoglobin A1c greater than 10% indicating poor diabetic control">   Diabetes Mellitus Type 2 in Adults, Ambulatory Care   GENERAL INFORMATION:   Diabetes mellitus type 2  is a disease that affects how your body uses glucose (sugar)  Insulin helps move sugar out of the blood so it can be used for energy  Normally, when the blood sugar level increases, the pancreas makes more insulin  Type 2 diabetes develops because either the body cannot make enough insulin, or it cannot use the insulin correctly  After many years, your pancreas may stop making insulin  Common symptoms include the following:   · More hunger or thirst than usual     · Frequent urination     · Weight loss without trying     · Blurred vision  Seek immediate care for the following symptoms:   · Severe abdominal pain, or pain that spreads to your back  You may also be vomiting  · Trouble staying awake or focusing    · Shaking or sweating    · Blurred or double vision    · Breath has a fruity, sweet smell    · Breathing is deep and labored, or rapid and shallow    · Heartbeat is fast and weak  Treatment for diabetes mellitus type 2  includes keeping your blood sugar at a normal level  You must eat the right foods, and exercise regularly  You may also need medicine if you cannot control your blood sugar level with nutrition and exercise  Manage diabetes mellitus type 2:   · Check your blood sugar level  You will be taught how to check a small drop of blood in a glucose monitor  Ask your healthcare provider when and how often to check during the day  Ask your healthcare provider what your blood sugar levels should be when you check them  · Keep track of carbohydrates (sugar and starchy foods)  Your blood sugar level can get too high if you eat too many carbohydrates  Your dietitian will help you plan meals and snacks that have the right amount of carbohydrates  · Eat low-fat foods    Some examples are skinless chicken and low-fat milk  · Eat less sodium (salt)  Some examples of high-sodium foods to limit are soy sauce, potato chips, and soup  Do not add salt to food you cook  Limit your use of table salt  · Eat high-fiber foods  Foods that are a good source of fiber include vegetables, whole grain bread, and beans  · Limit alcohol  Alcohol affects your blood sugar level and can make it harder to manage your diabetes  Women should limit alcohol to 1 drink a day  Men should limit alcohol to 2 drinks a day  A drink of alcohol is 12 ounces of beer, 5 ounces of wine, or 1½ ounces of liquor  · Get regular exercise  Exercise can help keep your blood sugar level steady, decrease your risk of heart disease, and help you lose weight  Exercise for at least 30 minutes, 5 days a week  Include muscle strengthening activities 2 days each week  Work with your healthcare provider to create an exercise plan  · Check your feet each day  for injuries or open sores  Ask your healthcare provider for activities you can do if you have an open sore  · Quit smoking  If you smoke, it is never too late to quit  Smoking can worsen the problems that may occur with diabetes  Ask your healthcare provider for information about how to stop smoking if you are having trouble quitting  · Ask about your weight:  Ask healthcare providers if you need to lose weight, and how much to lose  Ask them to help you with a weight loss program  Even a 10 to 15 pound weight loss can help you manage your blood sugar level  · Carry medical alert identification  Wear medical alert jewelry or carry a card that says you have diabetes  Ask your healthcare provider where to get these items  · Ask about vaccines  Diabetes puts you at risk of serious illness if you get the flu, pneumonia, or hepatitis  Ask your healthcare provider if you should get a flu, pneumonia, or hepatitis B vaccine, and when to get the vaccine    Follow up with your healthcare provider as directed:  Write down your questions so you remember to ask them during your visits  CARE AGREEMENT:   You have the right to help plan your care  Learn about your health condition and how it may be treated  Discuss treatment options with your caregivers to decide what care you want to receive  You always have the right to refuse treatment  The above information is an  only  It is not intended as medical advice for individual conditions or treatments  Talk to your doctor, nurse or pharmacist before following any medical regimen to see if it is safe and effective for you  © 2014 [address] is for End User's use only and may not be sold, redistributed or otherwise used for commercial purposes  All illustrations and images included in CareNotes® are the copyrighted property of A D A M [employer] Inc  or [employer]  Return in about 6 months (around 9/28/2022) for Annual physical     Subjective:      Patient ID: Jacob Tabor is a 58 y o  female  Chief Complaint   Patient presents with    Follow-up     diabetes  [id number]       She has been feeling well  She is concerned about her blood sugar  Her vomiting has improved significantly since stopping the Trulicity  She is wondering if she actually has gastroparesis or not  She has a dark spot on her right upper arm  Has been there for months  She has been using medication that is post help with scars and has been lightening up but it is still there        The following portions of the patient's history were reviewed and updated as appropriate: allergies, current medications, past family history, past medical history, past social history, past surgical history and problem list     Review of Systems      Current Outpatient Medications   Medication Sig Dispense Refill    Artificial Saliva (BIOTENE MOISTURIZING MOUTH MT) Apply to the mouth or throat as needed      atorvastatin (LIPITOR) 40 mg tablet TAKE 1 TABLET BY MOUTH DAILY 90 tablet 1    azelastine (ASTELIN) 0 1 % nasal spray USE 1 SPRAY INTO EACH NOSTRIL EVERY MORNING AS DIRECTED  GENERIC EQUIVALENT FOR ASTELIN 30 mL 3    BIOTIN PO Take 10,000 mcg by mouth every morning      desloratadine (CLARINEX) 5 MG tablet TAKE 1 TABLET BY MOUTH DAILY 90 tablet 1    Empagliflozin (Jardiance) 25 MG TABS Take 1 tablet (25 mg total) by mouth every morning 90 tablet 3    ferrous sulfate 324 (65 Fe) mg Take 1 tablet (324 mg total) by mouth daily before breakfast 90 tablet 1    glipiZIDE (GLUCOTROL XL) 10 mg 24 hr tablet Take 2 tablets (20 mg total) by mouth daily 180 tablet 1    hydrOXYzine HCL (ATARAX) 25 mg tablet TAKE 1 TABLET BY MOUTH EVERY NIGHT AT BEDTIME AS NEEDED FOR ITCHING 90 tablet 1    insulin glargine (Lantus SoloStar) 100 units/mL injection pen Inject 60 Units under the skin daily at bedtime 60 mL 1    levothyroxine 112 mcg tablet TAKE 1 TABLET BY MOUTH DAILY EARLY IN THE MORNING 90 tablet 1    metFORMIN (GLUCOPHAGE) 1000 MG tablet TAKE 1 TABLET BY MOUTH TWICE DAILY WITH MEALS 180 tablet 1    metoprolol succinate (TOPROL-XL) 25 mg 24 hr tablet TAKE 1 TABLET BY MOUTH DAILY (Patient taking differently: 25 mg daily at bedtime  ) 90 tablet 1    Omega-3 Fatty Acids (FISH OIL PO) Take 1,400 mg by mouth every evening       ONE TOUCH ULTRA TEST test strip USE TO TEST GLUCOSE TWICE A  each 1    pantoprazole (PROTONIX) 20 mg tablet Take 1 tablet (20 mg total) by mouth daily 90 tablet 0    quinapril (ACCUPRIL) 40 MG tablet TAKE 1 TABLET BY MOUTH DAILY 90 tablet 1    sitaGLIPtin (Januvia) 100 mg tablet Take 1 tablet (100 mg total) by mouth daily 90 tablet 1    TRUEplus Pen Needles 31G X 6 MM MISC USE TWICE DAILY  200 each 1     No current facility-administered medications for this visit         Objective:    /68   Pulse 91   Temp 97 8 °F (36 6 °C)   Resp 18   Ht 5' 5" (1 651 m)   Wt 92 1 kg (203 lb)   SpO2 99%   BMI 33 78 kg/m² Physical Exam  Vitals and nursing note reviewed  Constitutional:       Appearance: She is well-developed  HENT:      Head: Normocephalic and atraumatic  Right Ear: Tympanic membrane and external ear normal       Left Ear: Tympanic membrane and external ear normal    Cardiovascular:      Rate and Rhythm: Normal rate and regular rhythm  Heart sounds: Normal heart sounds  No murmur heard  No friction rub  Pulmonary:      Effort: Pulmonary effort is normal  No respiratory distress  Breath sounds: Normal breath sounds  No wheezing or rales  Musculoskeletal:      Right lower leg: No edema  Left lower leg: No edema  Skin:     Findings: Lesion (Right upper arm, see picture) present                    Mari Matute DO

## 2022-03-28 NOTE — PATIENT INSTRUCTIONS
10% - bad control"> 10% - bad control,Hemoglobin A1c (HbA1c) greater than 10% indicating poor diabetic control,Haemoglobin A1c greater than 10% indicating poor diabetic control">   Diabetes Mellitus Type 2 in Adults, Ambulatory Care   GENERAL INFORMATION:   Diabetes mellitus type 2  is a disease that affects how your body uses glucose (sugar)  Insulin helps move sugar out of the blood so it can be used for energy  Normally, when the blood sugar level increases, the pancreas makes more insulin  Type 2 diabetes develops because either the body cannot make enough insulin, or it cannot use the insulin correctly  After many years, your pancreas may stop making insulin  Common symptoms include the following:   · More hunger or thirst than usual     · Frequent urination     · Weight loss without trying     · Blurred vision  Seek immediate care for the following symptoms:   · Severe abdominal pain, or pain that spreads to your back  You may also be vomiting  · Trouble staying awake or focusing    · Shaking or sweating    · Blurred or double vision    · Breath has a fruity, sweet smell    · Breathing is deep and labored, or rapid and shallow    · Heartbeat is fast and weak  Treatment for diabetes mellitus type 2  includes keeping your blood sugar at a normal level  You must eat the right foods, and exercise regularly  You may also need medicine if you cannot control your blood sugar level with nutrition and exercise  Manage diabetes mellitus type 2:   · Check your blood sugar level  You will be taught how to check a small drop of blood in a glucose monitor  Ask your healthcare provider when and how often to check during the day  Ask your healthcare provider what your blood sugar levels should be when you check them  · Keep track of carbohydrates (sugar and starchy foods)  Your blood sugar level can get too high if you eat too many carbohydrates   Your dietitian will help you plan meals and snacks that have the right amount of carbohydrates  · Eat low-fat foods  Some examples are skinless chicken and low-fat milk  · Eat less sodium (salt)  Some examples of high-sodium foods to limit are soy sauce, potato chips, and soup  Do not add salt to food you cook  Limit your use of table salt  · Eat high-fiber foods  Foods that are a good source of fiber include vegetables, whole grain bread, and beans  · Limit alcohol  Alcohol affects your blood sugar level and can make it harder to manage your diabetes  Women should limit alcohol to 1 drink a day  Men should limit alcohol to 2 drinks a day  A drink of alcohol is 12 ounces of beer, 5 ounces of wine, or 1½ ounces of liquor  · Get regular exercise  Exercise can help keep your blood sugar level steady, decrease your risk of heart disease, and help you lose weight  Exercise for at least 30 minutes, 5 days a week  Include muscle strengthening activities 2 days each week  Work with your healthcare provider to create an exercise plan  · Check your feet each day  for injuries or open sores  Ask your healthcare provider for activities you can do if you have an open sore  · Quit smoking  If you smoke, it is never too late to quit  Smoking can worsen the problems that may occur with diabetes  Ask your healthcare provider for information about how to stop smoking if you are having trouble quitting  · Ask about your weight:  Ask healthcare providers if you need to lose weight, and how much to lose  Ask them to help you with a weight loss program  Even a 10 to 15 pound weight loss can help you manage your blood sugar level  · Carry medical alert identification  Wear medical alert jewelry or carry a card that says you have diabetes  Ask your healthcare provider where to get these items  · Ask about vaccines  Diabetes puts you at risk of serious illness if you get the flu, pneumonia, or hepatitis   Ask your healthcare provider if you should get a flu, pneumonia, or hepatitis B vaccine, and when to get the vaccine  Follow up with your healthcare provider as directed:  Write down your questions so you remember to ask them during your visits  CARE AGREEMENT:   You have the right to help plan your care  Learn about your health condition and how it may be treated  Discuss treatment options with your caregivers to decide what care you want to receive  You always have the right to refuse treatment  The above information is an  only  It is not intended as medical advice for individual conditions or treatments  Talk to your doctor, nurse or pharmacist before following any medical regimen to see if it is safe and effective for you  © 2014 [address] is for End User's use only and may not be sold, redistributed or otherwise used for commercial purposes  All illustrations and images included in CareNotes® are the copyrighted property of A D A M , Inc  or Israel Rosales

## 2022-03-28 NOTE — ASSESSMENT & PLAN NOTE
Lab Results   Component Value Date    HGBA1C 9 8 (H) 03/22/2022   Diabetes is uncontrolled  Patient continue follow-up with endocrinology   Referred to diabetic Education  Encouraged her to ask got continues glucose monitoring

## 2022-04-14 DIAGNOSIS — E11.9 TYPE 2 DIABETES MELLITUS WITHOUT COMPLICATION, WITHOUT LONG-TERM CURRENT USE OF INSULIN (HCC): ICD-10-CM

## 2022-04-14 RX ORDER — GLIPIZIDE 10 MG/1
TABLET, FILM COATED, EXTENDED RELEASE ORAL
Qty: 180 TABLET | Refills: 1 | Status: SHIPPED | OUTPATIENT
Start: 2022-04-14 | End: 2022-04-19

## 2022-04-18 ENCOUNTER — TELEPHONE (OUTPATIENT)
Dept: FAMILY MEDICINE CLINIC | Facility: CLINIC | Age: 62
End: 2022-04-18

## 2022-04-18 NOTE — TELEPHONE ENCOUNTER
4/18/2022 4:34 PM spoke with Mich Manriquez and discussed that her vomiting is better  Test has been cancelled      Message complete  Luis Daniel Pina, DO

## 2022-04-18 NOTE — TELEPHONE ENCOUNTER
Prior auth team calling stating NM gastric emptying will not get approved  Not enough information, not medically necessary  She has been feeling well  She is concerned about her blood sugar  Her vomiting has improved significantly since stopping the Trulicity  She is wondering if she actually has gastroparesis or not

## 2022-04-19 DIAGNOSIS — E11.9 TYPE 2 DIABETES MELLITUS WITHOUT COMPLICATION, WITHOUT LONG-TERM CURRENT USE OF INSULIN (HCC): ICD-10-CM

## 2022-04-19 DIAGNOSIS — I10 BENIGN ESSENTIAL HYPERTENSION: ICD-10-CM

## 2022-04-19 PROCEDURE — 4010F ACE/ARB THERAPY RXD/TAKEN: CPT | Performed by: FAMILY MEDICINE

## 2022-04-19 RX ORDER — GLIPIZIDE 10 MG/1
TABLET, FILM COATED, EXTENDED RELEASE ORAL
Qty: 180 TABLET | Refills: 1 | Status: SHIPPED | OUTPATIENT
Start: 2022-04-19 | End: 2022-04-26

## 2022-04-19 RX ORDER — QUINAPRIL 40 MG/1
TABLET ORAL
Qty: 90 TABLET | Refills: 1 | Status: SHIPPED | OUTPATIENT
Start: 2022-04-19

## 2022-04-21 ENCOUNTER — TELEPHONE (OUTPATIENT)
Dept: GASTROENTEROLOGY | Facility: AMBULATORY SURGERY CENTER | Age: 62
End: 2022-04-21

## 2022-04-21 NOTE — TELEPHONE ENCOUNTER
I lmom informing pt that we had to cx her procedures with Dr Danette Santoro at [facility] on 5/11/22 due to her insurance being OON with this location and that we needed to get her r/s to a different date at [facility]  Will call pt again in one week if do not hear back from her

## 2022-04-26 DIAGNOSIS — E11.9 TYPE 2 DIABETES MELLITUS WITHOUT COMPLICATION, WITHOUT LONG-TERM CURRENT USE OF INSULIN (HCC): ICD-10-CM

## 2022-04-26 RX ORDER — GLIPIZIDE 10 MG/1
TABLET, FILM COATED, EXTENDED RELEASE ORAL
Qty: 180 TABLET | Refills: 1 | Status: SHIPPED | OUTPATIENT
Start: 2022-04-26 | End: 2022-05-02 | Stop reason: SDUPTHER

## 2022-05-02 DIAGNOSIS — E11.9 TYPE 2 DIABETES MELLITUS WITHOUT COMPLICATION, WITHOUT LONG-TERM CURRENT USE OF INSULIN (HCC): ICD-10-CM

## 2022-05-02 RX ORDER — GLIPIZIDE 10 MG/1
20 TABLET, FILM COATED, EXTENDED RELEASE ORAL DAILY
Qty: 180 TABLET | Refills: 1 | Status: SHIPPED | OUTPATIENT
Start: 2022-05-02 | End: 2022-05-05 | Stop reason: SDUPTHER

## 2022-05-02 NOTE — TELEPHONE ENCOUNTER
----- Message from Misha Aquino sent at 5/1/2022 11:18 AM EDT -----  Regarding: Glipizide  I know you approved a renewal for  the Glipizide to [place]  mail order on 4/26 but dont see it on their site  Can you please send an order to [address] [place] [address]? I am out!   Thank you

## 2022-05-05 DIAGNOSIS — E11.9 TYPE 2 DIABETES MELLITUS WITHOUT COMPLICATION, WITHOUT LONG-TERM CURRENT USE OF INSULIN (HCC): ICD-10-CM

## 2022-05-05 RX ORDER — GLIPIZIDE 10 MG/1
20 TABLET, FILM COATED, EXTENDED RELEASE ORAL DAILY
Qty: 30 TABLET | Refills: 0 | Status: CANCELLED | OUTPATIENT
Start: 2022-05-05

## 2022-05-06 RX ORDER — GLIPIZIDE 10 MG/1
20 TABLET, FILM COATED, EXTENDED RELEASE ORAL DAILY
Qty: 60 TABLET | Refills: 0 | Status: SHIPPED | OUTPATIENT
Start: 2022-05-06 | End: 2022-05-19

## 2022-05-06 RX ORDER — GLIPIZIDE 10 MG/1
20 TABLET, FILM COATED, EXTENDED RELEASE ORAL DAILY
Qty: 180 TABLET | Refills: 1 | Status: SHIPPED | OUTPATIENT
Start: 2022-05-06 | End: 2022-05-19

## 2022-05-19 ENCOUNTER — OFFICE VISIT (OUTPATIENT)
Dept: ENDOCRINOLOGY | Facility: CLINIC | Age: 62
End: 2022-05-19
Payer: COMMERCIAL

## 2022-05-19 VITALS
SYSTOLIC BLOOD PRESSURE: 110 MMHG | HEART RATE: 100 BPM | DIASTOLIC BLOOD PRESSURE: 70 MMHG | HEIGHT: 65 IN | WEIGHT: 204 LBS | BODY MASS INDEX: 33.99 KG/M2

## 2022-05-19 DIAGNOSIS — E03.9 PRIMARY HYPOTHYROIDISM: ICD-10-CM

## 2022-05-19 DIAGNOSIS — E66.9 CLASS 1 OBESITY WITH SERIOUS COMORBIDITY AND BODY MASS INDEX (BMI) OF 34.0 TO 34.9 IN ADULT, UNSPECIFIED OBESITY TYPE: ICD-10-CM

## 2022-05-19 DIAGNOSIS — Z79.4 TYPE 2 DIABETES MELLITUS WITH HYPERGLYCEMIA, WITH LONG-TERM CURRENT USE OF INSULIN (HCC): Primary | ICD-10-CM

## 2022-05-19 DIAGNOSIS — I10 BENIGN ESSENTIAL HYPERTENSION: ICD-10-CM

## 2022-05-19 DIAGNOSIS — R35.0 INCREASED URINARY FREQUENCY: ICD-10-CM

## 2022-05-19 DIAGNOSIS — Z79.4 TYPE 2 DIABETES MELLITUS WITH OTHER SPECIFIED COMPLICATION, WITH LONG-TERM CURRENT USE OF INSULIN (HCC): ICD-10-CM

## 2022-05-19 DIAGNOSIS — E11.69 TYPE 2 DIABETES MELLITUS WITH OTHER SPECIFIED COMPLICATION, WITH LONG-TERM CURRENT USE OF INSULIN (HCC): ICD-10-CM

## 2022-05-19 DIAGNOSIS — E78.2 MIXED HYPERLIPIDEMIA: ICD-10-CM

## 2022-05-19 DIAGNOSIS — E11.65 TYPE 2 DIABETES MELLITUS WITH HYPERGLYCEMIA, WITH LONG-TERM CURRENT USE OF INSULIN (HCC): Primary | ICD-10-CM

## 2022-05-19 DIAGNOSIS — K76.0 FATTY LIVER: ICD-10-CM

## 2022-05-19 PROBLEM — E11.9 DIABETES MELLITUS (HCC): Status: ACTIVE | Noted: 2021-12-16

## 2022-05-19 PROCEDURE — 3078F DIAST BP <80 MM HG: CPT | Performed by: INTERNAL MEDICINE

## 2022-05-19 PROCEDURE — 99215 OFFICE O/P EST HI 40 MIN: CPT | Performed by: INTERNAL MEDICINE

## 2022-05-19 PROCEDURE — 3008F BODY MASS INDEX DOCD: CPT | Performed by: INTERNAL MEDICINE

## 2022-05-19 PROCEDURE — 1036F TOBACCO NON-USER: CPT | Performed by: INTERNAL MEDICINE

## 2022-05-19 PROCEDURE — 3074F SYST BP LT 130 MM HG: CPT | Performed by: INTERNAL MEDICINE

## 2022-05-19 RX ORDER — PEN NEEDLE, DIABETIC 31 G X1/4"
NEEDLE, DISPOSABLE MISCELLANEOUS
Qty: 360 EACH | Refills: 3 | Status: SHIPPED | OUTPATIENT
Start: 2022-05-19

## 2022-05-19 RX ORDER — INSULIN LISPRO 100 [IU]/ML
10 INJECTION, SOLUTION INTRAVENOUS; SUBCUTANEOUS
Qty: 45 ML | Refills: 3 | Status: SHIPPED | OUTPATIENT
Start: 2022-05-19 | End: 2022-06-03

## 2022-05-19 RX ORDER — INSULIN GLARGINE 100 [IU]/ML
60 INJECTION, SOLUTION SUBCUTANEOUS
Qty: 60 ML | Refills: 1 | Status: SHIPPED | OUTPATIENT
Start: 2022-05-19 | End: 2022-05-23 | Stop reason: CLARIF

## 2022-05-19 NOTE — PATIENT INSTRUCTIONS
discontinue glipizide   Change Lantus to 50 units at bedtime   Start Humalog 10 units with meals 3 times a day   Continue metformin, Januvia, Jardiance at current dose   If there is worsening in urinary symptoms, please discontinue Jardiance and let me know   Please send blood sugar log for review in 2-3 weeks         Please call your insurance and inquire which of the following would be covered  - short-acting insulin:  NovoLog, Humalog, apidra, admelog

## 2022-05-19 NOTE — PROGRESS NOTES
Francesca Lafleur 58 y o  female MRN: [id number]    Encounter: [id number]      Assessment/Plan     1  Type 2 diabetes mellitus long-term insulin therapy with hyperglycemia   2  Obesity   3  Increased urinary frequency, mild dysuria  Poorly controlled with Last A1c 9 8%    Recommend the following at this time  discontinue glipizide   Change Lantus to 50 units at bedtime   Start Humalog 10 units with meals 3 times a day   Continue metformin, Januvia, Jardiance at current dose   If there is worsening in urinary symptoms, please discontinue Jardiance and let me know   - check UA with reflex to microscopic  - send blood sugar log for review in 2-3 weeks   - no next set of labs, will check C-peptide   - Dexcom CGM     This patient requires therapeutic CGM  The patient has diabetes and has been using a home glucose monitor and is performing frequent (3 times a day) home glucose monitor testing and is presently being treated with multi-dose insulin therapy (MDI) with 3 or more injections a day or with a continuous subcutaneous insulin infusion (CSII) pump  This patient's insulin treatment regimen requires frequent adjustments on the basis of therapeutic CGM testing results  4  Hyperlipidemia  - continue statin therapy    5  Hypertension  Blood pressure at goal   - continue ACE-I/ ARB    6  Hypothyroidism - Clinically and biochemically euthyroid  Continue levothyroxine at current dose      I have spent 40 minutes with Patient  today in which greater than 50% of this time was spent in counseling/coordination of care    CC: Diabetes    History of Present Illness     HPI:  Francesca Lafleur is a 58 y o  female presents for a follow-up visit regarding diabetes management     Also has     Last seen in 2/2022  Last Eye exam: 9/2021    Current regimen:   jardiance 25 mg daily  Metformin 1000 mg orally twice a day  Januvia 100 mg orally daily  Lantus 60 units at bedtime   Glipizide XL 20 mg orally daily     Gastric symptoms have completely resolved since trulicity was discontinued   C/o sour taste in the mouth and coating on the tongue   No GERD symptoms   Increased frequency of urination, occasional burning, pressure like sensation  H/o interstitial cystitis, has not had a flare in years    Weight stable   No other complaints   No symptoms of hypo or hyperthyroidism     Levothyroxine 112 mcg daily     Statin:  lipitor   ACE-I/ARB:  Quinapril     Home glucose monitoring:  Log will be scanned, in 163-408 mg/dL  Symptoms of hypoglycemia :  No lows 2  All other systems were reviewed and are negative  Review of Systems      Historical Information   Past Medical History:   Diagnosis Date    Anemia     Colon polyp     Deviated nasal septum     Diabetes mellitus ([address] )     Disease of thyroid gland     hypo    Gastroparesis     Hearing aid worn     right ear only    Hyperlipidemia     Hypertension      Past Surgical History:   Procedure Laterality Date     SECTION, LOW TRANSVERSE      x2    COLONOSCOPY N/A 2016    Procedure: COLONOSCOPY;  Surgeon: Corinne Jimenez MD;  Location: [place] [address] [facility];   Service:     CYST REMOVAL Left     elbow    DILATION AND CURETTAGE OF UTERUS      GANGLION CYST EXCISION Right     RESECTION TONSIL RADICAL      TUBAL LIGATION      VARICOSE VEIN SURGERY Bilateral 2009    x3 procedures    WISDOM TOOTH EXTRACTION       Social History   Social History     Substance and Sexual Activity   Alcohol Use Yes    Comment: rare     Social History     Substance and Sexual Activity   Drug Use Never     Social History     Tobacco Use   Smoking Status Former Smoker    Packs/day: 1 00    Years: 10 00    Pack years: 10 00    Quit date: 26    Years since quittin 4   Smokeless Tobacco Never Used     Family History:   Family History   Problem Relation Age of Onset    Stroke Mother     Diabetes Mother     Arthritis Father     Heart disease Father         CABG    Heart disease Daughter congenital-truncus arteriosus    Congenital heart disease Daughter     Heart disease Son         congenital-"hole" repair    Other Sister         meningioma       Meds/Allergies   Current Outpatient Medications   Medication Sig Dispense Refill    Artificial Saliva (BIOTENE MOISTURIZING MOUTH MT) Apply to the mouth or throat as needed      atorvastatin (LIPITOR) 40 mg tablet TAKE 1 TABLET BY MOUTH DAILY 90 tablet 1    azelastine (ASTELIN) 0 1 % nasal spray USE 1 SPRAY INTO EACH NOSTRIL EVERY MORNING AS DIRECTED   GENERIC EQUIVALENT FOR ASTELIN 30 mL 3    BIOTIN PO Take 10,000 mcg by mouth daily at bedtime        desloratadine (CLARINEX) 5 MG tablet TAKE 1 TABLET BY MOUTH DAILY 90 tablet 1    Empagliflozin (Jardiance) 25 MG TABS Take 1 tablet (25 mg total) by mouth every morning 90 tablet 3    ferrous sulfate 324 (65 Fe) mg Take 1 tablet (324 mg total) by mouth daily before breakfast (Patient taking differently: Take 324 mg by mouth daily before breakfast Last dose 3/28/22) 90 tablet 1    glipiZIDE (GLUCOTROL XL) 10 mg 24 hr tablet Take 2 tablets (20 mg total) by mouth daily 180 tablet 1    hydrOXYzine HCL (ATARAX) 25 mg tablet TAKE 1 TABLET BY MOUTH EVERY NIGHT AT BEDTIME AS NEEDED FOR ITCHING 90 tablet 1    insulin glargine (Lantus SoloStar) 100 units/mL injection pen Inject 60 Units under the skin daily at bedtime 60 mL 1    levothyroxine 112 mcg tablet TAKE 1 TABLET BY MOUTH DAILY EARLY IN THE MORNING 90 tablet 1    metFORMIN (GLUCOPHAGE) 1000 MG tablet TAKE 1 TABLET BY MOUTH TWICE DAILY WITH MEALS 180 tablet 1    metoprolol succinate (TOPROL-XL) 25 mg 24 hr tablet TAKE 1 TABLET BY MOUTH DAILY (Patient taking differently: 25 mg daily at bedtime) 90 tablet 1    Omega-3 Fatty Acids (FISH OIL PO) Take 1,400 mg by mouth every evening       pantoprazole (PROTONIX) 20 mg tablet Take 1 tablet (20 mg total) by mouth daily (Patient taking differently: Take 20 mg by mouth every morning) 90 tablet 0    quinapril (ACCUPRIL) 40 MG tablet TAKE 1 TABLET BY MOUTH DAILY 90 tablet 1    sitaGLIPtin (Januvia) 100 mg tablet Take 1 tablet (100 mg total) by mouth daily 90 tablet 1    TRUEplus Pen Needles 31G X 6 MM MISC USE TWICE DAILY  200 each 1    bisacodyl (DULCOLAX) 5 mg EC tablet Take 10 mg by mouth 1 (one) time (Patient not taking: Reported on 5/19/2022)      glipiZIDE (GLUCOTROL XL) 10 mg 24 hr tablet Take 2 tablets (20 mg total) by mouth daily (Patient not taking: Reported on 5/19/2022) 60 tablet 0    ONE TOUCH ULTRA TEST test strip USE TO TEST GLUCOSE TWICE A DAY (Patient not taking: Reported on 5/19/2022) 200 each 1    Polyethylene Glycol 3350 (MIRALAX PO) Take by mouth 1 (one) time (Patient not taking: Reported on 5/19/2022)       No current facility-administered medications for this visit  No Known Allergies    Objective   Vitals: Blood pressure 110/70, pulse 100, height 5' 5" (1 651 m), weight 92 5 kg (204 lb), not currently breastfeeding  Physical Exam    The history was obtained from the review of the chart, patient      Lab Results:   Lab Results   Component Value Date/Time    Hemoglobin A1C 9 8 (H) 03/22/2022 10:20 AM    Hemoglobin A1C 9 3 (H) 11/24/2021 11:06 AM    Hemoglobin A1C 10 7 (H) 08/12/2021 10:51 AM    White Blood Cell Count 9 9 03/22/2022 10:20 AM    White Blood Cell Count 9 3 11/24/2021 11:06 AM    White Blood Cell Count 9 5 08/12/2021 10:51 AM    Hemoglobin 11 3 03/22/2022 10:20 AM    Hemoglobin 9 1 (L) 11/24/2021 11:06 AM    Hemoglobin 8 5 (L) 08/12/2021 10:51 AM    HCT 35 9 03/22/2022 10:20 AM    HCT 30 2 (L) 11/24/2021 11:06 AM    HCT 28 4 (L) 08/12/2021 10:51 AM    MCV 76 (L) 03/22/2022 10:20 AM    MCV 72 (L) 11/24/2021 11:06 AM    MCV 69 (L) 08/12/2021 10:51 AM    Platelet Count 774 53/88/9914 10:20 AM    Platelet Count 623 64/90/4423 11:06 AM    Platelet Count 790 79/33/3449 10:51 AM    BUN 22 03/22/2022 10:20 AM    BUN 14 11/24/2021 11:06 AM    BUN 16 08/12/2021 10:51 AM Potassium 5 0 03/22/2022 10:20 AM    Potassium 4 9 11/24/2021 11:06 AM    Potassium 5 1 08/12/2021 10:51 AM    Chloride 98 03/22/2022 10:20 AM    Chloride 97 11/24/2021 11:06 AM    Chloride 98 08/12/2021 10:51 AM    CO2 21 03/22/2022 10:20 AM    CO2 21 11/24/2021 11:06 AM    CO2 22 08/12/2021 10:51 AM    Creatinine 1 12 (H) 03/22/2022 10:20 AM    Creatinine 1 00 11/24/2021 11:06 AM    Creatinine 1 05 (H) 08/12/2021 10:51 AM    AST 43 (H) 03/22/2022 10:20 AM    AST 56 (H) 11/24/2021 11:06 AM    AST 87 (H) 08/12/2021 10:51 AM    ALT 45 (H) 03/22/2022 10:20 AM    ALT 51 (H) 11/24/2021 11:06 AM    ALT 76 (H) 08/12/2021 10:51 AM    Albumin 4 3 03/22/2022 10:20 AM    Albumin 4 1 11/24/2021 11:06 AM    Albumin 4 0 08/12/2021 10:51 AM    Globulin, Total 3 2 03/22/2022 10:20 AM    Globulin, Total 3 0 11/24/2021 11:06 AM    Globulin, Total 3 1 08/12/2021 10:51 AM    HDL 31 (L) 03/22/2022 10:20 AM    HDL 31 (L) 11/24/2021 11:06 AM    HDL 27 (L) 08/12/2021 10:51 AM    Triglycerides 331 (H) 03/22/2022 10:20 AM    Triglycerides 229 (H) 11/24/2021 11:06 AM    Triglycerides 201 (H) 08/12/2021 10:51 AM         Imaging Studies: I have personally reviewed pertinent reports  Portions of the record may have been created with voice recognition software  Occasional wrong word or "sound a like" substitutions may have occurred due to the inherent limitations of voice recognition software  Read the chart carefully and recognize, using context, where substitutions have occurred

## 2022-05-20 ENCOUNTER — TELEPHONE (OUTPATIENT)
Dept: ENDOCRINOLOGY | Facility: CLINIC | Age: 62
End: 2022-05-20

## 2022-05-20 LAB
APPEARANCE UR: CLEAR
BACTERIA URNS QL MICRO: ABNORMAL
BILIRUB UR QL STRIP: NEGATIVE
CASTS URNS QL MICRO: ABNORMAL /LPF
COLOR UR: YELLOW
EPI CELLS #/AREA URNS HPF: ABNORMAL /HPF (ref 0–10)
GLUCOSE UR QL: ABNORMAL
HGB UR QL STRIP: NEGATIVE
KETONES UR QL STRIP: NEGATIVE
LEUKOCYTE ESTERASE UR QL STRIP: NEGATIVE
MICRO URNS: ABNORMAL
NITRITE UR QL STRIP: POSITIVE
PH UR STRIP: 5.5 [PH] (ref 5–7.5)
PROT UR QL STRIP: ABNORMAL
RBC #/AREA URNS HPF: ABNORMAL /HPF (ref 0–2)
SP GR UR: 1.02 (ref 1–1.03)
UROBILINOGEN UR STRIP-ACNC: 0.2 MG/DL (ref 0.2–1)
WBC #/AREA URNS HPF: ABNORMAL /HPF (ref 0–5)

## 2022-05-20 NOTE — TELEPHONE ENCOUNTER
----- Message from Ryley Woo MD sent at 5/20/2022  2:24 PM EDT -----  Regarding: FW: Urine test result  White blood cells, nitrates positive   No bacteria seen   Please ask the lab if they can run a culture on the urine sample from yesterday   Please ask patient to discontinue Jardiance for now   Recommend follow-up with her primary care     ----- Message -----  From: Leonard Teague MA  Sent: 5/20/2022   2:03 PM EDT  To: Ryley Woo MD  Subject: FW: Urine test result                              ----- Message -----  From: Tiffanie Castle  Sent: 5/20/2022   1:12 PM EDT  To: , #  Subject: Urine test result                                The test results are in   Please advise    Thank you

## 2022-05-23 DIAGNOSIS — Z79.4 TYPE 2 DIABETES MELLITUS WITH HYPERGLYCEMIA, WITH LONG-TERM CURRENT USE OF INSULIN (HCC): Primary | ICD-10-CM

## 2022-05-23 DIAGNOSIS — E11.65 TYPE 2 DIABETES MELLITUS WITH HYPERGLYCEMIA, WITH LONG-TERM CURRENT USE OF INSULIN (HCC): Primary | ICD-10-CM

## 2022-05-23 RX ORDER — INSULIN GLARGINE 100 [IU]/ML
INJECTION, SOLUTION SUBCUTANEOUS
Qty: 15 ML | Refills: 3 | Status: SHIPPED | OUTPATIENT
Start: 2022-05-23 | End: 2022-08-01

## 2022-05-23 NOTE — TELEPHONE ENCOUNTER
lantus denied- Sent prescription for [facility]    Prior auth done for Lispro  Gonzalez #[id number]  Through [facility]

## 2022-05-24 ENCOUNTER — TELEPHONE (OUTPATIENT)
Dept: ENDOCRINOLOGY | Facility: CLINIC | Age: 62
End: 2022-05-24

## 2022-05-24 NOTE — TELEPHONE ENCOUNTER
use humalog insulin per the following sliding scale to correct high blood sugars:  BG  151-200: 1 unit  201-250: 2 units  251-300: 3 units  301-350: 4 units  > 350: 5 units  Do not correct bed time highs unless > 200 mg/dl

## 2022-06-06 DIAGNOSIS — J30.9 ALLERGIC RHINITIS, UNSPECIFIED SEASONALITY, UNSPECIFIED TRIGGER: ICD-10-CM

## 2022-06-06 RX ORDER — DESLORATADINE 5 MG/1
TABLET ORAL
Qty: 90 TABLET | Refills: 1 | Status: SHIPPED | OUTPATIENT
Start: 2022-06-06

## 2022-06-07 ENCOUNTER — ANESTHESIA (OUTPATIENT)
Dept: GASTROENTEROLOGY | Facility: AMBULARY SURGERY CENTER | Age: 62
End: 2022-06-07

## 2022-06-07 ENCOUNTER — ANESTHESIA EVENT (OUTPATIENT)
Dept: GASTROENTEROLOGY | Facility: AMBULARY SURGERY CENTER | Age: 62
End: 2022-06-07

## 2022-06-07 ENCOUNTER — HOSPITAL ENCOUNTER (OUTPATIENT)
Dept: GASTROENTEROLOGY | Facility: AMBULARY SURGERY CENTER | Age: 62
Setting detail: OUTPATIENT SURGERY
Discharge: HOME | End: 2022-06-07
Attending: INTERNAL MEDICINE | Admitting: INTERNAL MEDICINE
Payer: COMMERCIAL

## 2022-06-07 VITALS
DIASTOLIC BLOOD PRESSURE: 62 MMHG | SYSTOLIC BLOOD PRESSURE: 126 MMHG | TEMPERATURE: 97 F | WEIGHT: 204 LBS | OXYGEN SATURATION: 98 % | BODY MASS INDEX: 33.99 KG/M2 | RESPIRATION RATE: 16 BRPM | HEIGHT: 65 IN | HEART RATE: 84 BPM

## 2022-06-07 DIAGNOSIS — R79.89 LFT ELEVATION: Primary | ICD-10-CM

## 2022-06-07 DIAGNOSIS — D50.9 IRON DEFICIENCY ANEMIA, UNSPECIFIED IRON DEFICIENCY ANEMIA TYPE: ICD-10-CM

## 2022-06-07 LAB — GLUCOSE SERPL-MCNC: 368 MG/DL (ref 65–140)

## 2022-06-07 PROCEDURE — 45380 COLONOSCOPY AND BIOPSY: CPT | Performed by: INTERNAL MEDICINE

## 2022-06-07 PROCEDURE — 43239 EGD BIOPSY SINGLE/MULTIPLE: CPT | Performed by: INTERNAL MEDICINE

## 2022-06-07 PROCEDURE — 88305 TISSUE EXAM BY PATHOLOGIST: CPT | Performed by: PATHOLOGY

## 2022-06-07 PROCEDURE — 82948 REAGENT STRIP/BLOOD GLUCOSE: CPT

## 2022-06-07 RX ORDER — PROPOFOL 10 MG/ML
INJECTION, EMULSION INTRAVENOUS AS NEEDED
Status: DISCONTINUED | OUTPATIENT
Start: 2022-06-07 | End: 2022-06-07

## 2022-06-07 RX ORDER — SODIUM CHLORIDE, SODIUM LACTATE, POTASSIUM CHLORIDE, CALCIUM CHLORIDE 600; 310; 30; 20 MG/100ML; MG/100ML; MG/100ML; MG/100ML
125 INJECTION, SOLUTION INTRAVENOUS CONTINUOUS
Status: DISCONTINUED | OUTPATIENT
Start: 2022-06-07 | End: 2022-06-11 | Stop reason: HOSPADM

## 2022-06-07 RX ORDER — PROPOFOL 10 MG/ML
INJECTION, EMULSION INTRAVENOUS CONTINUOUS PRN
Status: DISCONTINUED | OUTPATIENT
Start: 2022-06-07 | End: 2022-06-07

## 2022-06-07 RX ORDER — LIDOCAINE HYDROCHLORIDE 20 MG/ML
INJECTION, SOLUTION INFILTRATION; PERINEURAL AS NEEDED
Status: DISCONTINUED | OUTPATIENT
Start: 2022-06-07 | End: 2022-06-07

## 2022-06-07 RX ORDER — FENTANYL CITRATE 50 UG/ML
INJECTION, SOLUTION INTRAMUSCULAR; INTRAVENOUS AS NEEDED
Status: DISCONTINUED | OUTPATIENT
Start: 2022-06-07 | End: 2022-06-07

## 2022-06-07 RX ADMIN — PROPOFOL 100 MG: 10 INJECTION, EMULSION INTRAVENOUS at 08:43

## 2022-06-07 RX ADMIN — LIDOCAINE HYDROCHLORIDE 5 ML: 20 INJECTION, SOLUTION EPIDURAL; INFILTRATION; INTRACAUDAL at 08:43

## 2022-06-07 RX ADMIN — FENTANYL CITRATE 100 MCG: 50 INJECTION, SOLUTION INTRAMUSCULAR; INTRAVENOUS at 09:01

## 2022-06-07 RX ADMIN — PROPOFOL 130 MCG/KG/MIN: 10 INJECTION, EMULSION INTRAVENOUS at 08:53

## 2022-06-07 RX ADMIN — SODIUM CHLORIDE, SODIUM LACTATE, POTASSIUM CHLORIDE, AND CALCIUM CHLORIDE 125 ML/HR: .6; .31; .03; .02 INJECTION, SOLUTION INTRAVENOUS at 08:07

## 2022-06-07 RX ADMIN — PROPOFOL 50 MG: 10 INJECTION, EMULSION INTRAVENOUS at 08:47

## 2022-06-07 RX ADMIN — FENTANYL CITRATE 50 MCG: 50 INJECTION, SOLUTION INTRAMUSCULAR; INTRAVENOUS at 09:00

## 2022-06-07 RX ADMIN — PROPOFOL 40 MG: 10 INJECTION, EMULSION INTRAVENOUS at 09:02

## 2022-06-07 RX ADMIN — FENTANYL CITRATE 50 MCG: 50 INJECTION, SOLUTION INTRAMUSCULAR; INTRAVENOUS at 08:43

## 2022-06-07 RX ADMIN — PROPOFOL 50 MG: 10 INJECTION, EMULSION INTRAVENOUS at 08:53

## 2022-06-07 NOTE — ANESTHESIA POSTPROCEDURE EVALUATION
Post-Op Assessment Note    CV Status:  Stable  Pain Score: 0    Pain management: adequate     Mental Status:  Awake   Hydration Status:  Stable and euvolemic   PONV Controlled:  None      Post Op Vitals Reviewed: Yes      Staff: CRNA         No complications documented      BP     Temp     Pulse     Resp      SpO2 Trauma / Surgical Daily Progress Note    Date of Service  6/29/2020    Chief Complaint  62 y.o. male admitted 6/21/2020 with Abdominal pain    Interval Events  No significant changes. On RA. Will clamp NGT and see if tolerates.  Leukocytosis improved. Consider Abd CT tomorrow if continues. Cont TPN. Mobilize.     Review of Systems  Review of Systems   Gastrointestinal: Positive for abdominal pain.        Vital Signs for last 24 hours  Temp:  [36.6 °C (97.9 °F)-37.8 °C (100 °F)] 36.7 °C (98 °F)  Pulse:  [101-121] 111  Resp:  [11-42] 16  BP: ()/(55-88) 123/82  SpO2:  [93 %-99 %] 96 %    Hemodynamic parameters for last 24 hours  CVP:  [2 MM HG-7 MM HG] 7 MM HG    Respiratory Data     Respiration: 16, Pulse Oximetry: 96 %     Work Of Breathing / Effort: Shallow;Mild  RUL Breath Sounds: Clear, RML Breath Sounds: Diminished, RLL Breath Sounds: Diminished, GINNY Breath Sounds: Clear, LLL Breath Sounds: Diminished    Physical Exam  Physical Exam  Neck:      Musculoskeletal: Neck supple.   Cardiovascular:      Rate and Rhythm: Tachycardia present.   Pulmonary:      Effort: Pulmonary effort is normal.   Abdominal:      General: There is distension.      Tenderness: There is abdominal tenderness.      Comments: BARBARA serosang   Genitourinary:     Comments: Bennett in place  Musculoskeletal: Normal range of motion.   Skin:     General: Skin is warm.   Neurological:      Mental Status: He is alert. Mental status is at baseline.         Laboratory  Recent Results (from the past 24 hour(s))   ACCU-CHEK GLUCOSE    Collection Time: 06/28/20  5:34 PM   Result Value Ref Range    Glucose - Accu-Ck 97 65 - 99 mg/dL   ACCU-CHEK GLUCOSE    Collection Time: 06/29/20 12:05 AM   Result Value Ref Range    Glucose - Accu-Ck 79 65 - 99 mg/dL   CBC WITH DIFFERENTIAL    Collection Time: 06/29/20  5:10 AM   Result Value Ref Range    WBC 33.6 (HH) 4.8 - 10.8 K/uL    RBC 3.05 (L) 4.70 - 6.10 M/uL    Hemoglobin 8.4 (L) 14.0 - 18.0 g/dL    Hematocrit  28.3 (L) 42.0 - 52.0 %    MCV 92.8 81.4 - 97.8 fL    MCH 27.5 27.0 - 33.0 pg    MCHC 29.7 (L) 33.7 - 35.3 g/dL    RDW 57.3 (H) 35.9 - 50.0 fL    Platelet Count 822 (H) 164 - 446 K/uL    MPV 9.7 9.0 - 12.9 fL    Neutrophils-Polys 90.40 (H) 44.00 - 72.00 %    Lymphocytes 3.50 (L) 22.00 - 41.00 %    Monocytes 1.70 0.00 - 13.40 %    Eosinophils 3.50 0.00 - 6.90 %    Basophils 0.90 0.00 - 1.80 %    Nucleated RBC 0.00 /100 WBC    Neutrophils (Absolute) 30.37 (H) 1.82 - 7.42 K/uL    Lymphs (Absolute) 1.18 1.00 - 4.80 K/uL    Monos (Absolute) 0.57 0.00 - 0.85 K/uL    Eos (Absolute) 1.18 (H) 0.00 - 0.51 K/uL    Baso (Absolute) 0.30 (H) 0.00 - 0.12 K/uL    NRBC (Absolute) 0.00 K/uL    Hypochromia 2+     Anisocytosis 1+     Microcytosis 1+    Comp Metabolic Panel    Collection Time: 06/29/20  5:10 AM   Result Value Ref Range    Sodium 147 (H) 135 - 145 mmol/L    Potassium 3.1 (L) 3.6 - 5.5 mmol/L    Chloride 113 (H) 96 - 112 mmol/L    Co2 23 20 - 33 mmol/L    Anion Gap 11.0 7.0 - 16.0    Glucose 101 (H) 65 - 99 mg/dL    Bun 9 8 - 22 mg/dL    Creatinine 0.27 (L) 0.50 - 1.40 mg/dL    Calcium 8.6 8.5 - 10.5 mg/dL    AST(SGOT) 18 12 - 45 U/L    ALT(SGPT) 12 2 - 50 U/L    Alkaline Phosphatase 62 30 - 99 U/L    Total Bilirubin 0.4 0.1 - 1.5 mg/dL    Albumin 2.1 (L) 3.2 - 4.9 g/dL    Total Protein 4.8 (L) 6.0 - 8.2 g/dL    Globulin 2.7 1.9 - 3.5 g/dL    A-G Ratio 0.8 g/dL   ESTIMATED GFR    Collection Time: 06/29/20  5:10 AM   Result Value Ref Range    GFR If African American >60 >60 mL/min/1.73 m 2    GFR If Non African American >60 >60 mL/min/1.73 m 2   DIFFERENTIAL MANUAL    Collection Time: 06/29/20  5:10 AM   Result Value Ref Range    Manual Diff Status PERFORMED    PERIPHERAL SMEAR REVIEW    Collection Time: 06/29/20  5:10 AM   Result Value Ref Range    Peripheral Smear Review see below    PLATELET ESTIMATE    Collection Time: 06/29/20  5:10 AM   Result Value Ref Range    Plt Estimation Increased    MORPHOLOGY    Collection  Time: 06/29/20  5:10 AM   Result Value Ref Range    RBC Morphology Present     Polychromia 2+     Poikilocytosis 2+     Ovalocytes 2+     Echinocytes 1+    ACCU-CHEK GLUCOSE    Collection Time: 06/29/20  5:27 AM   Result Value Ref Range    Glucose - Accu-Ck 88 65 - 99 mg/dL   ACCU-CHEK GLUCOSE    Collection Time: 06/29/20 11:12 AM   Result Value Ref Range    Glucose - Accu-Ck 93 65 - 99 mg/dL       Fluids    Intake/Output Summary (Last 24 hours) at 6/29/2020 1301  Last data filed at 6/29/2020 1000  Gross per 24 hour   Intake 3160 ml   Output 2560 ml   Net 600 ml       Core Measures & Quality Metrics  Labs reviewed, Medications reviewed and Radiology images reviewed  Bennett catheter: Critically Ill - Requiring Accurate Measurement of Urinary Output  Central line in place: Need for access    DVT Prophylaxis: Enoxaparin (Lovenox)  DVT prophylaxis - mechanical: SCDs  Ulcer prophylaxis: Yes  Antibiotics: Treating active infection/contamination beyond 24 hours perioperative coverage  Assessed for rehab: Patient unable to tolerate rehabilitation therapeutic regimen    STEPHANIE Score  ETOH Screening    Assessment/Plan  Spleen hematoma- (present on admission)  Assessment & Plan  Local trauma vs. Inflammation from adjacent pancreas  6/21 Ex Lap, splenectomy  Will need splenic vaccinations  [place] [facility]    Acute on chronic pancreatitis (HCC)- (present on admission)  Assessment & Plan  By Epic review:  On PO pancreatic exocrine therapy as an outpatient.  Long history of pancreatitis documented in Epic   CT- Atrophic appearing pancreas with acute inflammation at tail, surrounding inflammation, and fluid  6/21 Ex lap, intra-op cultures, splenectomy, Va/6 Laps left in the patient's LUQ, AbThera  6/23 Planned take back - distal pancreatectomy for pseudocyst and resection of retained splenic capsule. Laps removed. Bowel edema precluded fascial closure  6/26  Abdominal closure  6/28 Worsening leukocytosis -  6/29  Leukocytosis improving   On Zosyn   [facility]: [facility]    Respiratory failure following trauma and surgery (HCC)  Assessment & Plan  6/26 Returned from OR intubated.    6/27 Extubated  Aggressive pulm toilet     Acute blood loss anemia- (present on admission)  Assessment & Plan  Admit hemoglobin 10  At least 2L intra-op blood loss  Received [employer]  Transfuse for hemoglobin <7  Trend Hb serially  TEG OK   Remained stable post op    Hypokalemia  Assessment & Plan  Replete and trend    No contraindication to anticoagulation therapy- (present on admission)  Assessment & Plan  6/24 Initiated  lovenox    History of alcohol abuse- (present on admission)  Assessment & Plan  By Epic review  Unable to obtain information from patient at admit    Tobacco dependence- (present on admission)  Assessment & Plan  By Epic review  Unable to obtain information from patient at admit    GERD (gastroesophageal reflux disease)- (present on admission)  Assessment & Plan  By Epic review:  Omeprazole as an outpatient  Continuing acid suppression therapy while intubated      Discussed patient condition with RN, RT, Therapies, Pharmacy, Dietary,  and Patient.  CRITICAL CARE TIME EXCLUDING PROCEDURES: 36   minutes

## 2022-06-07 NOTE — ANESTHESIA PREPROCEDURE EVALUATION
Procedure:  COLONOSCOPY  EGD    Relevant Problems   CARDIO   (+) Benign essential hypertension   (+) Mixed hyperlipidemia      ENDO   (+) Primary hypothyroidism   (+) Type 2 diabetes mellitus with hyperglycemia, without long-term current use of insulin (HCC)      GI/HEPATIC   (+) Fatty liver   (+) Gastroesophageal reflux disease without esophagitis      HEMATOLOGY   (+) Anemia      NEURO/PSYCH   (+) Diabetic gastroparesis (HCC)   (+) History of colon polyps        Physical Exam    Airway    Mallampati score: II  TM Distance: >3 FB  Neck ROM: full     Dental   No notable dental hx     Cardiovascular  Cardiovascular exam normal    Pulmonary  Pulmonary exam normal     Other Findings        Anesthesia Plan  ASA Score- 3     Anesthesia Type- IV sedation with anesthesia with ASA Monitors  Additional Monitors:   Airway Plan:           Plan Factors-Exercise tolerance (METS): >4 METS  Chart reviewed  Imaging results reviewed  Existing labs reviewed  Patient summary reviewed  Patient is not a current smoker  Induction-     Postoperative Plan-     Informed Consent- Anesthetic plan and risks discussed with patient  I personally reviewed this patient with the CRNA  Discussed and agreed on the Anesthesia Plan with the CRNA  Tawnya Curry

## 2022-06-07 NOTE — H&P
History and Physical -  [facility]  Wicho Arenas 58 y o  female MRN: [id number]                  HPI: Wicho Arenas is a 58y o  year old female who presents for evaluation of iron deficiency anemia      REVIEW OF SYSTEMS: Per the HPI, and otherwise unremarkable  Historical Information   Past Medical History:   Diagnosis Date    Anemia     Colon polyp     Deviated nasal septum     Diabetes mellitus ([address] )     Disease of thyroid gland     hypo    Gastroparesis     Hearing aid worn     right ear only    Hyperlipidemia     Hypertension      Past Surgical History:   Procedure Laterality Date     SECTION, LOW TRANSVERSE      x2    COLONOSCOPY N/A 2016    Procedure: COLONOSCOPY;  Surgeon: Corinne Jimenez MD;  Location: [facility];   Service:     CYST REMOVAL Left     elbow    DILATION AND CURETTAGE OF UTERUS      GANGLION CYST EXCISION Right     RESECTION TONSIL RADICAL      TUBAL LIGATION      VARICOSE VEIN SURGERY Bilateral 2009    x3 procedures    WISDOM TOOTH EXTRACTION       Social History   Social History     Substance and Sexual Activity   Alcohol Use Yes    Comment: rare     Social History     Substance and Sexual Activity   Drug Use Never     Social History     Tobacco Use   Smoking Status Former Smoker    Packs/day: 1 00    Years: 10 00    Pack years: 10 00    Quit date: 26    Years since quittin 4   Smokeless Tobacco Never Used     Family History   Problem Relation Age of Onset    Stroke Mother     Diabetes Mother     Arthritis Father     Heart disease Father         CABG    Heart disease Daughter         congenital-truncus arteriosus    Congenital heart disease Daughter     Heart disease Son         congenital-"hole" repair    Other Sister         meningioma       Meds/Allergies       Current Outpatient Medications:     Artificial Saliva (BIOTENE MOISTURIZING MOUTH MT)    atorvastatin (LIPITOR) 40 mg tablet    azelastine (ASTELIN) 0 1 % nasal spray    BIOTIN PO    bisacodyl (DULCOLAX) 5 mg EC tablet    desloratadine (CLARINEX) 5 MG tablet    ferrous sulfate 324 (65 Fe) mg    hydrOXYzine HCL (ATARAX) 25 mg tablet    insulin aspart (NovoLOG) 100 Units/mL injection pen    insulin glargine (Semglee) 100 units/mL injection pen    Insulin Pen Needle (TRUEplus Pen Needles) 31G X 6 MM MISC    levothyroxine 112 mcg tablet    metFORMIN (GLUCOPHAGE) 1000 MG tablet    metoprolol succinate (TOPROL-XL) 25 mg 24 hr tablet    Omega-3 Fatty Acids (FISH OIL PO)    ONE TOUCH ULTRA TEST test strip    pantoprazole (PROTONIX) 20 mg tablet    Polyethylene Glycol 3350 (MIRALAX PO)    quinapril (ACCUPRIL) 40 MG tablet    sitaGLIPtin (Januvia) 100 mg tablet    Current Facility-Administered Medications:     lactated ringers infusion, 125 mL/hr, Intravenous, Continuous, 125 mL/hr at 06/07/22 0807    No Known Allergies    Objective     BP (!) 171/85   Pulse 85   Temp (!) 97 °F (36 1 °C) (Temporal)   Resp 18   Ht 5' 5" (1 651 m)   Wt 92 5 kg (204 lb)   SpO2 97%   BMI 33 95 kg/m²       PHYSICAL EXAM    Gen: NAD  Head: NCAT  CV: RRR  CHEST: Clear  ABD: soft, NT/ND  EXT: no edema      ASSESSMENT/PLAN:  This is a 58y o  year old female here for EGD and colonoscopy, and she is stable and optimized for her procedure

## 2022-06-20 ENCOUNTER — DOCUMENTATION (OUTPATIENT)
Dept: ENDOCRINOLOGY | Facility: CLINIC | Age: 62
End: 2022-06-20

## 2022-06-20 ENCOUNTER — TELEPHONE (OUTPATIENT)
Dept: ENDOCRINOLOGY | Facility: CLINIC | Age: 62
End: 2022-06-20

## 2022-06-20 NOTE — TELEPHONE ENCOUNTER
Reviewed  Increase Lantus to 55 units subcutaneously at bedtime   Increase NovoLog to 15 units with meals 3 times a day, continue sliding scale insulin   Continue metformin, please confirm with patient as I do not see it on her blood sugar log-issues still taking jardiance?

## 2022-06-22 LAB — HCV AB SER-ACNC: <0.1

## 2022-06-23 ENCOUNTER — TELEPHONE (OUTPATIENT)
Dept: GASTROENTEROLOGY | Facility: CLINIC | Age: 62
End: 2022-06-23

## 2022-06-23 NOTE — RESULT ENCOUNTER NOTE
Please call the patient regarding her result  Biopsies were all benign  Repeat colonoscopy in 3 years    Follow-up with me as scheduled

## 2022-06-27 ENCOUNTER — APPOINTMENT (OUTPATIENT)
Dept: LAB | Facility: HOSPITAL | Age: 62
End: 2022-06-27
Payer: COMMERCIAL

## 2022-06-27 DIAGNOSIS — R79.89 LFT ELEVATION: ICD-10-CM

## 2022-06-27 LAB
HBV CORE AB SER QL: NORMAL
HBV CORE IGM SER QL: NORMAL
HBV SURFACE AG SER QL: NORMAL
HCV AB SER QL: NORMAL

## 2022-06-27 PROCEDURE — 36415 COLL VENOUS BLD VENIPUNCTURE: CPT

## 2022-06-27 PROCEDURE — 86803 HEPATITIS C AB TEST: CPT

## 2022-06-27 PROCEDURE — 86704 HEP B CORE ANTIBODY TOTAL: CPT

## 2022-06-27 PROCEDURE — 86705 HEP B CORE ANTIBODY IGM: CPT

## 2022-06-27 PROCEDURE — 87340 HEPATITIS B SURFACE AG IA: CPT

## 2022-07-04 DIAGNOSIS — I10 BENIGN ESSENTIAL HYPERTENSION: ICD-10-CM

## 2022-07-05 ENCOUNTER — OFFICE VISIT (OUTPATIENT)
Dept: GASTROENTEROLOGY | Facility: CLINIC | Age: 62
End: 2022-07-05
Payer: COMMERCIAL

## 2022-07-05 VITALS
HEART RATE: 95 BPM | HEIGHT: 65 IN | DIASTOLIC BLOOD PRESSURE: 71 MMHG | BODY MASS INDEX: 34.49 KG/M2 | WEIGHT: 207 LBS | SYSTOLIC BLOOD PRESSURE: 133 MMHG

## 2022-07-05 DIAGNOSIS — D50.9 IRON DEFICIENCY ANEMIA, UNSPECIFIED IRON DEFICIENCY ANEMIA TYPE: ICD-10-CM

## 2022-07-05 DIAGNOSIS — K21.9 GASTROESOPHAGEAL REFLUX DISEASE WITHOUT ESOPHAGITIS: ICD-10-CM

## 2022-07-05 DIAGNOSIS — R79.89 LFT ELEVATION: Primary | ICD-10-CM

## 2022-07-05 PROCEDURE — 3078F DIAST BP <80 MM HG: CPT | Performed by: INTERNAL MEDICINE

## 2022-07-05 PROCEDURE — 3075F SYST BP GE 130 - 139MM HG: CPT | Performed by: INTERNAL MEDICINE

## 2022-07-05 PROCEDURE — 99214 OFFICE O/P EST MOD 30 MIN: CPT | Performed by: INTERNAL MEDICINE

## 2022-07-05 RX ORDER — METOPROLOL SUCCINATE 25 MG/1
TABLET, EXTENDED RELEASE ORAL
Qty: 90 TABLET | Refills: 1 | Status: SHIPPED | OUTPATIENT
Start: 2022-07-05 | End: 2022-09-16

## 2022-07-05 RX ORDER — FAMOTIDINE 40 MG/1
40 TABLET, FILM COATED ORAL DAILY
Qty: 30 TABLET | Refills: 5 | Status: SHIPPED | OUTPATIENT
Start: 2022-07-05 | End: 2022-09-19 | Stop reason: ALTCHOICE

## 2022-07-05 NOTE — PROGRESS NOTES
[facility] - Outpatient Follow-up Note  Michael Brantley 58 y o  female MRN: [id number]  Encounter: [id number]          ASSESSMENT AND PLAN:      1  LFT elevation  Likely steatohepatitis related to metabolic syndrome  Will order laboratory testing to evaluate for other possible etiologies  Will order ultrasound  Contingency might include elastography or biopsy to evaluate for hepatic fibrosis    - MARIO Screen w/ Reflex to Titer/Pattern; Future  - Antimitochondrial antibody; Future  - Anti-smooth muscle antibody, IgG; Future  - Alpha-1-antitrypsin; Future  - US right upper quadrant; Future  - Antimitochondrial antibody  - Anti-smooth muscle antibody, IgG  - Alpha-1-antitrypsin    2  Iron deficiency anemia, unspecified iron deficiency anemia type  Will schedule capsule endoscopy for further evaluation of the small bowel    - CBC; Future  - CBC    3  Gastroesophageal reflux disease without esophagitis  Symptoms previously well controlled on famotidine 20 mg daily but now with recurrent symptoms after discontinuing this  We discussed potential long-term risk of PPI as in will try a course of famotidine  If this is ineffective will reorder pantoprazole    ______________________________________________________________________    SUBJECTIVE:  Patient returns in follow-up of elevated LFTs and iron deficiency anemia  Underwent EGD and colonoscopy to the cecum which were unremarkable  Has had no obvious bruising or bleeding, abdominal pain, change in bowel habit, unexplained weight loss, nausea vomiting, fever chills  Has had persistently elevated transaminases less than 3 times the upper limit of normal since at least 2016 with normal alkaline phosphatase and bilirubin  Does have evidence of prior exposure to hepatitis a but negative hepatitis-B and C  Negative sprue serologies        REVIEW OF SYSTEMS:    Review of Systems   Gastrointestinal: Negative for bloating, abdominal pain, hematochezia, melena, nausea and vomiting  Historical Information   Past Medical History:   Diagnosis Date    Anemia     Colon polyp     Deviated nasal septum     Diabetes mellitus ([address] )     Disease of thyroid gland     hypo    Gastroparesis     Hearing aid worn     right ear only    Hyperlipidemia     Hypertension      Past Surgical History:   Procedure Laterality Date     SECTION, LOW TRANSVERSE      x2    COLONOSCOPY N/A 2016    Procedure: COLONOSCOPY;  Surgeon: Aline Tripathi MD;  Location: [facility];   Service:     CYST REMOVAL Left     elbow    DILATION AND CURETTAGE OF UTERUS      GANGLION CYST EXCISION Right     RESECTION TONSIL RADICAL      TUBAL LIGATION      VARICOSE VEIN SURGERY Bilateral 2009    x3 procedures    WISDOM TOOTH EXTRACTION       Social History   Social History     Substance and Sexual Activity   Alcohol Use Yes    Comment: rare     Social History     Substance and Sexual Activity   Drug Use Never     Social History     Tobacco Use   Smoking Status Former Smoker    Packs/day: 1 00    Years: 10 00    Pack years: 10 00    Quit date: 26    Years since quittin 5   Smokeless Tobacco Never Used     Family History   Problem Relation Age of Onset    Stroke Mother     Diabetes Mother     Arthritis Father     Heart disease Father         CABG    Heart disease Daughter         congenital-truncus arteriosus    Congenital heart disease Daughter     Heart disease Son         congenital-"hole" repair    Other Sister         meningioma       Meds/Allergies       Current Outpatient Medications:     Artificial Saliva (BIOTENE MOISTURIZING MOUTH MT)    atorvastatin (LIPITOR) 40 mg tablet    azelastine (ASTELIN) 0 1 % nasal spray    desloratadine (CLARINEX) 5 MG tablet    hydrOXYzine HCL (ATARAX) 25 mg tablet    insulin aspart (NovoLOG) 100 Units/mL injection pen    insulin glargine (Semglee) 100 units/mL injection pen    Insulin Pen Needle (TRUEplus Pen Needles) 31G X 6 MM MISC    levothyroxine 112 mcg tablet    metFORMIN (GLUCOPHAGE) 1000 MG tablet    metoprolol succinate (TOPROL-XL) 25 mg 24 hr tablet    Omega-3 Fatty Acids (FISH OIL PO)    ONE TOUCH ULTRA TEST test strip    pantoprazole (PROTONIX) 20 mg tablet    Polyethylene Glycol 3350 (MIRALAX PO)    quinapril (ACCUPRIL) 40 MG tablet    sitaGLIPtin (Januvia) 100 mg tablet    BIOTIN PO    bisacodyl (DULCOLAX) 5 mg EC tablet    ferrous sulfate 324 (65 Fe) mg    No Known Allergies        Objective     Blood pressure 133/71, pulse 95, height 5' 5" (1 651 m), weight 93 9 kg (207 lb), not currently breastfeeding  Body mass index is 34 45 kg/m²  PHYSICAL EXAM:      Physical Exam  Vitals and nursing note reviewed  Constitutional:       General: She is not in acute distress  Appearance: She is not ill-appearing  HENT:      Head: Normocephalic and atraumatic  Eyes:      General: No scleral icterus  Extraocular Movements: Extraocular movements intact  Cardiovascular:      Rate and Rhythm: Normal rate and regular rhythm  Pulmonary:      Effort: Pulmonary effort is normal  No respiratory distress  Abdominal:      General: There is no distension  Skin:     General: Skin is dry  Coloration: Skin is not cyanotic  Findings: No erythema  Neurological:      General: No focal deficit present  Mental Status: She is alert and oriented to person, place, and time  Psychiatric:         Mood and Affect: Mood normal          Behavior: Behavior normal           Lab Results:   No visits with results within 1 Day(s) from this visit     Latest known visit with results is:   Appointment on 06/27/2022   Component Date Value    Hepatitis B Surface Ag 06/27/2022 Non-reactive     Hepatitis C Ab 06/27/2022 Non-reactive     Hep B C IgM 06/27/2022 Non-reactive     Hep B Core Total Ab 06/27/2022 Non-reactive          Radiology Results:   EGD    Addendum Date: 6/7/2022 Addendum:   [address] [place] [id number] [phone] DATE OF SERVICE: 6/07/22 PHYSICIAN(S): Attending: Susie Fritz MD Fellow: No Staff Documented INDICATION: Iron deficiency anemia, unspecified iron deficiency anemia type  Negative sprue serologies POST-OP DIAGNOSIS: See the impression below  PREPROCEDURE: Informed consent was obtained for the procedure, including sedation  Risks of perforation, hemorrhage, adverse drug reaction and aspiration were discussed  The patient was placed in the left lateral decubitus position  Patient was explained about the risks and benefits of the procedure  Risks including but not limited to bleeding, infection, and perforation were explained in detail  Also explained about less than 100% sensitivity with the exam and other alternatives  DETAILS OF PROCEDURE: Patient was taken to the procedure room where a time out was performed to confirm correct patient and correct procedure  The patient underwent monitored anesthesia care, which was administered by an anesthesia professional  The patient's blood pressure, heart rate, level of consciousness, respirations and oxygen were monitored throughout the procedure  The scope was advanced to the third part of the duodenum  Retroflexion was performed in the fundus  The patient's estimated blood loss was minimal (<5 mL)  The procedure was not difficult  The patient tolerated the procedure well  There were no apparent complications  ANESTHESIA INFORMATION: ASA: III Anesthesia Type: IV Sedation with Anesthesia MEDICATIONS: No administrations occurring from 0829 to 0919 on 06/07/22 FINDINGS: The esophagus appeared normal  Z-line is 39 cm from the incisors  The stomach appeared normal  Performed random biopsy using biopsy forceps to rule out H  pylori   The duodenum appeared normal  SPECIMENS: ID Type Source Tests Collected by Time Destination 1 : antrum cold bx r/o h  pylori Tissue Stomach TISSUE Ashish Dominique MD 6/7/2022  8:48 AM  2 : ascending polyp cold bx Tissue Large Intestine, Right/Ascending Colon TISSUE EXAM Rebel Benz MD 6/7/2022  9:10 AM  IMPRESSION: Normal EGD Antral biopsies obtained RECOMMENDATION: Await pathology results Follow up with me in clinic   Rebel Benz MD     Result Date: 6/7/2022  Narrative: [address] [place], [place] [id number] [phone] DATE OF SERVICE: 6/07/22 PHYSICIAN(S): Attending: Rebel Benz MD Fellow: No Staff Documented INDICATION: Iron deficiency anemia, unspecified iron deficiency anemia type POST-OP DIAGNOSIS: See the impression below  PREPROCEDURE: Informed consent was obtained for the procedure, including sedation  Risks of perforation, hemorrhage, adverse drug reaction and aspiration were discussed  The patient was placed in the left lateral decubitus position  Patient was explained about the risks and benefits of the procedure  Risks including but not limited to bleeding, infection, and perforation were explained in detail  Also explained about less than 100% sensitivity with the exam and other alternatives  DETAILS OF PROCEDURE: Patient was taken to the procedure room where a time out was performed to confirm correct patient and correct procedure  The patient underwent monitored anesthesia care, which was administered by an anesthesia professional  The patient's blood pressure, heart rate, level of consciousness, respirations and oxygen were monitored throughout the procedure  The scope was advanced to the third part of the duodenum  Retroflexion was performed in the fundus  The patient's estimated blood loss was minimal (<5 mL)  The procedure was not difficult  The patient tolerated the procedure well  There were no apparent complications   ANESTHESIA INFORMATION: ASA: III Anesthesia Type: IV Sedation with Anesthesia MEDICATIONS: No administrations occurring from 0829 to 0919 on 06/07/22 FINDINGS: The esophagus appeared normal  Z-line is 39 cm from the incisors  The stomach appeared normal  Performed random biopsy using biopsy forceps to rule out H  pylori  The duodenum appeared normal  SPECIMENS: ID Type Source Tests Collected by Time Destination 1 : antrum cold bx r/o h  pylori Tissue Stomach TISSUE EXAM Violeta Magaña MD 6/7/2022  8:48 AM  2 : ascending polyp cold bx Tissue Large Intestine, Right/Ascending Colon TISSUE EXAM Violeta Magaña MD 6/7/2022  9:10 AM      Impression: Normal EGD Antral biopsies obtained RECOMMENDATION: Await pathology results Follow up with me in clinic   Violeta Magaña MD     Colonoscopy    Result Date: 6/7/2022  Narrative: [address] [place], [place] [id number] [phone] DATE OF SERVICE: 6/07/22 PHYSICIAN(S): Attending: Violeta Magaña MD Fellow: No Staff Documented INDICATION: Iron deficiency anemia, unspecified iron deficiency anemia type POST-OP DIAGNOSIS: See the impression below  HISTORY: Prior colonoscopy: Less than 3 years ago  It is being repeated at an interval of less than 3 years because: This colonoscopy is being performed for a diagnostic indication BOWEL PREPARATION: Miralax/Dulcolax PREPROCEDURE: Informed consent was obtained for the procedure, including sedation  Risks including but not limited to bleeding, infection, perforation, adverse drug reaction and aspiration were explained in detail  Also explained about less than 100% sensitivity with the exam and other alternatives  The patient was placed in the left lateral decubitus position  DETAILS OF PROCEDURE: Patient was taken to the procedure room where a time out was performed to confirm correct patient and correct procedure  The patient underwent monitored anesthesia care, which was administered by an anesthesia professional  The patient's blood pressure, heart rate, level of consciousness, oxygen and respirations were monitored throughout the procedure   A digital rectal exam was performed  The scope was introduced through the anus and advanced to the cecum  Retroflexion was performed in the rectum  The quality of bowel preparation was evaluated using the [facility] Bowel Preparation Scale with scores of: right colon = 2, transverse colon = 2, left colon = 2  The total BBPS score was 6  Bowel prep was adequate  The patient's estimated blood loss was minimal (<5 mL)  The procedure was not difficult  The patient tolerated the procedure well  There were no apparent complications   ANESTHESIA INFORMATION: ASA: III Anesthesia Type: IV Sedation with Anesthesia MEDICATIONS: No administrations occurring from 0829 to 0916 on 06/07/22 FINDINGS: One sessile polyp measuring smaller than 5 mm in the ascending colon; performed cold forceps biopsy EVENTS: Procedure Events Event Event Time ENDO SCOPE OUT TIME 6/7/2022  8:49 AM ENDO CECUM REACHED 6/7/2022  9:05 AM ENDO SCOPE OUT TIME 6/7/2022  9:14 AM SPECIMENS: ID Type Source Tests Collected by Time Destination 1 : antrum cold bx r/o h  pylori Tissue Stomach TISSUE Nathaniel Collado MD 6/7/2022  8:48 AM  2 : ascending polyp cold bx Tissue Large Intestine, Right/Ascending Colon TISSUE EXAM Fidel Olmos MD 6/7/2022  9:10 AM  EQUIPMENT: Colonoscope -     Impression: Single small polyp in the ascending colon removed cold forceps Terminal ileum could not be cannulated due to looping of the scope No blood or bleeding source identified RECOMMENDATION: Await pathology results Repeat colonoscopy in 3 years due to a personal history of colon polyps Will order capsule endoscopy for further evaluation of the small bowel Will order further workup for elevated LFTs Follow-up with me  Fidel Olmos MD

## 2022-07-06 LAB

## 2022-07-06 NOTE — TELEPHONE ENCOUNTER
Capsule scheduled 7/25/22  Please see if Matthew Carrera is needed  Instructions mailed to patient   Thank you

## 2022-07-07 ENCOUNTER — TELEPHONE (OUTPATIENT)
Dept: GASTROENTEROLOGY | Facility: HOSPITAL | Age: 62
End: 2022-07-07

## 2022-07-07 NOTE — TELEPHONE ENCOUNTER
This patients policy does not cover any sevices provided outside the state [facility]  She will not be covered in [place]

## 2022-07-08 ENCOUNTER — TELEPHONE (OUTPATIENT)
Dept: ENDOCRINOLOGY | Facility: CLINIC | Age: 62
End: 2022-07-08

## 2022-07-08 NOTE — TELEPHONE ENCOUNTER
Reviewed     Increase Semglee to 62 units once a day   Increase Novolog to 20 units three times a day   Continue rest of current regimen

## 2022-07-08 NOTE — TELEPHONE ENCOUNTER
Patient called saying that she is retuning a phone that she had got from the office in regarding her BG review and medication changes, patient is asking if the office can give her back a call regarding this matter

## 2022-07-11 ENCOUNTER — DOCUMENTATION (OUTPATIENT)
Dept: ENDOCRINOLOGY | Facility: CLINIC | Age: 62
End: 2022-07-11

## 2022-07-11 ENCOUNTER — TELEPHONE (OUTPATIENT)
Dept: DIABETES SERVICES | Facility: CLINIC | Age: 62
End: 2022-07-11

## 2022-07-11 DIAGNOSIS — E11.65 TYPE 2 DIABETES MELLITUS WITH HYPERGLYCEMIA, WITH LONG-TERM CURRENT USE OF INSULIN (HCC): ICD-10-CM

## 2022-07-11 DIAGNOSIS — E11.65 TYPE 2 DIABETES MELLITUS WITH HYPERGLYCEMIA, WITHOUT LONG-TERM CURRENT USE OF INSULIN (HCC): Primary | ICD-10-CM

## 2022-07-11 DIAGNOSIS — Z79.4 TYPE 2 DIABETES MELLITUS WITH HYPERGLYCEMIA, WITH LONG-TERM CURRENT USE OF INSULIN (HCC): ICD-10-CM

## 2022-07-15 NOTE — TELEPHONE ENCOUNTER
Confirmed with hz rep A cr# [id number]  no auth req  for capsule and this pt can come to [facility] as in network

## 2022-07-18 DIAGNOSIS — E11.9 TYPE 2 DIABETES MELLITUS WITHOUT COMPLICATION, WITHOUT LONG-TERM CURRENT USE OF INSULIN (HCC): ICD-10-CM

## 2022-07-19 ENCOUNTER — OFFICE VISIT (OUTPATIENT)
Dept: DIABETES SERVICES | Facility: CLINIC | Age: 62
End: 2022-07-19
Payer: COMMERCIAL

## 2022-07-19 DIAGNOSIS — E11.65 TYPE 2 DIABETES MELLITUS WITH HYPERGLYCEMIA, WITHOUT LONG-TERM CURRENT USE OF INSULIN (HCC): ICD-10-CM

## 2022-07-19 PROCEDURE — 95249 CONT GLUC MNTR PT PROV EQP: CPT | Performed by: DIETITIAN, REGISTERED

## 2022-07-19 RX ORDER — LEVOTHYROXINE SODIUM 112 UG/1
TABLET ORAL
Qty: 90 TABLET | Refills: 1 | Status: SHIPPED | OUTPATIENT
Start: 2022-07-19

## 2022-07-19 NOTE — PROGRESS NOTES
Personal CGM Initiation    Met with Guillermina Cohen, 58 -yr old patient, here today for initiation of Personal Continuous Glucose Monitoring: Dexcom G6  Stated goal for CGM expressed by parent/patient: "less fingersticks and easier to get the numbers"     The patient/family brings all necessary equipment, fully charged?: Yes     The patient/family was assisted with set-up of the device  High alert: 350 mg/dL Snooze time: 0 minutes  Low alert: 80 mg/dL Snooze time: 15  minutes  Other alert: urgent low, urgent low soon, rapid fall 3 mg/dL/min, rapid rise 3 mg/dL/min, signal loss, no readings   Alerts set? : Yes    The patients skin was prepped with alcohol  The patient/family was assisted in performing sensor insertion into the patients left abdomen: Yes  The patient tolerance of the procedure: good  Sensor connectivity was confirmed?: Yes    Topics discussed included:   difference between sensor and meter glucose   when fingerstick confirmation is advisable   system set-up   sensor insertion and start   calibration procedure if desired  reading graphs and trend information   troubleshooting alarms    activities of daily living: shower, swimming, travel, CT/MRI  sensor removal, disposal    Comments: Patient utilizing both  and G6 latanya on cell phone  Both set up today      Start: 10:32 am  Stop: 11:33 am  Referred by: Sánchez Herron MD

## 2022-07-25 ENCOUNTER — OFFICE VISIT (OUTPATIENT)
Dept: GASTROENTEROLOGY | Facility: CLINIC | Age: 62
End: 2022-07-25
Payer: COMMERCIAL

## 2022-07-25 DIAGNOSIS — D50.9 IRON DEFICIENCY ANEMIA, UNSPECIFIED IRON DEFICIENCY ANEMIA TYPE: ICD-10-CM

## 2022-07-25 PROCEDURE — 91110 GI TRC IMG INTRAL ESOPH-ILE: CPT | Performed by: INTERNAL MEDICINE

## 2022-07-25 NOTE — PROGRESS NOTES
PT HERE FOR CAPSULE ENDOSCOPY, TOLERATED BOWEL PREP, REMAINED NPO AFTER MIDNIGHT  REVIEWED CAPSULE AND INSTRUCTIONS  PT VERBALIZED UNDERSTANDING   PT TO RETURN AT 4:00PM      PT RETURNED AT 4:00PM- PT REPORTS SHE PASSED THE CAPSULE AT 2:30PM

## 2022-08-01 ENCOUNTER — TELEPHONE (OUTPATIENT)
Dept: ENDOCRINOLOGY | Facility: CLINIC | Age: 62
End: 2022-08-01

## 2022-08-01 DIAGNOSIS — Z79.4 TYPE 2 DIABETES MELLITUS WITH HYPERGLYCEMIA, WITH LONG-TERM CURRENT USE OF INSULIN (HCC): ICD-10-CM

## 2022-08-01 DIAGNOSIS — E11.9 TYPE 2 DIABETES MELLITUS WITHOUT COMPLICATION, WITHOUT LONG-TERM CURRENT USE OF INSULIN (HCC): ICD-10-CM

## 2022-08-01 DIAGNOSIS — E11.65 TYPE 2 DIABETES MELLITUS WITH HYPERGLYCEMIA, WITH LONG-TERM CURRENT USE OF INSULIN (HCC): ICD-10-CM

## 2022-08-01 RX ORDER — INSULIN GLARGINE-YFGN 100 [IU]/ML
INJECTION, SOLUTION SUBCUTANEOUS
Qty: 45 ML | Refills: 3 | Status: SHIPPED | OUTPATIENT
Start: 2022-08-01 | End: 2022-10-19

## 2022-08-02 ENCOUNTER — DOCUMENTATION (OUTPATIENT)
Dept: ENDOCRINOLOGY | Facility: CLINIC | Age: 62
End: 2022-08-02

## 2022-08-02 DIAGNOSIS — E11.65 TYPE 2 DIABETES MELLITUS WITH HYPERGLYCEMIA, WITHOUT LONG-TERM CURRENT USE OF INSULIN (HCC): Primary | ICD-10-CM

## 2022-08-02 RX ORDER — GLIPIZIDE 10 MG/1
TABLET, FILM COATED, EXTENDED RELEASE ORAL
Qty: 180 TABLET | Refills: 1 | Status: SHIPPED | OUTPATIENT
Start: 2022-08-02 | End: 2022-08-22 | Stop reason: ALTCHOICE

## 2022-08-02 NOTE — TELEPHONE ENCOUNTER
Reviewed     There is variability in blood sugars which appears to be diet related    Increase Semglee to 70 units at bedtime   Increase NovoLog to 22 units with meals 3 times a day   If patient is eating less carbohydrates, she should take only 20 units, similarly if eating more should take 24-25 units with each meal, continue sliding Insulin   Continue metformin, Januvia at current dose

## 2022-08-03 ENCOUNTER — OFFICE VISIT (OUTPATIENT)
Dept: DIABETES SERVICES | Facility: CLINIC | Age: 62
End: 2022-08-03
Payer: COMMERCIAL

## 2022-08-03 VITALS — BODY MASS INDEX: 34.45 KG/M2 | WEIGHT: 207 LBS

## 2022-08-03 DIAGNOSIS — E11.65 TYPE 2 DIABETES MELLITUS WITH HYPERGLYCEMIA, WITHOUT LONG-TERM CURRENT USE OF INSULIN (HCC): ICD-10-CM

## 2022-08-03 PROCEDURE — 97803 MED NUTRITION INDIV SUBSEQ: CPT

## 2022-08-03 NOTE — PATIENT INSTRUCTIONS
Consume 3 balanced meals/day  30 grams Carbohydrate/meal and 15 grams carbohydrate/snack  Increase exercise to 3-4 days/week of walking with 1 hour duration

## 2022-08-03 NOTE — PROGRESS NOTES
Medical Nutrition Therapy        Assessment    Visit Type: Initial visit for current nutrition meal plan  Follow up to CGM teaching  Chief complaint/Medical Diagnosis/reason for visit E11 65 Type 2 Diabetes Mellitus with hyperglycemia, without long term use of insulin    HPI Jan Villegas came in today for her nutrition visit  Luis's goals were for education on carbohydrate and protein amounts for meals and snacks and balancing nutrients  Problems identified in food recall include unbalanced meals and snacks, and skipping protein at snacks  Provided patient with a 1500 calorie balanced meal plan to assist with consistency, balance and portion control  Encouraged the consumption of regular meals at regular times, and importance of having proper amount of protein with carbohydrate  Advised patient to keep carbohydrate intake to 30 grams per meal and 15 grams per snack to assist with glycemic control  My Plate, portion booklet and food labels were used to teach basic carbohydrate counting  Patient stated she did not desire to make a nutrition follow up appointment at this time, but she stated she will call if she has any other questions or nutritional needs  RD will remain available for further dietary questions/concerns  Ht Readings from Last 1 Encounters:   07/05/22 5' 5" (1 651 m)     Wt Readings from Last 3 Encounters:   08/03/22 93 9 kg (207 lb)   07/05/22 93 9 kg (207 lb)   06/07/22 92 5 kg (204 lb)     Weight Change: No    Barriers to Learning: no barriers    Do you follow any special diet presently?: Yes, watches what she eats    Who shops: patient  Who cooks: patient    Food Log: Completed via the method of food recall    Breakfast:10 AM - 1/2 cup [place] yogurt with berries, 3 vanilla filled wafers, coffee  Morning Snack:None  Lunch:2:30 PM - 4 sl deli meat on 2 sl 6-4-7 Bread, lettuce, tomato jimenez, diet ice tea  Afternoon Snack: 4:30 PM fruit  Dinner:6:30 PM - Beef, chicken, pork, or Fish, corn, salad, cooked veggie, diet soda  Evening Snack:9PM - Carb Smart Ice Cream/pretzels  Beverages: Diet ice tea, diet soda,coffee  Eating out/Take out:Occasionally  Exercise 2-3x/week walks in large store    Calorie needs 1500 kcals/day  Carbs: 30 g/meal, 15g/snack         Nutrition Diagnosis:  Food and nutrition related knowledge deficit  related to Lack of prior exposure to accurate nutrition related information as evidenced by Verbalizes inaccurate or incomplete information    Intervention: plate method, label reading, carbohydrate counting, increased protein intake, meal planning, individualized meal plan and exercise guidelines     Treatment Goals: Patient will consume 3 meals a day, Patient will count carbohydrates and Patient will exercise    Monitoring and evaluation:    Term code indicator  FH 1 3 2 Food Intake Criteria: Consume 3 balanced meals/day  Term code indicator  FH 1 6 3 Carbohydrate Intake Criteria: 30 grams carbohydrate/meal and 15 grams crabohydrate/snack  Term code indicator  CH 2 2 Treatments/Therapy/Alternative Medicine Criteria: Increase walking to 3-4x/week for 1 hour duration  Materials Provided: Portion Booklet, CHO counting individualized meal plan  Patients Response to Instruction:  Comprehensiongood  Motivationgood  Expected Compliancegood    Begin Time: 12:55PM  End Time: 1:55 PM  Referring Provider: Dr Jameson Chen    Thank you for coming to the [place] [id number] for education today  Please feel free to call with any questions or concerns      Madison Figueroa  [address] [facility] [id number] [phone]

## 2022-08-04 DIAGNOSIS — D64.9 ANEMIA, UNSPECIFIED TYPE: ICD-10-CM

## 2022-08-04 RX ORDER — PANTOPRAZOLE SODIUM 20 MG/1
20 TABLET, DELAYED RELEASE ORAL DAILY
Qty: 90 TABLET | Refills: 1 | Status: SHIPPED | OUTPATIENT
Start: 2022-08-04 | End: 2022-12-21

## 2022-08-16 ENCOUNTER — TELEPHONE (OUTPATIENT)
Dept: GASTROENTEROLOGY | Facility: CLINIC | Age: 62
End: 2022-08-16

## 2022-08-22 ENCOUNTER — OFFICE VISIT (OUTPATIENT)
Dept: FAMILY MEDICINE CLINIC | Facility: CLINIC | Age: 62
End: 2022-08-22
Payer: COMMERCIAL

## 2022-08-22 VITALS
BODY MASS INDEX: 34.99 KG/M2 | HEIGHT: 65 IN | TEMPERATURE: 97.1 F | OXYGEN SATURATION: 98 % | WEIGHT: 210 LBS | RESPIRATION RATE: 18 BRPM | HEART RATE: 106 BPM | SYSTOLIC BLOOD PRESSURE: 130 MMHG | DIASTOLIC BLOOD PRESSURE: 80 MMHG

## 2022-08-22 DIAGNOSIS — I10 BENIGN ESSENTIAL HYPERTENSION: ICD-10-CM

## 2022-08-22 DIAGNOSIS — R31.0 GROSS HEMATURIA: Primary | ICD-10-CM

## 2022-08-22 DIAGNOSIS — E11.65 TYPE 2 DIABETES MELLITUS WITH HYPERGLYCEMIA, WITHOUT LONG-TERM CURRENT USE OF INSULIN (HCC): ICD-10-CM

## 2022-08-22 LAB
SL AMB  POCT GLUCOSE, UA: ABNORMAL
SL AMB LEUKOCYTE ESTERASE,UA: ABNORMAL
SL AMB POCT BILIRUBIN,UA: ABNORMAL
SL AMB POCT BLOOD,UA: ABNORMAL
SL AMB POCT CLARITY,UA: ABNORMAL
SL AMB POCT COLOR,UA: ABNORMAL
SL AMB POCT KETONES,UA: ABNORMAL
SL AMB POCT NITRITE,UA: ABNORMAL
SL AMB POCT PH,UA: 5.5
SL AMB POCT SPECIFIC GRAVITY,UA: 1.01
SL AMB POCT URINE PROTEIN: >=300
SL AMB POCT UROBILINOGEN: 0.2

## 2022-08-22 PROCEDURE — 81003 URINALYSIS AUTO W/O SCOPE: CPT | Performed by: NURSE PRACTITIONER

## 2022-08-22 PROCEDURE — 3075F SYST BP GE 130 - 139MM HG: CPT | Performed by: NURSE PRACTITIONER

## 2022-08-22 PROCEDURE — 3061F NEG MICROALBUMINURIA REV: CPT | Performed by: NURSE PRACTITIONER

## 2022-08-22 PROCEDURE — 99214 OFFICE O/P EST MOD 30 MIN: CPT | Performed by: NURSE PRACTITIONER

## 2022-08-22 PROCEDURE — 3725F SCREEN DEPRESSION PERFORMED: CPT | Performed by: NURSE PRACTITIONER

## 2022-08-22 PROCEDURE — 3079F DIAST BP 80-89 MM HG: CPT | Performed by: NURSE PRACTITIONER

## 2022-08-22 RX ORDER — PHENAZOPYRIDINE HYDROCHLORIDE 200 MG/1
TABLET, FILM COATED ORAL
Qty: 12 TABLET | Refills: 0 | Status: SHIPPED | OUTPATIENT
Start: 2022-08-22 | End: 2022-08-26

## 2022-08-22 RX ORDER — NITROFURANTOIN 25; 75 MG/1; MG/1
100 CAPSULE ORAL 2 TIMES DAILY
Qty: 10 CAPSULE | Refills: 0 | Status: SHIPPED | OUTPATIENT
Start: 2022-08-22 | End: 2022-08-27

## 2022-08-22 NOTE — PROGRESS NOTES
Assessment/Plan:    Will start on Macrobid  Urine sent for culture, will f/u with results    1  Gross hematuria  -     POCT urine dip auto non-scope  -     Urine culture  -     nitrofurantoin (MACROBID) 100 mg capsule; Take 1 capsule (100 mg total) by mouth 2 (two) times a day for 5 days  -     phenazopyridine (PYRIDIUM) 200 mg tablet; 1 tab PO TID as needed for burning    2  Type 2 diabetes mellitus with hyperglycemia, without long-term current use of insulin (HCC)  Assessment & Plan:  Uncontrolled  Management per endo  Lab Results   Component Value Date    HGBA1C 9 8 (H) 03/22/2022         3  Benign essential hypertension  Assessment & Plan:  Stable today        Recent Results (from the past 24 hour(s))   POCT urine dip auto non-scope    Collection Time: 08/22/22  4:02 PM   Result Value Ref Range     COLOR,UA fernanda     CLARITY,UA cloudy     SPECIFIC GRAVITY,UA 1 015      PH,UA 5 5     LEUKOCYTE ESTERASE,UA small     NITRITE,UA neg     GLUCOSE, UA neg     KETONES,UA neg     BILIRUBIN,UA neg     BLOOD,UA large     POCT URINE PROTEIN >=300     SL AMB POCT UROBILINOGEN 0 2              There are no Patient Instructions on file for this visit  Return if symptoms worsen or fail to improve  Subjective:      Patient ID: Deana Rodrigez is a 58 y o  female  Chief Complaint   Patient presents with    blood noted in urine     Noticed yesterday  [facility]       Here today with c/o suprapubic pain, frequency, and gross hematuria  Concerned because she has had this in the past and was hospitalized with a UTI  She denies any fevers or flank pain      Difficulty Urinating   This is a new problem  The current episode started today  The problem occurs intermittently  The problem has been gradually worsening  The quality of the pain is described as aching  The pain is at a severity of 4/10  There has been no fever  She is not sexually active  There is no history of pyelonephritis   Associated symptoms include frequency and hematuria  The following portions of the patient's history were reviewed and updated as appropriate: allergies, current medications, past family history, past medical history, past social history, past surgical history and problem list     Review of Systems   Constitutional: Negative  Gastrointestinal: Negative  Genitourinary: Positive for dysuria, frequency and hematuria  All other systems reviewed and are negative  Current Outpatient Medications   Medication Sig Dispense Refill    Artificial Saliva (BIOTENE MOISTURIZING MOUTH MT) Apply to the mouth or throat as needed      atorvastatin (LIPITOR) 40 mg tablet TAKE 1 TABLET BY MOUTH DAILY 90 tablet 1    azelastine (ASTELIN) 0 1 % nasal spray USE 1 SPRAY INTO EACH NOSTRIL EVERY MORNING AS DIRECTED   GENERIC EQUIVALENT FOR ASTELIN 30 mL 3    desloratadine (CLARINEX) 5 MG tablet TAKE 1 TABLET BY MOUTH DAILY 90 tablet 1    hydrOXYzine HCL (ATARAX) 25 mg tablet TAKE 1 TABLET BY MOUTH EVERY NIGHT AT BEDTIME AS NEEDED FOR ITCHING 90 tablet 1    insulin aspart (NovoLOG) 100 Units/mL injection pen Inject 20 units under the skin 3 times a day with meals (Patient taking differently: Inject 22 units under the skin 3 times a day with meals) 15 mL 3    Insulin Pen Needle (TRUEplus Pen Needles) 31G X 6 MM MISC Use 4 times a day to inject insulin 360 each 3    levothyroxine 112 mcg tablet 1 PO Qam 90 tablet 1    metFORMIN (GLUCOPHAGE) 1000 MG tablet TAKE 1 TABLET BY MOUTH TWICE DAILY WITH MEALS 180 tablet 1    metoprolol succinate (TOPROL-XL) 25 mg 24 hr tablet TAKE 1 TABLET BY MOUTH DAILY 90 tablet 1    nitrofurantoin (MACROBID) 100 mg capsule Take 1 capsule (100 mg total) by mouth 2 (two) times a day for 5 days 10 capsule 0    Omega-3 Fatty Acids (FISH OIL PO) Take 1,400 mg by mouth every evening       pantoprazole (PROTONIX) 20 mg tablet Take 1 tablet (20 mg total) by mouth daily 90 tablet 1    phenazopyridine (PYRIDIUM) 200 mg tablet 1 tab PO TID as needed for burning 12 tablet 0    quinapril (ACCUPRIL) 40 MG tablet TAKE 1 TABLET BY MOUTH DAILY (Patient taking differently: 40 mg daily at bedtime) 90 tablet 1    Semglee, yfgn, 100 UNIT/ML SOPN INJECT 60 UNITS UNDER THE SKIN EVERY BEDTIME (Patient taking differently: Inject 70 units at bedtime) 45 mL 3    sitaGLIPtin (Januvia) 100 mg tablet Take 1 tablet (100 mg total) by mouth daily 90 tablet 3    BIOTIN PO Take 10,000 mcg by mouth daily at bedtime   (Patient not taking: Reported on 7/5/2022)      bisacodyl (DULCOLAX) 5 mg EC tablet Take 10 mg by mouth 1 (one) time (Patient not taking: No sig reported)      famotidine (PEPCID) 40 MG tablet Take 1 tablet (40 mg total) by mouth daily 30 tablet 5    ferrous sulfate 324 (65 Fe) mg Take 1 tablet (324 mg total) by mouth daily before breakfast (Patient not taking: Reported on 7/5/2022) 90 tablet 1    ONE TOUCH ULTRA TEST test strip USE TO TEST GLUCOSE TWICE A DAY (Patient not taking: Reported on 7/19/2022) 200 each 1    Polyethylene Glycol 3350 (MIRALAX PO) Take by mouth 1 (one) time       No current facility-administered medications for this visit  Objective:    /80   Pulse (!) 106   Temp (!) 97 1 °F (36 2 °C)   Resp 18   Ht 5' 5" (1 651 m)   Wt 95 3 kg (210 lb)   SpO2 98%   BMI 34 95 kg/m²        Physical Exam  Vitals and nursing note reviewed  Constitutional:       Appearance: She is well-developed  Cardiovascular:      Rate and Rhythm: Normal rate and regular rhythm  Pulses: Normal pulses  Heart sounds: S1 normal and S2 normal    Pulmonary:      Effort: Pulmonary effort is normal       Breath sounds: Normal breath sounds  Abdominal:      General: Bowel sounds are normal  There is no distension  Tenderness: There is no abdominal tenderness  There is no right CVA tenderness or left CVA tenderness  Skin:     General: Skin is warm and dry     Psychiatric:         Mood and Affect: Mood normal          Behavior: Behavior normal                 KHOA Villegas

## 2022-08-22 NOTE — ASSESSMENT & PLAN NOTE
Uncontrolled    Management per endo  Lab Results   Component Value Date    HGBA1C 9 8 (H) 03/22/2022

## 2022-08-23 ENCOUNTER — TELEPHONE (OUTPATIENT)
Dept: ENDOCRINOLOGY | Facility: CLINIC | Age: 62
End: 2022-08-23

## 2022-08-23 NOTE — TELEPHONE ENCOUNTER
Reviewed      Increase semglee to  82 units at bedtime   Increase novolog to 25 units with meals, continue sliding scale, advised to take less mealtime insulin if consuming less carbohydrates  Continue metformin, Januvia at current dose

## 2022-08-24 ENCOUNTER — DOCUMENTATION (OUTPATIENT)
Dept: ENDOCRINOLOGY | Facility: CLINIC | Age: 62
End: 2022-08-24

## 2022-08-24 LAB
BACTERIA UR CULT: ABNORMAL
Lab: ABNORMAL
SL AMB ANTIMICROBIAL SUSCEPTIBILITY: ABNORMAL

## 2022-09-12 ENCOUNTER — TELEPHONE (OUTPATIENT)
Dept: ENDOCRINOLOGY | Facility: CLINIC | Age: 62
End: 2022-09-12

## 2022-09-12 NOTE — TELEPHONE ENCOUNTER
Reviewed  Recommend increasing semglee to 45 units twice a day, increase NovoLog to 28-30 units with meals    If eating less carbohydrates, okay to take a little less insulin   Should check blood sugars at bedtime   Strongly recommend diabetes education

## 2022-09-13 DIAGNOSIS — Z79.4 TYPE 2 DIABETES MELLITUS WITH HYPERGLYCEMIA, WITH LONG-TERM CURRENT USE OF INSULIN (HCC): ICD-10-CM

## 2022-09-13 DIAGNOSIS — E11.65 TYPE 2 DIABETES MELLITUS WITH HYPERGLYCEMIA, WITH LONG-TERM CURRENT USE OF INSULIN (HCC): ICD-10-CM

## 2022-09-13 NOTE — TELEPHONE ENCOUNTER
Patient informed of insulin dose adjustments  She advised that she was taking Semglee at night, since she has to do twice a day when should the doses be taken?

## 2022-09-15 LAB
ALBUMIN SERPL-MCNC: 4.1 G/DL (ref 3.8–4.8)
ALBUMIN/GLOB SERPL: 1.2 {RATIO} (ref 1.2–2.2)
ALP SERPL-CCNC: 87 IU/L (ref 44–121)
ALT SERPL-CCNC: 57 IU/L (ref 0–32)
AST SERPL-CCNC: 52 IU/L (ref 0–40)
BILIRUB SERPL-MCNC: 0.5 MG/DL (ref 0–1.2)
BUN SERPL-MCNC: 22 MG/DL (ref 8–27)
BUN/CREAT SERPL: 19 (ref 12–28)
CALCIUM SERPL-MCNC: 10.1 MG/DL (ref 8.7–10.3)
CHLORIDE SERPL-SCNC: 100 MMOL/L (ref 96–106)
CHOLEST SERPL-MCNC: 196 MG/DL (ref 100–199)
CO2 SERPL-SCNC: 19 MMOL/L (ref 20–29)
CREAT SERPL-MCNC: 1.15 MG/DL (ref 0.57–1)
EGFR: 54 ML/MIN/1.73
ERYTHROCYTE [DISTWIDTH] IN BLOOD BY AUTOMATED COUNT: 15.6 % (ref 11.7–15.4)
EST. AVERAGE GLUCOSE BLD GHB EST-MCNC: 232 MG/DL
GLOBULIN SER-MCNC: 3.4 G/DL (ref 1.5–4.5)
GLUCOSE SERPL-MCNC: 251 MG/DL (ref 65–99)
HBA1C MFR BLD: 9.7 % (ref 4.8–5.6)
HCT VFR BLD AUTO: 35.2 % (ref 34–46.6)
HDLC SERPL-MCNC: 39 MG/DL
HGB BLD-MCNC: 11.2 G/DL (ref 11.1–15.9)
LDLC SERPL CALC-MCNC: 109 MG/DL (ref 0–99)
LDLC/HDLC SERPL: 2.8 RATIO (ref 0–3.2)
MCH RBC QN AUTO: 24.6 PG (ref 26.6–33)
MCHC RBC AUTO-ENTMCNC: 31.8 G/DL (ref 31.5–35.7)
MCV RBC AUTO: 77 FL (ref 79–97)
MICRODELETION SYND BLD/T FISH: NORMAL
MICRODELETION SYND BLD/T FISH: NORMAL
PLATELET # BLD AUTO: 213 X10E3/UL (ref 150–450)
POTASSIUM SERPL-SCNC: 4.9 MMOL/L (ref 3.5–5.2)
PROT SERPL-MCNC: 7.5 G/DL (ref 6–8.5)
RBC # BLD AUTO: 4.55 X10E6/UL (ref 3.77–5.28)
SL AMB VLDL CHOLESTEROL CALC: 48 MG/DL (ref 5–40)
SODIUM SERPL-SCNC: 137 MMOL/L (ref 134–144)
T4 FREE SERPL-MCNC: 1.47 NG/DL (ref 0.82–1.77)
TRIGL SERPL-MCNC: 278 MG/DL (ref 0–149)
TSH SERPL DL<=0.005 MIU/L-ACNC: 4.03 UIU/ML (ref 0.45–4.5)
WBC # BLD AUTO: 8.4 X10E3/UL (ref 3.4–10.8)

## 2022-09-16 DIAGNOSIS — I10 BENIGN ESSENTIAL HYPERTENSION: ICD-10-CM

## 2022-09-16 RX ORDER — METOPROLOL SUCCINATE 25 MG/1
TABLET, EXTENDED RELEASE ORAL
Qty: 90 TABLET | Refills: 1 | Status: SHIPPED | OUTPATIENT
Start: 2022-09-16

## 2022-09-22 ENCOUNTER — OFFICE VISIT (OUTPATIENT)
Dept: FAMILY MEDICINE CLINIC | Facility: CLINIC | Age: 62
End: 2022-09-22
Payer: COMMERCIAL

## 2022-09-22 VITALS
RESPIRATION RATE: 20 BRPM | WEIGHT: 212 LBS | SYSTOLIC BLOOD PRESSURE: 136 MMHG | HEART RATE: 92 BPM | BODY MASS INDEX: 35.32 KG/M2 | TEMPERATURE: 99.2 F | DIASTOLIC BLOOD PRESSURE: 68 MMHG | HEIGHT: 65 IN

## 2022-09-22 DIAGNOSIS — Z23 NEED FOR VACCINATION: ICD-10-CM

## 2022-09-22 DIAGNOSIS — E78.2 MIXED HYPERLIPIDEMIA: ICD-10-CM

## 2022-09-22 DIAGNOSIS — I10 BENIGN ESSENTIAL HYPERTENSION: ICD-10-CM

## 2022-09-22 DIAGNOSIS — E11.65 TYPE 2 DIABETES MELLITUS WITH HYPERGLYCEMIA, WITHOUT LONG-TERM CURRENT USE OF INSULIN (HCC): Primary | ICD-10-CM

## 2022-09-22 DIAGNOSIS — K76.0 FATTY LIVER: ICD-10-CM

## 2022-09-22 DIAGNOSIS — E03.9 PRIMARY HYPOTHYROIDISM: ICD-10-CM

## 2022-09-22 DIAGNOSIS — Z79.899 ENCOUNTER FOR LONG-TERM CURRENT USE OF MEDICATION: ICD-10-CM

## 2022-09-22 PROCEDURE — 3078F DIAST BP <80 MM HG: CPT | Performed by: FAMILY MEDICINE

## 2022-09-22 PROCEDURE — 90682 RIV4 VACC RECOMBINANT DNA IM: CPT

## 2022-09-22 PROCEDURE — 90472 IMMUNIZATION ADMIN EACH ADD: CPT

## 2022-09-22 PROCEDURE — 90677 PCV20 VACCINE IM: CPT

## 2022-09-22 PROCEDURE — 90471 IMMUNIZATION ADMIN: CPT

## 2022-09-22 PROCEDURE — 99214 OFFICE O/P EST MOD 30 MIN: CPT | Performed by: FAMILY MEDICINE

## 2022-09-22 PROCEDURE — 3075F SYST BP GE 130 - 139MM HG: CPT | Performed by: FAMILY MEDICINE

## 2022-09-22 NOTE — ASSESSMENT & PLAN NOTE
Lab Results   Component Value Date    HGBA1C 9 7 (H) 09/14/2022     Not controlled  Being managed by endocrinology

## 2022-09-22 NOTE — PROGRESS NOTES
Name: Francisco J Yeung      : 1960      MRN: [id number]  Encounter Provider: Claudy Foster DO  Encounter Date: 2022   Encounter department: [facility]     1  Type 2 diabetes mellitus with hyperglycemia, without long-term current use of insulin [facility])  Assessment & Plan:    Lab Results   Component Value Date    HGBA1C 9 7 (H) 2022     Not controlled  Being managed by endocrinology    Orders:  -     Hemoglobin A1C; Future; Expected date: 2023  -     Microalbumin / creatinine urine ratio; Future; Expected date: 2023    2  Need for vaccination  -     Pneumococcal Conjugate Vaccine 20-valent (Pcv20)  -     influenza vaccine, quadrivalent, recombinant, PF, 0 5 mL, for patients 18 yr+ (FLUBLOK)    3  Benign essential hypertension  Assessment & Plan:  Blood pressure is stable   Continue metoprolol 25 mg daily and quinapril 40 mg daily    Orders:  -     CBC; Future; Expected date: 2023  -     Comprehensive metabolic panel; Future; Expected date: 2023    4  Primary hypothyroidism  -     TSH, 3rd generation; Future; Expected date: 2023  -     T4, free; Future; Expected date: 2023    5  Mixed hyperlipidemia  Assessment & Plan:  Stable   Continue atorvastatin 40 mg daily    Orders:  -     Lipid Panel with Direct LDL reflex; Future; Expected date: 2023    6  Fatty liver  Assessment & Plan:  Following with her gastroenterologist  Has an order for a liver ultrasound  FIB 4 score is 2      7  Encounter for long-term current use of medication  -     Vitamin B12; Future; Expected date: 2023      BMI Counseling: Body mass index is 35 28 kg/m²  The BMI is above normal  Exercise recommendations include exercising 3-5 times per week  Rationale for BMI follow-up plan is due to patient being overweight or obese  Subjective      Diabetes  She has type 2 diabetes mellitus  No MedicAlert identification noted   The initial diagnosis of diabetes was made 27 years ago  Pertinent negatives for hypoglycemia include no confusion, dizziness, headaches, hunger, mood changes, nervousness/anxiousness, pallor, seizures, sleepiness, speech difficulty, sweats or tremors  Pertinent negatives for diabetes include no blurred vision, no chest pain, no fatigue, no foot paresthesias, no foot ulcerations, no polydipsia, no polyphagia, no polyuria, no visual change, no weakness and no weight loss  Pertinent negatives for hypoglycemia complications include no blackouts, no hospitalization, no nocturnal hypoglycemia, no required assistance and no required glucagon injection  Symptoms are improving  Pertinent negatives for diabetic complications include no CVA, heart disease, impotence, nephropathy, peripheral neuropathy, PVD or retinopathy  Risk factors for coronary artery disease include obesity, post-menopausal and sedentary lifestyle  Current diabetic treatment includes diet, insulin injections and oral agent (dual therapy)  She is currently taking insulin pre-breakfast, pre-lunch, pre-dinner and at bedtime  Insulin injections are given by patient  Rotation sites for injection include the abdominal wall  Her weight is stable  She is following a generally healthy diet  Meal planning includes ADA exchanges  She has had a previous visit with a dietitian  She rarely participates in exercise  She monitors blood glucose at home 3-4 x per day  She monitors urine at home <1 x per month  Blood glucose monitoring compliance is good  Her home blood glucose trend is fluctuating minimally  Her breakfast blood glucose is taken after 10 am  Her breakfast blood glucose range is generally >200 mg/dl  Her lunch blood glucose is taken between 2-3 pm  Her lunch blood glucose range is generally 180-200 mg/dl  Her dinner blood glucose is taken between 6-7 pm  Her dinner blood glucose range is generally 140-180 mg/dl  Her highest blood glucose is >200 mg/dl   Her overall blood glucose range is >200 mg/dl  She does not see a podiatrist Eye exam is current  Review of Systems   Constitutional: Negative for fatigue and weight loss  Eyes: Negative for blurred vision  Cardiovascular: Negative for chest pain  Endocrine: Negative for polydipsia, polyphagia and polyuria  Genitourinary: Negative for impotence  Skin: Negative for pallor  Neurological: Negative for dizziness, tremors, seizures, speech difficulty, weakness and headaches  Psychiatric/Behavioral: Negative for confusion  The patient is not nervous/anxious  Current Outpatient Medications on File Prior to Visit   Medication Sig    Artificial Saliva (BIOTENE MOISTURIZING MOUTH MT) Apply to the mouth or throat as needed    atorvastatin (LIPITOR) 40 mg tablet TAKE 1 TABLET BY MOUTH DAILY    azelastine (ASTELIN) 0 1 % nasal spray USE 1 SPRAY INTO EACH NOSTRIL EVERY MORNING AS DIRECTED   GENERIC EQUIVALENT FOR ASTELIN    BIOTIN PO Take 10,000 mcg by mouth daily at bedtime    desloratadine (CLARINEX) 5 MG tablet TAKE 1 TABLET BY MOUTH DAILY    ferrous sulfate 324 (65 Fe) mg Take 1 tablet (324 mg total) by mouth daily before breakfast    hydrOXYzine HCL (ATARAX) 25 mg tablet TAKE 1 TABLET BY MOUTH EVERY NIGHT AT BEDTIME AS NEEDED FOR ITCHING    insulin aspart (NovoLOG) 100 Units/mL injection pen Inject 28-30 units under the skin 3 times a day with meals    Insulin Pen Needle (TRUEplus Pen Needles) 31G X 6 MM MISC Use 4 times a day to inject insulin    levothyroxine 112 mcg tablet 1 PO Qam    metFORMIN (GLUCOPHAGE) 1000 MG tablet TAKE 1 TABLET BY MOUTH TWICE DAILY WITH MEALS    metoprolol succinate (TOPROL-XL) 25 mg 24 hr tablet TAKE 1 TABLET BY MOUTH DAILY    Omega-3 Fatty Acids (FISH OIL PO) Take 1,400 mg by mouth every evening     pantoprazole (PROTONIX) 20 mg tablet Take 1 tablet (20 mg total) by mouth daily    quinapril (ACCUPRIL) 40 MG tablet TAKE 1 TABLET BY MOUTH DAILY (Patient taking differently: 40 mg daily at bedtime)    Amarilys, yfgn, 100 UNIT/ML SOPN INJECT 60 UNITS UNDER THE SKIN EVERY BEDTIME (Patient taking differently: 45 units twice daily)    sitaGLIPtin (Januvia) 100 mg tablet Take 1 tablet (100 mg total) by mouth daily    [DISCONTINUED] ONE TOUCH ULTRA TEST test strip USE TO TEST GLUCOSE TWICE A DAY (Patient not taking: No sig reported)    [DISCONTINUED] Polyethylene Glycol 3350 (MIRALAX PO) Take by mouth 1 (one) time       Objective     /68   Pulse 92   Temp 99 2 °F (37 3 °C)   Resp 20   Ht 5' 5" (1 651 m)   Wt 96 2 kg (212 lb)   BMI 35 28 kg/m²     Physical Exam  Vitals and nursing note reviewed  Constitutional:       Appearance: She is well-developed  HENT:      Head: Normocephalic and atraumatic  Right Ear: Tympanic membrane and external ear normal       Left Ear: Tympanic membrane and external ear normal    Cardiovascular:      Rate and Rhythm: Normal rate and regular rhythm  Pulses: no weak pulses          Dorsalis pedis pulses are 1+ on the right side and 1+ on the left side  Posterior tibial pulses are 1+ on the right side and 1+ on the left side  Heart sounds: No murmur heard  No friction rub  Pulmonary:      Effort: Pulmonary effort is normal  No respiratory distress  Breath sounds: Normal breath sounds  No wheezing or rales  Musculoskeletal:      Right lower leg: No edema  Left lower leg: No edema  Feet:      Right foot:      Skin integrity: No ulcer, skin breakdown, erythema, warmth, callus or dry skin  Left foot:      Skin integrity: No ulcer, skin breakdown, erythema, warmth, callus or dry skin  Patient's shoes and socks removed  Right Foot/Ankle   Right Foot Inspection  Skin Exam: skin normal  Skin not intact, no dry skin, no warmth, no callus, no erythema, no maceration, no abnormal color, no pre-ulcer, no ulcer and no callus  Toe Exam: ROM and strength within normal limits       Sensory   Monofilament testing: intact    Vascular  Capillary refills: < 3 seconds  The right DP pulse is 1+  The right PT pulse is 1+  Left Foot/Ankle  Left Foot Inspection  Skin Exam: skin normal  Skin not intact, no dry skin, no warmth, no erythema, no maceration, normal color, no pre-ulcer, no ulcer and no callus  Toe Exam: ROM and strength within normal limits  Sensory   Monofilament testing: intact    Vascular  Capillary refills: < 3 seconds  The left DP pulse is 1+  The left PT pulse is 1+       Assign Risk Category  No deformity present  No loss of protective sensation  No weak pulses  Risk: 0     Clarissa Carlos DO

## 2022-09-29 ENCOUNTER — APPOINTMENT (OUTPATIENT)
Dept: LAB | Facility: HOSPITAL | Age: 62
End: 2022-09-29
Payer: COMMERCIAL

## 2022-09-29 ENCOUNTER — HOSPITAL ENCOUNTER (OUTPATIENT)
Dept: RADIOLOGY | Facility: HOSPITAL | Age: 62
Discharge: HOME | End: 2022-09-29
Attending: INTERNAL MEDICINE
Payer: COMMERCIAL

## 2022-09-29 DIAGNOSIS — R79.89 LFT ELEVATION: ICD-10-CM

## 2022-09-29 DIAGNOSIS — R79.89 HYPOURICEMIA: ICD-10-CM

## 2022-09-29 DIAGNOSIS — D50.0 IRON DEFICIENCY ANEMIA DUE TO CHRONIC BLOOD LOSS: ICD-10-CM

## 2022-09-29 DIAGNOSIS — E11.65 TYPE 2 DIABETES MELLITUS WITH HYPERGLYCEMIA, WITHOUT LONG-TERM CURRENT USE OF INSULIN (HCC): ICD-10-CM

## 2022-09-29 LAB
BASOPHILS # BLD AUTO: 0.05 THOUSANDS/ΜL (ref 0–0.1)
BASOPHILS NFR BLD AUTO: 1 % (ref 0–1)
CREAT UR-MCNC: 52.2 MG/DL
EOSINOPHIL # BLD AUTO: 0.13 THOUSAND/ΜL (ref 0–0.61)
EOSINOPHIL NFR BLD AUTO: 2 % (ref 0–6)
ERYTHROCYTE [DISTWIDTH] IN BLOOD BY AUTOMATED COUNT: 15.7 % (ref 11.6–15.1)
HCT VFR BLD AUTO: 36 % (ref 34.8–46.1)
HGB BLD-MCNC: 11.3 G/DL (ref 11.5–15.4)
IMM GRANULOCYTES # BLD AUTO: 0.03 THOUSAND/UL (ref 0–0.2)
IMM GRANULOCYTES NFR BLD AUTO: 0 % (ref 0–2)
LYMPHOCYTES # BLD AUTO: 1.45 THOUSANDS/ΜL (ref 0.6–4.47)
LYMPHOCYTES NFR BLD AUTO: 19 % (ref 14–44)
MCH RBC QN AUTO: 25.4 PG (ref 26.8–34.3)
MCHC RBC AUTO-ENTMCNC: 31.4 G/DL (ref 31.4–37.4)
MCV RBC AUTO: 81 FL (ref 82–98)
MICROALBUMIN UR-MCNC: 1490 MG/L (ref 0–20)
MICROALBUMIN/CREAT 24H UR: 2854 MG/G CREATININE (ref 0–30)
MONOCYTES # BLD AUTO: 0.45 THOUSAND/ΜL (ref 0.17–1.22)
MONOCYTES NFR BLD AUTO: 6 % (ref 4–12)
NEUTROPHILS # BLD AUTO: 5.61 THOUSANDS/ΜL (ref 1.85–7.62)
NEUTS SEG NFR BLD AUTO: 72 % (ref 43–75)
NRBC BLD AUTO-RTO: 0 /100 WBCS
PLATELET # BLD AUTO: 176 THOUSANDS/UL (ref 149–390)
PMV BLD AUTO: 10.7 FL (ref 8.9–12.7)
RBC # BLD AUTO: 4.45 MILLION/UL (ref 3.81–5.12)
WBC # BLD AUTO: 7.72 THOUSAND/UL (ref 4.31–10.16)

## 2022-09-29 PROCEDURE — 86381 MITOCHONDRIAL ANTIBODY EACH: CPT

## 2022-09-29 PROCEDURE — 3062F POS MACROALBUMINURIA REV: CPT | Performed by: FAMILY MEDICINE

## 2022-09-29 PROCEDURE — 84681 ASSAY OF C-PEPTIDE: CPT

## 2022-09-29 PROCEDURE — 76705 ECHO EXAM OF ABDOMEN: CPT

## 2022-09-29 PROCEDURE — 85025 COMPLETE CBC W/AUTO DIFF WBC: CPT

## 2022-09-29 PROCEDURE — 82103 ALPHA-1-ANTITRYPSIN TOTAL: CPT

## 2022-09-29 PROCEDURE — 82043 UR ALBUMIN QUANTITATIVE: CPT

## 2022-09-29 PROCEDURE — 86038 ANTINUCLEAR ANTIBODIES: CPT

## 2022-09-29 PROCEDURE — 82570 ASSAY OF URINE CREATININE: CPT

## 2022-09-29 PROCEDURE — 86015 ACTIN ANTIBODY EACH: CPT

## 2022-09-29 PROCEDURE — 36415 COLL VENOUS BLD VENIPUNCTURE: CPT

## 2022-09-30 LAB
A1AT SERPL-MCNC: 161 MG/DL (ref 101–187)
ACTIN IGG SERPL-ACNC: 9 UNITS (ref 0–19)
C PEPTIDE SERPL-MCNC: 4.8 NG/ML (ref 1.1–4.4)
MITOCHONDRIA M2 IGG SER-ACNC: <20 UNITS (ref 0–20)
RYE IGE QN: NEGATIVE

## 2022-10-03 ENCOUNTER — TELEPHONE (OUTPATIENT)
Dept: ENDOCRINOLOGY | Facility: CLINIC | Age: 62
End: 2022-10-03

## 2022-10-03 NOTE — TELEPHONE ENCOUNTER
Reviewed      Fewer blood sugars in the 300s however blood sugars continue to be high, above goal     Recommend increasing glargine to 5 2 units twice a day, increase NovoLog to 34 units with meals  Continue metformin, Januvia at current dose   Recommend follow-up , in follow-up, may need to consider insulin U 500

## 2022-10-04 DIAGNOSIS — E11.9 TYPE 2 DIABETES MELLITUS WITHOUT COMPLICATION, WITHOUT LONG-TERM CURRENT USE OF INSULIN (HCC): ICD-10-CM

## 2022-10-04 DIAGNOSIS — E78.2 MIXED HYPERLIPIDEMIA: ICD-10-CM

## 2022-10-04 DIAGNOSIS — N30.10 CHRONIC INTERSTITIAL CYSTITIS: ICD-10-CM

## 2022-10-04 DIAGNOSIS — I10 BENIGN ESSENTIAL HYPERTENSION: ICD-10-CM

## 2022-10-04 RX ORDER — SITAGLIPTIN 100 MG/1
TABLET, FILM COATED ORAL
Qty: 90 TABLET | Refills: 3 | Status: SHIPPED | OUTPATIENT
Start: 2022-10-04

## 2022-10-04 RX ORDER — HYDROXYZINE HYDROCHLORIDE 25 MG/1
TABLET, FILM COATED ORAL
Qty: 90 TABLET | Refills: 1 | Status: SHIPPED | OUTPATIENT
Start: 2022-10-04

## 2022-10-04 RX ORDER — QUINAPRIL 40 MG/1
TABLET ORAL
Qty: 90 TABLET | Refills: 1 | Status: SHIPPED | OUTPATIENT
Start: 2022-10-04

## 2022-10-04 RX ORDER — ATORVASTATIN CALCIUM 40 MG/1
TABLET, FILM COATED ORAL
Qty: 90 TABLET | Refills: 1 | Status: SHIPPED | OUTPATIENT
Start: 2022-10-04

## 2022-10-07 ENCOUNTER — DOCUMENTATION (OUTPATIENT)
Dept: ENDOCRINOLOGY | Facility: CLINIC | Age: 62
End: 2022-10-07

## 2022-10-07 DIAGNOSIS — K76.0 FATTY LIVER: Primary | ICD-10-CM

## 2022-10-07 NOTE — RESULT ENCOUNTER NOTE
Please call the patient regarding her result  Ultrasound shows evidence of fat deposition in the liver as suspected  Labs do not suggest any other underlying liver disease    Will order additional blood work to evaluate for celiac disease and elastography to look for any scar tissue within the liver

## 2022-10-17 ENCOUNTER — HOSPITAL ENCOUNTER (OUTPATIENT)
Dept: RADIOLOGY | Facility: HOSPITAL | Age: 62
Discharge: HOME | End: 2022-10-17
Attending: INTERNAL MEDICINE
Payer: COMMERCIAL

## 2022-10-17 ENCOUNTER — APPOINTMENT (OUTPATIENT)
Dept: LAB | Facility: HOSPITAL | Age: 62
End: 2022-10-17
Payer: COMMERCIAL

## 2022-10-17 DIAGNOSIS — K76.0 FATTY LIVER: ICD-10-CM

## 2022-10-17 PROCEDURE — 82784 ASSAY IGA/IGD/IGG/IGM EACH: CPT

## 2022-10-17 PROCEDURE — 86364 TISS TRNSGLTMNASE EA IG CLAS: CPT

## 2022-10-17 PROCEDURE — 86258 DGP ANTIBODY EACH IG CLASS: CPT

## 2022-10-17 PROCEDURE — 86231 EMA EACH IG CLASS: CPT

## 2022-10-17 PROCEDURE — 36415 COLL VENOUS BLD VENIPUNCTURE: CPT

## 2022-10-18 ENCOUNTER — HOSPITAL ENCOUNTER (OUTPATIENT)
Dept: RADIOLOGY | Facility: HOSPITAL | Age: 62
Discharge: HOME | End: 2022-10-18
Attending: INTERNAL MEDICINE
Payer: COMMERCIAL

## 2022-10-18 LAB
ENDOMYSIUM IGA SER QL: NEGATIVE
GLIADIN PEPTIDE IGA SER-ACNC: 5 UNITS (ref 0–19)
GLIADIN PEPTIDE IGG SER-ACNC: 2 UNITS (ref 0–19)
IGA SERPL-MCNC: 274 MG/DL (ref 87–352)
TTG IGA SER-ACNC: <2 U/ML (ref 0–3)
TTG IGG SER-ACNC: 16 U/ML (ref 0–5)

## 2022-10-18 PROCEDURE — 76981 USE PARENCHYMA: CPT

## 2022-10-19 ENCOUNTER — OFFICE VISIT (OUTPATIENT)
Dept: ENDOCRINOLOGY | Facility: CLINIC | Age: 62
End: 2022-10-19
Payer: COMMERCIAL

## 2022-10-19 VITALS
WEIGHT: 214.7 LBS | HEIGHT: 65 IN | HEART RATE: 85 BPM | BODY MASS INDEX: 35.77 KG/M2 | SYSTOLIC BLOOD PRESSURE: 132 MMHG | DIASTOLIC BLOOD PRESSURE: 80 MMHG

## 2022-10-19 DIAGNOSIS — E78.2 MIXED HYPERLIPIDEMIA: ICD-10-CM

## 2022-10-19 DIAGNOSIS — E03.9 PRIMARY HYPOTHYROIDISM: ICD-10-CM

## 2022-10-19 DIAGNOSIS — E66.01 CLASS 2 SEVERE OBESITY WITH SERIOUS COMORBIDITY AND BODY MASS INDEX (BMI) OF 35.0 TO 35.9 IN ADULT, UNSPECIFIED OBESITY TYPE (HCC): ICD-10-CM

## 2022-10-19 DIAGNOSIS — I10 BENIGN ESSENTIAL HYPERTENSION: ICD-10-CM

## 2022-10-19 DIAGNOSIS — Z79.4 TYPE 2 DIABETES MELLITUS WITH HYPERGLYCEMIA, WITH LONG-TERM CURRENT USE OF INSULIN (HCC): Primary | ICD-10-CM

## 2022-10-19 DIAGNOSIS — E11.65 TYPE 2 DIABETES MELLITUS WITH HYPERGLYCEMIA, WITH LONG-TERM CURRENT USE OF INSULIN (HCC): Primary | ICD-10-CM

## 2022-10-19 PROCEDURE — 99215 OFFICE O/P EST HI 40 MIN: CPT | Performed by: INTERNAL MEDICINE

## 2022-10-19 RX ORDER — INSULIN GLARGINE 300 U/ML
INJECTION, SOLUTION SUBCUTANEOUS
Qty: 34 ML | Refills: 3 | Status: SHIPPED | OUTPATIENT
Start: 2022-10-19

## 2022-10-19 RX ORDER — NEOMYCIN SULFATE, POLYMYXIN B SULFATE, AND DEXAMETHASONE 3.5; 10000; 1 MG/G; [USP'U]/G; MG/G
OINTMENT OPHTHALMIC
COMMUNITY
Start: 2022-10-13

## 2022-10-19 NOTE — PATIENT INSTRUCTIONS
Continue semglee 52 units subcutaneously morning, increase to 56 units at bedtime  When you run out of the long-acting insulin, will change to concentrated Toujeo, in follow-up make it once a day   Continue rest of current regimen   Plan for review of blood sugars in 2-3 weeks   Consistent carb diet  Follow up in 3 months

## 2022-10-19 NOTE — PROGRESS NOTES
Greg Julio 58 y o  female MRN: [id number]    Encounter: [id number]      Assessment/Plan     1  Type 2 diabetes mellitus long-term insulin therapy with hyperglycemia   2  Obesity   Poorly controlled with Last A1c 9 7%, recent blood sugars there is some improvement    Recommend the following at this time  Continue semglee 52 units subcutaneously morning, increase to 56 units at bedtime   will change to concentrated Toujeo u300, in follow-up make it once a day   Continue rest of current regimen   Patient to send blood sugar log review in 2-3 weeks   Discussed flexible insulin therapy, with higher carbohydrate meals, should take more mealtime insulin  Patient will consider following up with CDE to discuss carbohydrate estimation   -consistent carb diet, regular exercise recommended  -follow-up in 3 months with repeat labs    3  Hyperlipidemia-improving  - continue statin therapy    4  Hypertension  Blood pressure at goal  - continue ACE-I/ ARB    5  Hypothyroidism-continue current dose of levothyroxine    CC: Diabetes    History of Present Illness     HPI:  Greg Julio is a 58 y o  female presents for a follow-up visit regarding diabetes management  Last seen in 5/2022  Last Eye exam: 10/2022- no DR     Current regimen:   Metformin 1 gm BID   Januvia 100 mg daily   Novolog 34 units TID + SSI   Semglee 52 units twice a day     Developed side effects with glp 1 agonist and S GLT 2 inhibitors in the past and so needed to be discontinued     Statin: lipitor 40   ACE-I/ARB: Quinapril   Levothyroxine 112 mcg daily     Feels well, does not have any complaints  Admits to some dietary indiscretion, has been trying to eat better overall  Not much exercise   Has gained 10 lbs since the last visit   Has met with CDE in the past, feels she needs to make more changes at her own and, knows what she needs to do    Has been avoiding ice cream, choose lower carbohydrate snacks at bedtime  Denies symptoms of hypo or or hyperthyroidism       Home glucose monitoring:  CGM interpretation   Dexcom   Time percentage CGM worn  93%   Time in range 23  (goal >65-70%)  High 59  Very high-18%  Low-0 %  Very low -0%    SD 49 (goal <50)     Average glucose 213 mg/dL  GMI 8 4 %     Symptoms of hypoglycemia :  None ; no symptoms when BG was 78 mg/dl       All other systems were reviewed and are negative  Review of Systems      Historical Information   Past Medical History:   Diagnosis Date   • Anemia    • Colon polyp    • Deviated nasal septum    • Diabetes mellitus ([address] )    • Disease of thyroid gland     hypo   • Gastroparesis    • Hearing aid worn     right ear only   • Hyperlipidemia    • Hypertension      Past Surgical History:   Procedure Laterality Date   •  SECTION, LOW TRANSVERSE      x2   • COLONOSCOPY N/A 2016    Procedure: COLONOSCOPY;  Surgeon: Marlene Moran MD;  Location: [facility] GI LAB;   Service:    • CYST REMOVAL Left     elbow   • DILATION AND CURETTAGE OF UTERUS     • GANGLION CYST EXCISION Right    • RESECTION TONSIL RADICAL     • TUBAL LIGATION     • VARICOSE VEIN SURGERY Bilateral 2009    x3 procedures   • WISDOM TOOTH EXTRACTION       Social History   Social History     Substance and Sexual Activity   Alcohol Use Yes    Comment: rare     Social History     Substance and Sexual Activity   Drug Use Never     Social History     Tobacco Use   Smoking Status Former Smoker   • Packs/day: 1 00   • Years: 10 00   • Pack years: 10 00   • Quit date: 26   • Years since quittin 8   Smokeless Tobacco Never Used     Family History:   Family History   Problem Relation Age of Onset   • Stroke Mother    • Diabetes Mother    • Arthritis Father    • Heart disease Father         CABG   • Heart disease Daughter         congenital-truncus arteriosus   • Congenital heart disease Daughter    • Heart disease Son         congenital-"hole" repair   • Other Sister         meningioma       Meds/Allergies   Current Outpatient Medications   Medication Sig Dispense Refill   • Artificial Saliva (BIOTENE MOISTURIZING MOUTH MT) Apply to the mouth or throat as needed     • atorvastatin (LIPITOR) 40 mg tablet TAKE 1 TABLET BY MOUTH DAILY 90 tablet 1   • azelastine (ASTELIN) 0 1 % nasal spray USE 1 SPRAY INTO EACH NOSTRIL EVERY MORNING AS DIRECTED  GENERIC EQUIVALENT FOR ASTELIN 30 mL 3   • BIOTIN PO Take 10,000 mcg by mouth daily at bedtime     • desloratadine (CLARINEX) 5 MG tablet TAKE 1 TABLET BY MOUTH DAILY 90 tablet 1   • ferrous sulfate 324 (65 Fe) mg Take 1 tablet (324 mg total) by mouth daily before breakfast 90 tablet 1   • hydrOXYzine HCL (ATARAX) 25 mg tablet TAKE 1 TABLET BY MOUTH EVERY NIGHT AT BEDTIME AS NEEDED FOR ITCHING 90 tablet 1   • insulin aspart (NovoLOG) 100 Units/mL injection pen Inject 28-30 units under the skin 3 times a day with meals (Patient taking differently: 34 Units Inject 34 units under the skin 3 times a day with meals) 30 mL 3   • Insulin Pen Needle (TRUEplus Pen Needles) 31G X 6 MM MISC Use 4 times a day to inject insulin 360 each 3   • Januvia 100 MG tablet TAKE 1 TABLET BY MOUTH DAILY 90 tablet 3   • levothyroxine 112 mcg tablet 1 PO Qam 90 tablet 1   • metFORMIN (GLUCOPHAGE) 1000 MG tablet TAKE 1 TABLET BY MOUTH TWICE DAILY WITH MEALS 180 tablet 1   • metoprolol succinate (TOPROL-XL) 25 mg 24 hr tablet TAKE 1 TABLET BY MOUTH DAILY 90 tablet 1   • neomycin-polymyxin-dexamethasone (MAXITROL) 0 35%-10,000 units/g-0 1%      • Omega-3 Fatty Acids (FISH OIL PO) Take 1,400 mg by mouth every evening      • pantoprazole (PROTONIX) 20 mg tablet Take 1 tablet (20 mg total) by mouth daily 90 tablet 1   • quinapril (ACCUPRIL) 40 MG tablet TAKE 1 TABLET BY MOUTH DAILY 90 tablet 1   • yamel Pandagn, 100 UNIT/ML SOPN INJECT 60 UNITS UNDER THE SKIN EVERY BEDTIME (Patient taking differently: 52 units twice daily) 45 mL 3     No current facility-administered medications for this visit       No Known Allergies    Objective Vitals: Blood pressure 132/80, pulse 85, height 5' 5" (1 651 m), weight 97 4 kg (214 lb 11 2 oz), not currently breastfeeding  Physical Exam  Constitutional:       Appearance: She is well-developed  HENT:      Head: Normocephalic and atraumatic  Eyes:      Conjunctiva/sclera: Conjunctivae normal       Pupils: Pupils are equal, round, and reactive to light  Neck:      Thyroid: No thyromegaly  Cardiovascular:      Rate and Rhythm: Normal rate and regular rhythm  Heart sounds: Normal heart sounds  No murmur heard  Pulmonary:      Effort: Pulmonary effort is normal       Breath sounds: Normal breath sounds  No wheezing  Abdominal:      General: There is no distension  Palpations: Abdomen is soft  Tenderness: There is no abdominal tenderness  Musculoskeletal:         General: Normal range of motion  Cervical back: Normal range of motion and neck supple  Skin:     General: Skin is warm and dry  Neurological:      Mental Status: She is alert and oriented to person, place, and time  Psychiatric:         Behavior: Behavior normal          The history was obtained from the review of the chart, patient      Lab Results:   Lab Results   Component Value Date/Time    Hemoglobin A1C 9 7 (H) 09/14/2022 10:37 AM    Hemoglobin A1C 9 8 (H) 03/22/2022 10:20 AM    Hemoglobin A1C 9 3 (H) 11/24/2021 11:06 AM    WBC 7 72 09/29/2022 11:21 AM    White Blood Cell Count 8 4 09/14/2022 10:37 AM    White Blood Cell Count 9 9 03/22/2022 10:20 AM    White Blood Cell Count 9 3 11/24/2021 11:06 AM    Hemoglobin 11 3 (L) 09/29/2022 11:21 AM    Hemoglobin 11 2 09/14/2022 10:37 AM    Hemoglobin 11 3 03/22/2022 10:20 AM    Hemoglobin 9 1 (L) 11/24/2021 11:06 AM    HCT 35 2 09/14/2022 10:37 AM    HCT 35 9 03/22/2022 10:20 AM    HCT 30 2 (L) 11/24/2021 11:06 AM    Hematocrit 36 0 09/29/2022 11:21 AM    MCV 81 (L) 09/29/2022 11:21 AM    MCV 77 (L) 09/14/2022 10:37 AM    MCV 76 (L) 03/22/2022 10:20 AM    MCV 72 (L) 11/24/2021 11:06 AM    Platelet Count 458 37/51/3016 10:37 AM    Platelet Count 154 88/30/0397 10:20 AM    Platelet Count 449 97/59/9541 11:06 AM    Platelets 139 98/70/1024 11:21 AM    BUN 22 09/14/2022 10:37 AM    BUN 22 03/22/2022 10:20 AM    BUN 14 11/24/2021 11:06 AM    Potassium 4 9 09/14/2022 10:37 AM    Potassium 5 0 03/22/2022 10:20 AM    Potassium 4 9 11/24/2021 11:06 AM    Chloride 100 09/14/2022 10:37 AM    Chloride 98 03/22/2022 10:20 AM    Chloride 97 11/24/2021 11:06 AM    CO2 19 (L) 09/14/2022 10:37 AM    CO2 21 03/22/2022 10:20 AM    CO2 21 11/24/2021 11:06 AM    Creatinine 1 15 (H) 09/14/2022 10:37 AM    Creatinine 1 12 (H) 03/22/2022 10:20 AM    Creatinine 1 00 11/24/2021 11:06 AM    AST 52 (H) 09/14/2022 10:37 AM    AST 43 (H) 03/22/2022 10:20 AM    AST 56 (H) 11/24/2021 11:06 AM    ALT 57 (H) 09/14/2022 10:37 AM    ALT 45 (H) 03/22/2022 10:20 AM    ALT 51 (H) 11/24/2021 11:06 AM    Albumin 4 1 09/14/2022 10:37 AM    Albumin 4 3 03/22/2022 10:20 AM    Albumin 4 1 11/24/2021 11:06 AM    Globulin, Total 3 4 09/14/2022 10:37 AM    Globulin, Total 3 2 03/22/2022 10:20 AM    Globulin, Total 3 0 11/24/2021 11:06 AM    HDL 39 (L) 09/14/2022 10:37 AM    HDL 31 (L) 03/22/2022 10:20 AM    HDL 31 (L) 11/24/2021 11:06 AM    Triglycerides 278 (H) 09/14/2022 10:37 AM    Triglycerides 331 (H) 03/22/2022 10:20 AM    Triglycerides 229 (H) 11/24/2021 11:06 AM         Imaging Studies: I have personally reviewed pertinent reports  Portions of the record may have been created with voice recognition software  Occasional wrong word or "sound a like" substitutions may have occurred due to the inherent limitations of voice recognition software  Read the chart carefully and recognize, using context, where substitutions have occurred

## 2022-10-20 ENCOUNTER — APPOINTMENT (OUTPATIENT)
Dept: LAB | Facility: CLINIC | Age: 62
End: 2022-10-20
Payer: COMMERCIAL

## 2022-10-20 ENCOUNTER — OFFICE VISIT (OUTPATIENT)
Dept: GASTROENTEROLOGY | Facility: CLINIC | Age: 62
End: 2022-10-20
Payer: COMMERCIAL

## 2022-10-20 VITALS
DIASTOLIC BLOOD PRESSURE: 70 MMHG | WEIGHT: 214 LBS | HEIGHT: 65 IN | SYSTOLIC BLOOD PRESSURE: 147 MMHG | BODY MASS INDEX: 35.65 KG/M2 | HEART RATE: 92 BPM

## 2022-10-20 DIAGNOSIS — K76.0 FATTY LIVER: ICD-10-CM

## 2022-10-20 DIAGNOSIS — R79.89 LFT ELEVATION: Primary | ICD-10-CM

## 2022-10-20 DIAGNOSIS — R89.4 ABNORMAL CELIAC ANTIBODY PANEL: ICD-10-CM

## 2022-10-20 DIAGNOSIS — K74.02 HEPATIC FIBROSIS, ADVANCED FIBROSIS: ICD-10-CM

## 2022-10-20 PROCEDURE — 36415 COLL VENOUS BLD VENIPUNCTURE: CPT

## 2022-10-20 PROCEDURE — 99214 OFFICE O/P EST MOD 30 MIN: CPT | Performed by: INTERNAL MEDICINE

## 2022-10-20 PROCEDURE — 81382 HLA II TYPING 1 LOC HR: CPT

## 2022-10-20 PROCEDURE — 81376 HLA II TYPING 1 LOCUS LR: CPT

## 2022-10-20 NOTE — PROGRESS NOTES
[facility] - Outpatient Follow-up Note  Manfred Nuno 58 y o  female MRN: [id number]  Encounter: [id number]          ASSESSMENT AND PLAN:      1  LFT elevation  2  Fatty liver  3  Hepatic fibrosis, advanced fibrosis  Significant steatosis as well as fibrosis on elastography  Discussed risk of progression to cirrhosis  Will evaluate further with liver biopsy  Will continue to work toward weight loss    - US guided liver biopsy; Future      4  Abnormal celiac antibody panel  No suggestion of villous blunting visually but biopsies previously not obtained given normal sprue serologies  Now evidence of elevated tTG IgG raises question of possible celiac disease  Will check HLA DQ and if negative, this would essentially rule out celiac sprue  If positive, will require further verification with duodenal biopsy and plan repeat EGD    - CELIAC HLA-DQ,BLOOD; Future    ______________________________________________________________________    SUBJECTIVE:  Patient with longstanding mild transaminitis likely secondary to steatohepatitis underwent elastography with suggestion of F3 fibrosis  Also history of iron deficiency anemia with previously normal tTG IgA and endomysial antibody  Recent more expanded sprue serologically testing revealed an elevation of tTG IgG level  Has previously undergone upper endoscopy as well as capsule endoscopy with no suggestion of villous blunting biopsies have not been obtained  REVIEW OF SYSTEMS:    Review of Systems   Gastrointestinal: Positive for abdominal pain            Historical Information   Past Medical History:   Diagnosis Date   • Anemia    • Colon polyp    • Deviated nasal septum    • Diabetes mellitus ([address] )    • Disease of thyroid gland     hypo   • Fatty liver    • Gastroparesis    • Hearing aid worn     right ear only   • Hepatitis A    • Hyperlipidemia    • Hypertension      Past Surgical History:   Procedure Laterality Date   •  SECTION, LOW TRANSVERSE      x2   • COLONOSCOPY N/A 2016    Procedure: COLONOSCOPY;  Surgeon: Patrick Dumont MD;  Location: [facility] GI LAB;   Service:    • CYST REMOVAL Left     elbow   • DILATION AND CURETTAGE OF UTERUS     • GANGLION CYST EXCISION Right    • RESECTION TONSIL RADICAL     • TUBAL LIGATION     • VARICOSE VEIN SURGERY Bilateral 2009    x3 procedures   • WISDOM TOOTH EXTRACTION       Social History   Social History     Substance and Sexual Activity   Alcohol Use Yes    Comment: rare     Social History     Substance and Sexual Activity   Drug Use Never     Social History     Tobacco Use   Smoking Status Former Smoker   • Packs/day: 1 00   • Years: 10    • Pack years: 10 00   • Quit date: 26   • Years since quittin 8   Smokeless Tobacco Never Used     Family History   Problem Relation Age of Onset   • Stroke Mother    • Diabetes Mother    • Arthritis Father    • Heart disease Father         CABG   • Heart disease Daughter         congenital-truncus arteriosus   • Congenital heart disease Daughter    • Heart disease Son         congenital-"hole" repair   • Other Sister         meningioma       Meds/Allergies       Current Outpatient Medications:   •  Artificial Saliva (BIOTENE MOISTURIZING MOUTH MT)  •  atorvastatin (LIPITOR) 40 mg tablet  •  azelastine (ASTELIN) 0 1 % nasal spray  •  BIOTIN PO  •  desloratadine (CLARINEX) 5 MG tablet  •  ferrous sulfate 324 (65 Fe) mg  •  hydrOXYzine HCL (ATARAX) 25 mg tablet  •  insulin aspart (NovoLOG) 100 Units/mL injection pen  •  Insulin Pen Needle (TRUEplus Pen Needles) 31G X 6 MM MISC  •  Januvia 100 MG tablet  •  levothyroxine 112 mcg tablet  •  metFORMIN (GLUCOPHAGE) 1000 MG tablet  •  metoprolol succinate (TOPROL-XL) 25 mg 24 hr tablet  •  neomycin-polymyxin-dexamethasone (MAXITROL) 0 35%-10,000 units/g-0 1%  •  Omega-3 Fatty Acids (FISH OIL PO)  •  pantoprazole (PROTONIX) 20 mg tablet  •  quinapril (ACCUPRIL) 40 MG tablet  •  insulin glargine (Toujeo SoloStar) 300 units/mL CONCENTRATED U-300 injection pen (1-unit dial)    No Known Allergies        Objective     Blood pressure 147/70, pulse 92, height 5' 5" (1 651 m), weight 97 1 kg (214 lb), not currently breastfeeding  Body mass index is 35 61 kg/m²  PHYSICAL EXAM:      Physical Exam     Lab Results:   No visits with results within 1 Day(s) from this visit  Latest known visit with results is:   Appointment on 10/17/2022   Component Date Value   • IgA 10/17/2022 274    • Gliadin IgA 10/17/2022 5    • Gliadin IgG 10/17/2022 2    • Tissue Transglut Ab IGG 10/17/2022 16 (A)   • TISSUE TRANSGLUTAMINASE * 10/17/2022 <2    • Endomysial IgA 10/17/2022 Negative          Radiology Results:   US right upper quadrant    Result Date: 10/3/2022  Narrative: RIGHT UPPER QUADRANT ULTRASOUND INDICATION:     R79 89: Other specified abnormal findings of blood chemistry  COMPARISON:  Renal ultrasound 7/17/2014 TECHNIQUE:   Real-time ultrasound of the right upper quadrant was performed with a curvilinear transducer with both volumetric sweeps and still imaging techniques  FINDINGS: PANCREAS:  Visualized portions of the pancreas are within normal limits  AORTA AND IVC:  Visualized portions are normal for patient age  LIVER: Size:  Moderately enlarged  The liver measures 21 4 cm in the midclavicular line  Contour:  Surface contour is smooth  Parenchyma:  Diffuse increased echogenicity of the liver in keeping with hepatic steatosis  No liver mass identified  Limited imaging of the main portal vein shows it to be patent and hepatopetal   BILIARY: No gallbladder findings  No intrahepatic biliary dilatation  CBD measures 4 0 mm  No choledocholithiasis  KIDNEY: Right kidney measures 12 0 x 4 4 x 5 6 cm  Volume 153 2 mL Multiple right renal cysts with the largest measuring 2 1 cm  One of these has layering milk of calcium  ASCITES:   None  Impression: Hepatomegaly and hepatic steatosis  Multiple right renal cysts    Workstation performed: [id number]     US elastography/UGAP    Result Date: 10/19/2022  Narrative: ELASTOGRAPHY, LIVER ULTRASOUND INDICATION:   K76 0: Fatty (change of) liver, not elsewhere classified  COMPARISON:  None TECHNIQUE: Targeted ultrasound of the liver was performed with a curvilinear transducer on a [employer] e10 utilizing 2D SWE  A total of 10 shear wave Elastography samples were obtained  FINDINGS:  Shear Wave Elastography sampling was performed to evaluate stiffness changes within the liver associated with fibrosis  The resulting E median is 11 64 kPa  The corresponding Metavir score is F3 (severefibrosis), 9 40-11 87 kPa  1 77-1 99 m/s  The IQR/median value is 14 1 %  (IQR of less than 30% is considered a satisfactory data set)  Note: that the stage of liver fibrosis may be overestimated by clinical conditions unrelated to fibrosis, including but not limited to, nonfasting, hepatic inflammation, vascular congestion, biliary obstruction, and infiltrative liver disease  Furthermore, in some patients with NAFLD, the cut-off values for compensated advanced chronic liver disease may be lower (7-9 kPa)  In causes other than viral hepatitis and nonalcoholic fatty liver disease, the cut-off values are not well established  When comparing measurements across time, a clinically significant change should be considered when the delta change is greater than 10%  In all these conditions, however, liver stiffness values within the normal range exclude significant liver fibrosis  Ultrasound-guided attenuation parameter (UGAP) Liver Steatosis Grading Attenuation coefficient:  0 78 dB/cm/MHz Liver steatosis grading:  S3 ( > 67% steatosis) IQR/Med:  6 % (Less than or equal to 30% is a satisfactory data set ) Reference White paper for GE ultrasound-guided attenuation parameter https://www collins com/  au/-/jssmedia/[email or web address]  pdf?la=en-au     Impression: Metavir score: F3, Severe Fibrosis   According to the updated SRU consensus statement, a liver stiffness of 11 64 kPa, suggestive of compensated advanced chronic liver disease (cACLD) but need further test for confirmation  https://pubs  rsna org/doi/10 1148/radiol  [id number] No discrete surface nodularity of the liver parenchyma noted Liver steatosis grading:  S3 ( > 67% steatosis) Workstation performed: [id number]

## 2022-10-21 ENCOUNTER — TELEPHONE (OUTPATIENT)
Dept: OTHER | Facility: OTHER | Age: 62
End: 2022-10-21

## 2022-10-21 DIAGNOSIS — R79.89 LFT ELEVATION: ICD-10-CM

## 2022-10-21 DIAGNOSIS — K74.00 HEPATIC FIBROSIS: ICD-10-CM

## 2022-10-21 DIAGNOSIS — K76.0 FATTY LIVER: Primary | ICD-10-CM

## 2022-10-21 NOTE — TELEPHONE ENCOUNTER
IR dept called that they need a referral entered in her chart for IR in order to do the liver biopsy that was ordered

## 2022-10-24 ENCOUNTER — PREP FOR PROCEDURE (OUTPATIENT)
Dept: INTERVENTIONAL RADIOLOGY/VASCULAR | Facility: HOSPITAL | Age: 62
End: 2022-10-24

## 2022-10-24 DIAGNOSIS — K74.00 HEPATIC FIBROSIS: Primary | ICD-10-CM

## 2022-10-24 RX ORDER — SODIUM CHLORIDE 9 MG/ML
30 INJECTION, SOLUTION INTRAVENOUS CONTINUOUS
OUTPATIENT
Start: 2022-10-24

## 2022-10-24 NOTE — H&P
Assessment/Plan     Active Problems:    * No active hospital problems  *       For liver biopsy    Mary Xiong is an 58 y o  female  HPI:  Known fatty liver and hepatic fibrosis     Past Surgical History:   Procedure Laterality Date   •  SECTION, LOW TRANSVERSE      x2   • COLONOSCOPY N/A 2016    Procedure: COLONOSCOPY;  Surgeon: Kanwal Chaney MD;  Location: [facility]; Service:    • CYST REMOVAL Left     elbow   • DILATION AND CURETTAGE OF UTERUS     • GANGLION CYST EXCISION Right    • RESECTION TONSIL RADICAL     • TUBAL LIGATION     • VARICOSE VEIN SURGERY Bilateral 2009    x3 procedures   • WISDOM TOOTH EXTRACTION         Review of Systems    Objective     There were no vitals taken for this visit      Physical Exam       SOC

## 2022-11-03 LAB
ANNOTATION COMMENT IMP: NORMAL
HLA-DQ2 QL: POSITIVE
HLA-DQ8 QL: NEGATIVE
REF LAB TEST METHOD: NORMAL

## 2022-11-07 ENCOUNTER — HOSPITAL ENCOUNTER (OUTPATIENT)
Dept: NON INVASIVE DIAGNOSTICS | Facility: HOSPITAL | Age: 62
Discharge: HOME | End: 2022-11-07
Attending: RADIOLOGY

## 2022-11-07 VITALS
HEART RATE: 89 BPM | SYSTOLIC BLOOD PRESSURE: 137 MMHG | OXYGEN SATURATION: 95 % | TEMPERATURE: 97.4 F | RESPIRATION RATE: 20 BRPM | DIASTOLIC BLOOD PRESSURE: 65 MMHG

## 2022-11-07 DIAGNOSIS — K74.00 HEPATIC FIBROSIS: ICD-10-CM

## 2022-11-07 LAB
INR PPP: 0.99 (ref 0.84–1.19)
PROTHROMBIN TIME: 13.2 SECONDS (ref 11.6–14.5)

## 2022-11-07 RX ORDER — FENTANYL CITRATE 50 UG/ML
25 INJECTION, SOLUTION INTRAMUSCULAR; INTRAVENOUS EVERY 2 HOUR PRN
Status: DISCONTINUED | OUTPATIENT
Start: 2022-11-07 | End: 2022-11-08 | Stop reason: HOSPADM

## 2022-11-07 RX ORDER — FENTANYL CITRATE 50 UG/ML
INJECTION, SOLUTION INTRAMUSCULAR; INTRAVENOUS CODE/TRAUMA/SEDATION MEDICATION
Status: COMPLETED | OUTPATIENT
Start: 2022-11-07 | End: 2022-11-07

## 2022-11-07 RX ORDER — OXYCODONE HYDROCHLORIDE 5 MG/1
5 TABLET ORAL EVERY 4 HOURS PRN
Status: DISCONTINUED | OUTPATIENT
Start: 2022-11-07 | End: 2022-11-08 | Stop reason: HOSPADM

## 2022-11-07 RX ORDER — SODIUM CHLORIDE 9 MG/ML
30 INJECTION, SOLUTION INTRAVENOUS CONTINUOUS
Status: DISCONTINUED | OUTPATIENT
Start: 2022-11-07 | End: 2022-11-08 | Stop reason: HOSPADM

## 2022-11-07 RX ORDER — MIDAZOLAM HYDROCHLORIDE 2 MG/2ML
INJECTION, SOLUTION INTRAMUSCULAR; INTRAVENOUS CODE/TRAUMA/SEDATION MEDICATION
Status: COMPLETED | OUTPATIENT
Start: 2022-11-07 | End: 2022-11-07

## 2022-11-07 RX ORDER — LIDOCAINE HYDROCHLORIDE 10 MG/ML
INJECTION, SOLUTION EPIDURAL; INFILTRATION; INTRACAUDAL; PERINEURAL CODE/TRAUMA/SEDATION MEDICATION
Status: COMPLETED | OUTPATIENT
Start: 2022-11-07 | End: 2022-11-07

## 2022-11-07 RX ORDER — OXYCODONE HYDROCHLORIDE 5 MG/1
10 TABLET ORAL EVERY 4 HOURS PRN
Status: DISCONTINUED | OUTPATIENT
Start: 2022-11-07 | End: 2022-11-08 | Stop reason: HOSPADM

## 2022-11-07 RX ORDER — ACETAMINOPHEN 325 MG/1
650 TABLET ORAL EVERY 4 HOURS PRN
Status: DISCONTINUED | OUTPATIENT
Start: 2022-11-07 | End: 2022-11-08 | Stop reason: HOSPADM

## 2022-11-07 RX ADMIN — LIDOCAINE HYDROCHLORIDE 10 ML: 10 INJECTION, SOLUTION EPIDURAL; INFILTRATION; INTRACAUDAL; PERINEURAL at 11:38

## 2022-11-07 RX ADMIN — OXYCODONE HYDROCHLORIDE 10 MG: 5 TABLET ORAL at 12:05

## 2022-11-07 RX ADMIN — MIDAZOLAM 1 MG: 1 INJECTION INTRAMUSCULAR; INTRAVENOUS at 11:38

## 2022-11-07 RX ADMIN — FENTANYL CITRATE 50 MCG: 50 INJECTION, SOLUTION INTRAMUSCULAR; INTRAVENOUS at 11:38

## 2022-11-07 RX ADMIN — FENTANYL CITRATE 50 MCG: 50 INJECTION, SOLUTION INTRAMUSCULAR; INTRAVENOUS at 11:34

## 2022-11-07 RX ADMIN — MIDAZOLAM 1 MG: 1 INJECTION INTRAMUSCULAR; INTRAVENOUS at 11:34

## 2022-11-07 NOTE — BRIEF OP NOTE (RAD/CATH)
INTERVENTIONAL RADIOLOGY PROCEDURE NOTE    Date: 11/7/2022    Procedure: IR BIOPSY LIVER RANDOM     Preoperative diagnosis:   1  Hepatic fibrosis      Postoperative diagnosis: Same  Surgeon: Shaun Solares     Assistant: None  No qualified resident was available  Blood loss: minimal    Specimens: 18 gauge core x 3    Findings: Successful random liver core biopsy  Complications: None immediate      Anesthesia: conscious sedation

## 2022-11-07 NOTE — PERIOPERATIVE NURSING NOTE
Patient received into my care, patient awake and alert, states pain medication was effective  Pain is now a 2/10  Abdominal bandaid RUQ clean, dry and intact  Abdomen rounded  Patient given lunch and tolerating well

## 2022-11-07 NOTE — DISCHARGE INSTRUCTIONS
Percutaneous Liver Biopsy   WHAT YOU NEED TO KNOW:   A PLB is a procedure to remove a sample of tissue from your liver  The sample can be sent to a lab and tested for liver disease, cancer, or infection  After the procedure you may have pain and bruising at the biopsy site  You may also have pain in your right shoulder  These symptoms should get better in 48 to 72 hours  DISCHARGE INSTRUCTIONS:     [address] and [place] patients,  Contact Interventional Radiology at [phone] PATIENTS: Contact Interventional Radiology at [phone]   [facility] PATIENTS: Contact Interventional Radiology at [phone] if:    Fever greater than 101 or chills  You have severe pain in your abdomen  Your abdomen is larger than usual and feels hard  Your neck is more swollen and you have trouble swallowing  You feel weak or dizzy  Your heart is beating faster than usual    Your pain does not get better after you take pain medicine  Your wound is red, swollen, or draining pus  You have nausea or are vomiting  Your skin is itchy, swollen, or you have a rash  You have questions or concerns about your condition or care  Medicines:   Acetaminophen decreases pain and fever  It is available without a doctor's order  Acetaminophen can cause liver damage if not taken correctly  Take your home medicine as directed  Resume your normal diet  Small sips of flat soda will help with mild nausea  Self-care:   Rest as directed  Do not play sports, exercise, or lift anything heavier than 5 pounds for up to 1 week  Apply firm, steady pressure if bleeding occurs  A small amount of bleeding from your wound is possible  Apply pressure with a clean gauze or towel for 5 to 10 minutes  Call 911 if bleeding becomes heavy or does not stop  Ask your healthcare provider when to take your blood thinner or antiplatelet medicine  You may need to wait 24 to 72 hours to take your medicine   This will prevent bleeding  Follow up with your healthcare provider as directed: Write down your questions so you remember to ask them during your visits  Procedural Sedation   WHAT YOU NEED TO KNOW:   Procedural sedation is medicine used during procedures to help you feel relaxed and calm  You will remember little to none of the procedure  After sedation you may feel tired, weak, or unsteady on your feet  You may also have trouble concentrating or short-term memory loss  These symptoms should go away in 24 hours or less  DISCHARGE INSTRUCTIONS:     Call 911 or have someone else call for any of the following: You have sudden trouble breathing  You cannot be woken  Contact Interventional Radiology at [phone]   [place] PATIENTS: Contact Interventional Radiology at [phone]) [facility] PATIENTS: Contact Interventional Radiology at [phone]) if any of the following occur: You have a severe headache or dizziness  Your heart is beating faster than usual     You have a fever or chills  Your skin is itchy, swollen, or you have a rash  You have nausea or are vomiting for more than 8 hours after the procedure  You have questions or concerns about your condition or care  Self-care:   Have someone stay with you for 24 hours  This person can drive you to errands and help you do things around the house  This person can also watch for problems  Rest and do quiet activities for 24 hours  Do not exercise, ride a bike, or play sports  Stand up slowly to prevent dizziness and falls  Take short walks around the house with another person  Slowly return to your usual activities the next day  Do not drive or use dangerous machines or tools for 24 hours  You may injure yourself or others  Examples include a lawnmower, saw, or drill  Do not return to work for 24 hours if you use dangerous machines or tools for work  Do not make important decisions for 24 hours   For example, do not sign important papers or invest money  Drink liquids as directed  Liquids help flush the sedation medicine out of your body  Ask how much liquid to drink each day and which liquids are best for you  Eat small, frequent meals to prevent nausea and vomiting  Start with clear liquids such as juice or broth  If you do not vomit after clear liquids, you can eat your usual foods  Do not drink alcohol or take medicines that make you drowsy  This includes medicines that help you sleep and anxiety medicines  Ask your healthcare provider if it is safe for you to take pain medicine  Follow up with your healthcare provider as directed: Write down your questions so you remember to ask them during your visits

## 2022-11-07 NOTE — PERIOPERATIVE NURSING NOTE
Received patient from IR  in room 12, patient is alert and awake, VSS, denies pain, no distress noted, dressing clean, dry and intact to RUQ biopsy site  Patient is tolerating PO fluids, call bell placed in reach, will continue to monitor patient until d/c criteria met

## 2022-11-08 ENCOUNTER — DOCUMENTATION (OUTPATIENT)
Dept: ENDOCRINOLOGY | Facility: CLINIC | Age: 62
End: 2022-11-08

## 2022-11-08 ENCOUNTER — TELEPHONE (OUTPATIENT)
Dept: ENDOCRINOLOGY | Facility: CLINIC | Age: 62
End: 2022-11-08

## 2022-11-08 NOTE — TELEPHONE ENCOUNTER
Call patient  I reviewed her blood sugar record from 10/19/2022 through 11/06/2022  Blood sugars are still running high particularly in the morning so I would like her to increase her Toujeo insulin to 54 units in the morning and 58 units in the evening  Continue the same NovoLog insulin, metformin, and Januvia for now  Send another blood sugar record in 2 weeks please

## 2022-11-15 ENCOUNTER — TELEPHONE (OUTPATIENT)
Dept: ENDOCRINOLOGY | Facility: CLINIC | Age: 62
End: 2022-11-15

## 2022-11-17 DIAGNOSIS — Z79.4 TYPE 2 DIABETES MELLITUS WITH HYPERGLYCEMIA, WITH LONG-TERM CURRENT USE OF INSULIN (HCC): ICD-10-CM

## 2022-11-17 DIAGNOSIS — E11.65 TYPE 2 DIABETES MELLITUS WITH HYPERGLYCEMIA, WITH LONG-TERM CURRENT USE OF INSULIN (HCC): ICD-10-CM

## 2022-11-21 ENCOUNTER — TELEPHONE (OUTPATIENT)
Dept: ENDOCRINOLOGY | Facility: CLINIC | Age: 62
End: 2022-11-21

## 2022-11-22 ENCOUNTER — TELEPHONE (OUTPATIENT)
Dept: ENDOCRINOLOGY | Facility: CLINIC | Age: 62
End: 2022-11-22

## 2022-11-22 ENCOUNTER — DOCUMENTATION (OUTPATIENT)
Dept: ENDOCRINOLOGY | Facility: CLINIC | Age: 62
End: 2022-11-22

## 2022-11-22 ENCOUNTER — TELEPHONE (OUTPATIENT)
Dept: ENDOCRINOLOGY | Facility: HOSPITAL | Age: 62
End: 2022-11-22

## 2022-11-22 NOTE — TELEPHONE ENCOUNTER
BG log reviewed  Bedtime values consistently high  Would suggest increasing dose of novolog just with dinner to 42 units  Can keep remainder of insulin and non-insulin treatment the same   Can re-send BG log in 2-weeks

## 2022-11-23 ENCOUNTER — DOCUMENTATION (OUTPATIENT)
Dept: ENDOCRINOLOGY | Facility: CLINIC | Age: 62
End: 2022-11-23

## 2022-11-28 ENCOUNTER — TELEPHONE (OUTPATIENT)
Dept: ENDOCRINOLOGY | Facility: CLINIC | Age: 62
End: 2022-11-28

## 2022-11-28 NOTE — TELEPHONE ENCOUNTER
Spoke to patient, provided following information, she verbalized understanding  DO Kaila Lester MA; Jaiden Pearl MD  Okay to follow Dr Mela Rivers   Can follow up repeat BG log in 2-weeks

## 2022-11-28 NOTE — TELEPHONE ENCOUNTER
patient informed of the following adjustments to insulin:    NovoLog insulin to 34 units at breakfast and lunch but 36 units at supper and increase her Toujeo to 60 units in the evening

## 2022-12-02 ENCOUNTER — VBI (OUTPATIENT)
Dept: ADMINISTRATIVE | Facility: OTHER | Age: 62
End: 2022-12-02

## 2022-12-05 ENCOUNTER — TELEPHONE (OUTPATIENT)
Dept: ENDOCRINOLOGY | Facility: CLINIC | Age: 62
End: 2022-12-05

## 2022-12-06 ENCOUNTER — DOCUMENTATION (OUTPATIENT)
Dept: ENDOCRINOLOGY | Facility: CLINIC | Age: 62
End: 2022-12-06

## 2022-12-06 NOTE — TELEPHONE ENCOUNTER
Spoke to patient, she confirmed  she it taking NovoLog insulin to 34 units at breakfast, 34units at lunch, and 36 units at dinner,   Toujoe she is taking 54 units in the am and 64 units in the evening  So do you want her to take just 64 units in the evening an none during the day  Also she wants to know how far apart should the Novolog and Toujeo doses be taken?

## 2022-12-07 DIAGNOSIS — E11.9 TYPE 2 DIABETES MELLITUS WITHOUT COMPLICATION, WITHOUT LONG-TERM CURRENT USE OF INSULIN (HCC): ICD-10-CM

## 2022-12-07 DIAGNOSIS — J30.9 ALLERGIC RHINITIS, UNSPECIFIED SEASONALITY, UNSPECIFIED TRIGGER: ICD-10-CM

## 2022-12-07 RX ORDER — LEVOTHYROXINE SODIUM 112 UG/1
TABLET ORAL
Qty: 90 TABLET | Refills: 1 | Status: SHIPPED | OUTPATIENT
Start: 2022-12-07

## 2022-12-07 RX ORDER — DESLORATADINE 5 MG/1
TABLET ORAL
Qty: 90 TABLET | Refills: 1 | Status: SHIPPED | OUTPATIENT
Start: 2022-12-07

## 2022-12-19 ENCOUNTER — TELEPHONE (OUTPATIENT)
Dept: ENDOCRINOLOGY | Facility: CLINIC | Age: 62
End: 2022-12-19

## 2022-12-19 ENCOUNTER — DOCUMENTATION (OUTPATIENT)
Dept: ENDOCRINOLOGY | Facility: CLINIC | Age: 62
End: 2022-12-19

## 2022-12-19 DIAGNOSIS — Z79.4 TYPE 2 DIABETES MELLITUS WITH OTHER SPECIFIED COMPLICATION, WITH LONG-TERM CURRENT USE OF INSULIN (HCC): ICD-10-CM

## 2022-12-19 DIAGNOSIS — E11.69 TYPE 2 DIABETES MELLITUS WITH OTHER SPECIFIED COMPLICATION, WITH LONG-TERM CURRENT USE OF INSULIN (HCC): ICD-10-CM

## 2022-12-19 RX ORDER — PEN NEEDLE, DIABETIC 31 G X1/4"
NEEDLE, DISPOSABLE MISCELLANEOUS
Qty: 360 EACH | Refills: 3 | Status: SHIPPED | OUTPATIENT
Start: 2022-12-19

## 2022-12-19 NOTE — TELEPHONE ENCOUNTER
BG log reviewed (below)  Fasting and evening blood sugars typically elevated  Would suggest increasing novolog to 42 units with dinner, keep remainder of insulin schedule as is for now   Can resend numbers in 2-weeks, earlier as needed

## 2022-12-21 DIAGNOSIS — E11.65 TYPE 2 DIABETES MELLITUS WITH HYPERGLYCEMIA, WITHOUT LONG-TERM CURRENT USE OF INSULIN (HCC): ICD-10-CM

## 2022-12-21 DIAGNOSIS — D64.9 ANEMIA, UNSPECIFIED TYPE: ICD-10-CM

## 2022-12-21 RX ORDER — PANTOPRAZOLE SODIUM 20 MG/1
TABLET, DELAYED RELEASE ORAL
Qty: 90 TABLET | Refills: 1 | Status: SHIPPED | OUTPATIENT
Start: 2022-12-21

## 2022-12-29 ENCOUNTER — HOSPITAL ENCOUNTER (OUTPATIENT)
Dept: GASTROENTEROLOGY | Facility: AMBULARY SURGERY CENTER | Age: 62
Setting detail: OUTPATIENT SURGERY
End: 2022-12-29
Attending: INTERNAL MEDICINE

## 2022-12-29 ENCOUNTER — ANESTHESIA EVENT (OUTPATIENT)
Dept: GASTROENTEROLOGY | Facility: AMBULARY SURGERY CENTER | Age: 62
End: 2022-12-29

## 2022-12-29 ENCOUNTER — ANESTHESIA (OUTPATIENT)
Dept: GASTROENTEROLOGY | Facility: AMBULARY SURGERY CENTER | Age: 62
End: 2022-12-29

## 2022-12-29 VITALS
SYSTOLIC BLOOD PRESSURE: 132 MMHG | RESPIRATION RATE: 18 BRPM | OXYGEN SATURATION: 97 % | TEMPERATURE: 98 F | DIASTOLIC BLOOD PRESSURE: 67 MMHG | HEART RATE: 87 BPM

## 2022-12-29 DIAGNOSIS — R89.4 ABNORMAL CELIAC ANTIBODY PANEL: ICD-10-CM

## 2022-12-29 PROBLEM — E66.812 CLASS 2 OBESITY IN ADULT: Status: ACTIVE | Noted: 2021-12-13

## 2022-12-29 RX ORDER — PROPOFOL 10 MG/ML
INJECTION, EMULSION INTRAVENOUS AS NEEDED
Status: DISCONTINUED | OUTPATIENT
Start: 2022-12-29 | End: 2022-12-29

## 2022-12-29 RX ORDER — SODIUM CHLORIDE, SODIUM LACTATE, POTASSIUM CHLORIDE, CALCIUM CHLORIDE 600; 310; 30; 20 MG/100ML; MG/100ML; MG/100ML; MG/100ML
125 INJECTION, SOLUTION INTRAVENOUS CONTINUOUS
Status: CANCELLED | OUTPATIENT
Start: 2022-12-29

## 2022-12-29 RX ORDER — LIDOCAINE HYDROCHLORIDE 20 MG/ML
INJECTION, SOLUTION EPIDURAL; INFILTRATION; INTRACAUDAL; PERINEURAL AS NEEDED
Status: DISCONTINUED | OUTPATIENT
Start: 2022-12-29 | End: 2022-12-29

## 2022-12-29 RX ORDER — LIDOCAINE HYDROCHLORIDE 10 MG/ML
0.5 INJECTION, SOLUTION EPIDURAL; INFILTRATION; INTRACAUDAL; PERINEURAL ONCE AS NEEDED
Status: CANCELLED | OUTPATIENT
Start: 2022-12-29

## 2022-12-29 RX ORDER — SODIUM CHLORIDE, SODIUM LACTATE, POTASSIUM CHLORIDE, CALCIUM CHLORIDE 600; 310; 30; 20 MG/100ML; MG/100ML; MG/100ML; MG/100ML
INJECTION, SOLUTION INTRAVENOUS CONTINUOUS PRN
Status: DISCONTINUED | OUTPATIENT
Start: 2022-12-29 | End: 2022-12-29

## 2022-12-29 RX ADMIN — SODIUM CHLORIDE, SODIUM LACTATE, POTASSIUM CHLORIDE, AND CALCIUM CHLORIDE: .6; .31; .03; .02 INJECTION, SOLUTION INTRAVENOUS at 07:16

## 2022-12-29 RX ADMIN — LIDOCAINE HYDROCHLORIDE 100 MG: 20 INJECTION, SOLUTION EPIDURAL; INFILTRATION; INTRACAUDAL; PERINEURAL at 07:29

## 2022-12-29 RX ADMIN — PROPOFOL 50 MG: 10 INJECTION, EMULSION INTRAVENOUS at 07:32

## 2022-12-29 RX ADMIN — PROPOFOL 150 MG: 10 INJECTION, EMULSION INTRAVENOUS at 07:29

## 2022-12-29 NOTE — ANESTHESIA PREPROCEDURE EVALUATION
Procedure:  EGD    Relevant Problems   ANESTHESIA (within normal limits)      CARDIO   (+) Benign essential hypertension   (+) Mixed hyperlipidemia      ENDO   (+) Primary hypothyroidism   (+) Type 2 diabetes mellitus with hyperglycemia, without long-term current use of insulin (HCC)      GI/HEPATIC   (+) Fatty liver   (+) Gastroesophageal reflux disease without esophagitis      HEMATOLOGY   (+) Anemia      NEURO/PSYCH   (+) Diabetic gastroparesis (HCC)   (+) History of colon polyps      PULMONARY (within normal limits)      Genitourinary   (+) Chronic interstitial cystitis      Other   (+) Class 2 obesity in adult        Physical Exam    Airway    Mallampati score: II  TM Distance: >3 FB  Neck ROM: full     Dental   No notable dental hx     Cardiovascular      Pulmonary      Other Findings        Anesthesia Plan  ASA Score- 2     Anesthesia Type- IV sedation with anesthesia with ASA Monitors  Additional Monitors:   Airway Plan:           Plan Factors-Exercise tolerance (METS): >4 METS  Chart reviewed  Existing labs reviewed  Patient summary reviewed  Patient is not a current smoker  Induction-     Postoperative Plan-     Informed Consent- Anesthetic plan and risks discussed with patient  I personally reviewed this patient with the CRNA  Discussed and agreed on the Anesthesia Plan with the CRNA  Micaela Hollins

## 2022-12-29 NOTE — H&P
History and Physical -  [facility]  Indy Ku 58 y o  female MRN: [id number]                  HPI: Indy Ku is a 58y o  year old female who presents for further evaluation of iron deficiency anemia with positive TTG IgG antibody      REVIEW OF SYSTEMS: Per the HPI, and otherwise unremarkable  Historical Information   Past Medical History:   Diagnosis Date   • Anemia    • Celiac disease     R/O due to genetic marker-no family hx   • Colon polyp    • Deviated nasal septum    • Diabetes mellitus ([address] )    • Disease of thyroid gland     hypo   • Fatty liver    • Hearing aid worn     right ear only   • Hepatitis A    • Hyperlipidemia    • Hypertension    • Wears glasses     on occ     Past Surgical History:   Procedure Laterality Date   •  SECTION, LOW TRANSVERSE      x2   • COLONOSCOPY N/A 2016    Procedure: COLONOSCOPY;  Surgeon: Rivera Dutton MD;  Location: [place] [address] GI LAB;   Service:    • CYST REMOVAL Left     elbow   • DILATION AND CURETTAGE OF UTERUS     • EGD     • GANGLION CYST EXCISION Right     wrist   • IR BIOPSY LIVER RANDOM  2022   • RESECTION TONSIL RADICAL     • TUBAL LIGATION     • VARICOSE VEIN SURGERY Bilateral 2009    x3 procedures   • WISDOM TOOTH EXTRACTION       Social History   Social History     Substance and Sexual Activity   Alcohol Use Yes    Comment: rare     Social History     Substance and Sexual Activity   Drug Use Never     Social History     Tobacco Use   Smoking Status Former   • Packs/day: 1 00   • Years: 10 00   • Pack years: 10 00   • Types: Cigarettes   • Quit date: 26   • Years since quittin 0   Smokeless Tobacco Never     Family History   Problem Relation Age of Onset   • Stroke Mother    • Diabetes Mother    • Arthritis Father    • Heart disease Father         CABG   • Heart disease Daughter         congenital-truncus arteriosus   • Congenital heart disease Daughter    • Heart disease Son         congenital-"hole" repair • Other Sister         meningioma       Meds/Allergies       Current Outpatient Medications:   •  Artificial Saliva (BIOTENE MOISTURIZING MOUTH MT)  •  atorvastatin (LIPITOR) 40 mg tablet  •  azelastine (ASTELIN) 0 1 % nasal spray  •  BIOTIN PO  •  desloratadine (CLARINEX) 5 MG tablet  •  ferrous sulfate 324 (65 Fe) mg  •  guaiFENesin (MUCINEX) 600 mg 12 hr tablet  •  hydrOXYzine HCL (ATARAX) 25 mg tablet  •  insulin aspart (NovoLOG) 100 Units/mL injection pen  •  insulin glargine (Toujeo SoloStar) 300 units/mL CONCENTRATED U-300 injection pen (1-unit dial)  •  Januvia 100 MG tablet  •  levothyroxine 112 mcg tablet  •  metFORMIN (GLUCOPHAGE) 1000 MG tablet  •  metoprolol succinate (TOPROL-XL) 25 mg 24 hr tablet  •  Omega-3 Fatty Acids (FISH OIL PO)  •  pantoprazole (PROTONIX) 20 mg tablet  •  quinapril (ACCUPRIL) 40 MG tablet  •  Insulin Pen Needle (TRUEplus Pen Needles) 31G X 6 MM MISC  •  neomycin-polymyxin-dexamethasone (MAXITROL) 0 35%-10,000 units/g-0 1%    No Known Allergies    Objective     /72   Pulse 86   Temp 98 °F (36 7 °C) (Temporal)   Resp 18   SpO2 98%       PHYSICAL EXAM    Gen: NAD  Head: NCAT  CV: RRR  CHEST: Clear  ABD: soft, NT/ND  EXT: no edema      ASSESSMENT/PLAN:  This is a 58y o  year old female here for EGD, and she is stable and optimized for her procedure

## 2022-12-29 NOTE — ANESTHESIA POSTPROCEDURE EVALUATION
Post-Op Assessment Note    CV Status:  Stable  Pain Score: 0    Pain management: adequate     Mental Status:  Sleepy and arousable   Hydration Status:  Stable   PONV Controlled:  None   Airway Patency:  Patent and adequate      Post Op Vitals Reviewed: Yes      Staff: CRNA         No notable events documented      BP      Temp      Pulse     Resp      SpO2

## 2023-01-03 ENCOUNTER — LAB (OUTPATIENT)
Dept: LAB | Facility: CLINIC | Age: 63
End: 2023-01-03

## 2023-01-03 ENCOUNTER — TELEPHONE (OUTPATIENT)
Dept: ENDOCRINOLOGY | Facility: CLINIC | Age: 63
End: 2023-01-03

## 2023-01-03 DIAGNOSIS — I10 BENIGN ESSENTIAL HYPERTENSION: ICD-10-CM

## 2023-01-03 DIAGNOSIS — Z79.4 TYPE 2 DIABETES MELLITUS WITH HYPERGLYCEMIA, WITH LONG-TERM CURRENT USE OF INSULIN (HCC): ICD-10-CM

## 2023-01-03 DIAGNOSIS — E03.9 PRIMARY HYPOTHYROIDISM: ICD-10-CM

## 2023-01-03 DIAGNOSIS — E78.2 MIXED HYPERLIPIDEMIA: ICD-10-CM

## 2023-01-03 DIAGNOSIS — E11.65 TYPE 2 DIABETES MELLITUS WITH HYPERGLYCEMIA, WITH LONG-TERM CURRENT USE OF INSULIN (HCC): ICD-10-CM

## 2023-01-03 LAB
ALBUMIN SERPL BCP-MCNC: 3.4 G/DL (ref 3.5–5)
ALP SERPL-CCNC: 70 U/L (ref 46–116)
ALT SERPL W P-5'-P-CCNC: 52 U/L (ref 12–78)
ANION GAP SERPL CALCULATED.3IONS-SCNC: 5 MMOL/L (ref 4–13)
AST SERPL W P-5'-P-CCNC: 36 U/L (ref 5–45)
BILIRUB SERPL-MCNC: 0.7 MG/DL (ref 0.2–1)
BUN SERPL-MCNC: 23 MG/DL (ref 5–25)
CALCIUM ALBUM COR SERPL-MCNC: 10 MG/DL (ref 8.3–10.1)
CALCIUM SERPL-MCNC: 9.5 MG/DL (ref 8.3–10.1)
CHLORIDE SERPL-SCNC: 105 MMOL/L (ref 96–108)
CHOLEST SERPL-MCNC: 165 MG/DL
CO2 SERPL-SCNC: 26 MMOL/L (ref 21–32)
CREAT SERPL-MCNC: 1.13 MG/DL (ref 0.6–1.3)
GFR SERPL CREATININE-BSD FRML MDRD: 52 ML/MIN/1.73SQ M
GLUCOSE P FAST SERPL-MCNC: 224 MG/DL (ref 65–99)
HDLC SERPL-MCNC: 38 MG/DL
LDLC SERPL CALC-MCNC: 91 MG/DL (ref 0–100)
NONHDLC SERPL-MCNC: 127 MG/DL
POTASSIUM SERPL-SCNC: 4.4 MMOL/L (ref 3.5–5.3)
PROT SERPL-MCNC: 7.5 G/DL (ref 6.4–8.4)
SODIUM SERPL-SCNC: 136 MMOL/L (ref 135–147)
T4 FREE SERPL-MCNC: 1.23 NG/DL (ref 0.76–1.46)
TRIGL SERPL-MCNC: 181 MG/DL
TSH SERPL DL<=0.05 MIU/L-ACNC: 3.16 UIU/ML (ref 0.45–4.5)

## 2023-01-04 LAB
EST. AVERAGE GLUCOSE BLD GHB EST-MCNC: 160 MG/DL
HBA1C MFR BLD: 7.2 %

## 2023-01-05 NOTE — RESULT ENCOUNTER NOTE
Covering for Dr Green Blizzard   this patient has upcoming appointment, no urgent lab results, will review with patient at that time

## 2023-01-09 ENCOUNTER — TELEPHONE (OUTPATIENT)
Dept: ENDOCRINOLOGY | Facility: CLINIC | Age: 63
End: 2023-01-09

## 2023-01-11 ENCOUNTER — TELEPHONE (OUTPATIENT)
Dept: ENDOCRINOLOGY | Facility: CLINIC | Age: 63
End: 2023-01-11

## 2023-01-11 ENCOUNTER — OFFICE VISIT (OUTPATIENT)
Dept: ENDOCRINOLOGY | Facility: CLINIC | Age: 63
End: 2023-01-11

## 2023-01-11 VITALS
HEART RATE: 94 BPM | BODY MASS INDEX: 36.49 KG/M2 | SYSTOLIC BLOOD PRESSURE: 140 MMHG | DIASTOLIC BLOOD PRESSURE: 70 MMHG | HEIGHT: 65 IN | WEIGHT: 219 LBS

## 2023-01-11 DIAGNOSIS — E78.2 MIXED HYPERLIPIDEMIA: ICD-10-CM

## 2023-01-11 DIAGNOSIS — I10 BENIGN ESSENTIAL HYPERTENSION: ICD-10-CM

## 2023-01-11 DIAGNOSIS — E11.65 TYPE 2 DIABETES MELLITUS WITH HYPERGLYCEMIA, WITHOUT LONG-TERM CURRENT USE OF INSULIN (HCC): Primary | ICD-10-CM

## 2023-01-11 DIAGNOSIS — E66.01 CLASS 2 SEVERE OBESITY DUE TO EXCESS CALORIES WITH SERIOUS COMORBIDITY AND BODY MASS INDEX (BMI) OF 36.0 TO 36.9 IN ADULT (HCC): ICD-10-CM

## 2023-01-11 DIAGNOSIS — E03.9 PRIMARY HYPOTHYROIDISM: ICD-10-CM

## 2023-01-11 NOTE — PATIENT INSTRUCTIONS
Full Version - [email or web address]     Warmup - [email or web address]     Lower Body - [email or web address]     Upper Body - [email or web address]     Core -  [email or web address]  [email or web address]

## 2023-01-11 NOTE — PROGRESS NOTES
Sherman Francisco 58 y o  female MRN: [id number]    Encounter: [id number]      Assessment/Plan     Assessment: This is a 58y o -year-old female with diabetes with hyperglycemia, hypertension, hyperlipidemia and hypothyroidism along with obesity  Plan:  1  Type 2 diabetes with hyperglycemia has improved based on hemoglobin A1c  She is still above target  She is agreeable to starting Jardiance 25 mg/day which I prescribed for her  Continue to use Dexcom to monitor blood sugar  I did go over the side effect of urinary tract infection and yeast infection  2   Hypertension-systolic blood pressure is just above monitor over time  3   Hyperlipidemia-continue statin  4   Hypothyroidism-continue levothyroxine  5   Obesity appears to be stable  I did give her fitness videos that she can do at home  CC: Diabetes    History of Present Illness     HPI:  40-year-old woman with type 2 diabetes presents for follow-up  She is currently on multiple medications  She states that with a GLP-1 analog, she had severe constipation and gastroparesis  She rarely has hypoglycemia but has noticed increased blood sugars over the last 2 weeks  She is uncertain what has caused this  Review of Systems   Constitutional: Negative for chills and fever  Respiratory: Negative for shortness of breath  Cardiovascular: Negative for chest pain  Gastrointestinal: Negative for constipation, diarrhea, nausea and vomiting  Endocrine: Negative for polydipsia and polyuria  All other systems reviewed and are negative        Historical Information   Past Medical History:   Diagnosis Date   • Anemia    • Celiac disease     R/O due to genetic marker-no family hx   • Colon polyp    • Deviated nasal septum    • Diabetes mellitus [address] [address] )    • Disease of thyroid gland     hypo   • Fatty liver    • Hearing aid worn     right ear only   • Hepatitis A    • Hyperlipidemia    • Hypertension    • Wears glasses     on occ     Past Surgical History:   Procedure Laterality Date   •  SECTION, LOW TRANSVERSE      x2   • COLONOSCOPY N/A 2016    Procedure: COLONOSCOPY;  Surgeon: Debora Montana MD;  Location: [place] [address] GI LAB; Service:    • CYST REMOVAL Left     elbow   • DILATION AND CURETTAGE OF UTERUS     • EGD     • GANGLION CYST EXCISION Right     wrist   • IR BIOPSY LIVER RANDOM  2022   • RESECTION TONSIL RADICAL     • TUBAL LIGATION     • VARICOSE VEIN SURGERY Bilateral 2009    x3 procedures   • WISDOM TOOTH EXTRACTION       Social History   Social History     Substance and Sexual Activity   Alcohol Use Yes    Comment: rare     Social History     Substance and Sexual Activity   Drug Use Never     Social History     Tobacco Use   Smoking Status Former   • Packs/day: 1 00   • Years: 10    • Pack years: 10 00   • Types: Cigarettes   • Quit date: 26   • Years since quittin 0   Smokeless Tobacco Never     Family History:   Family History   Problem Relation Age of Onset   • Stroke Mother    • Diabetes Mother    • Arthritis Father    • Heart disease Father         CABG   • Heart disease Daughter         congenital-truncus arteriosus   • Congenital heart disease Daughter    • Heart disease Son         congenital-"hole" repair   • Other Sister         meningioma       Meds/Allergies   Current Outpatient Medications   Medication Sig Dispense Refill   • Artificial Saliva (BIOTENE MOISTURIZING MOUTH MT) Apply to the mouth or throat daily at bedtime     • atorvastatin (LIPITOR) 40 mg tablet TAKE 1 TABLET BY MOUTH DAILY (Patient taking differently: 40 mg daily at bedtime) 90 tablet 1   • azelastine (ASTELIN) 0 1 % nasal spray USE 1 SPRAY INTO EACH NOSTRIL EVERY MORNING AS DIRECTED   GENERIC EQUIVALENT FOR ASTELIN 30 mL 3   • BIOTIN PO Take 10,000 mcg by mouth daily at bedtime     • desloratadine (CLARINEX) 5 MG tablet TAKE 1 TABLET BY MOUTH DAILY (Patient taking differently: 5 mg every morning) 90 tablet 1   • Empagliflozin 25 MG TABS Take 1 tablet (25 mg total) by mouth every morning 90 tablet 3   • ferrous sulfate 324 (65 Fe) mg Take 1 tablet (324 mg total) by mouth daily before breakfast 90 tablet 1   • guaiFENesin (MUCINEX) 600 mg 12 hr tablet Take 1,200 mg by mouth every 12 (twelve) hours     • hydrOXYzine HCL (ATARAX) 25 mg tablet TAKE 1 TABLET BY MOUTH EVERY NIGHT AT BEDTIME AS NEEDED FOR ITCHING 90 tablet 1   • insulin aspart (NovoLOG) 100 Units/mL injection pen Inject under the skin 34 units 3 times a day with meals plus sliding scale for a maximum daily total of 110 units (Patient taking differently: Inject under the 34 units at breakfast, 34units at lunch, and 42 units at dinner,  plus sliding scale for a maximum daily total of 110 units) 135 mL 3   • insulin glargine (Toujeo SoloStar) 300 units/mL CONCENTRATED U-300 injection pen (1-unit dial) Inject 52 units in the morning, 56 at bedtime (Patient taking differently: Inject 54 units in the morning, 68 at bedtime) 34 mL 3   • Insulin Pen Needle (TRUEplus Pen Needles) 31G X 6 MM MISC Use 4 times a day to inject insulin 360 each 3   • Januvia 100 MG tablet TAKE 1 TABLET BY MOUTH DAILY (Patient taking differently: 100 mg every morning) 90 tablet 3   • levothyroxine 112 mcg tablet TAKE 1 TABLET BY MOUTH EVERY MORNING 90 tablet 1   • metFORMIN (GLUCOPHAGE) 1000 MG tablet TAKE 1 TABLET BY MOUTH TWICE DAILY WITH MEALS 180 tablet 1   • metoprolol succinate (TOPROL-XL) 25 mg 24 hr tablet TAKE 1 TABLET BY MOUTH DAILY (Patient taking differently: 25 mg daily at bedtime) 90 tablet 1   • Omega-3 Fatty Acids (FISH OIL PO) Take 1,400 mg by mouth every evening      • pantoprazole (PROTONIX) 20 mg tablet TAKE 1 TABLET BY MOUTH DAILY 90 tablet 1   • quinapril (ACCUPRIL) 40 MG tablet TAKE 1 TABLET BY MOUTH DAILY (Patient taking differently: 40 mg daily at bedtime) 90 tablet 1   • neomycin-polymyxin-dexamethasone (MAXITROL) 0 35%-10,000 units/g-0 1%        No current facility-administered medications for this visit  No Known Allergies    Objective   Vitals: Blood pressure 140/70, pulse 94, height 5' 5" (1 651 m), weight 99 3 kg (219 lb), not currently breastfeeding  Physical Exam  Vitals reviewed  Constitutional:       General: She is not in acute distress  Appearance: She is well-developed  She is obese  She is not diaphoretic  HENT:      Head: Normocephalic and atraumatic  Mouth/Throat:      Pharynx: No oropharyngeal exudate  Eyes:      General: Lids are normal  No scleral icterus  Right eye: No discharge  Left eye: No discharge  Conjunctiva/sclera: Conjunctivae normal    Neck:      Thyroid: No thyromegaly  Cardiovascular:      Rate and Rhythm: Normal rate and regular rhythm  Heart sounds: Normal heart sounds  No murmur heard  No friction rub  No gallop  Pulmonary:      Effort: Pulmonary effort is normal  No respiratory distress  Breath sounds: Normal breath sounds  No wheezing  Abdominal:      General: Bowel sounds are normal  There is no distension  Palpations: Abdomen is soft  Tenderness: There is no abdominal tenderness  Musculoskeletal:         General: No tenderness or deformity  Normal range of motion  Cervical back: Neck supple  Lymphadenopathy:      Head:      Right side of head: No occipital adenopathy  Left side of head: No occipital adenopathy  Upper Body:      Right upper body: No supraclavicular adenopathy  Left upper body: No supraclavicular adenopathy  Skin:     General: Skin is warm  Findings: No erythema or rash  Neurological:      Mental Status: She is alert and oriented to person, place, and time  Cranial Nerves: No cranial nerve deficit  Psychiatric:         Mood and Affect: Mood normal          Behavior: Behavior normal          The history was obtained from the review of the chart, patient      Lab Results:   Lab Results   Component Value Date/Time    Hemoglobin A1C 7 2 (H) 01/03/2023 10:25 AM    Hemoglobin A1C 9 7 (H) 09/14/2022 10:37 AM    Hemoglobin A1C 9 8 (H) 03/22/2022 10:20 AM    WBC 7 72 09/29/2022 11:21 AM    White Blood Cell Count 8 4 09/14/2022 10:37 AM    White Blood Cell Count 9 9 03/22/2022 10:20 AM    Hemoglobin 11 3 (L) 09/29/2022 11:21 AM    Hemoglobin 11 2 09/14/2022 10:37 AM    Hemoglobin 11 3 03/22/2022 10:20 AM    HCT 35 2 09/14/2022 10:37 AM    HCT 35 9 03/22/2022 10:20 AM    Hematocrit 36 0 09/29/2022 11:21 AM    MCV 81 (L) 09/29/2022 11:21 AM    MCV 77 (L) 09/14/2022 10:37 AM    MCV 76 (L) 03/22/2022 10:20 AM    Platelet Count 045 43/86/1636 10:37 AM    Platelet Count 153 78/82/7260 10:20 AM    Platelets 123 66/80/9294 11:21 AM    BUN 23 01/03/2023 10:25 AM    BUN 22 09/14/2022 10:37 AM    BUN 22 03/22/2022 10:20 AM    Potassium 4 4 01/03/2023 10:25 AM    Potassium 4 9 09/14/2022 10:37 AM    Potassium 5 0 03/22/2022 10:20 AM    Chloride 105 01/03/2023 10:25 AM    Chloride 100 09/14/2022 10:37 AM    Chloride 98 03/22/2022 10:20 AM    CO2 26 01/03/2023 10:25 AM    CO2 19 (L) 09/14/2022 10:37 AM    CO2 21 03/22/2022 10:20 AM    Creatinine 1 13 01/03/2023 10:25 AM    Creatinine 1 15 (H) 09/14/2022 10:37 AM    Creatinine 1 12 (H) 03/22/2022 10:20 AM    AST 36 01/03/2023 10:25 AM    AST 52 (H) 09/14/2022 10:37 AM    AST 43 (H) 03/22/2022 10:20 AM    ALT 52 01/03/2023 10:25 AM    ALT 57 (H) 09/14/2022 10:37 AM    ALT 45 (H) 03/22/2022 10:20 AM    Albumin 3 4 (L) 01/03/2023 10:25 AM    Albumin 4 1 09/14/2022 10:37 AM    Albumin 4 3 03/22/2022 10:20 AM    Globulin, Total 3 4 09/14/2022 10:37 AM    Globulin, Total 3 2 03/22/2022 10:20 AM    HDL 39 (L) 09/14/2022 10:37 AM    HDL 31 (L) 03/22/2022 10:20 AM    HDL, Direct 38 (L) 01/03/2023 10:25 AM    Triglycerides 181 (H) 01/03/2023 10:25 AM    Triglycerides 278 (H) 09/14/2022 10:37 AM    Triglycerides 331 (H) 03/22/2022 10:20 AM       Terrial Ser   Device used Dexcom  Home use       Indication     Type 2 Diabetes    More than 72 hours of data was reviewed  Report to be scanned to chart  Date Range: December 29, 2022 to January 11, 2023    Analysis of data:   % time CGM used:  Average Glucose: 198 mg/dL  SD : 47 mg/dL  Time in Target Range: 33%  Time Above Range: 54% high and 12% very high  Time Below Range: Less than 1% low    Interpretation of data: She is having hyperglycemia starting at about 10 PM and lasting till about 1 PM   Jardiance was added to her regimen  Imaging Studies: I have personally reviewed pertinent reports  Portions of the record may have been created with voice recognition software  Occasional wrong word or "sound a like" substitutions may have occurred due to the inherent limitations of voice recognition software  Read the chart carefully and recognize, using context, where substitutions have occurred

## 2023-01-16 DIAGNOSIS — E11.65 TYPE 2 DIABETES MELLITUS WITH HYPERGLYCEMIA, WITHOUT LONG-TERM CURRENT USE OF INSULIN (HCC): ICD-10-CM

## 2023-01-20 DIAGNOSIS — E11.65 TYPE 2 DIABETES MELLITUS WITH HYPERGLYCEMIA, WITH LONG-TERM CURRENT USE OF INSULIN (HCC): ICD-10-CM

## 2023-01-20 DIAGNOSIS — Z79.4 TYPE 2 DIABETES MELLITUS WITH HYPERGLYCEMIA, WITH LONG-TERM CURRENT USE OF INSULIN (HCC): ICD-10-CM

## 2023-01-23 ENCOUNTER — TELEPHONE (OUTPATIENT)
Dept: ENDOCRINOLOGY | Facility: CLINIC | Age: 63
End: 2023-01-23

## 2023-01-24 ENCOUNTER — OFFICE VISIT (OUTPATIENT)
Dept: GASTROENTEROLOGY | Facility: CLINIC | Age: 63
End: 2023-01-24

## 2023-01-24 VITALS
SYSTOLIC BLOOD PRESSURE: 153 MMHG | WEIGHT: 217.8 LBS | DIASTOLIC BLOOD PRESSURE: 68 MMHG | HEART RATE: 90 BPM | BODY MASS INDEX: 36.29 KG/M2 | HEIGHT: 65 IN

## 2023-01-24 DIAGNOSIS — K74.69 OTHER CIRRHOSIS OF LIVER (HCC): Primary | ICD-10-CM

## 2023-01-24 NOTE — PROGRESS NOTES
[facility] - Outpatient Follow-up Note  Jeri Diggs 58 y o  female MRN: [id number]  Encounter: [id number]          ASSESSMENT AND PLAN:      1  Other cirrhosis of liver ([facility] )  We again discussed this diagnosis in some detail including possible complications  She will follow-up with Dr Lee to stay up-to-date on routine vaccinations  Discussed low-sodium diet  Discussed alcohol avoidance  Discussed avoiding raw seafood, especially shellfish  We will plan repeat abdominal ultrasound every 6 months and EGD yearly  Further evaluation as below    - Protime-INR; Future  - AFP tumor marker; Future  - Iron Panel (Includes Ferritin, Iron Sat%, Iron, and TIBC); Future  - CBC  - CMP  - US right upper quadrant; Future    ______________________________________________________________________    SUBJECTIVE: Patient 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  She has been doing well with no complaints  No abdominal pain, nausea vomiting, fever chills, jaundice or rash, bruising or bleeding, confusion or forgetfulness, edema or increased abdominal girth  EGD in late December with no evidence of esophageal or gastric varices  Biopsies negative for celiac disease  REVIEW OF SYSTEMS:    Review of Systems   Gastrointestinal: Positive for abdominal pain  Historical Information   Past Medical History:   Diagnosis Date   • Anemia    • Celiac disease     R/O due to genetic marker-no family hx   • Colon polyp    • Deviated nasal septum    • Diabetes mellitus ([facility] )    • Disease of thyroid gland     hypo   • Fatty liver    • Hearing aid worn     right ear only   • Hepatitis A    • Hyperlipidemia    • Hypertension    • Wears glasses     on occ     Past Surgical History:   Procedure Laterality Date   •  SECTION, LOW TRANSVERSE      x2   • COLONOSCOPY N/A 2016    Procedure: COLONOSCOPY;  Surgeon: Garcia Bonds MD;  Location: [facility] GI LAB;   Service:    • CYST REMOVAL Left     elbow   • DILATION AND CURETTAGE OF UTERUS     • EGD     • GANGLION CYST EXCISION Right     wrist   • IR BIOPSY LIVER RANDOM  2022   • RESECTION TONSIL RADICAL     • TUBAL LIGATION     • UPPER GASTROINTESTINAL ENDOSCOPY     • VARICOSE VEIN SURGERY Bilateral 2009    x3 procedures   • WISDOM TOOTH EXTRACTION       Social History   Social History     Substance and Sexual Activity   Alcohol Use Yes    Comment: rare     Social History     Substance and Sexual Activity   Drug Use Never     Social History     Tobacco Use   Smoking Status Former   • Packs/day: 1 00   • Years: 10    • Pack years: 10 00   • Types: Cigarettes   • Quit date: 26   • Years since quittin 0   Smokeless Tobacco Never     Family History   Problem Relation Age of Onset   • Stroke Mother    • Diabetes Mother    • Arthritis Father    • Heart disease Father         CABG   • Heart disease Daughter         congenital-truncus arteriosus   • Congenital heart disease Daughter    • Heart disease Son         congenital-"hole" repair   • Other Sister         meningioma       Meds/Allergies       Current Outpatient Medications:   •  Artificial Saliva (BIOTENE MOISTURIZING MOUTH MT)  •  atorvastatin (LIPITOR) 40 mg tablet  •  azelastine (ASTELIN) 0 1 % nasal spray  •  BIOTIN PO  •  desloratadine (CLARINEX) 5 MG tablet  •  Empagliflozin 25 MG TABS  •  ferrous sulfate 324 (65 Fe) mg  •  guaiFENesin (MUCINEX) 600 mg 12 hr tablet  •  hydrOXYzine HCL (ATARAX) 25 mg tablet  •  insulin aspart (NovoLOG) 100 Units/mL injection pen  •  insulin glargine (Toujeo SoloStar) 300 units/mL CONCENTRATED U-300 injection pen (1-unit dial)  •  Insulin Pen Needle (TRUEplus Pen Needles) 31G X 6 MM MISC  •  Januvia 100 MG tablet  •  levothyroxine 112 mcg tablet  •  metFORMIN (GLUCOPHAGE) 1000 MG tablet  •  metoprolol succinate (TOPROL-XL) 25 mg 24 hr tablet  •  Omega-3 Fatty Acids (FISH OIL PO)  •  pantoprazole (PROTONIX) 20 mg tablet  •  quinapril (ACCUPRIL) 40 MG tablet  •  neomycin-polymyxin-dexamethasone (MAXITROL) 0 35%-10,000 units/g-0 1%    No Known Allergies        Objective     Blood pressure 153/68, pulse 90, height 5' 5" (1 651 m), weight 98 8 kg (217 lb 12 8 oz), not currently breastfeeding  Body mass index is 36 24 kg/m²  PHYSICAL EXAM:      Physical Exam  Vitals and nursing note reviewed  Constitutional:       General: She is not in acute distress  Appearance: She is obese  She is not ill-appearing  HENT:      Head: Normocephalic and atraumatic  Eyes:      General: No scleral icterus  Extraocular Movements: Extraocular movements intact  Cardiovascular:      Rate and Rhythm: Normal rate and regular rhythm  Pulmonary:      Effort: Pulmonary effort is normal  No respiratory distress  Abdominal:      General: There is no distension  Palpations: Abdomen is soft  Tenderness: There is no abdominal tenderness  There is no guarding  Hernia: No hernia is present  Musculoskeletal:      Right lower leg: No edema  Left lower leg: No edema  Skin:     General: Skin is warm and dry  Coloration: Skin is not cyanotic  Findings: No erythema  Neurological:      General: No focal deficit present  Mental Status: She is alert and oriented to person, place, and time  Psychiatric:         Mood and Affect: Mood normal          Behavior: Behavior normal           Lab Results:   No visits with results within 1 Day(s) from this visit     Latest known visit with results is:   Lab on 01/03/2023   Component Date Value   • Sodium 01/03/2023 136    • Potassium 01/03/2023 4 4    • Chloride 01/03/2023 105    • CO2 01/03/2023 26    • ANION GAP 01/03/2023 5    • BUN 01/03/2023 23    • Creatinine 01/03/2023 1 13    • Glucose, Fasting 01/03/2023 224 (H)    • Calcium 01/03/2023 9 5    • Corrected Calcium 01/03/2023 10 0    • AST 01/03/2023 36    • ALT 01/03/2023 52    • Alkaline Phosphatase 01/03/2023 70    • Total Protein 01/03/2023 7 5    • Albumin 01/03/2023 3 4 (L)    • Total Bilirubin 01/03/2023 0 70    • eGFR 01/03/2023 52    • Cholesterol 01/03/2023 165    • Triglycerides 01/03/2023 181 (H)    • HDL, Direct 01/03/2023 38 (L)    • LDL Calculated 01/03/2023 91    • Non-HDL-Chol (CHOL-HDL) 01/03/2023 127    • TSH 3RD GENERATON 01/03/2023 3 160    • Free T4 01/03/2023 1 23    • Hemoglobin A1C 01/03/2023 7 2 (H)    • EAG 01/03/2023 160          Radiology Results:   EGD    Result Date: 12/29/2022  Narrative: Table formatting from the original result was not included  [address] [place] [address] [phone] DATE OF SERVICE: 12/29/22 PHYSICIAN(S): Attending: Alison Heath MD Fellow: No Staff Documented INDICATION: Abnormal celiac antibody panel in patient with cirrhosis POST-OP DIAGNOSIS: See the impression below  PREPROCEDURE: Informed consent was obtained for the procedure, including sedation  Risks of perforation, hemorrhage, adverse drug reaction and aspiration were discussed  The patient was placed in the left lateral decubitus position  Patient was explained about the risks and benefits of the procedure  Risks including but not limited to bleeding, infection, and perforation were explained in detail  Also explained about less than 100% sensitivity with the exam and other alternatives  PROCEDURE: EGD DETAILS OF PROCEDURE: Patient was taken to the procedure room where a time out was performed to confirm correct patient and correct procedure  The patient underwent monitored anesthesia care, which was administered by an anesthesia professional  The patient's blood pressure, heart rate, level of consciousness, respirations and oxygen were monitored throughout the procedure  The scope was advanced to the third part of the duodenum  Retroflexion was performed in the fundus  The patient's estimated blood loss was minimal (<5 mL)  The procedure was not difficult   The patient tolerated the procedure well  There were no apparent complications  ANESTHESIA INFORMATION: ASA: II Anesthesia Type: IV Sedation with Anesthesia MEDICATIONS: No administrations occurring from 0717 to 0738 on 12/29/22 FINDINGS: The esophagus appeared normal  Z-line is 39 cm from the incisors  No esophageal varices noted Mild, generalized erythematous mucosa in the antrum; performed cold forceps biopsy to rule out H  pylori  No gastric varices noted The duodenum appeared normal  Performed random biopsy using biopsy forceps to rule out celiac disease  Biopsies obtained from the first second and third portions SPECIMENS: ID Type Source Tests Collected by Time Destination 1 : duodenum bx check for celiac Tissue Duodenum TISSUE EXAM Earnest Lopez MD 12/29/2022  7:33 AM  2 : antrum check for h  pylori Tissue Stomach TISSUE EXAM Earnest Lopez MD 12/29/2022  7:34 AM      Impression: Mild antral erythema  Biopsies obtained Normal duodenum    Biopsies obtained No evidence of esophageal or gastric varices RECOMMENDATION:  Await pathology results  Schedule repeat EGD  Repeat EGD in 1 year   Earnest Lopez MD

## 2023-02-06 ENCOUNTER — TELEPHONE (OUTPATIENT)
Dept: ENDOCRINOLOGY | Facility: CLINIC | Age: 63
End: 2023-02-06

## 2023-02-07 ENCOUNTER — DOCUMENTATION (OUTPATIENT)
Dept: OTHER | Facility: HOSPITAL | Age: 63
End: 2023-02-07

## 2023-02-08 NOTE — PROGRESS NOTES
Spoke with patient and reviewed DEXCOM report and medication change  She understood    Will let us know in 2 weeks to download Dexcom for review

## 2023-02-08 NOTE — PROGRESS NOTES
Geoffrey Roa   Device used Dexcom  Home use       Indication     Type 2 Diabetes    More than 72 hours of data was reviewed  Report to be scanned to chart  Date Range: January 25, 2023 to February 7, 2023    Analysis of data:   % time CGM used: 64%  Average Glucose: 180 mg/dL  SD : 33 mg/dL  Time in Target Range: 46%  Time Above Range: 53% high and 1% very high  Time Below Range: 0%    Interpretation of data: There is hyperglycemia overnight  At this time, I would recommend increasing Toujeo to 75 units at bedtime  Recommend continuous use of Dexcom  Please send Dexcom results in 2 weeks

## 2023-03-01 DIAGNOSIS — J30.9 ALLERGIC RHINITIS, UNSPECIFIED SEASONALITY, UNSPECIFIED TRIGGER: ICD-10-CM

## 2023-03-01 DIAGNOSIS — I10 BENIGN ESSENTIAL HYPERTENSION: ICD-10-CM

## 2023-03-02 RX ORDER — AZELASTINE 1 MG/ML
2 SPRAY, METERED NASAL 2 TIMES DAILY
Qty: 30 ML | Refills: 3 | Status: SHIPPED | OUTPATIENT
Start: 2023-03-02

## 2023-03-02 RX ORDER — QUINAPRIL 40 MG/1
40 TABLET ORAL DAILY
Qty: 90 TABLET | Refills: 1 | Status: SHIPPED | OUTPATIENT
Start: 2023-03-02

## 2023-03-07 ENCOUNTER — TELEPHONE (OUTPATIENT)
Dept: ENDOCRINOLOGY | Facility: CLINIC | Age: 63
End: 2023-03-07

## 2023-03-08 ENCOUNTER — DOCUMENTATION (OUTPATIENT)
Dept: ENDOCRINOLOGY | Facility: CLINIC | Age: 63
End: 2023-03-08

## 2023-03-08 DIAGNOSIS — Z79.4 TYPE 2 DIABETES MELLITUS WITH OTHER SPECIFIED COMPLICATION, WITH LONG-TERM CURRENT USE OF INSULIN (HCC): ICD-10-CM

## 2023-03-08 DIAGNOSIS — E11.69 TYPE 2 DIABETES MELLITUS WITH OTHER SPECIFIED COMPLICATION, WITH LONG-TERM CURRENT USE OF INSULIN (HCC): ICD-10-CM

## 2023-03-08 RX ORDER — PEN NEEDLE, DIABETIC 31 G X1/4"
NEEDLE, DISPOSABLE MISCELLANEOUS
Qty: 360 EACH | Refills: 3 | Status: SHIPPED | OUTPATIENT
Start: 2023-03-08

## 2023-03-08 NOTE — PROGRESS NOTES
Renardmarvin Emmanuelle   Device used Dexcom  Home use       Indication     Type 2 Diabetes    More than 72 hours of data was reviewed  Report to be scanned to chart  Date Range: February 22, 2023 to March 7, 2023    Analysis of data:   % time CGM used: 100%  Average Glucose: 163 mg/dL  SD : 31 mg/dL  Time in Target Range: 74%  Time Above Range: 25% high and 1% very high  Time Below Range: 0% low    Interpretation of data: The blood sugars are stable on the higher side of the target range  Would recommend increasing Toujeo to 60 units in the morning  Please find out if she is taking 75 units of Toujeo at bedtime  We will need to update the medication list with the changes

## 2023-03-08 NOTE — PROGRESS NOTES
Patient confirms taking 75 units of Toujeo  at bedtime    She understood change the Toujeo AM   Med list updated

## 2023-03-13 ENCOUNTER — VBI (OUTPATIENT)
Dept: ADMINISTRATIVE | Facility: OTHER | Age: 63
End: 2023-03-13

## 2023-03-20 ENCOUNTER — TELEPHONE (OUTPATIENT)
Dept: FAMILY MEDICINE CLINIC | Facility: CLINIC | Age: 63
End: 2023-03-20

## 2023-03-20 DIAGNOSIS — E03.9 PRIMARY HYPOTHYROIDISM: ICD-10-CM

## 2023-03-20 DIAGNOSIS — I10 BENIGN ESSENTIAL HYPERTENSION: ICD-10-CM

## 2023-03-20 DIAGNOSIS — E78.2 MIXED HYPERLIPIDEMIA: ICD-10-CM

## 2023-03-20 DIAGNOSIS — E11.65 TYPE 2 DIABETES MELLITUS WITH HYPERGLYCEMIA, WITH LONG-TERM CURRENT USE OF INSULIN (HCC): ICD-10-CM

## 2023-03-20 DIAGNOSIS — Z79.4 TYPE 2 DIABETES MELLITUS WITH HYPERGLYCEMIA, WITH LONG-TERM CURRENT USE OF INSULIN (HCC): ICD-10-CM

## 2023-03-20 RX ORDER — INSULIN GLARGINE 300 U/ML
INJECTION, SOLUTION SUBCUTANEOUS
Qty: 34 ML | Refills: 3 | Status: SHIPPED | OUTPATIENT
Start: 2023-03-20

## 2023-03-20 NOTE — TELEPHONE ENCOUNTER
Please have her call her eye doctor for the prescription  I don't manage this medication for her  Thank you,  Dionne Hodges, DO

## 2023-03-20 NOTE — TELEPHONE ENCOUNTER
----- Message from María Vasquez sent at 3/20/2023 11:28 AM EDT -----  Regarding: Refill for Maxitrol (neomycin-polymyxin)  Contact: [phone]  Please submit an order for a refill of the Maxitrol for dry eyelids to the [employer]    Thank you

## 2023-03-23 ENCOUNTER — HOSPITAL ENCOUNTER (OUTPATIENT)
Dept: RADIOLOGY | Facility: HOSPITAL | Age: 63
Discharge: HOME | End: 2023-03-23

## 2023-03-23 DIAGNOSIS — K74.69 OTHER CIRRHOSIS OF LIVER (HCC): ICD-10-CM

## 2023-03-27 ENCOUNTER — DOCUMENTATION (OUTPATIENT)
Dept: ENDOCRINOLOGY | Facility: CLINIC | Age: 63
End: 2023-03-27

## 2023-03-27 ENCOUNTER — TELEPHONE (OUTPATIENT)
Dept: ENDOCRINOLOGY | Facility: CLINIC | Age: 63
End: 2023-03-27

## 2023-03-27 DIAGNOSIS — E11.65 TYPE 2 DIABETES MELLITUS WITH HYPERGLYCEMIA, WITHOUT LONG-TERM CURRENT USE OF INSULIN (HCC): Primary | ICD-10-CM

## 2023-03-27 NOTE — PROGRESS NOTES
Logan Regalado   Device used Dexcom  Home use       Indication     Type 2 Diabetes    More than 72 hours of data was reviewed  Report to be scanned to chart  Date Range: March 14, 2023 to March 27, 2023    Analysis of data:   % time CGM used:79%  Average Glucose: 164 mg/dL  SD : 36 mg/dL   Time in Target Range: 67%   Time Above Range: 32%   Time Below Range: <1%     Interpretation of data: Comparing to the prior results, the time in range has decreased  Would consider adding Mounjaro or Ozempic and stopping Januvia  Let us know if you would want to do this

## 2023-04-05 ENCOUNTER — RA CDI HCC (OUTPATIENT)
Dept: OTHER | Facility: HOSPITAL | Age: 63
End: 2023-04-05

## 2023-04-05 NOTE — PROGRESS NOTES
[address]  coding opportunities          Chart Reviewed number of suggestions sent to Provider: 1     Patients Insurance        Commercial Insurance: Fausto 93     [id number]

## 2023-04-13 DIAGNOSIS — K74.69 OTHER CIRRHOSIS OF LIVER (HCC): Primary | ICD-10-CM

## 2023-04-14 PROBLEM — N18.31 STAGE 3A CHRONIC KIDNEY DISEASE (HCC): Status: ACTIVE | Noted: 2023-04-14

## 2023-04-14 PROBLEM — K74.60 CIRRHOSIS OF LIVER (HCC): Status: ACTIVE | Noted: 2023-04-14

## 2023-04-21 PROBLEM — D50.9 IRON DEFICIENCY ANEMIA: Status: ACTIVE | Noted: 2023-04-21

## 2023-04-24 DIAGNOSIS — I10 BENIGN ESSENTIAL HYPERTENSION: Primary | ICD-10-CM

## 2023-04-24 RX ORDER — ENALAPRIL MALEATE 20 MG/1
40 TABLET ORAL DAILY
Qty: 180 TABLET | Refills: 3 | Status: SHIPPED | OUTPATIENT
Start: 2023-04-24

## 2023-05-11 ENCOUNTER — OFFICE VISIT (OUTPATIENT)
Dept: ENDOCRINOLOGY | Facility: CLINIC | Age: 63
End: 2023-05-11

## 2023-05-11 VITALS
HEART RATE: 91 BPM | BODY MASS INDEX: 32.82 KG/M2 | HEIGHT: 65 IN | SYSTOLIC BLOOD PRESSURE: 120 MMHG | WEIGHT: 197 LBS | DIASTOLIC BLOOD PRESSURE: 78 MMHG

## 2023-05-11 DIAGNOSIS — E11.69 TYPE 2 DIABETES MELLITUS WITH OTHER SPECIFIED COMPLICATION, WITH LONG-TERM CURRENT USE OF INSULIN (HCC): ICD-10-CM

## 2023-05-11 DIAGNOSIS — E03.9 PRIMARY HYPOTHYROIDISM: ICD-10-CM

## 2023-05-11 DIAGNOSIS — Z79.4 TYPE 2 DIABETES MELLITUS WITH HYPERGLYCEMIA, WITH LONG-TERM CURRENT USE OF INSULIN (HCC): ICD-10-CM

## 2023-05-11 DIAGNOSIS — E78.2 MIXED HYPERLIPIDEMIA: ICD-10-CM

## 2023-05-11 DIAGNOSIS — E11.65 TYPE 2 DIABETES MELLITUS WITH HYPERGLYCEMIA, WITH LONG-TERM CURRENT USE OF INSULIN (HCC): ICD-10-CM

## 2023-05-11 DIAGNOSIS — E11.65 TYPE 2 DIABETES MELLITUS WITH HYPERGLYCEMIA, WITHOUT LONG-TERM CURRENT USE OF INSULIN (HCC): ICD-10-CM

## 2023-05-11 DIAGNOSIS — Z79.4 TYPE 2 DIABETES MELLITUS WITH OTHER SPECIFIED COMPLICATION, WITH LONG-TERM CURRENT USE OF INSULIN (HCC): ICD-10-CM

## 2023-05-11 DIAGNOSIS — E11.9 TYPE 2 DIABETES MELLITUS WITHOUT COMPLICATION, WITHOUT LONG-TERM CURRENT USE OF INSULIN (HCC): ICD-10-CM

## 2023-05-11 DIAGNOSIS — I10 BENIGN ESSENTIAL HYPERTENSION: ICD-10-CM

## 2023-05-11 RX ORDER — INSULIN GLARGINE 300 U/ML
INJECTION, SOLUTION SUBCUTANEOUS
Qty: 34 ML | Refills: 3 | Status: SHIPPED | OUTPATIENT
Start: 2023-05-11

## 2023-05-11 RX ORDER — LEVOTHYROXINE SODIUM 112 UG/1
TABLET ORAL
Qty: 90 TABLET | Refills: 3 | Status: SHIPPED | OUTPATIENT
Start: 2023-05-11

## 2023-05-11 RX ORDER — PEN NEEDLE, DIABETIC 31 G X1/4"
NEEDLE, DISPOSABLE MISCELLANEOUS
Qty: 600 EACH | Refills: 3 | Status: SHIPPED | OUTPATIENT
Start: 2023-05-11

## 2023-05-11 NOTE — PATIENT INSTRUCTIONS
Continue metformin, Januvia, Jardiance at current dose  Continue NovoLog 34 units with breakfast, lunch, 42 units with dinner  Start NovoLog 5 units with carbohydrate containing snack at night  Continue Toujeo 60 units in the morning, increase to 78 units at bedtime  Plan for review of blood sugars in 2 weeks

## 2023-05-11 NOTE — PROGRESS NOTES
Dayana Ennis 61 y o  female MRN: [id number]    Encounter: [id number]      Assessment/Plan     1  Type 2 diabetes mellitus on long-term insulin therapy with hyperglycemia  2  Obesity  Well controlled based on last A1c 6 5% however this is not corresponding to most recent CGM data  Patient does admit that she has been more stressed recently    History of side effects with GLP-1 agonist in the past   When reviewed potential side effects with Mounjaro, patient said that she would like to avoid starting this new medication  Recommend the following at this time  Continue metformin, Januvia, Jardiance at current dose  Continue NovoLog 34 units with breakfast, lunch, 42 units with dinner  Start NovoLog 5 units with carbohydrate containing snack at night  Continue Toujeo 60 units in the morning, increase to 78 units at bedtime  Plan for review of blood sugars in 2 weeks  -Repeat labs in 3 months including fructosamine prior to follow-up    3  Hyperlipidemia  - continue statin therapy    4  Hypertension  Blood pressure at goal   - continue ACE-I/ ARB    5  Hypothyroidism - Clinically and biochemically euthyroid; continue Current dose of levothyroxine    CC: Diabetes    History of Present Illness     HPI:  Dayana Ennis is a 61 y o  female presents for a follow-up visit regarding diabetes management     Also has     Last seen in   Last Eye exam: 10/2022 - No DR     Current regimen:   Metformin 1 gm BID   Januvia 100 mg daily   Novolog 34, 34, 42 units TID + SSI   Toujeo 60, 75 units twice a day   jardiance 25 mg daily     Developed side effects with glp 1 agonist and S GLT 2 inhibitors in the past and so needed to be discontinued    Levothyroxine 112 mcg orally daily     appetite better since Bg have improved  Lost 5 lbs   Last few months has been experiencing joint stiffness  Last 2 weeks bG are higer- admits to stress  Eats a snack post dinner       Home glucose monitoring:  CGM interpretation    Time percentage CGM worn  93%   Time in range 35%  (goal >65-70%)  High 55%  Very high 10 percent  Low 0  Very low 0  SD 39 (goal <50)     Average glucose 199  mg/dL  GMI 8 1 %     Symptoms of hypoglycemia :  Did not have any symptoms, altered by CGM 68-70 mg/dl     All other systems were reviewed and are negative  Review of Systems      Historical Information   Past Medical History:   Diagnosis Date   • Anemia    • Celiac disease     R/O due to genetic marker-no family hx   • Colon polyp    • Deviated nasal septum    • Diabetes mellitus ([address] )    • Disease of thyroid gland     hypo   • Fatty liver    • Hearing aid worn     right ear only   • Hepatitis A    • Hyperlipidemia    • Hypertension    • Wears glasses     on occ     Past Surgical History:   Procedure Laterality Date   •  SECTION, LOW TRANSVERSE      x2   • COLONOSCOPY N/A 2016    Procedure: COLONOSCOPY;  Surgeon: Alejo Dye MD;  Location: [facility] GI LAB;   Service:    • CYST REMOVAL Left     elbow   • DILATION AND CURETTAGE OF UTERUS     • EGD     • GANGLION CYST EXCISION Right     wrist   • IR BIOPSY LIVER RANDOM  2022   • RESECTION TONSIL RADICAL     • TUBAL LIGATION     • UPPER GASTROINTESTINAL ENDOSCOPY     • VARICOSE VEIN SURGERY Bilateral 2009    x3 procedures   • WISDOM TOOTH EXTRACTION       Social History   Social History     Substance and Sexual Activity   Alcohol Use Yes    Comment: rare     Social History     Substance and Sexual Activity   Drug Use Never     Social History     Tobacco Use   Smoking Status Former   • Packs/day: 1 00   • Years: 10 00   • Pack years: 10 00   • Types: Cigarettes   • Quit date: 26   • Years since quittin 3   Smokeless Tobacco Never     Family History:   Family History   Problem Relation Age of Onset   • Stroke Mother    • Diabetes Mother    • Arthritis Father    • Heart disease Father         CABG   • Heart disease Daughter         congenital-truncus arteriosus   • Congenital heart disease Daughter    • "Heart disease Son         congenital-\"hole\" repair   • Other Sister         meningioma       Meds/Allergies   Current Outpatient Medications   Medication Sig Dispense Refill   • Artificial Saliva (BIOTENE MOISTURIZING MOUTH MT) Apply to the mouth or throat daily at bedtime     • atorvastatin (LIPITOR) 40 mg tablet Take 1 tablet (40 mg total) by mouth daily 90 tablet 1   • azelastine (ASTELIN) 0 1 % nasal spray 2 sprays into each nostril 2 (two) times a day Use in each nostril as directed 30 mL 3   • BIOTIN PO Take 10,000 mcg by mouth daily at bedtime     • desloratadine (CLARINEX) 5 MG tablet TAKE 1 TABLET BY MOUTH DAILY (Patient taking differently: 5 mg every morning) 90 tablet 1   • Empagliflozin 25 MG TABS Take 1 tablet (25 mg total) by mouth every morning 90 tablet 3   • enalapril (VASOTEC) 20 mg tablet Take 2 tablets (40 mg total) by mouth daily 180 tablet 3   • ferrous sulfate 324 (65 Fe) mg Take 1 tablet (324 mg total) by mouth daily before breakfast 90 tablet 1   • guaiFENesin (MUCINEX) 600 mg 12 hr tablet Take 1,200 mg by mouth every 12 (twelve) hours     • hydrOXYzine HCL (ATARAX) 25 mg tablet TAKE 1 TABLET BY MOUTH EVERY NIGHT AT BEDTIME AS NEEDED FOR ITCHING 90 tablet 1   • insulin aspart (NovoLOG) 100 Units/mL injection pen Inject under the 34 units at breakfast, 34units at lunch, and 42 units at dinner,  plus sliding scale for a maximum daily total of 110 units 135 mL 3   • insulin glargine (Toujeo SoloStar) 300 units/mL CONCENTRATED U-300 injection pen (1-unit dial) Inject 60 units in the morning, 75 at bedtime 34 mL 3   • Insulin Pen Needle (TRUEplus Pen Needles) 31G X 6 MM MISC Use 4 times a day to inject insulin 360 each 3   • Januvia 100 MG tablet TAKE 1 TABLET BY MOUTH DAILY (Patient taking differently: 100 mg every morning) 90 tablet 3   • levothyroxine 112 mcg tablet TAKE 1 TABLET BY MOUTH EVERY MORNING 90 tablet 1   • metFORMIN (GLUCOPHAGE) 1000 MG tablet TAKE 1 TABLET BY MOUTH TWICE DAILY " "WITH MEALS 180 tablet 1   • metoprolol succinate (TOPROL-XL) 25 mg 24 hr tablet TAKE 1 TABLET BY MOUTH DAILY (Patient taking differently: 25 mg daily at bedtime) 90 tablet 1   • Omega-3 Fatty Acids (FISH OIL PO) Take 1,400 mg by mouth every evening      • pantoprazole (PROTONIX) 20 mg tablet TAKE 1 TABLET BY MOUTH DAILY 90 tablet 1     No current facility-administered medications for this visit  No Known Allergies    Objective   Vitals: Blood pressure 120/78, pulse 91, height 5' 5\" (1 651 m), weight 89 4 kg (197 lb), not currently breastfeeding  Physical Exam  Constitutional:       Appearance: She is well-developed  HENT:      Head: Normocephalic and atraumatic  Eyes:      Conjunctiva/sclera: Conjunctivae normal       Pupils: Pupils are equal, round, and reactive to light  Neck:      Thyroid: No thyromegaly  Cardiovascular:      Rate and Rhythm: Normal rate and regular rhythm  Heart sounds: Normal heart sounds  No murmur heard  Pulmonary:      Effort: Pulmonary effort is normal       Breath sounds: Normal breath sounds  No wheezing  Abdominal:      General: There is no distension  Palpations: Abdomen is soft  Tenderness: There is no abdominal tenderness  Musculoskeletal:         General: Normal range of motion  Cervical back: Normal range of motion and neck supple  Skin:     General: Skin is warm and dry  Neurological:      Mental Status: She is alert and oriented to person, place, and time  Psychiatric:         Behavior: Behavior normal          The history was obtained from the review of the chart, patient      Lab Results:   Lab Results   Component Value Date/Time    Hemoglobin A1C 6 5 (H) 04/10/2023 11:35 AM    Hemoglobin A1C 7 2 (H) 01/03/2023 10:25 AM    Hemoglobin A1C 9 7 (H) 09/14/2022 10:37 AM    WBC 8 11 04/10/2023 11:35 AM    WBC 7 72 09/29/2022 11:21 AM    White Blood Cell Count 8 4 09/14/2022 10:37 AM    Hemoglobin 12 0 04/10/2023 11:35 AM    Hemoglobin 11 3 " "(L) 09/29/2022 11:21 AM    Hemoglobin 11 2 09/14/2022 10:37 AM    HCT 35 2 09/14/2022 10:37 AM    Hematocrit 37 9 04/10/2023 11:35 AM    Hematocrit 36 0 09/29/2022 11:21 AM    MCV 81 (L) 04/10/2023 11:35 AM    MCV 81 (L) 09/29/2022 11:21 AM    MCV 77 (L) 09/14/2022 10:37 AM    Platelet Count 526 88/36/5984 10:37 AM    Platelets 808 68/39/8876 11:35 AM    Platelets 290 83/01/4137 11:21 AM    BUN 20 04/10/2023 11:35 AM    BUN 23 01/03/2023 10:25 AM    BUN 22 09/14/2022 10:37 AM    Potassium 4 7 04/10/2023 11:35 AM    Potassium 4 4 01/03/2023 10:25 AM    Potassium 4 9 09/14/2022 10:37 AM    Chloride 106 04/10/2023 11:35 AM    Chloride 105 01/03/2023 10:25 AM    Chloride 100 09/14/2022 10:37 AM    CO2 26 04/10/2023 11:35 AM    CO2 26 01/03/2023 10:25 AM    CO2 19 (L) 09/14/2022 10:37 AM    Creatinine 1 25 04/10/2023 11:35 AM    Creatinine 1 13 01/03/2023 10:25 AM    Creatinine 1 15 (H) 09/14/2022 10:37 AM    AST 67 (H) 04/10/2023 11:35 AM    AST 36 01/03/2023 10:25 AM    AST 52 (H) 09/14/2022 10:37 AM    ALT 87 (H) 04/10/2023 11:35 AM    ALT 52 01/03/2023 10:25 AM    ALT 57 (H) 09/14/2022 10:37 AM    Albumin 3 7 04/10/2023 11:35 AM    Albumin 3 4 (L) 01/03/2023 10:25 AM    Albumin 4 1 09/14/2022 10:37 AM    Globulin, Total 3 4 09/14/2022 10:37 AM    HDL 39 (L) 09/14/2022 10:37 AM    HDL, Direct 36 (L) 04/10/2023 11:35 AM    HDL, Direct 38 (L) 01/03/2023 10:25 AM    Triglycerides 196 (H) 04/10/2023 11:35 AM    Triglycerides 181 (H) 01/03/2023 10:25 AM    Triglycerides 278 (H) 09/14/2022 10:37 AM         Imaging Studies: I have personally reviewed pertinent reports  Portions of the record may have been created with voice recognition software  Occasional wrong word or \"sound a like\" substitutions may have occurred due to the inherent limitations of voice recognition software  Read the chart carefully and recognize, using context, where substitutions have occurred       "

## 2023-05-24 DIAGNOSIS — J30.9 ALLERGIC RHINITIS, UNSPECIFIED SEASONALITY, UNSPECIFIED TRIGGER: ICD-10-CM

## 2023-05-24 RX ORDER — AZELASTINE 1 MG/ML
SPRAY, METERED NASAL
Qty: 30 ML | Refills: 2 | Status: SHIPPED | OUTPATIENT
Start: 2023-05-24

## 2023-06-09 DIAGNOSIS — J30.9 ALLERGIC RHINITIS, UNSPECIFIED SEASONALITY, UNSPECIFIED TRIGGER: ICD-10-CM

## 2023-06-09 RX ORDER — DESLORATADINE 5 MG/1
TABLET ORAL
Qty: 90 TABLET | Refills: 1 | Status: SHIPPED | OUTPATIENT
Start: 2023-06-09

## 2023-06-13 ENCOUNTER — TELEPHONE (OUTPATIENT)
Dept: FAMILY MEDICINE CLINIC | Facility: CLINIC | Age: 63
End: 2023-06-13

## 2023-06-14 ENCOUNTER — CLINICAL SUPPORT (OUTPATIENT)
Dept: FAMILY MEDICINE CLINIC | Facility: CLINIC | Age: 63
End: 2023-06-14
Payer: COMMERCIAL

## 2023-06-14 ENCOUNTER — DOCUMENTATION (OUTPATIENT)
Dept: ENDOCRINOLOGY | Facility: CLINIC | Age: 63
End: 2023-06-14

## 2023-06-14 DIAGNOSIS — Z23 NEED FOR VACCINATION: Primary | ICD-10-CM

## 2023-06-14 PROCEDURE — 90471 IMMUNIZATION ADMIN: CPT

## 2023-06-14 PROCEDURE — 90746 HEPB VACCINE 3 DOSE ADULT IM: CPT

## 2023-06-19 ENCOUNTER — OFFICE VISIT (OUTPATIENT)
Dept: FAMILY MEDICINE CLINIC | Facility: CLINIC | Age: 63
End: 2023-06-19
Payer: COMMERCIAL

## 2023-06-19 VITALS
BODY MASS INDEX: 36.15 KG/M2 | WEIGHT: 217 LBS | SYSTOLIC BLOOD PRESSURE: 140 MMHG | HEIGHT: 65 IN | DIASTOLIC BLOOD PRESSURE: 80 MMHG | RESPIRATION RATE: 18 BRPM | HEART RATE: 88 BPM | OXYGEN SATURATION: 99 % | TEMPERATURE: 97.6 F

## 2023-06-19 DIAGNOSIS — R39.9 UTI SYMPTOMS: Primary | ICD-10-CM

## 2023-06-19 LAB
SL AMB  POCT GLUCOSE, UA: 500
SL AMB LEUKOCYTE ESTERASE,UA: ABNORMAL
SL AMB POCT BILIRUBIN,UA: ABNORMAL
SL AMB POCT BLOOD,UA: ABNORMAL
SL AMB POCT CLARITY,UA: CLEAR
SL AMB POCT COLOR,UA: ABNORMAL
SL AMB POCT KETONES,UA: ABNORMAL
SL AMB POCT NITRITE,UA: ABNORMAL
SL AMB POCT PH,UA: 5
SL AMB POCT SPECIFIC GRAVITY,UA: 1.02
SL AMB POCT URINE PROTEIN: >=300
SL AMB POCT UROBILINOGEN: 1

## 2023-06-19 PROCEDURE — 99213 OFFICE O/P EST LOW 20 MIN: CPT | Performed by: NURSE PRACTITIONER

## 2023-06-19 PROCEDURE — 81003 URINALYSIS AUTO W/O SCOPE: CPT | Performed by: NURSE PRACTITIONER

## 2023-06-19 RX ORDER — NITROFURANTOIN 25; 75 MG/1; MG/1
100 CAPSULE ORAL 2 TIMES DAILY
Qty: 10 CAPSULE | Refills: 0 | Status: SHIPPED | OUTPATIENT
Start: 2023-06-19 | End: 2023-06-24

## 2023-06-19 RX ORDER — PHENAZOPYRIDINE HYDROCHLORIDE 200 MG/1
TABLET, FILM COATED ORAL
Qty: 12 TABLET | Refills: 0 | Status: SHIPPED | OUTPATIENT
Start: 2023-06-19 | End: 2023-06-23

## 2023-06-19 NOTE — PROGRESS NOTES
Assessment/Plan:    Will start on Macrobid and send urine for culture  Encouraged to hydrate, Pyridium prn for pain/discomfort  Will f/u with results of culture  1  UTI symptoms  -     POCT urine dip auto non-scope  -     Urine culture  -     nitrofurantoin (MACROBID) 100 mg capsule; Take 1 capsule (100 mg total) by mouth 2 (two) times a day for 5 days  -     phenazopyridine (PYRIDIUM) 200 mg tablet; 1 tab PO TID as needed for burning      Recent Results (from the past 24 hour(s))   POCT urine dip auto non-scope    Collection Time: 06/19/23 10:58 AM   Result Value Ref Range     COLOR,UA orange     CLARITY,UA clear     SPECIFIC GRAVITY,UA 1 020      PH,UA 5 0     LEUKOCYTE ESTERASE,UA neg     NITRITE,UA neg     GLUCOSE,      KETONES,UA neg     BILIRUBIN,UA neg     BLOOD,UA neg     POCT URINE PROTEIN >=300     SL AMB POCT UROBILINOGEN 1 0              There are no Patient Instructions on file for this visit  Return if symptoms worsen or fail to improve  Subjective:      Patient ID: Chapin Castillo is a 61 y o  female  Chief Complaint   Patient presents with   • Urinary Tract Infection     [id number]       She has had UTI symptoms for the past week  She has a history of IC, unsure if this is related or if she has an infection  She has frequency, bladder pressure/discomfort, and burning  No hematuria, flank pain, fevers, n/v  She is taking Uricalm OTC      Urinary Tract Infection   Pertinent negatives include no chills, discharge, flank pain, frequency, hematuria, hesitancy, nausea, possible pregnancy, sweats, urgency or vomiting  Difficulty Urinating   The current episode started in the past 7 days  The problem occurs intermittently  The problem has been waxing and waning  The quality of the pain is described as burning  The pain is at a severity of 4/10  She is not sexually active  There is no history of pyelonephritis   Pertinent negatives include no chills, discharge, flank pain, frequency, hematuria, hesitancy, nausea, possible pregnancy, sweats, urgency or vomiting  The following portions of the patient's history were reviewed and updated as appropriate: allergies, current medications, past family history, past medical history, past social history, past surgical history and problem list     Review of Systems   Constitutional: Negative for chills and fever  Gastrointestinal: Negative for nausea and vomiting  Genitourinary: Positive for dysuria  Negative for flank pain, frequency, hematuria, hesitancy and urgency  Current Outpatient Medications   Medication Sig Dispense Refill   • Artificial Saliva (BIOTENE MOISTURIZING MOUTH MT) Apply to the mouth or throat daily at bedtime     • atorvastatin (LIPITOR) 40 mg tablet Take 1 tablet (40 mg total) by mouth daily 90 tablet 1   • azelastine (ASTELIN) 0 1 % nasal spray Instill 2 sprays into each nostril twice daily as directed  30 mL 2   • BIOTIN PO Take 10,000 mcg by mouth daily at bedtime     • desloratadine (CLARINEX) 5 MG tablet Take 1 tablet by mouth daily  90 tablet 1   • Empagliflozin 25 MG TABS Take 1 tablet (25 mg total) by mouth every morning 90 tablet 3   • enalapril (VASOTEC) 20 mg tablet Take 2 tablets (40 mg total) by mouth daily 180 tablet 3   • ferrous sulfate 324 (65 Fe) mg Take 1 tablet (324 mg total) by mouth daily before breakfast 90 tablet 1   • guaiFENesin (MUCINEX) 600 mg 12 hr tablet Take 1,200 mg by mouth every 12 (twelve) hours     • hydrOXYzine HCL (ATARAX) 25 mg tablet TAKE 1 TABLET BY MOUTH EVERY NIGHT AT BEDTIME AS NEEDED FOR ITCHING 90 tablet 1   • insulin aspart (NovoLOG) 100 Units/mL injection pen Inject under the 34 units at breakfast, 34units at lunch, and 42 units at dinner,   5 units with snack plus sliding scale for a maximum daily total of 110 units (Patient taking differently: Inject under the 34 units at breakfast, 30-32    units at lunch, and 40 units at dinner,   5 units with snack plus sliding scale "for a maximum daily total of 110 units) 150 mL 3   • insulin glargine (Toujeo SoloStar) 300 units/mL CONCENTRATED U-300 injection pen (1-unit dial) Inject 60 units in the morning, 78 at bedtime 34 mL 3   • Insulin Pen Needle (TRUEplus Pen Needles) 31G X 6 MM MISC Use 6 times a day to inject insulin 600 each 3   • Januvia 100 MG tablet TAKE 1 TABLET BY MOUTH DAILY (Patient taking differently: 100 mg every morning) 90 tablet 3   • levothyroxine 112 mcg tablet TAKE 1 TABLET BY MOUTH EVERY MORNING 90 tablet 3   • metFORMIN (GLUCOPHAGE) 1000 MG tablet Take 1 tablet (1,000 mg total) by mouth 2 (two) times a day with meals 180 tablet 3   • metoprolol succinate (TOPROL-XL) 25 mg 24 hr tablet TAKE 1 TABLET BY MOUTH DAILY (Patient taking differently: 25 mg daily at bedtime) 90 tablet 1   • nitrofurantoin (MACROBID) 100 mg capsule Take 1 capsule (100 mg total) by mouth 2 (two) times a day for 5 days 10 capsule 0   • Omega-3 Fatty Acids (FISH OIL PO) Take 1,400 mg by mouth every evening      • pantoprazole (PROTONIX) 20 mg tablet TAKE 1 TABLET BY MOUTH DAILY 90 tablet 1   • phenazopyridine (PYRIDIUM) 200 mg tablet 1 tab PO TID as needed for burning 12 tablet 0     No current facility-administered medications for this visit  Objective:    /80   Pulse 88   Temp 97 6 °F (36 4 °C)   Resp 18   Ht 5' 5\" (1 651 m)   Wt 98 4 kg (217 lb)   SpO2 99%   BMI 36 11 kg/m²        Physical Exam  Vitals and nursing note reviewed  Constitutional:       Appearance: Normal appearance  She is well-developed  Cardiovascular:      Rate and Rhythm: Normal rate and regular rhythm  Heart sounds: Normal heart sounds  No murmur heard  Pulmonary:      Effort: Pulmonary effort is normal       Breath sounds: Normal breath sounds  Abdominal:      Tenderness: There is no right CVA tenderness or left CVA tenderness  Skin:     General: Skin is warm and dry  Neurological:      Mental Status: She is alert     Psychiatric:         " Mood and Affect: Mood normal          Behavior: Behavior normal                 KHOA Moreira

## 2023-06-21 ENCOUNTER — TELEPHONE (OUTPATIENT)
Dept: FAMILY MEDICINE CLINIC | Facility: CLINIC | Age: 63
End: 2023-06-21

## 2023-06-21 NOTE — TELEPHONE ENCOUNTER
Pt was seen on 6/16/23, has not heard what the urine culture results were and is now running a fever  Did pickup and take the medication  Would like call to discuss results and treatment plan this evening

## 2023-06-22 LAB
BACTERIA UR CULT: ABNORMAL
Lab: ABNORMAL
SL AMB ANTIMICROBIAL SUSCEPTIBILITY: ABNORMAL

## 2023-06-22 NOTE — TELEPHONE ENCOUNTER
Spoke to patient she stated that she has two days left of her ABX   She had two days of fever before treating with tylenol  She stated that today she has not had any fevers and no other symptoms as of yet  Patient called because she wanted to know the results of her culture to which I told her that we have don't have the results yet  Patient informed that Dr Jacy Chew and Terrie Riggins are out today   What should I tell her  Charlene Hunter

## 2023-06-22 NOTE — TELEPHONE ENCOUNTER
Urine culture is not back yet  Please triage for other symptoms  Dysuria? Back pain? How high is the fever?

## 2023-06-27 ENCOUNTER — OFFICE VISIT (OUTPATIENT)
Dept: OBGYN CLINIC | Facility: CLINIC | Age: 63
End: 2023-06-27
Payer: COMMERCIAL

## 2023-06-27 VITALS
DIASTOLIC BLOOD PRESSURE: 72 MMHG | HEIGHT: 65 IN | HEART RATE: 86 BPM | SYSTOLIC BLOOD PRESSURE: 122 MMHG | BODY MASS INDEX: 36.15 KG/M2 | WEIGHT: 217 LBS

## 2023-06-27 DIAGNOSIS — M65.322 TRIGGER FINGER, LEFT INDEX FINGER: ICD-10-CM

## 2023-06-27 DIAGNOSIS — M65.321 TRIGGER FINGER, RIGHT INDEX FINGER: Primary | ICD-10-CM

## 2023-06-27 DIAGNOSIS — E11.65 TYPE 2 DIABETES MELLITUS WITH HYPERGLYCEMIA, WITHOUT LONG-TERM CURRENT USE OF INSULIN (HCC): ICD-10-CM

## 2023-06-27 PROCEDURE — 99204 OFFICE O/P NEW MOD 45 MIN: CPT | Performed by: ORTHOPAEDIC SURGERY

## 2023-06-27 PROCEDURE — 20550 NJX 1 TENDON SHEATH/LIGAMENT: CPT | Performed by: ORTHOPAEDIC SURGERY

## 2023-06-27 RX ORDER — BETAMETHASONE SODIUM PHOSPHATE AND BETAMETHASONE ACETATE 3; 3 MG/ML; MG/ML
6 INJECTION, SUSPENSION INTRA-ARTICULAR; INTRALESIONAL; INTRAMUSCULAR; SOFT TISSUE
Status: COMPLETED | OUTPATIENT
Start: 2023-06-27 | End: 2023-06-27

## 2023-06-27 RX ORDER — BUPIVACAINE HYDROCHLORIDE 2.5 MG/ML
1 INJECTION, SOLUTION INFILTRATION; PERINEURAL
Status: COMPLETED | OUTPATIENT
Start: 2023-06-27 | End: 2023-06-27

## 2023-06-27 RX ADMIN — BETAMETHASONE SODIUM PHOSPHATE AND BETAMETHASONE ACETATE 6 MG: 3; 3 INJECTION, SUSPENSION INTRA-ARTICULAR; INTRALESIONAL; INTRAMUSCULAR; SOFT TISSUE at 13:00

## 2023-06-27 RX ADMIN — BUPIVACAINE HYDROCHLORIDE 1 ML: 2.5 INJECTION, SOLUTION INFILTRATION; PERINEURAL at 13:00

## 2023-06-27 NOTE — PROGRESS NOTES
ORTHOPAEDIC HAND, WRIST, AND ELBOW OFFICE  VISIT       ASSESSMENT/PLAN:      Diagnoses and all orders for this visit:    Trigger finger, right index finger  -     Hand/upper extremity injection: bilateral index A1    Trigger finger, left index finger  -     Hand/upper extremity injection: bilateral index A1    Type 2 diabetes mellitus with hyperglycemia, without long-term current use of insulin [facility])      70-year-old female with multiple trigger fingers of the bilateral hands  · Diagnostics reviewed and physical exam performed  Diagnosis, treatment options and associated risks were discussed with the patient including no treatment, nonsurgical treatment and potential for surgical intervention  The patient was given the opportunity to ask questions regarding each  · Patient wishes to proceed with nonoperative treatment in the form of cortisone injection which will be performed today with bilateral index fingers  · Patient was offered, accepted, and received a cortisone injection of the bilateral index A1 pulleys  Risks and benefits of CSI were discussed with the patient  The corticosteroid injection was administered without any immediate complication and was well tolerated by the patient  This was done under sterile technique  Post injection instructions and expectations were discussed  It was explained to the patient that cortisone injections can be repeated as early as every 3 months  · I discussed with the patient that due to history of diabetes, blood sugars should be monitored closely over the next 3-5 days, because corticosteroid injections can potentially elevate their blood sugar  Patient was instructed to contact their PCP or Endocrinologist if their blood sugars are consistently elevated in the days following their injection  · She may follow-up in 8 weeks for reevaluation  At that time, may consider repeat cortisone injection based on efficacy of today's treatment    She may cancel this appointment if she is feeling well  The patient verbalized understanding of exam findings and treatment plan  We engaged in the shared decision-making process and treatment options were discussed at length with the patient  Surgical and conservative management discussed today along with risks and benefits  Follow Up:  Return in about 8 weeks (around 8/22/2023) for re-evaluation, may cancel if feeling well  To Do Next Visit:  Re-evaluation of current issue    General Discussions:  Trigger Finger: The anatomy and physiology of trigger finger was discussed with the patient today in the office  Edema and increased contact pressure within the flexor tendons at the A1 pulley can cause pain, crepitation, and triggering or locking of the digit resulting in limitation of function  Symptoms can occur at anytime but are typically worse in the morning or after a brief rest from repetitive activity  Treatment options include resting/nighttime MP blocking splints to decrease edema, oral anti-inflammatory medications, home or formal therapy exercises, up to 2 steroid injections within the tendon sheath, or surgical release  While majority of patients do respond to conservative treatment, up to 20% may require surgical release      ____________________________________________________________________________________________________________________________________________      CHIEF COMPLAINT:  Chief Complaint   Patient presents with   • Left Hand - Pain   • Right Hand - Pain       SUBJECTIVE:  Obed Benedict is a 61y o  year old RHD female who presents today for evaluation and treatment of multiple trigger fingers of the bilateral hands  She notes this has been ongoing for five months     She describes an aching pain in the volar aspect of her second through fifth MCP joints as well as occasional clicking sensation, inability to fully flex or fully extend the fingers, and pain with making a composite fist   She notes that pain "and stiffness is worse first thing in the morning and with cold weather  She has not had any treatment in the past for this pain  She does have a history of diabetes with her most recent A1c being 6 5, and her blood sugar about 1 hour prior to his appointment at [facility]  I have personally reviewed all the relevant PMH, PSH, SH, FH, Medications and allergies      PAST MEDICAL HISTORY:  Past Medical History:   Diagnosis Date   • Anemia    • Celiac disease     R/O due to genetic marker-no family hx   • Colon polyp    • Deviated nasal septum    • Diabetes mellitus ([facility] [address] )    • Disease of thyroid gland     hypo   • Fatty liver    • Hearing aid worn     right ear only   • Hepatitis A    • Hyperlipidemia    • Hypertension    • Wears glasses     on occ       PAST SURGICAL HISTORY:  Past Surgical History:   Procedure Laterality Date   •  SECTION, LOW TRANSVERSE      x2   • COLONOSCOPY N/A 2016    Procedure: COLONOSCOPY;  Surgeon: Tenisha Marx MD;  Location: [place] [address] GI LAB;   Service:    • CYST REMOVAL Left     elbow   • DILATION AND CURETTAGE OF UTERUS     • EGD     • GANGLION CYST EXCISION Right     wrist   • HAND SURGERY     • IR BIOPSY LIVER RANDOM  2022   • RESECTION TONSIL RADICAL     • TUBAL LIGATION     • UPPER GASTROINTESTINAL ENDOSCOPY     • VARICOSE VEIN SURGERY Bilateral 2009    x3 procedures   • WISDOM TOOTH EXTRACTION         FAMILY HISTORY:  Family History   Problem Relation Age of Onset   • Stroke Mother    • Diabetes Mother    • Arthritis Father    • Heart disease Father         CABG   • Heart disease Daughter         congenital-truncus arteriosus   • Congenital heart disease Daughter    • Heart disease Son         congenital-\"hole\" repair   • Other Sister         meningioma       SOCIAL HISTORY:  Social History     Tobacco Use   • Smoking status: Former     Packs/day: 1 00     Years: 10 00     Total pack years: 10 00     Types: Cigarettes     Start date: 1977     Quit date: " 1987     Years since quittin 5   • Smokeless tobacco: Never   Vaping Use   • Vaping Use: Never used   Substance Use Topics   • Alcohol use: Not Currently     Comment: rare   • Drug use: No       MEDICATIONS:    Current Outpatient Medications:   •  Misc Natural Products (OSTEO BI-FLEX JOINT SHIELD PO), , Disp: , Rfl:   •  Artificial Saliva (BIOTENE MOISTURIZING MOUTH MT), Apply to the mouth or throat daily at bedtime, Disp: , Rfl:   •  atorvastatin (LIPITOR) 40 mg tablet, Take 1 tablet (40 mg total) by mouth daily, Disp: 90 tablet, Rfl: 1  •  azelastine (ASTELIN) 0 1 % nasal spray, Instill 2 sprays into each nostril twice daily as directed , Disp: 30 mL, Rfl: 2  •  BIOTIN PO, Take 10,000 mcg by mouth daily at bedtime, Disp: , Rfl:   •  desloratadine (CLARINEX) 5 MG tablet, Take 1 tablet by mouth daily  , Disp: 90 tablet, Rfl: 1  •  Empagliflozin 25 MG TABS, Take 1 tablet (25 mg total) by mouth every morning, Disp: 90 tablet, Rfl: 3  •  enalapril (VASOTEC) 20 mg tablet, Take 2 tablets (40 mg total) by mouth daily, Disp: 180 tablet, Rfl: 3  •  ferrous sulfate 324 (65 Fe) mg, Take 1 tablet (324 mg total) by mouth daily before breakfast, Disp: 90 tablet, Rfl: 1  •  guaiFENesin (MUCINEX) 600 mg 12 hr tablet, Take 1,200 mg by mouth every 12 (twelve) hours, Disp: , Rfl:   •  hydrOXYzine HCL (ATARAX) 25 mg tablet, TAKE 1 TABLET BY MOUTH EVERY NIGHT AT BEDTIME AS NEEDED FOR ITCHING, Disp: 90 tablet, Rfl: 1  •  insulin aspart (NovoLOG) 100 Units/mL injection pen, Inject under the 34 units at breakfast, 34units at lunch, and 42 units at dinner,   5 units with snack plus sliding scale for a maximum daily total of 110 units (Patient taking differently: Inject under the 34 units at breakfast, 30-32   units at lunch, and 40 units at dinner,   5 units with snack plus sliding scale for a maximum daily total of 110 units), Disp: 150 mL, Rfl: 3  •  insulin glargine (Toujeo SoloStar) 300 units/mL CONCENTRATED U-300 injection pen (1-unit dial), Inject 60 units in the morning, 78 at bedtime, Disp: 34 mL, Rfl: 3  •  Insulin Pen Needle (TRUEplus Pen Needles) 31G X 6 MM MISC, Use 6 times a day to inject insulin, Disp: 600 each, Rfl: 3  •  Januvia 100 MG tablet, TAKE 1 TABLET BY MOUTH DAILY (Patient taking differently: 100 mg every morning), Disp: 90 tablet, Rfl: 3  •  levothyroxine 112 mcg tablet, TAKE 1 TABLET BY MOUTH EVERY MORNING, Disp: 90 tablet, Rfl: 3  •  metFORMIN (GLUCOPHAGE) 1000 MG tablet, Take 1 tablet (1,000 mg total) by mouth 2 (two) times a day with meals, Disp: 180 tablet, Rfl: 3  •  metoprolol succinate (TOPROL-XL) 25 mg 24 hr tablet, TAKE 1 TABLET BY MOUTH DAILY (Patient taking differently: 25 mg daily at bedtime), Disp: 90 tablet, Rfl: 1  •  Omega-3 Fatty Acids (FISH OIL PO), Take 1,400 mg by mouth every evening , Disp: , Rfl:   •  pantoprazole (PROTONIX) 20 mg tablet, Take 1 tablet by mouth daily  , Disp: 90 tablet, Rfl: 2    ALLERGIES:  No Known Allergies      Review of Systems  Pertinent items are noted in HPI  All other systems were reviewed and are negative      VITALS:  Vitals:    06/27/23 1300   BP: 122/72   Pulse: 86       LABS:  HgA1c:   Lab Results   Component Value Date    HGBA1C 6 5 (H) 04/10/2023     BMP:   Lab Results   Component Value Date    GLUCOSE 190 (H) 09/14/2017    CALCIUM 10 2 (H) 04/10/2023     09/14/2017    K 4 7 04/10/2023    CO2 26 04/10/2023     04/10/2023    BUN 20 04/10/2023    CREATININE 1 25 04/10/2023       _____________________________________________________  PHYSICAL EXAMINATION:  General: well developed and well nourished, alert, oriented times 3 and appears comfortable  Psychiatric: Normal  HEENT: Normocephalic, Atraumatic Trachea Midline, No torticollis  Pulmonary: No audible wheezing or respiratory distress   Abdomen/GI: Non tender, non distended   Cardiovascular: No pitting edema, 2+ radial pulse   Skin: No masses, erythema, lacerations, fluctation, "ulcerations  Neurovascular: Sensation Intact to the Median, Ulnar, Radial Nerve, Motor Intact to the Median, Ulnar, Radial Nerve and Pulses Intact  Musculoskeletal: Normal, except as noted in detailed exam and in HPI  MUSCULOSKELETAL EXAMINATION:      Examination of the affected extremity was compared to the unaffected extremity  Skin was intact  No swelling or ecchymosis  No deformity  Hand and fingers were warm and well-perfused  Capillary refill was brisk  Full active range of motion of the elbows, forearms, wrists, and fingers  5/5 elbow flexors/extensors, wrist flexor/extensors, and   The right index finger was not postured in a flexed position  There was tenderness at the level of the A1 pulley  There was crepitus with flexion and extension of the finger  Catching/locking was observed during the examination  The left index finger was not postured in a flexed position  There was tenderness at the level of the A1 pulley  There was crepitus with flexion and extension of the finger  Catching/locking was observed during the examination  ___________________________________________________  STUDIES REVIEWED:  No studies to review        PROCEDURES PERFORMED:  Hand/upper extremity injection: bilateral index A1  Universal Protocol:  Consent: Verbal consent obtained  Risks and benefits: risks, benefits and alternatives were discussed  Consent given by: patient  Time out: Immediately prior to procedure a \"time out\" was called to verify the correct patient, procedure, equipment, support staff and site/side marked as required    Patient understanding: patient states understanding of the procedure being performed  Site marked: the operative site was marked  Patient identity confirmed: verbally with patient    Supporting Documentation  Indications: tendon swelling   Procedure Details  Condition:trigger finger Location: index finger - bilateral index A1   Preparation: Patient was prepped and " draped in the usual sterile fashion  Needle size: 25 G  Approach: volar    Medications (Right): 1 mL bupivacaine 0 25 %; 6 mg betamethasone acetate-betamethasone sodium phosphate 6 (3-3) mg/mLMedications (Left): 1 mL bupivacaine 0 25 %; 6 mg betamethasone acetate-betamethasone sodium phosphate 6 (3-3) mg/mL   Patient tolerance: patient tolerated the procedure well with no immediate complications  Dressing:  Sterile dressing applied              _____________________________________________________      Scribe Attestation    I,:  Jose Escalante am acting as a scribe while in the presence of the attending physician :       I,:  Barb Mc MD personally performed the services described in this documentation    as scribed in my presence :

## 2023-07-05 ENCOUNTER — TELEPHONE (OUTPATIENT)
Dept: ENDOCRINOLOGY | Facility: CLINIC | Age: 63
End: 2023-07-05

## 2023-07-11 ENCOUNTER — TELEPHONE (OUTPATIENT)
Dept: ENDOCRINOLOGY | Facility: CLINIC | Age: 63
End: 2023-07-11

## 2023-07-11 NOTE — TELEPHONE ENCOUNTER
----- Message from Corina Jansen sent at 7/11/2023 11:22 AM EDT -----  Regarding: Dexcom readings  Contact: [phone]  Please review my Dexcom readings for the last 2 weeks. On 6/28 I had 2 injections in my hands for trigger finger that caused a rise in blood sugars for several days.

## 2023-07-11 NOTE — TELEPHONE ENCOUNTER
----- Message from Drew Monahan sent at 7/11/2023 11:22 AM EDT -----  Regarding: Dexcom readings  Contact: [phone]  Please review my Dexcom readings for the last 2 weeks. On 6/28 I had 2 injections in my hands for trigger finger that caused a rise in blood sugars for several days.

## 2023-07-11 NOTE — TELEPHONE ENCOUNTER
Can you confirm if she is dosing for her evening snack and what time she has a snack and how much Novolog she is giving, thanks!

## 2023-07-17 DIAGNOSIS — E11.9 TYPE 2 DIABETES MELLITUS WITHOUT COMPLICATION, WITHOUT LONG-TERM CURRENT USE OF INSULIN (HCC): ICD-10-CM

## 2023-07-18 DIAGNOSIS — E11.9 TYPE 2 DIABETES MELLITUS WITHOUT COMPLICATION, WITHOUT LONG-TERM CURRENT USE OF INSULIN (HCC): ICD-10-CM

## 2023-07-24 DIAGNOSIS — I10 BENIGN ESSENTIAL HYPERTENSION: ICD-10-CM

## 2023-07-24 RX ORDER — METOPROLOL SUCCINATE 25 MG/1
TABLET, EXTENDED RELEASE ORAL
Qty: 90 TABLET | Refills: 1 | Status: SHIPPED | OUTPATIENT
Start: 2023-07-24

## 2023-07-25 ENCOUNTER — TELEPHONE (OUTPATIENT)
Dept: ENDOCRINOLOGY | Facility: CLINIC | Age: 63
End: 2023-07-25

## 2023-07-25 NOTE — TELEPHONE ENCOUNTER
----- Message from Nicole Dye sent at 7/25/2023 12:08 PM EDT -----  Regarding: Dexcom readings  Contact: [phone]  Please review the Dexcom readings for the last 2 weeks  Thank you

## 2023-07-31 DIAGNOSIS — E78.2 MIXED HYPERLIPIDEMIA: ICD-10-CM

## 2023-07-31 RX ORDER — ATORVASTATIN CALCIUM 40 MG/1
40 TABLET, FILM COATED ORAL DAILY
Qty: 90 TABLET | Refills: 0 | Status: SHIPPED | OUTPATIENT
Start: 2023-07-31

## 2023-08-04 ENCOUNTER — TELEPHONE (OUTPATIENT)
Dept: ENDOCRINOLOGY | Facility: CLINIC | Age: 63
End: 2023-08-04

## 2023-08-04 NOTE — TELEPHONE ENCOUNTER
----- Message from Anthony Mg sent at 8/4/2023 11:37 AM EDT -----  Regarding: Dexcom readings  Contact: [phone]  Please review my Dexcom readings for the last week.   Toujeo increased to 63 a.m. and 80 p.m. on 7/27    Thank you

## 2023-08-11 ENCOUNTER — LAB (OUTPATIENT)
Dept: LAB | Facility: HOSPITAL | Age: 63
End: 2023-08-11
Payer: COMMERCIAL

## 2023-08-11 DIAGNOSIS — E11.65 INADEQUATELY CONTROLLED DIABETES MELLITUS (HCC): ICD-10-CM

## 2023-08-11 DIAGNOSIS — I10 BENIGN ESSENTIAL HYPERTENSION: ICD-10-CM

## 2023-08-11 DIAGNOSIS — E11.69 DIABETES MELLITUS ASSOCIATED WITH HORMONAL ETIOLOGY (HCC): ICD-10-CM

## 2023-08-11 DIAGNOSIS — Z79.4 ENCOUNTER FOR LONG-TERM (CURRENT) USE OF INSULIN (HCC): ICD-10-CM

## 2023-08-11 DIAGNOSIS — E78.2 MIXED HYPERLIPIDEMIA: ICD-10-CM

## 2023-08-11 DIAGNOSIS — E11.9 TYPE 2 DIABETES MELLITUS WITHOUT COMPLICATION, WITHOUT LONG-TERM CURRENT USE OF INSULIN (HCC): ICD-10-CM

## 2023-08-11 DIAGNOSIS — E03.9 PRIMARY HYPOTHYROIDISM: ICD-10-CM

## 2023-08-11 LAB
ANION GAP SERPL CALCULATED.3IONS-SCNC: 9 MMOL/L
BUN SERPL-MCNC: 26 MG/DL (ref 5–25)
CALCIUM SERPL-MCNC: 10.1 MG/DL (ref 8.4–10.2)
CHLORIDE SERPL-SCNC: 104 MMOL/L (ref 96–108)
CO2 SERPL-SCNC: 24 MMOL/L (ref 21–32)
CREAT SERPL-MCNC: 1.12 MG/DL (ref 0.6–1.3)
GFR SERPL CREATININE-BSD FRML MDRD: 52 ML/MIN/1.73SQ M
GLUCOSE P FAST SERPL-MCNC: 158 MG/DL (ref 65–99)
POTASSIUM SERPL-SCNC: 4.6 MMOL/L (ref 3.5–5.3)
SODIUM SERPL-SCNC: 137 MMOL/L (ref 135–147)
T4 FREE SERPL-MCNC: 1.34 NG/DL (ref 0.61–1.12)
TSH SERPL DL<=0.05 MIU/L-ACNC: 2.33 UIU/ML (ref 0.45–4.5)

## 2023-08-11 PROCEDURE — 80048 BASIC METABOLIC PNL TOTAL CA: CPT

## 2023-08-11 PROCEDURE — 84439 ASSAY OF FREE THYROXINE: CPT

## 2023-08-11 PROCEDURE — 82985 ASSAY OF GLYCATED PROTEIN: CPT

## 2023-08-11 PROCEDURE — 84443 ASSAY THYROID STIM HORMONE: CPT

## 2023-08-11 PROCEDURE — 36415 COLL VENOUS BLD VENIPUNCTURE: CPT

## 2023-08-12 LAB — FRUCTOSAMINE SERPL-SCNC: 226 UMOL/L (ref 0–285)

## 2023-08-17 ENCOUNTER — OFFICE VISIT (OUTPATIENT)
Dept: ENDOCRINOLOGY | Facility: CLINIC | Age: 63
End: 2023-08-17
Payer: COMMERCIAL

## 2023-08-17 VITALS
SYSTOLIC BLOOD PRESSURE: 124 MMHG | BODY MASS INDEX: 36.35 KG/M2 | OXYGEN SATURATION: 97 % | WEIGHT: 218.2 LBS | HEART RATE: 86 BPM | HEIGHT: 65 IN | DIASTOLIC BLOOD PRESSURE: 80 MMHG

## 2023-08-17 DIAGNOSIS — E03.9 PRIMARY HYPOTHYROIDISM: ICD-10-CM

## 2023-08-17 DIAGNOSIS — R20.2 NUMBNESS AND TINGLING: ICD-10-CM

## 2023-08-17 DIAGNOSIS — Z79.4 TYPE 2 DIABETES MELLITUS WITH OTHER SPECIFIED COMPLICATION, WITH LONG-TERM CURRENT USE OF INSULIN (HCC): Primary | ICD-10-CM

## 2023-08-17 DIAGNOSIS — R20.0 NUMBNESS AND TINGLING: ICD-10-CM

## 2023-08-17 DIAGNOSIS — E55.9 VITAMIN D DEFICIENCY: ICD-10-CM

## 2023-08-17 DIAGNOSIS — E78.2 MIXED HYPERLIPIDEMIA: ICD-10-CM

## 2023-08-17 DIAGNOSIS — E11.69 TYPE 2 DIABETES MELLITUS WITH OTHER SPECIFIED COMPLICATION, WITH LONG-TERM CURRENT USE OF INSULIN (HCC): Primary | ICD-10-CM

## 2023-08-17 DIAGNOSIS — I10 BENIGN ESSENTIAL HYPERTENSION: ICD-10-CM

## 2023-08-17 DIAGNOSIS — N18.31 STAGE 3A CHRONIC KIDNEY DISEASE (HCC): ICD-10-CM

## 2023-08-17 PROCEDURE — 99215 OFFICE O/P EST HI 40 MIN: CPT | Performed by: INTERNAL MEDICINE

## 2023-08-17 PROCEDURE — 95251 CONT GLUC MNTR ANALYSIS I&R: CPT | Performed by: INTERNAL MEDICINE

## 2023-08-17 NOTE — PROGRESS NOTES
Libby Mills 61 y.o. female MRN: [id number]    Encounter: [id number]      Assessment/Plan     1. Type 2 diabetes mellitus on long-term insulin therapy with hyperglycemia  2. CKD  3. Obesity  Well Controlled based on last A1c 6.8%, fructosamine  Has some low/tight blood sugars, most prominent post lunch    Recommend the following at this time  Decrease NovoLog to 28-30 units with lunch  Continue rest of current regimen  Turned FALL RATE ON   Follow-up in 3 to 4 months with repeat labs    3. Hyperlipidemia  - continue statin therapy    4. Hypertension  Blood pressure at goal  - continue ACE-I/ ARB    5. hypothyroidism-clinically, biochemically euthyroid. Continue current dose of levothyroxine    6 history of vitamin D deficiency  #7 high normal calcium  Keep well-hydrated,check vitamin D level    7. numbness, tingling, low B12 levels-recommend starting supplementation, follow-up with primary care        CC: Diabetes    History of Present Illness     HPI:  Libby Mills is a 61 y.o. female presents for a follow-up visit regarding diabetes management. Also has     Last seen in   Last Eye exam: due in Sept 2023     Current regimen:   toujeo 63, 80 units twice a day   NovoLog 34 units at breakfast, 30-32 with lunch, 40 with dinner, 6 units with snack plus sliding scale insulin  januvia 100 mg   Metformin 1 gm BID  jardiance 25 mg daily     levothyroxine 112 mcg orally daily  Compliant, empty stomach     itching at the back of the neck for the last few months   No  Change in diet   exercise - none  tingling in the feet 0 mor sagar the left     Statin:  lipitor   ACE-I/ARB: Enalapril    Home glucose monitoring: CGM interpretation  Dexcom  Time percentage CGM worn  93%   Time in range 83 (goal >65-70%)  High  16%  Very high <1%  Low <1%  Very low 0%    SD 32 (goal <50)     Average glucose 150 mg/dL  GMI 6.9 %   Symptoms of hypoglycemia :  None     All other systems were reviewed and are negative.     Review of Systems      Historical Information   Past Medical History:   Diagnosis Date   • Anemia    • Celiac disease     R/O due to genetic marker-no family hx   • Colon polyp    • Deviated nasal septum    • Diabetes mellitus ([address])    • Disease of thyroid gland     hypo   • Fatty liver    • Hearing aid worn     right ear only   • Hepatitis A    • Hyperlipidemia    • Hypertension    • Wears glasses     on occ     Past Surgical History:   Procedure Laterality Date   •  SECTION, LOW TRANSVERSE      x2   • COLONOSCOPY N/A 2016    Procedure: COLONOSCOPY;  Surgeon: Perfecto Lopes MD;  Location: [address] [place] [address] [facility];   Service:    • CYST REMOVAL Left     elbow   • DILATION AND CURETTAGE OF UTERUS     • EGD     • GANGLION CYST EXCISION Right     wrist   • HAND SURGERY     • IR BIOPSY LIVER RANDOM  2022   • RESECTION TONSIL RADICAL     • TUBAL LIGATION     • UPPER GASTROINTESTINAL ENDOSCOPY     • VARICOSE VEIN SURGERY Bilateral 2009    x3 procedures   • WISDOM TOOTH EXTRACTION       Social History   Social History     Substance and Sexual Activity   Alcohol Use Not Currently    Comment: rare     Social History     Substance and Sexual Activity   Drug Use No     Social History     Tobacco Use   Smoking Status Former   • Packs/day: 1.00   • Years: 10.00   • Total pack years: 10.00   • Types: Cigarettes   • Start date: 1977   • Quit date: 1987   • Years since quittin.6   Smokeless Tobacco Never     Family History:   Family History   Problem Relation Age of Onset   • Stroke Mother    • Diabetes Mother    • Diabetes type II Mother    • Infertility Mother    • Arthritis Father    • Heart disease Father         CABG   • Heart disease Daughter         congenital-truncus arteriosus   • Congenital heart disease Daughter    • Heart disease Son         congenital-"hole" repair   • Other Sister         meningioma       Meds/Allergies   Current Outpatient Medications   Medication Sig Dispense Refill   • Artificial Saliva (BIOTENE MOISTURIZING MOUTH MT) Apply to the mouth or throat daily at bedtime     • atorvastatin (LIPITOR) 40 mg tablet Take 1 tablet by mouth daily. 90 tablet 0   • BIOTIN PO Take 10,000 mcg by mouth daily at bedtime     • desloratadine (CLARINEX) 5 MG tablet Take 1 tablet by mouth daily. 90 tablet 1   • Empagliflozin 25 MG TABS Take 1 tablet (25 mg total) by mouth every morning 90 tablet 3   • enalapril (VASOTEC) 20 mg tablet Take 2 tablets (40 mg total) by mouth daily 180 tablet 3   • ferrous sulfate 324 (65 Fe) mg Take 1 tablet (324 mg total) by mouth daily before breakfast 90 tablet 1   • guaiFENesin (MUCINEX) 600 mg 12 hr tablet Take 1,200 mg by mouth every 12 (twelve) hours     • insulin aspart (NovoLOG) 100 Units/mL injection pen Inject under the 34 units at breakfast, 34units at lunch, and 42 units at dinner,   5 units with snack plus sliding scale for a maximum daily total of 110 units (Patient taking differently: Inject under the 34 units at breakfast, 30-32  . units at lunch, and 40 units at dinner,   6 units with snack plus sliding scale for a maximum daily total of 110 units) 150 mL 3   • insulin glargine (Toujeo SoloStar) 300 units/mL CONCENTRATED U-300 injection pen (1-unit dial) Inject 63 units in the morning, 80 at bedtime 34 mL 3   • levothyroxine 112 mcg tablet TAKE 1 TABLET BY MOUTH EVERY MORNING 90 tablet 3   • metFORMIN (GLUCOPHAGE) 1000 MG tablet Take 1 tablet (1,000 mg total) by mouth 2 (two) times a day with meals 180 tablet 3   • metoprolol succinate (TOPROL-XL) 25 mg 24 hr tablet Take 1 tablet by mouth daily. 90 tablet 1   • Misc Natural Products (OSTEO BI-FLEX JOINT SHIELD PO)      • Omega-3 Fatty Acids (FISH OIL PO) Take 1,400 mg by mouth every evening      • pantoprazole (PROTONIX) 20 mg tablet Take 1 tablet by mouth daily.  90 tablet 2   • sitaGLIPtin (Januvia) 100 mg tablet Take 1 tablet (100 mg total) by mouth daily 90 tablet 3   • azelastine (ASTELIN) 0.1 % nasal spray Instill 2 sprays into each nostril twice daily as directed. (Patient not taking: Reported on 8/17/2023) 30 mL 2   • hydrOXYzine HCL (ATARAX) 25 mg tablet TAKE 1 TABLET BY MOUTH EVERY NIGHT AT BEDTIME AS NEEDED FOR ITCHING (Patient not taking: Reported on 8/17/2023) 90 tablet 1   • Insulin Pen Needle (TRUEplus Pen Needles) 31G X 6 MM MISC Use 6 times a day to inject insulin 600 each 3     No current facility-administered medications for this visit. No Known Allergies    Objective   Vitals: Blood pressure 124/80, pulse 86, height 5' 5" (1.651 m), weight 99 kg (218 lb 3.2 oz), SpO2 97 %, not currently breastfeeding. Physical Exam  Constitutional:       Appearance: She is well-developed. HENT:      Head: Normocephalic and atraumatic. Eyes:      Conjunctiva/sclera: Conjunctivae normal.      Pupils: Pupils are equal, round, and reactive to light. Neck:      Thyroid: No thyromegaly. Cardiovascular:      Rate and Rhythm: Normal rate and regular rhythm. Pulses:           Dorsalis pedis pulses are 2+ on the right side and 2+ on the left side. Heart sounds: Normal heart sounds. No murmur heard. Pulmonary:      Effort: Pulmonary effort is normal.      Breath sounds: Normal breath sounds. No wheezing. Abdominal:      General: There is no distension. Palpations: Abdomen is soft. Tenderness: There is no abdominal tenderness. Musculoskeletal:         General: Normal range of motion. Cervical back: Normal range of motion and neck supple. Feet:      Right foot:      Skin integrity: No ulcer, skin breakdown, erythema, warmth, callus or dry skin. Left foot:      Skin integrity: No ulcer, skin breakdown, erythema, warmth, callus or dry skin. Skin:     General: Skin is warm and dry. Neurological:      Mental Status: She is alert and oriented to person, place, and time. Psychiatric:         Behavior: Behavior normal.       Diabetic Foot Exam    Patient's shoes and socks removed.     Right Foot/Ankle   Right Foot Inspection  Skin Exam: skin normal and skin intact. No dry skin, no warmth, no callus, no erythema, no maceration, no abnormal color, no pre-ulcer, no ulcer and no callus. Sensory   Vibration: intact  Proprioception: intact  Monofilament testing: intact    Vascular  Capillary refills: < 3 seconds  The right DP pulse is 2+. Left Foot/Ankle  Left Foot Inspection  Skin Exam: skin normal and skin intact. No dry skin, no warmth, no erythema, no maceration, normal color, no pre-ulcer, no ulcer and no callus. Sensory   Vibration: intact  Proprioception: intact  Monofilament testing: intact    Vascular  Capillary refills: < 3 seconds  The left DP pulse is 2+. The history was obtained from the review of the chart, patient.     Lab Results:   Lab Results   Component Value Date/Time    Hemoglobin A1C 6.8 (H) 08/11/2023 12:32 PM    Hemoglobin A1C 6.5 (H) 04/10/2023 11:35 AM    Hemoglobin A1C 7.2 (H) 01/03/2023 10:25 AM    Hemoglobin A1C 9.7 (H) 09/14/2022 10:37 AM    WBC 8.11 04/10/2023 11:35 AM    WBC 7.72 09/29/2022 11:21 AM    White Blood Cell Count 8.4 09/14/2022 10:37 AM    Hemoglobin 12.0 04/10/2023 11:35 AM    Hemoglobin 11.3 (L) 09/29/2022 11:21 AM    Hemoglobin 11.2 09/14/2022 10:37 AM    HCT 35.2 09/14/2022 10:37 AM    Hematocrit 37.9 04/10/2023 11:35 AM    Hematocrit 36.0 09/29/2022 11:21 AM    MCV 81 (L) 04/10/2023 11:35 AM    MCV 81 (L) 09/29/2022 11:21 AM    MCV 77 (L) 09/14/2022 10:37 AM    Platelet Count 762 63/24/6455 10:37 AM    Platelets 917 47/61/7984 11:35 AM    Platelets 399 58/58/2651 11:21 AM    BUN 26 (H) 08/11/2023 12:32 PM    BUN 20 04/10/2023 11:35 AM    BUN 23 01/03/2023 10:25 AM    BUN 22 09/14/2022 10:37 AM    Potassium 4.6 08/11/2023 12:32 PM    Potassium 4.7 04/10/2023 11:35 AM    Potassium 4.4 01/03/2023 10:25 AM    Potassium 4.9 09/14/2022 10:37 AM    Chloride 104 08/11/2023 12:32 PM    Chloride 106 04/10/2023 11:35 AM    Chloride 105 01/03/2023 10:25 AM Chloride 100 09/14/2022 10:37 AM    CO2 24 08/11/2023 12:32 PM    CO2 26 04/10/2023 11:35 AM    CO2 26 01/03/2023 10:25 AM    CO2 19 (L) 09/14/2022 10:37 AM    Creatinine 1.12 08/11/2023 12:32 PM    Creatinine 1.25 04/10/2023 11:35 AM    Creatinine 1.13 01/03/2023 10:25 AM    AST 67 (H) 04/10/2023 11:35 AM    AST 36 01/03/2023 10:25 AM    AST 52 (H) 09/14/2022 10:37 AM    ALT 87 (H) 04/10/2023 11:35 AM    ALT 52 01/03/2023 10:25 AM    ALT 57 (H) 09/14/2022 10:37 AM    Total Protein 7.9 04/10/2023 11:35 AM    Total Protein 7.5 01/03/2023 10:25 AM    Protein, Total 7.5 09/14/2022 10:37 AM    Albumin 3.7 04/10/2023 11:35 AM    Albumin 3.4 (L) 01/03/2023 10:25 AM    Albumin 4.1 09/14/2022 10:37 AM    Globulin, Total 3.4 09/14/2022 10:37 AM    HDL 39 (L) 09/14/2022 10:37 AM    HDL, Direct 33 (L) 08/11/2023 12:32 PM    HDL, Direct 36 (L) 04/10/2023 11:35 AM    HDL, Direct 38 (L) 01/03/2023 10:25 AM    Triglycerides 222 (H) 08/11/2023 12:32 PM    Triglycerides 196 (H) 04/10/2023 11:35 AM    Triglycerides 181 (H) 01/03/2023 10:25 AM    Triglycerides 278 (H) 09/14/2022 10:37 AM         Imaging Studies: I have personally reviewed pertinent reports. Portions of the record may have been created with voice recognition software. Occasional wrong word or "sound a like" substitutions may have occurred due to the inherent limitations of voice recognition software. Read the chart carefully and recognize, using context, where substitutions have occurred.

## 2023-08-17 NOTE — PATIENT INSTRUCTIONS
Decrease NovoLog to 28-30 units with lunch  Continue rest of current regimen  Follow-up in 3 to 4 months with repeat labs  Keep well hydrated

## 2023-08-22 ENCOUNTER — OFFICE VISIT (OUTPATIENT)
Dept: OBGYN CLINIC | Facility: CLINIC | Age: 63
End: 2023-08-22
Payer: COMMERCIAL

## 2023-08-22 VITALS
SYSTOLIC BLOOD PRESSURE: 116 MMHG | WEIGHT: 218 LBS | DIASTOLIC BLOOD PRESSURE: 72 MMHG | HEIGHT: 65 IN | HEART RATE: 94 BPM | BODY MASS INDEX: 36.32 KG/M2

## 2023-08-22 DIAGNOSIS — M65.321 TRIGGER FINGER, RIGHT INDEX FINGER: ICD-10-CM

## 2023-08-22 DIAGNOSIS — M65.352 TRIGGER LITTLE FINGER OF LEFT HAND: Primary | ICD-10-CM

## 2023-08-22 DIAGNOSIS — M65.322 TRIGGER FINGER, LEFT INDEX FINGER: ICD-10-CM

## 2023-08-22 DIAGNOSIS — E11.65 TYPE 2 DIABETES MELLITUS WITH HYPERGLYCEMIA, WITHOUT LONG-TERM CURRENT USE OF INSULIN (HCC): ICD-10-CM

## 2023-08-22 PROCEDURE — 20550 NJX 1 TENDON SHEATH/LIGAMENT: CPT | Performed by: ORTHOPAEDIC SURGERY

## 2023-08-22 PROCEDURE — 99214 OFFICE O/P EST MOD 30 MIN: CPT | Performed by: ORTHOPAEDIC SURGERY

## 2023-08-22 RX ORDER — CHOLECALCIFEROL (VITAMIN D3) 25 MCG
TABLET,CHEWABLE ORAL
COMMUNITY
Start: 2023-08-16

## 2023-08-22 RX ADMIN — LIDOCAINE HYDROCHLORIDE 1 ML: 10 INJECTION, SOLUTION INFILTRATION; PERINEURAL at 13:00

## 2023-08-22 RX ADMIN — BETAMETHASONE SODIUM PHOSPHATE AND BETAMETHASONE ACETATE 6 MG: 3; 3 INJECTION, SUSPENSION INTRA-ARTICULAR; INTRALESIONAL; INTRAMUSCULAR; SOFT TISSUE at 13:00

## 2023-08-22 NOTE — PROGRESS NOTES
Assessment/Plan:  1. Trigger little finger of left hand  Hand/upper extremity injection: L small A1      2. Trigger finger, right index finger        3. Trigger finger, left index finger        4. Type 2 diabetes mellitus with hyperglycemia, without long-term current use of insulin (HCC)            • Physical examination performed and thorough subjective history obtained at today's visit  • Treatment options were discussed which included nonoperative and operative treatment. Operative treatment is reserved for when nonoperative management has not been successful. • Nonoperative treatment includes splinting, therapy, use of NSAIDs (oral or topical), and injection. Risk, benefits, and realistic expectations following nonoperative treatment were discussed. • The patient was given an opportunity to ask questions. Questions were answered to their satisfaction. • Through shared decision making, the patient decided to move forward with corticosteroid injection of the left small finger. • She understood that her blood sugar levels can become transiently elevated following the injection. • Follow up as needed    Hand/upper extremity injection: L small A1  Universal Protocol:  Consent: Verbal consent obtained. Risks and benefits: risks, benefits and alternatives were discussed  Consent given by: patient  Time out: Immediately prior to procedure a "time out" was called to verify the correct patient, procedure, equipment, support staff and site/side marked as required.   Patient understanding: patient states understanding of the procedure being performed  Site marked: the operative site was marked  Patient identity confirmed: verbally with patient    Supporting Documentation  Indications: tendon swelling   Procedure Details  Condition:trigger finger Location: small finger - L small A1   Preparation: Patient was prepped and draped in the usual sterile fashion  Needle size: 25 G  Ultrasound guidance: no  Approach: volar  Medications administered: 1 mL lidocaine 1 %; 6 mg betamethasone acetate-betamethasone sodium phosphate 6 (3-3) mg/mL    Patient tolerance: patient tolerated the procedure well with no immediate complications  Dressing:  Sterile dressing applied        cc: My left pinky is triggering    Subjective:   Juana Wen is a left hand dominant 61 y.o. female who presents with triggering of the left small finger. She was last seen on 2023 and received bilateral corticosteroid injections of the A1 pulleys of the index fingers. She reports the index fingers are no longer triggering. She reports an intermittent pain of the left small finger. She indicates the pain is localized over the A1 pulley of the left small finger. She notices it when stretching the finger in an extended position. She denies any paresthesias of the left hand or small finger. She denies taking any over the counter pain medications. She is interested in receiving a corticosteroid injection of the left small finger today. Review of Systems      Past Medical History:   Diagnosis Date   • Anemia    • Celiac disease     R/O due to genetic marker-no family hx   • Colon polyp    • Deviated nasal septum    • Diabetes mellitus ([address])    • Disease of thyroid gland     hypo   • Fatty liver    • Hearing aid worn     right ear only   • Hepatitis A    • Hyperlipidemia    • Hypertension    • Wears glasses     on occ       Past Surgical History:   Procedure Laterality Date   •  SECTION, LOW TRANSVERSE      x2   • COLONOSCOPY N/A 2016    Procedure: COLONOSCOPY;  Surgeon: Darrel Cifuentes MD;  Location: [address] [place] [address] [facility];   Service:    • CYST REMOVAL Left     elbow   • DILATION AND CURETTAGE OF UTERUS     • EGD     • GANGLION CYST EXCISION Right     wrist   • HAND SURGERY     • IR BIOPSY LIVER RANDOM  2022   • RESECTION TONSIL RADICAL     • TUBAL LIGATION     • UPPER GASTROINTESTINAL ENDOSCOPY     • VARICOSE VEIN SURGERY Bilateral  x3 procedures   • WISDOM TOOTH EXTRACTION         Family History   Problem Relation Age of Onset   • Stroke Mother    • Diabetes Mother    • Diabetes type II Mother    • Infertility Mother    • Arthritis Father    • Heart disease Father         CABG   • Heart disease Daughter         congenital-truncus arteriosus   • Congenital heart disease Daughter    • Heart disease Son         congenital-"hole" repair   • Other Sister         meningioma       Social History     Occupational History   • Not on file   Tobacco Use   • Smoking status: Former     Packs/day: 1.00     Years: 10.00     Total pack years: 10.00     Types: Cigarettes     Start date: 1977     Quit date: 1987     Years since quittin.6   • Smokeless tobacco: Never   Vaping Use   • Vaping Use: Never used   Substance and Sexual Activity   • Alcohol use: Not Currently     Comment: rare   • Drug use: No   • Sexual activity: Not Currently     Partners: Male     Birth control/protection: Post-menopausal         Current Outpatient Medications:   •  Cyanocobalamin (B-12) 1000 MCG CAPS, , Disp: , Rfl:   •  Artificial Saliva (BIOTENE MOISTURIZING MOUTH MT), Apply to the mouth or throat daily at bedtime, Disp: , Rfl:   •  atorvastatin (LIPITOR) 40 mg tablet, Take 1 tablet by mouth daily. , Disp: 90 tablet, Rfl: 0  •  azelastine (ASTELIN) 0.1 % nasal spray, Instill 2 sprays into each nostril twice daily as directed. (Patient not taking: Reported on 2023), Disp: 30 mL, Rfl: 2  •  BIOTIN PO, Take 10,000 mcg by mouth daily at bedtime, Disp: , Rfl:   •  desloratadine (CLARINEX) 5 MG tablet, Take 1 tablet by mouth daily. , Disp: 90 tablet, Rfl: 1  •  Empagliflozin 25 MG TABS, Take 1 tablet (25 mg total) by mouth every morning, Disp: 90 tablet, Rfl: 3  •  enalapril (VASOTEC) 20 mg tablet, Take 2 tablets (40 mg total) by mouth daily, Disp: 180 tablet, Rfl: 3  •  ferrous sulfate 324 (65 Fe) mg, Take 1 tablet (324 mg total) by mouth daily before breakfast, Disp: 90 tablet, Rfl: 1  •  guaiFENesin (MUCINEX) 600 mg 12 hr tablet, Take 1,200 mg by mouth every 12 (twelve) hours, Disp: , Rfl:   •  hydrOXYzine HCL (ATARAX) 25 mg tablet, TAKE 1 TABLET BY MOUTH EVERY NIGHT AT BEDTIME AS NEEDED FOR ITCHING (Patient not taking: Reported on 8/17/2023), Disp: 90 tablet, Rfl: 1  •  insulin aspart (NovoLOG) 100 Units/mL injection pen, Inject under the 34 units at breakfast, 34units at lunch, and 42 units at dinner,   5 units with snack plus sliding scale for a maximum daily total of 110 units (Patient taking differently: Inject under the 34 units at breakfast, 30-32 . units at lunch, and 40 units at dinner,   6 units with snack plus sliding scale for a maximum daily total of 110 units), Disp: 150 mL, Rfl: 3  •  insulin glargine (Toujeo SoloStar) 300 units/mL CONCENTRATED U-300 injection pen (1-unit dial), Inject 63 units in the morning, 80 at bedtime, Disp: 34 mL, Rfl: 3  •  Insulin Pen Needle (TRUEplus Pen Needles) 31G X 6 MM MISC, Use 6 times a day to inject insulin, Disp: 600 each, Rfl: 3  •  levothyroxine 112 mcg tablet, TAKE 1 TABLET BY MOUTH EVERY MORNING, Disp: 90 tablet, Rfl: 3  •  metFORMIN (GLUCOPHAGE) 1000 MG tablet, Take 1 tablet (1,000 mg total) by mouth 2 (two) times a day with meals, Disp: 180 tablet, Rfl: 3  •  metoprolol succinate (TOPROL-XL) 25 mg 24 hr tablet, Take 1 tablet by mouth daily. , Disp: 90 tablet, Rfl: 1  •  [facility] Natural Products (OSTEO BI-FLEX JOINT SHIELD PO), , Disp: , Rfl:   •  Omega-3 Fatty Acids (FISH OIL PO), Take 1,400 mg by mouth every evening , Disp: , Rfl:   •  pantoprazole (PROTONIX) 20 mg tablet, Take 1 tablet by mouth daily. , Disp: 90 tablet, Rfl: 2  •  sitaGLIPtin (Januvia) 100 mg tablet, Take 1 tablet (100 mg total) by mouth daily, Disp: 90 tablet, Rfl: 3    No Known Allergies    Objective:  Vitals:    08/22/23 1305   BP: 116/72   Pulse: 94       The patient was awake, alert, and oriented to person, place, and time. No acute distress. Normocephalic. EOMI. Mucous membranes moist.  Normal respiratory effort. Examination of the affected extremity was compared to the unaffected extremity. Skin was intact. No swelling or ecchymosis. No deformity. Hand and fingers were warm and well-perfused. Capillary refill was brisk. Full active range of motion of the elbows, forearms, wrists, and fingers. 5/5  wrist flexor/extensors and . The left small finger was not postured in a flexed position. There was tenderness at the level of the A1 pulley. There was crepitus with flexion and extension of the finger. Catching/locking was not observed during the examination. Imaging/Diagnostic Studies:    No pertinent imaging to review today. Scribe Attestation    I,:  Francella Apgar am acting as a scribe while in the presence of the attending physician.:       I,:  Clarke Zhu MD personally performed the services described in this documentation    as scribed in my presence.:         This document was created using speech voice recognition software. Grammatical errors, random word insertions, pronoun errors, and incomplete sentences are an occasional consequence of this system due to software limitations, ambient noise, and hardware issues. Any formal questions or concerns about content, text, or information contained within the body of this dictation should be directly addressed to the provider for clarification.

## 2023-08-23 RX ORDER — BETAMETHASONE SODIUM PHOSPHATE AND BETAMETHASONE ACETATE 3; 3 MG/ML; MG/ML
6 INJECTION, SUSPENSION INTRA-ARTICULAR; INTRALESIONAL; INTRAMUSCULAR; SOFT TISSUE
Status: COMPLETED | OUTPATIENT
Start: 2023-08-22 | End: 2023-08-22

## 2023-08-23 RX ORDER — LIDOCAINE HYDROCHLORIDE 10 MG/ML
1 INJECTION, SOLUTION INFILTRATION; PERINEURAL
Status: COMPLETED | OUTPATIENT
Start: 2023-08-22 | End: 2023-08-22

## 2023-08-26 ENCOUNTER — OFFICE VISIT (OUTPATIENT)
Dept: FAMILY MEDICINE CLINIC | Facility: CLINIC | Age: 63
End: 2023-08-26
Payer: COMMERCIAL

## 2023-08-26 VITALS
RESPIRATION RATE: 16 BRPM | HEIGHT: 65 IN | SYSTOLIC BLOOD PRESSURE: 120 MMHG | DIASTOLIC BLOOD PRESSURE: 68 MMHG | TEMPERATURE: 97.2 F | WEIGHT: 216.2 LBS | BODY MASS INDEX: 36.02 KG/M2 | HEART RATE: 82 BPM | OXYGEN SATURATION: 97 %

## 2023-08-26 DIAGNOSIS — R21 RASH AND NONSPECIFIC SKIN ERUPTION: Primary | ICD-10-CM

## 2023-08-26 PROCEDURE — 99214 OFFICE O/P EST MOD 30 MIN: CPT | Performed by: FAMILY MEDICINE

## 2023-08-26 RX ORDER — CEPHALEXIN 500 MG/1
500 CAPSULE ORAL EVERY 12 HOURS SCHEDULED
Qty: 14 CAPSULE | Refills: 0 | Status: SHIPPED | OUTPATIENT
Start: 2023-08-26 | End: 2023-09-02

## 2023-08-26 RX ORDER — CLOTRIMAZOLE AND BETAMETHASONE DIPROPIONATE 10; .64 MG/G; MG/G
CREAM TOPICAL 2 TIMES DAILY
Qty: 45 G | Refills: 0 | Status: SHIPPED | OUTPATIENT
Start: 2023-08-26

## 2023-08-26 NOTE — PROGRESS NOTES
Name: Felisha Claire      : 1960      MRN: [id number]  Encounter Provider: Israel Duran MD  Encounter Date: 2023   Encounter department: [facility]     1. Rash and nonspecific skin eruption  -     clotrimazole-betamethasone (LOTRISONE) 1-0.05 % cream; Apply topically 2 (two) times a day  -     cephalexin (KEFLEX) 500 mg capsule; Take 1 capsule (500 mg total) by mouth every 12 (twelve) hours for 7 days  -     Ambulatory referral to Dermatology; Future    Unclear etiology of itching on the back of her neck x 3 months. She does have some degree of mild folliculitis at the base of her hairline so I can trial antibiotics. Given location will treat for fungal infection as well. She can continue hydroxyzine for itching. Follow up with derm if symptoms persist after 10 days. Subjective      HPI   Pruritis behind neck x 3 months. No change. No known rash. No pain. No itching anywhere else in the body. Tried hydroxyzine, cortisone cream, anti itch creams with no relief. Review of Systems   Constitutional: Negative. HENT: Negative. Eyes: Negative. Respiratory: Negative. Cardiovascular: Negative. Gastrointestinal: Negative. Endocrine: Negative. Genitourinary: Negative. Musculoskeletal: Negative. Skin: Negative. Skin itching on back of neck   Allergic/Immunologic: Negative. Neurological: Negative. Hematological: Negative. Psychiatric/Behavioral: Negative. Current Outpatient Medications on File Prior to Visit   Medication Sig   • Artificial Saliva (BIOTENE MOISTURIZING MOUTH MT) Apply to the mouth or throat daily at bedtime   • atorvastatin (LIPITOR) 40 mg tablet Take 1 tablet by mouth daily. • azelastine (ASTELIN) 0.1 % nasal spray Instill 2 sprays into each nostril twice daily as directed.    • BIOTIN PO Take 10,000 mcg by mouth daily at bedtime   • Cyanocobalamin (B-12) 1000 MCG CAPS    • desloratadine (CLARINEX) 5 MG tablet Take 1 tablet by mouth daily. • Empagliflozin 25 MG TABS Take 1 tablet (25 mg total) by mouth every morning   • enalapril (VASOTEC) 20 mg tablet Take 2 tablets (40 mg total) by mouth daily   • ferrous sulfate 324 (65 Fe) mg Take 1 tablet (324 mg total) by mouth daily before breakfast   • guaiFENesin (MUCINEX) 600 mg 12 hr tablet Take 1,200 mg by mouth every 12 (twelve) hours   • hydrOXYzine HCL (ATARAX) 25 mg tablet TAKE 1 TABLET BY MOUTH EVERY NIGHT AT BEDTIME AS NEEDED FOR ITCHING   • insulin aspart (NovoLOG) 100 Units/mL injection pen Inject under the 34 units at breakfast, 34units at lunch, and 42 units at dinner,   5 units with snack plus sliding scale for a maximum daily total of 110 units (Patient taking differently: Inject under the 34 units at breakfast, 30-32  . units at lunch, and 40 units at dinner,   6 units with snack plus sliding scale for a maximum daily total of 110 units)   • insulin glargine (Toujeo SoloStar) 300 units/mL CONCENTRATED U-300 injection pen (1-unit dial) Inject 63 units in the morning, 80 at bedtime   • Insulin Pen Needle (TRUEplus Pen Needles) 31G X 6 MM MISC Use 6 times a day to inject insulin   • levothyroxine 112 mcg tablet TAKE 1 TABLET BY MOUTH EVERY MORNING   • metFORMIN (GLUCOPHAGE) 1000 MG tablet Take 1 tablet (1,000 mg total) by mouth 2 (two) times a day with meals   • metoprolol succinate (TOPROL-XL) 25 mg 24 hr tablet Take 1 tablet by mouth daily. • Misc Natural Products (OSTEO BI-FLEX JOINT SHIELD PO)    • Omega-3 Fatty Acids (FISH OIL PO) Take 1,400 mg by mouth every evening    • pantoprazole (PROTONIX) 20 mg tablet Take 1 tablet by mouth daily.    • sitaGLIPtin (Januvia) 100 mg tablet Take 1 tablet (100 mg total) by mouth daily       Objective     /68   Pulse 82   Temp (!) 97.2 °F (36.2 °C) (Temporal)   Resp 16   Ht 5' 5" (1.651 m)   Wt 98.1 kg (216 lb 3.2 oz)   SpO2 97%   BMI 35.98 kg/m²     Physical Exam  Constitutional: General: She is not in acute distress. Appearance: She is well-developed. She is not diaphoretic. HENT:      Head: Normocephalic and atraumatic. Eyes:      General: No scleral icterus. Right eye: No discharge. Left eye: No discharge. Conjunctiva/sclera: Conjunctivae normal.   Pulmonary:      Effort: Pulmonary effort is normal.   Musculoskeletal:      Cervical back: Normal range of motion. Skin:     General: Skin is warm. Comments: She has some dry skin as well as some hair follicles with erythema behind neck. Neurological:      Mental Status: She is alert and oriented to person, place, and time. Psychiatric:         Behavior: Behavior normal.         Thought Content:  Thought content normal.         Judgment: Judgment normal.       Bebeto Sims MD

## 2023-09-29 DIAGNOSIS — J30.9 ALLERGIC RHINITIS, UNSPECIFIED SEASONALITY, UNSPECIFIED TRIGGER: ICD-10-CM

## 2023-09-29 RX ORDER — AZELASTINE 1 MG/ML
SPRAY, METERED NASAL
Qty: 30 ML | Refills: 1 | Status: SHIPPED | OUTPATIENT
Start: 2023-09-29

## 2023-10-01 DIAGNOSIS — J30.9 ALLERGIC RHINITIS, UNSPECIFIED SEASONALITY, UNSPECIFIED TRIGGER: ICD-10-CM

## 2023-10-02 RX ORDER — DESLORATADINE 5 MG/1
TABLET ORAL
Qty: 90 TABLET | Refills: 0 | Status: SHIPPED | OUTPATIENT
Start: 2023-10-02

## 2023-10-10 ENCOUNTER — RA CDI HCC (OUTPATIENT)
Dept: OTHER | Facility: HOSPITAL | Age: 63
End: 2023-10-10

## 2023-10-10 NOTE — PROGRESS NOTES
[address] [facility] coding opportunities          Chart Reviewed number of suggestions sent to Provider: 1     Patients Insurance        Commercial Insurance: [employer]     [id number]

## 2023-10-13 ENCOUNTER — HOSPITAL ENCOUNTER (OUTPATIENT)
Dept: RADIOLOGY | Facility: HOSPITAL | Age: 63
Discharge: HOME | End: 2023-10-13
Payer: COMMERCIAL

## 2023-10-13 DIAGNOSIS — K74.69 OTHER CIRRHOSIS OF LIVER (HCC): ICD-10-CM

## 2023-10-13 PROCEDURE — 76705 ECHO EXAM OF ABDOMEN: CPT

## 2023-10-16 DIAGNOSIS — E11.69 TYPE 2 DIABETES MELLITUS WITH OTHER SPECIFIED COMPLICATION, WITH LONG-TERM CURRENT USE OF INSULIN (HCC): ICD-10-CM

## 2023-10-16 DIAGNOSIS — Z79.4 TYPE 2 DIABETES MELLITUS WITH OTHER SPECIFIED COMPLICATION, WITH LONG-TERM CURRENT USE OF INSULIN (HCC): ICD-10-CM

## 2023-10-16 LAB
LEFT EYE DIABETIC RETINOPATHY: NORMAL
RIGHT EYE DIABETIC RETINOPATHY: NORMAL

## 2023-10-16 RX ORDER — PEN NEEDLE, DIABETIC 31 G X1/4"
NEEDLE, DISPOSABLE MISCELLANEOUS
Qty: 600 EACH | Refills: 3 | Status: SHIPPED | OUTPATIENT
Start: 2023-10-16

## 2023-10-17 ENCOUNTER — OFFICE VISIT (OUTPATIENT)
Dept: FAMILY MEDICINE CLINIC | Facility: CLINIC | Age: 63
End: 2023-10-17
Payer: COMMERCIAL

## 2023-10-17 VITALS
TEMPERATURE: 98 F | WEIGHT: 221 LBS | HEART RATE: 82 BPM | BODY MASS INDEX: 37.73 KG/M2 | DIASTOLIC BLOOD PRESSURE: 70 MMHG | HEIGHT: 64 IN | SYSTOLIC BLOOD PRESSURE: 140 MMHG | RESPIRATION RATE: 16 BRPM

## 2023-10-17 DIAGNOSIS — Z79.899 ENCOUNTER FOR LONG-TERM CURRENT USE OF MEDICATION: ICD-10-CM

## 2023-10-17 DIAGNOSIS — Z23 ENCOUNTER FOR IMMUNIZATION: ICD-10-CM

## 2023-10-17 DIAGNOSIS — E11.65 TYPE 2 DIABETES MELLITUS WITH HYPERGLYCEMIA, WITHOUT LONG-TERM CURRENT USE OF INSULIN (HCC): ICD-10-CM

## 2023-10-17 DIAGNOSIS — Z23 NEED FOR INFLUENZA VACCINATION: ICD-10-CM

## 2023-10-17 DIAGNOSIS — R71.8 MICROCYTOSIS: ICD-10-CM

## 2023-10-17 DIAGNOSIS — Z00.00 ANNUAL PHYSICAL EXAM: Primary | ICD-10-CM

## 2023-10-17 PROCEDURE — 90746 HEPB VACCINE 3 DOSE ADULT IM: CPT

## 2023-10-17 PROCEDURE — 90686 IIV4 VACC NO PRSV 0.5 ML IM: CPT

## 2023-10-17 PROCEDURE — 90471 IMMUNIZATION ADMIN: CPT

## 2023-10-17 PROCEDURE — 90472 IMMUNIZATION ADMIN EACH ADD: CPT

## 2023-10-17 PROCEDURE — 90632 HEPA VACCINE ADULT IM: CPT

## 2023-10-17 PROCEDURE — 99396 PREV VISIT EST AGE 40-64: CPT | Performed by: FAMILY MEDICINE

## 2023-10-17 NOTE — ASSESSMENT & PLAN NOTE
Lab Results   Component Value Date    HGBA1C 6.8 (H) 08/11/2023   Well controlled  Managed by endocrinology  Had eye exam yesterday

## 2023-10-17 NOTE — PATIENT INSTRUCTIONS
Wellness Visit for Adults   AMBULATORY CARE:   A wellness visit  is when you see your healthcare provider to get screened for health problems. Your healthcare provider will also give you advice on how to stay healthy. Write down your questions so you remember to ask them. Ask your healthcare provider how often you should have a wellness visit. What happens at a wellness visit:  Your healthcare provider will ask about your health, and your family history of health problems. This includes high blood pressure, heart disease, and cancer. He or she will ask if you have symptoms that concern you, if you smoke, and about your mood. You may also be asked about your intake of medicines, supplements, food, and alcohol. Any of the following may be done: Your weight  will be checked. Your height may also be checked so your body mass index (BMI) can be calculated. Your BMI shows if you are at a healthy weight. Your blood pressure  and heart rate will be checked. Your temperature may also be checked. Blood and urine tests  may be done. Blood tests may be done to check your cholesterol levels. Abnormal cholesterol levels increase your risk for heart disease and stroke. You may also need a blood or urine test to check for diabetes if you are at increased risk. Urine tests may be done to look for signs of an infection or kidney disease. A physical exam  includes checking your heartbeat and lungs with a stethoscope. Your healthcare provider may also check your skin to look for sun damage. Screening tests  may be recommended. A screening test is done to check for diseases that may not cause symptoms. The screening tests you may need depend on your age, gender, family history, and lifestyle habits. For example, colorectal screening may be recommended if you are 48years old or older. Screening tests you need if you are a woman:   A Pap smear  is used to screen for cervical cancer.  Pap smears are usually done every 3 to 5 years depending on your age. You may need them more often if you have had abnormal Pap smear test results in the past. Ask your healthcare provider how often you should have a Pap smear. A mammogram  is an x-ray of your breasts to screen for breast cancer. Experts recommend mammograms every 2 years starting at age 48 years. You may need a mammogram at age 52 years or younger if you have an increased risk for breast cancer. Talk to your healthcare provider about when you should start having mammograms and how often you need them. Vaccines you may need:   Get an influenza vaccine  every year. The influenza vaccine protects you from the flu. Several types of viruses cause the flu. The viruses change over time, so new vaccines are made each year. Get a tetanus-diphtheria (Td) booster vaccine  every 10 years. This vaccine protects you against tetanus and diphtheria. Tetanus is a severe infection that may cause painful muscle spasms and lockjaw. Diphtheria is a severe bacterial infection that causes a thick covering in the back of your mouth and throat. Get a human papillomavirus (HPV) vaccine  if you are female and aged 23 to 32 or male 23 to 24 and never received it. This vaccine protects you from HPV infection. HPV is the most common infection spread by sexual contact. HPV may also cause vaginal, penile, and anal cancers. Get a pneumococcal vaccine  if you are aged 72 years or older. The pneumococcal vaccine is an injection given to protect you from pneumococcal disease. Pneumococcal disease is an infection caused by pneumococcal bacteria. The infection may cause pneumonia, meningitis, or an ear infection. Get a shingles vaccine  if you are 60 or older, even if you have had shingles before. The shingles vaccine is an injection to protect you from the varicella-zoster virus. This is the same virus that causes chickenpox.  Shingles is a painful rash that develops in people who had chickenpox or have been exposed to the virus. How to eat healthy:  My Plate is a model for planning healthy meals. It shows the types and amounts of foods that should go on your plate. Fruits and vegetables make up about half of your plate, and grains and protein make up the other half. A serving of dairy is included on the side of your plate. The amount of calories and serving sizes you need depends on your age, gender, weight, and height. Examples of healthy foods are listed below:  Eat a variety of vegetables  such as dark green, red, and orange vegetables. You can also include canned vegetables low in sodium (salt) and frozen vegetables without added butter or sauces. Eat a variety of fresh fruits , canned fruit in 100% juice, frozen fruit, and dried fruit. Include whole grains. At least half of the grains you eat should be whole grains. Examples include whole-wheat bread, wheat pasta, brown rice, and whole-grain cereals such as oatmeal.    Eat a variety of protein foods such as seafood (fish and shellfish), lean meat, and poultry without skin (turkey and chicken). Examples of lean meats include pork leg, shoulder, or tenderloin, and beef round, sirloin, tenderloin, and extra lean ground beef. Other protein foods include eggs and egg substitutes, beans, peas, soy products, nuts, and seeds. Choose low-fat dairy products such as skim or 1% milk or low-fat yogurt, cheese, and cottage cheese. Limit unhealthy fats  such as butter, hard margarine, and shortening. Exercise:  Exercise at least 30 minutes per day on most days of the week. Some examples of exercise include walking, biking, dancing, and swimming. You can also fit in more physical activity by taking the stairs instead of the elevator or parking farther away from stores. Include muscle strengthening activities 2 days each week. Regular exercise provides many health benefits.  It helps you manage your weight, and decreases your risk for type 2 diabetes, heart disease, stroke, and high blood pressure. Exercise can also help improve your mood. Ask your healthcare provider about the best exercise plan for you. General health and safety guidelines:   Do not smoke. Nicotine and other chemicals in cigarettes and cigars can cause lung damage. Ask your healthcare provider for information if you currently smoke and need help to quit. E-cigarettes or smokeless tobacco still contain nicotine. Talk to your healthcare provider before you use these products. Limit alcohol. A drink of alcohol is 12 ounces of beer, 5 ounces of wine, or 1½ ounces of liquor. Lose weight, if needed. Being overweight increases your risk of certain health conditions. These include heart disease, high blood pressure, type 2 diabetes, and certain types of cancer. Protect your skin. Do not sunbathe or use tanning beds. Use sunscreen with a SPF 15 or higher. Apply sunscreen at least 15 minutes before you go outside. Reapply sunscreen every 2 hours. Wear protective clothing, hats, and sunglasses when you are outside. Drive safely. Always wear your seatbelt. Make sure everyone in your car wears a seatbelt. A seatbelt can save your life if you are in an accident. Do not use your cell phone when you are driving. This could distract you and cause an accident. Pull over if you need to make a call or send a text message. Practice safe sex. Use latex condoms if are sexually active and have more than one partner. Your healthcare provider may recommend screening tests for sexually transmitted infections (STIs). Wear helmets, lifejackets, and protective gear. Always wear a helmet when you ride a bike or motorcycle, go skiing, or play sports that could cause a head injury. Wear protective equipment when you play sports. Wear a lifejacket when you are on a boat or doing water sports.     © Copyright Loletta Gosselin 2023 Information is for End User's use only and may not be sold, redistributed or otherwise used for commercial purposes. The above information is an  only. It is not intended as medical advice for individual conditions or treatments. Talk to your doctor, nurse or pharmacist before following any medical regimen to see if it is safe and effective for you.

## 2023-10-17 NOTE — PROGRESS NOTES
[address]    NAME: Rony Cui  AGE: 61 y.o. SEX: female  : 1960     DATE: 10/17/2023     Assessment and Plan:     Problem List Items Addressed This Visit     Type 2 diabetes mellitus with hyperglycemia, without long-term current use of insulin ([address])       Lab Results   Component Value Date    HGBA1C 6.8 (H) 2023   Well controlled  Managed by endocrinology  Had eye exam yesterday        Other Visit Diagnoses     Annual physical exam    -  Primary    Encounter for immunization        Relevant Orders    HEPATITIS A VACCINE ADULT IM (Completed)    HEPATITIS B VACCINE ADULT IM (Completed)    Need for influenza vaccination        Relevant Orders    influenza vaccine, quadrivalent, 0.5 mL, preservative-free, for adult and pediatric patients 6 mos+ (AFLURIA, FLUARIX, FLULAVAL, FLUZONE) (Completed)    Microcytosis        Relevant Orders    Iron    TIBC Panel (incl. Iron, TIBC, % Iron Saturation)    Encounter for long-term current use of medication        Relevant Orders    Vitamin B12          Immunizations and preventive care screenings were discussed with patient today. Appropriate education was printed on patient's after visit summary. Counseling:  Dental Health: discussed importance of regular tooth brushing, flossing, and dental visits. Exercise: the importance of regular exercise/physical activity was discussed. Recommend exercise 3-5 times per week for at least 30 minutes. Depression Screening and Follow-up Plan: Patient was screened for depression during today's encounter. They screened negative with a PHQ-2 score of 0. Return in about 6 months (around 2024) for Next scheduled follow up.      Chief Complaint:     Chief Complaint   Patient presents with   • Physical Exam     Sas/cma      History of Present Illness:     Adult Annual Physical   Patient here for a comprehensive physical exam. The patient reports no problems. Diet and Physical Activity  Diet/Nutrition: well balanced diet. Exercise: no formal exercise. Depression Screening  PHQ-2/9 Depression Screening    Little interest or pleasure in doing things: 0 - not at all  Feeling down, depressed, or hopeless: 0 - not at all  PHQ-2 Score: 0  PHQ-2 Interpretation: Negative depression screen       General Health  Sleep:  has hard time sleeping . Hearing: requires use of hearing aids. Vision: wears glasses. Dental: regular dental visits. /GYN Health  Patient is: postmenopausal      Advanced Care Planning  Do you have an advanced directive? no  Do you have a durable medical power of ? no     Review of Systems:     Review of Systems   Constitutional: Negative. Respiratory: Negative. Cardiovascular: Negative. Past Medical History:     Past Medical History:   Diagnosis Date   • Anemia    • Celiac disease     R/O due to genetic marker-no family hx   • Colon polyp    • Deviated nasal septum    • Diabetes mellitus ([address])    • Disease of thyroid gland     hypo   • Fatty liver    • Hearing aid worn     right ear only   • Hepatitis A    • Hyperlipidemia    • Hypertension    • Wears glasses     on occ      Past Surgical History:     Past Surgical History:   Procedure Laterality Date   •  SECTION, LOW TRANSVERSE      x2   • COLONOSCOPY N/A 2016    Procedure: COLONOSCOPY;  Surgeon: Chen Flowers MD;  Location: [address] [place] [address] [address] GI LAB; Service:    • CYST REMOVAL Left     elbow   • DILATION AND CURETTAGE OF UTERUS     • EGD     • GANGLION CYST EXCISION Right     wrist   • HAND SURGERY     • IR BIOPSY LIVER RANDOM  2022   • RESECTION TONSIL RADICAL     • TUBAL LIGATION     • UPPER GASTROINTESTINAL ENDOSCOPY     • VARICOSE VEIN SURGERY Bilateral 2009    x3 procedures   • WISDOM TOOTH EXTRACTION        Social History:     Social History     Socioeconomic History   • Marital status:       Spouse name: None   • Number of children: 2   • Years of education: None   • Highest education level: None   Occupational History   • None   Tobacco Use   • Smoking status: Former     Packs/day: 1.00     Years: 10.00     Total pack years: 10.00     Types: Cigarettes     Start date: 1977     Quit date: 1987     Years since quittin.8   • Smokeless tobacco: Never   Vaping Use   • Vaping Use: Never used   Substance and Sexual Activity   • Alcohol use: Not Currently     Comment: rare   • Drug use: No   • Sexual activity: Not Currently     Partners: Male     Birth control/protection: Post-menopausal   Other Topics Concern   • None   Social History Narrative   • None     Social Determinants of Health     Financial Resource Strain: Not on file   Food Insecurity: Not on file   Transportation Needs: Not on file   Physical Activity: Not on file   Stress: Not on file   Social Connections: Not on file   Intimate Partner Violence: Not on file   Housing Stability: Not on file      Family History:     Family History   Problem Relation Age of Onset   • Stroke Mother    • Diabetes Mother    • Diabetes type II Mother    • Infertility Mother    • Arthritis Father    • Heart disease Father         CABG   • Heart disease Daughter         congenital-truncus arteriosus   • Congenital heart disease Daughter    • Heart disease Son         congenital-"hole" repair   • Other Sister         meningioma      Current Medications:     Current Outpatient Medications   Medication Sig Dispense Refill   • Artificial Saliva (BIOTENE MOISTURIZING MOUTH MT) Apply to the mouth or throat daily at bedtime     • atorvastatin (LIPITOR) 40 mg tablet Take 1 tablet by mouth daily. 90 tablet 0   • azelastine (ASTELIN) 0.1 % nasal spray Instill 2 sprays into each nostril twice daily as directed.  30 mL 1   • BIOTIN PO Take 10,000 mcg by mouth daily at bedtime     • clotrimazole-betamethasone (LOTRISONE) 1-0.05 % cream Apply topically 2 (two) times a day 45 g 0   • desloratadine (CLARINEX) 5 MG tablet Take 1 tablet by mouth daily. 90 tablet 0   • Empagliflozin 25 MG TABS Take 1 tablet (25 mg total) by mouth every morning 90 tablet 3   • enalapril (VASOTEC) 20 mg tablet Take 2 tablets (40 mg total) by mouth daily 180 tablet 3   • ferrous sulfate 324 (65 Fe) mg Take 1 tablet (324 mg total) by mouth daily before breakfast 90 tablet 1   • guaiFENesin (MUCINEX) 600 mg 12 hr tablet Take 1,200 mg by mouth every 12 (twelve) hours     • hydrOXYzine HCL (ATARAX) 25 mg tablet TAKE 1 TABLET BY MOUTH EVERY NIGHT AT BEDTIME AS NEEDED FOR ITCHING 90 tablet 1   • insulin aspart (NovoLOG) 100 Units/mL injection pen Inject under the 34 units at breakfast, 34units at lunch, and 42 units at dinner,   5 units with snack plus sliding scale for a maximum daily total of 110 units (Patient taking differently: Inject under the 34 units at breakfast, 30-32  . units at lunch, and 40 units at dinner,   6 units with snack plus sliding scale for a maximum daily total of 110 units) 150 mL 3   • insulin glargine (Toujeo SoloStar) 300 units/mL CONCENTRATED U-300 injection pen (1-unit dial) Inject 63 units in the morning, 80 at bedtime 34 mL 3   • Insulin Pen Needle (TRUEplus Pen Needles) 31G X 6 MM MISC Use 6 times a day to inject insulin 600 each 3   • levothyroxine 112 mcg tablet TAKE 1 TABLET BY MOUTH EVERY MORNING 90 tablet 3   • metFORMIN (GLUCOPHAGE) 1000 MG tablet Take 1 tablet (1,000 mg total) by mouth 2 (two) times a day with meals 180 tablet 3   • metoprolol succinate (TOPROL-XL) 25 mg 24 hr tablet Take 1 tablet by mouth daily. 90 tablet 1   • Misc Natural Products (OSTEO BI-FLEX JOINT SHIELD PO)      • Omega-3 Fatty Acids (FISH OIL PO) Take 1,400 mg by mouth every evening      • pantoprazole (PROTONIX) 20 mg tablet Take 1 tablet by mouth daily. 90 tablet 2   • sitaGLIPtin (Januvia) 100 mg tablet Take 1 tablet (100 mg total) by mouth daily 90 tablet 3     No current facility-administered medications for this visit. Allergies:     No Known Allergies   Physical Exam:     /70   Pulse 82   Temp 98 °F (36.7 °C)   Resp 16   Ht 5' 4.25" (1.632 m)   Wt 100 kg (221 lb)   BMI 37.64 kg/m²     Physical Exam  Vitals and nursing note reviewed. Constitutional:       General: She is not in acute distress. Appearance: She is well-developed. HENT:      Head: Normocephalic and atraumatic. Eyes:      Conjunctiva/sclera: Conjunctivae normal.   Cardiovascular:      Rate and Rhythm: Normal rate and regular rhythm. Pulses: no weak pulses          Dorsalis pedis pulses are 2+ on the right side and 2+ on the left side. Posterior tibial pulses are 2+ on the right side and 2+ on the left side. Heart sounds: No murmur heard. Pulmonary:      Effort: Pulmonary effort is normal. No respiratory distress. Breath sounds: Normal breath sounds. Musculoskeletal:         General: No swelling. Cervical back: Neck supple. Feet:      Right foot:      Skin integrity: No ulcer, skin breakdown, erythema, warmth, callus or dry skin. Left foot:      Skin integrity: No ulcer, skin breakdown, erythema, warmth, callus or dry skin. Skin:     General: Skin is warm and dry. Capillary Refill: Capillary refill takes less than 2 seconds. Neurological:      Mental Status: She is alert. Psychiatric:         Mood and Affect: Mood normal.         Vision Screening    Right eye Left eye Both eyes   Without correction      With correction 20/25 20/25 20/25     Patient's shoes and socks removed. Right Foot/Ankle   Right Foot Inspection  Skin Exam: skin normal. Skin not intact, no dry skin, no warmth, no callus, no erythema, no maceration, no abnormal color, no pre-ulcer, no ulcer and no callus. Toe Exam: ROM and strength within normal limits. Sensory   Monofilament testing: intact    Vascular  Capillary refills: < 3 seconds  The right DP pulse is 2+. The right PT pulse is 2+.      Left Foot/Ankle  Left Foot Inspection  Skin Exam: skin normal. Skin not intact, no dry skin, no warmth, no erythema, no maceration, normal color, no pre-ulcer, no ulcer and no callus. Toe Exam: ROM and strength within normal limits. Sensory   Monofilament testing: intact    Vascular  Capillary refills: < 3 seconds  The left DP pulse is 2+. The left PT pulse is 2+.      Assign Risk Category  No deformity present  No loss of protective sensation  No weak pulses  Risk: 0    DO UNIQUE [place] [facility]

## 2023-10-18 DIAGNOSIS — E78.2 MIXED HYPERLIPIDEMIA: ICD-10-CM

## 2023-10-18 RX ORDER — ATORVASTATIN CALCIUM 40 MG/1
40 TABLET, FILM COATED ORAL DAILY
Qty: 90 TABLET | Refills: 0 | Status: SHIPPED | OUTPATIENT
Start: 2023-10-18

## 2023-11-02 ENCOUNTER — OFFICE VISIT (OUTPATIENT)
Dept: GASTROENTEROLOGY | Facility: CLINIC | Age: 63
End: 2023-11-02
Payer: COMMERCIAL

## 2023-11-02 VITALS
HEART RATE: 81 BPM | BODY MASS INDEX: 36.32 KG/M2 | WEIGHT: 218 LBS | HEIGHT: 65 IN | SYSTOLIC BLOOD PRESSURE: 161 MMHG | DIASTOLIC BLOOD PRESSURE: 84 MMHG

## 2023-11-02 DIAGNOSIS — K74.60 CIRRHOSIS OF LIVER WITHOUT ASCITES, UNSPECIFIED HEPATIC CIRRHOSIS TYPE (HCC): Primary | ICD-10-CM

## 2023-11-02 PROCEDURE — 99214 OFFICE O/P EST MOD 30 MIN: CPT | Performed by: INTERNAL MEDICINE

## 2023-11-02 RX ORDER — NEOMYCIN SULFATE, POLYMYXIN B SULFATE, AND DEXAMETHASONE 3.5; 10000; 1 MG/G; [USP'U]/G; MG/G
OINTMENT OPHTHALMIC
COMMUNITY
Start: 2023-10-16

## 2023-11-02 NOTE — PROGRESS NOTES
[facility] - Outpatient Follow-up Note  Esteban Arreguin 61 y.o. female MRN: [id number]  Encounter: [id number]          ASSESSMENT AND PLAN:      1. Cirrhosis of liver without ascites, unspecified hepatic cirrhosis type ([address])  We will check previously ordered MELD labs  - AFP tumor marker; Future  - AFP tumor marker  - US right upper quadrant; Future    ______________________________________________________________________    SUBJECTIVEMaye Milford returns in follow-up of [place] cirrhosis. Seems to be doing quite well with no bruising or bleeding, fever chills, jaundice or rash, abdominal pain, increased abdominal girth, edema. Recent ultrasound with no evidence of hepatoma or ascites. Has not yet had blood work done previously ordered      REVIEW OF SYSTEMS:    ROS       Historical Information   Past Medical History:   Diagnosis Date    Anemia     Celiac disease     R/O due to genetic marker-no family hx    Colon polyp     Deviated nasal septum     Diabetes mellitus ([address])     Disease of thyroid gland     hypo    Fatty liver     Hearing aid worn     right ear only    Hepatitis A     Hyperlipidemia     Hypertension     Wears glasses     on occ     Past Surgical History:   Procedure Laterality Date    ABDOMINAL SURGERY      C section     SECTION, LOW TRANSVERSE      x2    COLONOSCOPY N/A 2016    Procedure: COLONOSCOPY;  Surgeon: Noé Mir MD;  Location: [facility];   Service:     CYST REMOVAL Left     elbow    DILATION AND CURETTAGE OF UTERUS      EGD      GANGLION CYST EXCISION Right     wrist    HAND SURGERY      IR BIOPSY LIVER RANDOM  2022    LIVER BIOPSY      RESECTION TONSIL RADICAL      TUBAL LIGATION      UPPER GASTROINTESTINAL ENDOSCOPY      VARICOSE VEIN SURGERY Bilateral 2009    x3 procedures    WISDOM TOOTH EXTRACTION       Social History   Social History     Substance and Sexual Activity   Alcohol Use Not Currently    Comment: rare     Social History     Substance and Sexual Activity   Drug Use No     Social History     Tobacco Use   Smoking Status Former    Packs/day: 1.00    Years: 10.00    Total pack years: 10.00    Types: Cigarettes    Start date: 1977    Quit date: 1987    Years since quittin.8   Smokeless Tobacco Never     Family History   Problem Relation Age of Onset    Stroke Mother     Diabetes Mother     Diabetes type II Mother     Infertility Mother     Early death Mother     Arthritis Father     Heart disease Father         CABG    Heart disease Daughter         congenital-truncus arteriosus    Congenital heart disease Daughter     Heart disease Son         congenital-"hole" repair    Other Sister         meningioma    Stroke Maternal Grandfather     Hearing loss Paternal Grandfather        Meds/Allergies       Current Outpatient Medications:     Artificial Saliva (BIOTENE MOISTURIZING MOUTH MT)    atorvastatin (LIPITOR) 40 mg tablet    azelastine (ASTELIN) 0.1 % nasal spray    BIOTIN PO    desloratadine (CLARINEX) 5 MG tablet    Empagliflozin 25 MG TABS    enalapril (VASOTEC) 20 mg tablet    ferrous sulfate 324 (65 Fe) mg    guaiFENesin (MUCINEX) 600 mg 12 hr tablet    halobetasol (ULTRAVATE) 0.05 % cream    hydrOXYzine HCL (ATARAX) 25 mg tablet    insulin aspart (NovoLOG) 100 Units/mL injection pen    insulin glargine (Toujeo SoloStar) 300 units/mL CONCENTRATED U-300 injection pen (1-unit dial)    Insulin Pen Needle (TRUEplus Pen Needles) 31G X 6 MM MISC    levothyroxine 112 mcg tablet    metFORMIN (GLUCOPHAGE) 1000 MG tablet    metoprolol succinate (TOPROL-XL) 25 mg 24 hr tablet    Misc Natural Products (OSTEO BI-FLEX JOINT SHIELD PO)    neomycin-polymyxin-dexamethasone (MAXITROL) 0.35%-10,000 units/g-0.1%    Omega-3 Fatty Acids (FISH OIL PO)    pantoprazole (PROTONIX) 20 mg tablet    sitaGLIPtin (Januvia) 100 mg tablet    clotrimazole-betamethasone (LOTRISONE) 1-0.05 % cream    No Known Allergies        Objective     Blood pressure 161/84, pulse 81, height 5' 4.5" (1.638 m), weight 98.9 kg (218 lb), not currently breastfeeding. Body mass index is 36.84 kg/m². PHYSICAL EXAM:      Physical Exam  Vitals and nursing note reviewed. Constitutional:       General: She is not in acute distress. Appearance: She is obese. She is not ill-appearing. HENT:      Head: Normocephalic and atraumatic. Eyes:      General: No scleral icterus. Extraocular Movements: Extraocular movements intact. Cardiovascular:      Rate and Rhythm: Normal rate and regular rhythm. Pulmonary:      Effort: Pulmonary effort is normal. No respiratory distress. Abdominal:      General: There is no distension. Palpations: Abdomen is soft. Tenderness: There is no abdominal tenderness. There is no guarding or rebound. Musculoskeletal:      Right lower leg: No edema. Left lower leg: No edema. Skin:     General: Skin is warm and dry. Coloration: Skin is not cyanotic. Findings: No erythema. Neurological:      General: No focal deficit present. Mental Status: She is alert and oriented to person, place, and time. Psychiatric:         Mood and Affect: Mood normal.         Behavior: Behavior normal.          Lab Results:   No visits with results within 1 Day(s) from this visit. Latest known visit with results is:   Transcribe Orders on 08/11/2023   Component Date Value    Hemoglobin A1C 08/11/2023 6.8 (H)     EAG 08/11/2023 148     Cholesterol 08/11/2023 153     Triglycerides 08/11/2023 222 (H)     HDL, Direct 08/11/2023 33 (L)     LDL Calculated 08/11/2023 76     Non-HDL-Chol (CHOL-HDL) 08/11/2023 120     Creatinine, Ur 08/11/2023 47.2     Albumin,U,Random 08/11/2023 689.0 (H)     Albumin Creat Ratio 08/11/2023 1,460 (H)          Radiology Results:   US right upper quadrant    Result Date: 10/22/2023  Narrative: RIGHT UPPER QUADRANT ULTRASOUND INDICATION:     K74.69: Other cirrhosis of liver.  COMPARISON: Right upper quadrant ultrasound 3/23/2023 TECHNIQUE:   Real-time ultrasound of the right upper quadrant was performed with a curvilinear transducer with both volumetric sweeps and still imaging techniques. FINDINGS: PANCREAS:  Visualized portions of the pancreas are within normal limits. AORTA AND IVC:  Visualized portions are normal for patient age. LIVER: Size: Enlarged. The liver measures 25.6 cm in the midclavicular line. Contour:  Surface contour is smooth. Parenchyma: Diffuse increased echogenicity of the liver is nonspecific but may be in keeping with hepatic steatosis. No liver mass identified. Limited imaging of the main portal vein shows it to be patent and hepatopetal. The portal vein appears dilated. BILIARY: A small amount of sludge is suspected in the gallbladder, but this could be artifactual. No gallbladder wall thickening or pericholecystic fluid. No intrahepatic biliary dilatation. CBD measures 7.0 mm. No choledocholithiasis. KIDNEY: Right kidney measures 11.7 x 4.7 x 6.5 cm. Volume 184.6 mL At least 3 simple cysts are identified in the right kidney measuring up to 2.6 cm. Kidney otherwise within normal limits. ASCITES:   None. Impression: 1. Hepatomegaly with increased echogenicity likely due to hepatic steatosis. 2. Suspected sludge in the gallbladder. No evidence of acute cholecystitis.  Workstation performed: [id number]

## 2023-11-17 ENCOUNTER — VBI (OUTPATIENT)
Dept: ADMINISTRATIVE | Facility: OTHER | Age: 63
End: 2023-11-17

## 2023-11-17 ENCOUNTER — TELEPHONE (OUTPATIENT)
Dept: GASTROENTEROLOGY | Facility: CLINIC | Age: 63
End: 2023-11-17

## 2023-11-17 NOTE — TELEPHONE ENCOUNTER
Dr Edenilson Yost , pt was just in for an ov the beginning of November. There is an egd recall for 12/29/23. Can you advise if you still want pt to have egd recall at this time?  Thanks Diana Zazueta

## 2023-11-24 NOTE — TELEPHONE ENCOUNTER
Lm for pt to call back and schedule egd . Pt is on multiple diabetic meds and due to ins needs to be scheduled at [facility].  Thanks Harlan Fernandez

## 2023-12-12 ENCOUNTER — APPOINTMENT (OUTPATIENT)
Dept: LAB | Facility: CLINIC | Age: 63
End: 2023-12-12
Payer: COMMERCIAL

## 2023-12-12 DIAGNOSIS — Z79.899 ENCOUNTER FOR LONG-TERM CURRENT USE OF MEDICATION: ICD-10-CM

## 2023-12-12 DIAGNOSIS — Z79.4 TYPE 2 DIABETES MELLITUS WITH OTHER SPECIFIED COMPLICATION, WITH LONG-TERM CURRENT USE OF INSULIN (HCC): ICD-10-CM

## 2023-12-12 DIAGNOSIS — E78.2 MIXED HYPERLIPIDEMIA: ICD-10-CM

## 2023-12-12 DIAGNOSIS — I10 BENIGN ESSENTIAL HYPERTENSION: ICD-10-CM

## 2023-12-12 DIAGNOSIS — R71.8 MICROCYTOSIS: ICD-10-CM

## 2023-12-12 DIAGNOSIS — E03.9 PRIMARY HYPOTHYROIDISM: ICD-10-CM

## 2023-12-12 DIAGNOSIS — E11.69 TYPE 2 DIABETES MELLITUS WITH OTHER SPECIFIED COMPLICATION, WITH LONG-TERM CURRENT USE OF INSULIN (HCC): ICD-10-CM

## 2023-12-12 LAB
25(OH)D3 SERPL-MCNC: 24.7 NG/ML (ref 30–100)
ALBUMIN SERPL BCP-MCNC: 4.3 G/DL (ref 3.5–5)
ALP SERPL-CCNC: 60 U/L (ref 34–104)
ALT SERPL W P-5'-P-CCNC: 43 U/L (ref 7–52)
ANION GAP SERPL CALCULATED.3IONS-SCNC: 9 MMOL/L
AST SERPL W P-5'-P-CCNC: 40 U/L (ref 13–39)
BILIRUB SERPL-MCNC: 0.6 MG/DL (ref 0.2–1)
BUN SERPL-MCNC: 21 MG/DL (ref 5–25)
CALCIUM SERPL-MCNC: 9.7 MG/DL (ref 8.4–10.2)
CHLORIDE SERPL-SCNC: 103 MMOL/L (ref 96–108)
CHOLEST SERPL-MCNC: 167 MG/DL
CO2 SERPL-SCNC: 26 MMOL/L (ref 21–32)
CREAT SERPL-MCNC: 1.05 MG/DL (ref 0.6–1.3)
EST. AVERAGE GLUCOSE BLD GHB EST-MCNC: 140 MG/DL
GFR SERPL CREATININE-BSD FRML MDRD: 56 ML/MIN/1.73SQ M
GLUCOSE P FAST SERPL-MCNC: 118 MG/DL (ref 65–99)
HBA1C MFR BLD: 6.5 %
HDLC SERPL-MCNC: 34 MG/DL
IRON SATN MFR SERPL: 13 % (ref 15–50)
IRON SERPL-MCNC: 49 UG/DL (ref 50–212)
LDLC SERPL CALC-MCNC: 83 MG/DL (ref 0–100)
NONHDLC SERPL-MCNC: 133 MG/DL
POTASSIUM SERPL-SCNC: 4.4 MMOL/L (ref 3.5–5.3)
PROT SERPL-MCNC: 7.6 G/DL (ref 6.4–8.4)
SODIUM SERPL-SCNC: 138 MMOL/L (ref 135–147)
T4 FREE SERPL-MCNC: 1.65 NG/DL (ref 0.61–1.12)
TIBC SERPL-MCNC: 379 UG/DL (ref 250–450)
TRIGL SERPL-MCNC: 249 MG/DL
TSH SERPL DL<=0.05 MIU/L-ACNC: 2.87 UIU/ML (ref 0.45–4.5)
UIBC SERPL-MCNC: 330 UG/DL (ref 155–355)
VIT B12 SERPL-MCNC: 278 PG/ML (ref 180–914)

## 2023-12-12 PROCEDURE — 82306 VITAMIN D 25 HYDROXY: CPT

## 2023-12-12 PROCEDURE — 84443 ASSAY THYROID STIM HORMONE: CPT

## 2023-12-12 PROCEDURE — 83036 HEMOGLOBIN GLYCOSYLATED A1C: CPT

## 2023-12-12 PROCEDURE — 84439 ASSAY OF FREE THYROXINE: CPT

## 2023-12-12 PROCEDURE — 36415 COLL VENOUS BLD VENIPUNCTURE: CPT

## 2023-12-12 PROCEDURE — 82607 VITAMIN B-12: CPT

## 2023-12-12 PROCEDURE — 80061 LIPID PANEL: CPT

## 2023-12-12 PROCEDURE — 80053 COMPREHEN METABOLIC PANEL: CPT

## 2023-12-12 PROCEDURE — 83540 ASSAY OF IRON: CPT

## 2023-12-12 PROCEDURE — 83550 IRON BINDING TEST: CPT

## 2023-12-15 PROBLEM — E53.8 VITAMIN B12 DEFICIENCY: Status: ACTIVE | Noted: 2023-12-15

## 2023-12-18 ENCOUNTER — OFFICE VISIT (OUTPATIENT)
Dept: ENDOCRINOLOGY | Facility: CLINIC | Age: 63
End: 2023-12-18
Payer: COMMERCIAL

## 2023-12-18 VITALS
SYSTOLIC BLOOD PRESSURE: 137 MMHG | BODY MASS INDEX: 37.33 KG/M2 | HEART RATE: 88 BPM | DIASTOLIC BLOOD PRESSURE: 65 MMHG | WEIGHT: 220.9 LBS

## 2023-12-18 DIAGNOSIS — E53.8 B12 DEFICIENCY: ICD-10-CM

## 2023-12-18 DIAGNOSIS — Z79.4 TYPE 2 DIABETES MELLITUS WITH OTHER SPECIFIED COMPLICATION, WITH LONG-TERM CURRENT USE OF INSULIN (HCC): Primary | ICD-10-CM

## 2023-12-18 DIAGNOSIS — E78.2 MIXED HYPERLIPIDEMIA: ICD-10-CM

## 2023-12-18 DIAGNOSIS — E55.9 VITAMIN D DEFICIENCY: ICD-10-CM

## 2023-12-18 DIAGNOSIS — E03.9 PRIMARY HYPOTHYROIDISM: ICD-10-CM

## 2023-12-18 DIAGNOSIS — E11.69 TYPE 2 DIABETES MELLITUS WITH OTHER SPECIFIED COMPLICATION, WITH LONG-TERM CURRENT USE OF INSULIN (HCC): Primary | ICD-10-CM

## 2023-12-18 DIAGNOSIS — N18.31 STAGE 3A CHRONIC KIDNEY DISEASE (HCC): ICD-10-CM

## 2023-12-18 DIAGNOSIS — I10 BENIGN ESSENTIAL HYPERTENSION: ICD-10-CM

## 2023-12-18 PROCEDURE — 99214 OFFICE O/P EST MOD 30 MIN: CPT | Performed by: INTERNAL MEDICINE

## 2023-12-18 PROCEDURE — 95251 CONT GLUC MNTR ANALYSIS I&R: CPT | Performed by: INTERNAL MEDICINE

## 2023-12-18 NOTE — PROGRESS NOTES
Luis Rodriguez 63 y.o. female MRN: [id number]    Encounter: [id number]      Assessment/Plan     Type 2 diabetes mellitus on long-term insulin therapy with hypo and hyperglycemia   CKD stage 3, EGFR 56  Obesity, BMI 37  Last A1c 6.5, improved  Has variability in blood cards which appears to be diet related    Recommend the following at this time  Continue current medications  Advised to take a few units less short acting insulin if eating less carbohydrates, if unsure okay to take right after eating rather than before to avoid low blood sugars  Try to include some protein with most meals  Keep a food log, send for review  Follow-up in 3 months with repeat labs     4. Hyperlipidemia  - continue statin therapy    5. Hypertension  Blood pressure at goal   - continue medications including ACE-I/ ARB    6. Hypothyroidism  Free T4 mildly elevated, TSH within normal limits  Clinically euthyroid, continue current dose of levothyroxine     7. Vitamin D deficiency - Start vitamin D3 supplementation 1321-2334 international units orally daily- can get this over-the-counter  8. Vitamin B12 deficiency - follow up with primary care physician     CC: Diabetes    History of Present Illness     HPI:  Luis Rodriguez is a 63 y.o. female presents for a follow-up visit regarding diabetes management.     Last seen in 8/2024  Last Eye exam: 10/2023 - No DR     Current regimen:   Toujeo 63, 80 units twice a day  NovoLog 34, 28-30, 40 units with dinner, 6 units with snack, sliding scale insulin  Januvia 100 mg daily  Metformin 1 g twice a day  Jardiance 25 mg daily    Levothyroxine 112 mcg orally daily  Compliant, takes on an empty stomach  No symptoms of hypo or hyperthyroidism     Statin: Atorvastatin 40  ACE-I/ARB: Enalapril    B12 deficiency  - started oral supplements but got GI symptoms so discontinued   Numbness and tingling better   Occasional diarrhea/ constipation  No vision changes   Trying to walk more regularly      Not taking  D3 supplementation     Home glucose monitoring: CGM interpretation  [employer]excom   Time percentage CGM worn %   Time in range  (goal >65-70%)  High   Very high  Low  Very low     CV    % (goal <33%)  SD (goal <50)     Average glucose mg/dL  GMI %   Symptoms of hypoglycemia :     All other systems were reviewed and are negative.    Review of Systems      Historical Information   Past Medical History:   Diagnosis Date    Anemia     Celiac disease     R/O due to genetic marker-no family hx    Colon polyp     Deviated nasal septum     Diabetes mellitus (HCC)     Disease of thyroid gland     hypo    Fatty liver     Hearing aid worn     right ear only    Hepatitis A     Hyperlipidemia     Hypertension     Wears glasses     on occ     Past Surgical History:   Procedure Laterality Date    ABDOMINAL SURGERY      C section     SECTION, LOW TRANSVERSE      x2    COLONOSCOPY N/A 2016    Procedure: COLONOSCOPY;  Surgeon: Good Kim MD;  Location: [facility];  Service:     CYST REMOVAL Left     elbow    DILATION AND CURETTAGE OF UTERUS      EGD      GANGLION CYST EXCISION Right     wrist    HAND SURGERY      IR BIOPSY LIVER RANDOM  2022    LIVER BIOPSY      RESECTION TONSIL RADICAL      TUBAL LIGATION      UPPER GASTROINTESTINAL ENDOSCOPY      VARICOSE VEIN SURGERY Bilateral 2009    x3 procedures    WISDOM TOOTH EXTRACTION       Social History   Social History     Substance and Sexual Activity   Alcohol Use Not Currently    Comment: rare     Social History     Substance and Sexual Activity   Drug Use No     Social History     Tobacco Use   Smoking Status Former    Current packs/day: 0.00    Average packs/day: 1 pack/day for 10.0 years (10.0 ttl pk-yrs)    Types: Cigarettes    Start date: 1977    Quit date: 1987    Years since quittin.9   Smokeless Tobacco Never     Family History:   Family History   Problem Relation Age of Onset    Stroke Mother     Diabetes Mother     Diabetes type II  "Mother     Infertility Mother     Early death Mother     Arthritis Father     Heart disease Father         CABG    Heart disease Daughter         congenital-truncus arteriosus    Congenital heart disease Daughter     Heart disease Son         congenital-\"hole\" repair    Other Sister         meningioma    Stroke Maternal Grandfather     Hearing loss Paternal Grandfather        Meds/Allergies   Current Outpatient Medications   Medication Sig Dispense Refill    Artificial Saliva (BIOTENE MOISTURIZING MOUTH MT) Apply to the mouth or throat daily at bedtime      atorvastatin (LIPITOR) 40 mg tablet Take 1 tablet by mouth daily. 90 tablet 0    azelastine (ASTELIN) 0.1 % nasal spray Instill 2 sprays into each nostril twice daily as directed. 30 mL 1    BIOTIN PO Take 10,000 mcg by mouth daily at bedtime      clotrimazole-betamethasone (LOTRISONE) 1-0.05 % cream Apply topically 2 (two) times a day (Patient not taking: Reported on 11/2/2023) 45 g 0    desloratadine (CLARINEX) 5 MG tablet Take 1 tablet by mouth daily. 90 tablet 0    Empagliflozin 25 MG TABS Take 1 tablet (25 mg total) by mouth every morning 90 tablet 3    enalapril (VASOTEC) 20 mg tablet Take 2 tablets (40 mg total) by mouth daily 180 tablet 3    ferrous sulfate 324 (65 Fe) mg Take 1 tablet (324 mg total) by mouth daily before breakfast 90 tablet 1    guaiFENesin (MUCINEX) 600 mg 12 hr tablet Take 1,200 mg by mouth every 12 (twelve) hours      halobetasol (ULTRAVATE) 0.05 % cream       hydrOXYzine HCL (ATARAX) 25 mg tablet TAKE 1 TABLET BY MOUTH EVERY NIGHT AT BEDTIME AS NEEDED FOR ITCHING 90 tablet 1    insulin aspart (NovoLOG) 100 Units/mL injection pen Inject under the 34 units at breakfast, 34units at lunch, and 42 units at dinner,   5 units with snack plus sliding scale for a maximum daily total of 110 units (Patient taking differently: Inject under the 34 units at breakfast, 30-32  .units at lunch, and 40 units at dinner,   6 units with snack plus " sliding scale for a maximum daily total of 110 units) 150 mL 3    insulin glargine (Toujeo SoloStar) 300 units/mL CONCENTRATED U-300 injection pen (1-unit dial) Inject 63 units in the morning, 80 at bedtime 34 mL 3    Insulin Pen Needle (TRUEplus Pen Needles) 31G X 6 MM MISC Use 6 times a day to inject insulin 600 each 3    levothyroxine 112 mcg tablet TAKE 1 TABLET BY MOUTH EVERY MORNING 90 tablet 3    metFORMIN (GLUCOPHAGE) 1000 MG tablet Take 1 tablet (1,000 mg total) by mouth 2 (two) times a day with meals 180 tablet 3    metoprolol succinate (TOPROL-XL) 25 mg 24 hr tablet Take 1 tablet by mouth daily. 90 tablet 1    Misc Natural Products (OSTEO BI-FLEX JOINT SHIELD PO)       neomycin-polymyxin-dexamethasone (MAXITROL) 0.35%-10,000 units/g-0.1%       Omega-3 Fatty Acids (FISH OIL PO) Take 1,400 mg by mouth every evening       pantoprazole (PROTONIX) 20 mg tablet Take 1 tablet by mouth daily. 90 tablet 2    sitaGLIPtin (Januvia) 100 mg tablet Take 1 tablet (100 mg total) by mouth daily 90 tablet 3     No current facility-administered medications for this visit.     No Known Allergies    Objective   Vitals: Blood pressure 137/65, pulse 88, weight 100 kg (220 lb 14.4 oz), not currently breastfeeding.    Physical Exam  Constitutional:       Appearance: She is well-developed.   HENT:      Head: Normocephalic and atraumatic.   Eyes:      Conjunctiva/sclera: Conjunctivae normal.      Pupils: Pupils are equal, round, and reactive to light.   Neck:      Thyroid: No thyromegaly.   Cardiovascular:      Rate and Rhythm: Normal rate and regular rhythm.      Heart sounds: Normal heart sounds. No murmur heard.  Pulmonary:      Effort: Pulmonary effort is normal.      Breath sounds: Normal breath sounds. No wheezing.   Abdominal:      General: There is no distension.      Palpations: Abdomen is soft.      Tenderness: There is no abdominal tenderness.   Musculoskeletal:         General: Normal range of motion.      Cervical  back: Normal range of motion and neck supple.   Skin:     General: Skin is warm and dry.   Neurological:      Mental Status: She is alert and oriented to person, place, and time.      Deep Tendon Reflexes: Reflexes normal.      Comments: No tremors on the outstretched arms       Psychiatric:         Behavior: Behavior normal.         The history was obtained from the review of the chart, patient.    Lab Results:   Lab Results   Component Value Date/Time    Hemoglobin A1C 6.5 (H) 12/12/2023 10:12 AM    Hemoglobin A1C 6.8 (H) 08/11/2023 12:32 PM    Hemoglobin A1C 6.5 (H) 04/10/2023 11:35 AM    WBC 8.11 04/10/2023 11:35 AM    Hemoglobin 12.0 04/10/2023 11:35 AM    Hematocrit 37.9 04/10/2023 11:35 AM    MCV 81 (L) 04/10/2023 11:35 AM    Platelets 169 04/10/2023 11:35 AM    BUN 21 12/12/2023 10:12 AM    BUN 26 (H) 08/11/2023 12:32 PM    BUN 20 04/10/2023 11:35 AM    Potassium 4.4 12/12/2023 10:12 AM    Potassium 4.6 08/11/2023 12:32 PM    Potassium 4.7 04/10/2023 11:35 AM    Chloride 103 12/12/2023 10:12 AM    Chloride 104 08/11/2023 12:32 PM    Chloride 106 04/10/2023 11:35 AM    CO2 26 12/12/2023 10:12 AM    CO2 24 08/11/2023 12:32 PM    CO2 26 04/10/2023 11:35 AM    Creatinine 1.05 12/12/2023 10:12 AM    Creatinine 1.12 08/11/2023 12:32 PM    Creatinine 1.25 04/10/2023 11:35 AM    AST 40 (H) 12/12/2023 10:12 AM    AST 67 (H) 04/10/2023 11:35 AM    AST 36 01/03/2023 10:25 AM    ALT 43 12/12/2023 10:12 AM    ALT 87 (H) 04/10/2023 11:35 AM    ALT 52 01/03/2023 10:25 AM    Total Protein 7.6 12/12/2023 10:12 AM    Total Protein 7.9 04/10/2023 11:35 AM    Total Protein 7.5 01/03/2023 10:25 AM    Albumin 4.3 12/12/2023 10:12 AM    Albumin 3.7 04/10/2023 11:35 AM    Albumin 3.4 (L) 01/03/2023 10:25 AM    HDL, Direct 34 (L) 12/12/2023 10:12 AM    HDL, Direct 33 (L) 08/11/2023 12:32 PM    HDL, Direct 36 (L) 04/10/2023 11:35 AM    Triglycerides 249 (H) 12/12/2023 10:12 AM    Triglycerides 222 (H) 08/11/2023 12:32 PM     "Triglycerides 196 (H) 04/10/2023 11:35 AM         Imaging Studies: I have personally reviewed pertinent reports.      Portions of the record may have been created with voice recognition software. Occasional wrong word or \"sound a like\" substitutions may have occurred due to the inherent limitations of voice recognition software. Read the chart carefully and recognize, using context, where substitutions have occurred.    "

## 2023-12-18 NOTE — PATIENT INSTRUCTIONS
Continue current medications  If you know you are eating less carbohydrates, please take a few units less short acting insulin, if unsure okay to take right after eating rather than before to avoid low blood sugar  Please try to include some protein with most of your meals  Keep a food log, send for review  Follow-up in 3 months with repeat labs   Start vitamin D3 supplementation 9173-8953 international units orally daily-you can get this over-the-counter

## 2023-12-22 DIAGNOSIS — E78.2 MIXED HYPERLIPIDEMIA: ICD-10-CM

## 2023-12-22 DIAGNOSIS — D64.9 ANEMIA, UNSPECIFIED TYPE: ICD-10-CM

## 2023-12-22 DIAGNOSIS — I10 BENIGN ESSENTIAL HYPERTENSION: ICD-10-CM

## 2023-12-22 DIAGNOSIS — Z79.4 TYPE 2 DIABETES MELLITUS WITH HYPERGLYCEMIA, WITH LONG-TERM CURRENT USE OF INSULIN (HCC): ICD-10-CM

## 2023-12-22 DIAGNOSIS — E03.9 PRIMARY HYPOTHYROIDISM: ICD-10-CM

## 2023-12-22 DIAGNOSIS — J30.9 ALLERGIC RHINITIS, UNSPECIFIED SEASONALITY, UNSPECIFIED TRIGGER: ICD-10-CM

## 2023-12-22 DIAGNOSIS — E11.65 TYPE 2 DIABETES MELLITUS WITH HYPERGLYCEMIA, WITHOUT LONG-TERM CURRENT USE OF INSULIN (HCC): ICD-10-CM

## 2023-12-22 DIAGNOSIS — E11.65 TYPE 2 DIABETES MELLITUS WITH HYPERGLYCEMIA, WITH LONG-TERM CURRENT USE OF INSULIN (HCC): ICD-10-CM

## 2023-12-22 RX ORDER — ATORVASTATIN CALCIUM 40 MG/1
40 TABLET, FILM COATED ORAL DAILY
Qty: 90 TABLET | Refills: 0 | Status: SHIPPED | OUTPATIENT
Start: 2023-12-22

## 2023-12-22 RX ORDER — EMPAGLIFLOZIN 25 MG/1
25 TABLET, FILM COATED ORAL EVERY MORNING
Qty: 90 TABLET | Refills: 2 | Status: SHIPPED | OUTPATIENT
Start: 2023-12-22

## 2023-12-22 RX ORDER — METOPROLOL SUCCINATE 25 MG/1
TABLET, EXTENDED RELEASE ORAL
Qty: 90 TABLET | Refills: 0 | Status: SHIPPED | OUTPATIENT
Start: 2023-12-22

## 2023-12-22 RX ORDER — INSULIN GLARGINE 300 U/ML
INJECTION, SOLUTION SUBCUTANEOUS
Qty: 33 ML | Refills: 2 | Status: SHIPPED | OUTPATIENT
Start: 2023-12-22

## 2023-12-22 RX ORDER — DESLORATADINE 5 MG/1
TABLET ORAL
Qty: 90 TABLET | Refills: 0 | Status: SHIPPED | OUTPATIENT
Start: 2023-12-22

## 2023-12-22 RX ORDER — PANTOPRAZOLE SODIUM 20 MG/1
TABLET, DELAYED RELEASE ORAL
Qty: 90 TABLET | Refills: 1 | Status: SHIPPED | OUTPATIENT
Start: 2023-12-22

## 2023-12-23 DIAGNOSIS — I10 BENIGN ESSENTIAL HYPERTENSION: ICD-10-CM

## 2023-12-26 RX ORDER — ENALAPRIL MALEATE 20 MG/1
40 TABLET ORAL DAILY
Qty: 180 TABLET | Refills: 1 | Status: SHIPPED | OUTPATIENT
Start: 2023-12-26

## 2024-01-11 DIAGNOSIS — E78.2 MIXED HYPERLIPIDEMIA: ICD-10-CM

## 2024-01-11 RX ORDER — ATORVASTATIN CALCIUM 40 MG/1
40 TABLET, FILM COATED ORAL DAILY
Qty: 90 TABLET | Refills: 0 | Status: SHIPPED | OUTPATIENT
Start: 2024-01-11

## 2024-01-25 DIAGNOSIS — E11.9 TYPE 2 DIABETES MELLITUS WITHOUT COMPLICATION, WITHOUT LONG-TERM CURRENT USE OF INSULIN (HCC): ICD-10-CM

## 2024-01-26 DIAGNOSIS — N30.10 CHRONIC INTERSTITIAL CYSTITIS: ICD-10-CM

## 2024-01-26 RX ORDER — HYDROXYZINE HYDROCHLORIDE 25 MG/1
25 TABLET, FILM COATED ORAL
Qty: 90 TABLET | Refills: 0 | Status: SHIPPED | OUTPATIENT
Start: 2024-01-26

## 2024-02-18 NOTE — PATIENT INSTRUCTIONS
Increase fluids and use Mucinex as needed  You can access the FollowMyHealth Patient Portal offered by [facility] by registering at the following website: [email or web address]. By joining [facility]’s FollowMyHealth portal, you will also be able to view your health information using other applications (apps) compatible with our system.

## 2024-02-25 ENCOUNTER — OFFICE VISIT (OUTPATIENT)
Dept: URGENT CARE | Facility: CLINIC | Age: 64
End: 2024-02-25
Payer: COMMERCIAL

## 2024-02-25 VITALS
DIASTOLIC BLOOD PRESSURE: 86 MMHG | HEART RATE: 93 BPM | RESPIRATION RATE: 14 BRPM | BODY MASS INDEX: 37.18 KG/M2 | SYSTOLIC BLOOD PRESSURE: 142 MMHG | OXYGEN SATURATION: 99 % | WEIGHT: 220 LBS | TEMPERATURE: 98.6 F

## 2024-02-25 DIAGNOSIS — R30.0 DYSURIA: Primary | ICD-10-CM

## 2024-02-25 LAB
SL AMB  POCT GLUCOSE, UA: 1000
SL AMB LEUKOCYTE ESTERASE,UA: ABNORMAL
SL AMB POCT BILIRUBIN,UA: ABNORMAL
SL AMB POCT BLOOD,UA: ABNORMAL
SL AMB POCT CLARITY,UA: ABNORMAL
SL AMB POCT COLOR,UA: ABNORMAL
SL AMB POCT KETONES,UA: ABNORMAL
SL AMB POCT NITRITE,UA: ABNORMAL
SL AMB POCT PH,UA: 5
SL AMB POCT SPECIFIC GRAVITY,UA: 1.01
SL AMB POCT URINE PROTEIN: ABNORMAL
SL AMB POCT UROBILINOGEN: ABNORMAL

## 2024-02-25 PROCEDURE — 99203 OFFICE O/P NEW LOW 30 MIN: CPT | Performed by: PHYSICIAN ASSISTANT

## 2024-02-25 PROCEDURE — 81002 URINALYSIS NONAUTO W/O SCOPE: CPT | Performed by: PHYSICIAN ASSISTANT

## 2024-02-25 RX ORDER — PHENAZOPYRIDINE HYDROCHLORIDE 200 MG/1
200 TABLET, FILM COATED ORAL
Qty: 6 TABLET | Refills: 0 | Status: SHIPPED | OUTPATIENT
Start: 2024-02-25 | End: 2024-02-27

## 2024-02-25 RX ORDER — CEPHALEXIN 500 MG/1
500 CAPSULE ORAL EVERY 12 HOURS SCHEDULED
Qty: 10 CAPSULE | Refills: 0 | Status: SHIPPED | OUTPATIENT
Start: 2024-02-25 | End: 2024-03-01

## 2024-02-25 NOTE — PROGRESS NOTES
[facility]        NAME: Luis Rodriguez is a 64 y.o. female  : 1960    MRN: [id number]  DATE: 2024  TIME: 12:37 PM    Assessment and Plan   Dysuria [R30.0]  1. Dysuria  POCT urine dip    Urine culture    cephalexin (KEFLEX) 500 mg capsule    phenazopyridine (PYRIDIUM) 200 mg tablet        UA and symptoms suggestive of UTI, will send culture to confirm. Encouraged to stay hydrated. Discussed strict return to care precautions as well as red flag symptoms which should prompt immediate ED referral. Pt verbalized understanding and is in agreement with plan.  Please follow up with your primary care provider within the next week. Please remember that your visit today was with an urgent care provider and should not replace follow up with your primary care provider for chronic medical issues or annual physicals.       Patient Instructions       Follow up with PCP in 3-5 days.  Proceed to  ER if symptoms worsen.    Chief Complaint     Chief Complaint   Patient presents with    Possible UTI     Pt presents with urinary pressure, frequency, blood in urine last night, took Pyridium this morning// urine POCT untestable         History of Present Illness       Pt is a(n) 64 y.o. female who presents out of concern for a UTI.  Dysuria: Yes  Urgency: Yes  Frequency: Yes  Hematuria: Yes  Cloudy/malodorous urine: No  Vaginal discharge/itching: No  LMP: No LMP recorded. Patient is postmenopausal.  Fever: No  Flank pain: No  Nausea/vomiting: No  Previous UTIs: Yes  Last UTI: last           Review of Systems   Review of Systems   Constitutional:  Negative for chills, diaphoresis and fever.   Respiratory:  Negative for shortness of breath.    Cardiovascular:  Negative for chest pain.   Gastrointestinal:  Negative for abdominal pain, nausea and vomiting.   Genitourinary:  Positive for dysuria, frequency, hematuria and urgency. Negative for flank pain.   Musculoskeletal:  Negative for back pain and  myalgias.   Psychiatric/Behavioral:  Negative for confusion.          Current Medications       Current Outpatient Medications:     Artificial Saliva (BIOTENE MOISTURIZING MOUTH MT), Apply to the mouth or throat daily at bedtime, Disp: , Rfl:     atorvastatin (LIPITOR) 40 mg tablet, Take 1 tablet by mouth daily., Disp: 90 tablet, Rfl: 0    azelastine (ASTELIN) 0.1 % nasal spray, Instill 2 sprays into each nostril twice daily as directed., Disp: 30 mL, Rfl: 1    cephalexin (KEFLEX) 500 mg capsule, Take 1 capsule (500 mg total) by mouth every 12 (twelve) hours for 5 days, Disp: 10 capsule, Rfl: 0    desloratadine (CLARINEX) 5 MG tablet, Take 1 tablet by mouth daily., Disp: 90 tablet, Rfl: 0    enalapril (VASOTEC) 20 mg tablet, Take 2 tablets by mouth daily., Disp: 180 tablet, Rfl: 1    ferrous sulfate 324 (65 Fe) mg, Take 1 tablet (324 mg total) by mouth daily before breakfast, Disp: 90 tablet, Rfl: 1    guaiFENesin (MUCINEX) 600 mg 12 hr tablet, Take 1,200 mg by mouth every 12 (twelve) hours, Disp: , Rfl:     halobetasol (ULTRAVATE) 0.05 % cream, , Disp: , Rfl:     hydrOXYzine HCL (ATARAX) 25 mg tablet, Take 1 tablet (25 mg total) by mouth daily at bedtime as needed for itching, Disp: 90 tablet, Rfl: 0    insulin aspart (NovoLOG) 100 Units/mL injection pen, Inject under the 34 units at breakfast, 34units at lunch, and 42 units at dinner,   5 units with snack plus sliding scale for a maximum daily total of 110 units (Patient taking differently: Inject under the 34 units at breakfast, 30-32 .units at lunch, and 40 units at dinner,   6 units with snack plus sliding scale for a maximum daily total of 110 units), Disp: 150 mL, Rfl: 3    insulin glargine (Toujeo SoloStar) 300 units/mL CONCENTRATED U-300 injection pen (1-unit dial), Inject 60 units under the skin in the morning and inject 78 units under the skin at bedtime., Disp: 33 mL, Rfl: 2    Insulin Pen Needle (TRUEplus Pen Needles) 31G X 6 MM MISC, Use 6 times a day  to inject insulin, Disp: 600 each, Rfl: 3    Jardiance 25 MG TABS, Take 1 tablet by mouth every morning., Disp: 90 tablet, Rfl: 2    levothyroxine 112 mcg tablet, TAKE 1 TABLET BY MOUTH EVERY MORNING, Disp: 90 tablet, Rfl: 3    metFORMIN (GLUCOPHAGE) 1000 MG tablet, Take 1 tablet (1,000 mg total) by mouth 2 (two) times a day with meals, Disp: 180 tablet, Rfl: 3    metoprolol succinate (TOPROL-XL) 25 mg 24 hr tablet, Take 1 tablet by mouth daily., Disp: 90 tablet, Rfl: 0    Misc Natural Products (OSTEO BI-FLEX JOINT SHIELD PO), , Disp: , Rfl:     neomycin-polymyxin-dexamethasone (MAXITROL) 0.35%-10,000 units/g-0.1%, , Disp: , Rfl:     Omega-3 Fatty Acids (FISH OIL PO), Take 1,400 mg by mouth every evening , Disp: , Rfl:     pantoprazole (PROTONIX) 20 mg tablet, Take 1 tablet by mouth daily., Disp: 90 tablet, Rfl: 1    phenazopyridine (PYRIDIUM) 200 mg tablet, Take 1 tablet (200 mg total) by mouth 3 (three) times a day with meals for 2 days, Disp: 6 tablet, Rfl: 0    sitaGLIPtin (Januvia) 100 mg tablet, Take 1 tablet (100 mg total) by mouth daily, Disp: 90 tablet, Rfl: 3    BIOTIN PO, Take 10,000 mcg by mouth daily at bedtime (Patient not taking: Reported on 2/25/2024), Disp: , Rfl:     clotrimazole-betamethasone (LOTRISONE) 1-0.05 % cream, Apply topically 2 (two) times a day (Patient not taking: Reported on 11/2/2023), Disp: 45 g, Rfl: 0    Current Allergies     Allergies as of 02/25/2024    (No Known Allergies)            The following portions of the patient's history were reviewed and updated as appropriate: allergies, current medications, past family history, past medical history, past social history, past surgical history and problem list.     Past Medical History:   Diagnosis Date    Anemia     Celiac disease     R/O due to genetic marker-no family hx    Colon polyp     Deviated nasal septum     Diabetes mellitus (HCC)     Disease of thyroid gland     hypo    Fatty liver     Hearing aid worn     right ear only  "   Hepatitis A     Hyperlipidemia     Hypertension     Wears glasses     on occ       Past Surgical History:   Procedure Laterality Date    ABDOMINAL SURGERY      C section     SECTION, LOW TRANSVERSE      x2    COLONOSCOPY N/A 2016    Procedure: COLONOSCOPY;  Surgeon: Good Kim MD;  Location: [facility] GI LAB;  Service:     CYST REMOVAL Left     elbow    DILATION AND CURETTAGE OF UTERUS      EGD      GANGLION CYST EXCISION Right     wrist    HAND SURGERY      IR BIOPSY LIVER RANDOM  2022    LIVER BIOPSY      RESECTION TONSIL RADICAL      TUBAL LIGATION      UPPER GASTROINTESTINAL ENDOSCOPY      VARICOSE VEIN SURGERY Bilateral 2009    x3 procedures    WISDOM TOOTH EXTRACTION         Family History   Problem Relation Age of Onset    Stroke Mother     Diabetes Mother     Diabetes type II Mother     Infertility Mother     Early death Mother     Arthritis Father     Heart disease Father         CABG    Heart disease Daughter         congenital-truncus arteriosus    Congenital heart disease Daughter     Heart disease Son         congenital-\"hole\" repair    Other Sister         meningioma    Stroke Maternal Grandfather     Hearing loss Paternal Grandfather          Medications have been verified.        Objective   /86   Pulse 93   Temp 98.6 °F (37 °C)   Resp 14   Wt 99.8 kg (220 lb)   SpO2 99%   BMI 37.18 kg/m²        Physical Exam     Physical Exam  Vitals and nursing note reviewed.   Constitutional:       General: She is not in acute distress.     Appearance: Normal appearance. She is not ill-appearing.   HENT:      Head: Normocephalic and atraumatic.   Cardiovascular:      Rate and Rhythm: Normal rate and regular rhythm.      Heart sounds: Normal heart sounds.   Pulmonary:      Effort: Pulmonary effort is normal. No respiratory distress.      Breath sounds: Normal breath sounds. No wheezing, rhonchi or rales.   Abdominal:      General: Abdomen is flat.      Palpations: Abdomen " is soft.      Tenderness: There is abdominal tenderness (suprapubic). There is no right CVA tenderness, left CVA tenderness or guarding.   Skin:     General: Skin is warm and dry.      Capillary Refill: Capillary refill takes less than 2 seconds.   Neurological:      Mental Status: She is alert and oriented to person, place, and time.   Psychiatric:         Behavior: Behavior normal.

## 2024-02-28 LAB
BACTERIA UR CULT: ABNORMAL
Lab: ABNORMAL
SL AMB ANTIMICROBIAL SUSCEPTIBILITY: ABNORMAL

## 2024-03-24 DIAGNOSIS — E11.9 TYPE 2 DIABETES MELLITUS WITHOUT COMPLICATION, WITHOUT LONG-TERM CURRENT USE OF INSULIN (HCC): ICD-10-CM

## 2024-03-24 DIAGNOSIS — E11.65 TYPE 2 DIABETES MELLITUS WITH HYPERGLYCEMIA, WITHOUT LONG-TERM CURRENT USE OF INSULIN (HCC): ICD-10-CM

## 2024-03-24 DIAGNOSIS — J30.9 ALLERGIC RHINITIS, UNSPECIFIED SEASONALITY, UNSPECIFIED TRIGGER: ICD-10-CM

## 2024-03-24 RX ORDER — DESLORATADINE 5 MG/1
TABLET ORAL
Qty: 90 TABLET | Refills: 0 | Status: SHIPPED | OUTPATIENT
Start: 2024-03-24

## 2024-03-26 RX ORDER — LEVOTHYROXINE SODIUM 112 UG/1
TABLET ORAL
Qty: 90 TABLET | Refills: 2 | Status: SHIPPED | OUTPATIENT
Start: 2024-03-26

## 2024-04-01 ENCOUNTER — APPOINTMENT (OUTPATIENT)
Dept: LAB | Facility: CLINIC | Age: 64
End: 2024-04-01
Payer: COMMERCIAL

## 2024-04-01 DIAGNOSIS — I10 BENIGN ESSENTIAL HYPERTENSION: ICD-10-CM

## 2024-04-01 DIAGNOSIS — Z79.4 TYPE 2 DIABETES MELLITUS WITH OTHER SPECIFIED COMPLICATION, WITH LONG-TERM CURRENT USE OF INSULIN (HCC): ICD-10-CM

## 2024-04-01 DIAGNOSIS — N18.31 STAGE 3A CHRONIC KIDNEY DISEASE (HCC): ICD-10-CM

## 2024-04-01 DIAGNOSIS — E53.8 B12 DEFICIENCY: ICD-10-CM

## 2024-04-01 DIAGNOSIS — E03.9 PRIMARY HYPOTHYROIDISM: ICD-10-CM

## 2024-04-01 DIAGNOSIS — E78.2 MIXED HYPERLIPIDEMIA: ICD-10-CM

## 2024-04-01 DIAGNOSIS — E55.9 VITAMIN D DEFICIENCY: ICD-10-CM

## 2024-04-01 DIAGNOSIS — E11.69 TYPE 2 DIABETES MELLITUS WITH OTHER SPECIFIED COMPLICATION, WITH LONG-TERM CURRENT USE OF INSULIN (HCC): ICD-10-CM

## 2024-04-01 LAB
25(OH)D3 SERPL-MCNC: 49.3 NG/ML (ref 30–100)
ALBUMIN SERPL BCP-MCNC: 4.3 G/DL (ref 3.5–5)
ALP SERPL-CCNC: 67 U/L (ref 34–104)
ALT SERPL W P-5'-P-CCNC: 59 U/L (ref 7–52)
ANION GAP SERPL CALCULATED.3IONS-SCNC: 10 MMOL/L (ref 4–13)
AST SERPL W P-5'-P-CCNC: 45 U/L (ref 13–39)
BILIRUB SERPL-MCNC: 0.58 MG/DL (ref 0.2–1)
BUN SERPL-MCNC: 33 MG/DL (ref 5–25)
CALCIUM SERPL-MCNC: 9.8 MG/DL (ref 8.4–10.2)
CHLORIDE SERPL-SCNC: 103 MMOL/L (ref 96–108)
CHOLEST SERPL-MCNC: 163 MG/DL
CO2 SERPL-SCNC: 24 MMOL/L (ref 21–32)
CREAT SERPL-MCNC: 1.18 MG/DL (ref 0.6–1.3)
EST. AVERAGE GLUCOSE BLD GHB EST-MCNC: 137 MG/DL
GFR SERPL CREATININE-BSD FRML MDRD: 48 ML/MIN/1.73SQ M
GLUCOSE P FAST SERPL-MCNC: 175 MG/DL (ref 65–99)
HBA1C MFR BLD: 6.4 %
HDLC SERPL-MCNC: 35 MG/DL
LDLC SERPL CALC-MCNC: 79 MG/DL (ref 0–100)
NONHDLC SERPL-MCNC: 128 MG/DL
POTASSIUM SERPL-SCNC: 4.9 MMOL/L (ref 3.5–5.3)
PROT SERPL-MCNC: 7.9 G/DL (ref 6.4–8.4)
SODIUM SERPL-SCNC: 137 MMOL/L (ref 135–147)
T4 FREE SERPL-MCNC: 0.94 NG/DL (ref 0.61–1.12)
TRIGL SERPL-MCNC: 243 MG/DL
TSH SERPL DL<=0.05 MIU/L-ACNC: 2.77 UIU/ML (ref 0.45–4.5)

## 2024-04-01 PROCEDURE — 82306 VITAMIN D 25 HYDROXY: CPT

## 2024-04-01 PROCEDURE — 84443 ASSAY THYROID STIM HORMONE: CPT

## 2024-04-01 PROCEDURE — 36415 COLL VENOUS BLD VENIPUNCTURE: CPT

## 2024-04-01 PROCEDURE — 80053 COMPREHEN METABOLIC PANEL: CPT

## 2024-04-01 PROCEDURE — 83036 HEMOGLOBIN GLYCOSYLATED A1C: CPT

## 2024-04-01 PROCEDURE — 80061 LIPID PANEL: CPT

## 2024-04-01 PROCEDURE — 82985 ASSAY OF GLYCATED PROTEIN: CPT

## 2024-04-01 PROCEDURE — 84439 ASSAY OF FREE THYROXINE: CPT

## 2024-04-03 LAB — FRUCTOSAMINE SERPL-SCNC: 245 UMOL/L (ref 0–285)

## 2024-04-05 ENCOUNTER — TELEPHONE (OUTPATIENT)
Dept: ENDOCRINOLOGY | Facility: CLINIC | Age: 64
End: 2024-04-05

## 2024-04-05 RX ORDER — CHOLECALCIFEROL (VITAMIN D3) 25 MCG
TABLET,CHEWABLE ORAL
COMMUNITY

## 2024-04-05 RX ORDER — MELATONIN 10 MG/ML
DROPS ORAL
COMMUNITY

## 2024-04-07 DIAGNOSIS — N30.10 CHRONIC INTERSTITIAL CYSTITIS: ICD-10-CM

## 2024-04-07 RX ORDER — HYDROXYZINE HYDROCHLORIDE 25 MG/1
TABLET, FILM COATED ORAL
Qty: 90 TABLET | Refills: 0 | Status: SHIPPED | OUTPATIENT
Start: 2024-04-07

## 2024-04-09 ENCOUNTER — TELEPHONE (OUTPATIENT)
Age: 64
End: 2024-04-09

## 2024-04-09 NOTE — TELEPHONE ENCOUNTER
[facility] calling in regards to a fax they sent for updated medical records. They are resending the fax to [phone]. Please advise.

## 2024-04-10 ENCOUNTER — RA CDI HCC (OUTPATIENT)
Dept: OTHER | Facility: HOSPITAL | Age: 64
End: 2024-04-10

## 2024-04-10 ENCOUNTER — OFFICE VISIT (OUTPATIENT)
Dept: ENDOCRINOLOGY | Facility: CLINIC | Age: 64
End: 2024-04-10
Payer: COMMERCIAL

## 2024-04-10 VITALS
SYSTOLIC BLOOD PRESSURE: 140 MMHG | WEIGHT: 219 LBS | HEART RATE: 90 BPM | OXYGEN SATURATION: 98 % | BODY MASS INDEX: 36.49 KG/M2 | HEIGHT: 65 IN | DIASTOLIC BLOOD PRESSURE: 68 MMHG

## 2024-04-10 DIAGNOSIS — E66.01 CLASS 2 SEVERE OBESITY WITH SERIOUS COMORBIDITY AND BODY MASS INDEX (BMI) OF 35.0 TO 35.9 IN ADULT, UNSPECIFIED OBESITY TYPE (HCC): ICD-10-CM

## 2024-04-10 DIAGNOSIS — I10 BENIGN ESSENTIAL HYPERTENSION: ICD-10-CM

## 2024-04-10 DIAGNOSIS — E53.8 B12 DEFICIENCY: ICD-10-CM

## 2024-04-10 DIAGNOSIS — N18.31 STAGE 3A CHRONIC KIDNEY DISEASE (HCC): ICD-10-CM

## 2024-04-10 DIAGNOSIS — Z79.4 TYPE 2 DIABETES MELLITUS WITH OTHER SPECIFIED COMPLICATION, WITH LONG-TERM CURRENT USE OF INSULIN (HCC): Primary | ICD-10-CM

## 2024-04-10 DIAGNOSIS — E03.9 PRIMARY HYPOTHYROIDISM: ICD-10-CM

## 2024-04-10 DIAGNOSIS — E11.69 TYPE 2 DIABETES MELLITUS WITH OTHER SPECIFIED COMPLICATION, WITH LONG-TERM CURRENT USE OF INSULIN (HCC): Primary | ICD-10-CM

## 2024-04-10 DIAGNOSIS — E55.9 VITAMIN D DEFICIENCY: ICD-10-CM

## 2024-04-10 DIAGNOSIS — E78.2 MIXED HYPERLIPIDEMIA: ICD-10-CM

## 2024-04-10 PROCEDURE — 99214 OFFICE O/P EST MOD 30 MIN: CPT | Performed by: INTERNAL MEDICINE

## 2024-04-10 PROCEDURE — 95251 CONT GLUC MNTR ANALYSIS I&R: CPT | Performed by: INTERNAL MEDICINE

## 2024-04-10 NOTE — PROGRESS NOTES
Luis Rodriguez 64 y.o. female MRN: [id number]    Encounter: [id number]      Assessment/Plan     Type 2 diabetes mellitus long-term insulin therapy  CKD stage III-stable  Obesity, BMI 36.4  Last A1c 6.4%, well-controlled    Recommend the following at this time  Continue current medications  Will upgrade to Dexcom G7  Follow-up in 3 months with repeat labs  recommend trying to include some physical activity/exercise into daily routine    4. Hyperlipidemia  - continue statin therapy    5. Hypertension  Blood pressure at goal   - continue current medications     6.  Hypothyroidism   Clinically and biochemically euthyroid.     7 vitamin D deficiency  - now within normal limits; continue supplementation      CC: Diabetes    History of Present Illness     HPI:  Luis Rodriguez is a 64 y.o. female presents for a follow-up visit regarding diabetes management.     Last seen in 12/2023  Last Eye exam: 10/2023    Current regimen:   Toujeo 63, 80 units twice a day  NovoLog 34, 28-30 units, 40 units with dinner, 6 units with snack  Januvia 100 mg orally daily  Metformin 1 g twice a day  Jardiance 25 mg daily    Levothyroxine 112 mcg orally daily, compliant    Feel well, no complaints   No change in diet/ no exercise   Numbness/tingling has improved; taking B12 supplementation OTC  No symptoms of hypo or hyperthyroidism     Statin:  lipitor   ACE-I/ARB:  enalapril     Home glucose monitoring:CGM interpretation  dexcom  - will be scanned into chart   Had sensor failure for a few days with both highs and lows which was discordant with FBG. BG better now   Would like  G7     All other systems were reviewed and are negative.    Review of Systems      Historical Information   Past Medical History:   Diagnosis Date    Anemia     Celiac disease     R/O due to genetic marker-no family hx    Colon polyp     Deviated nasal septum     Diabetes mellitus (HCC)     Disease of thyroid gland     hypo    Fatty liver     Hearing aid worn      "right ear only    Hepatitis A     Hyperlipidemia     Hypertension     Wears glasses     on occ     Past Surgical History:   Procedure Laterality Date    ABDOMINAL SURGERY      C section     SECTION, LOW TRANSVERSE      x2    COLONOSCOPY N/A 2016    Procedure: COLONOSCOPY;  Surgeon: Good Kim MD;  Location: [facility] GI LAB;  Service:     CYST REMOVAL Left     elbow    DILATION AND CURETTAGE OF UTERUS      EGD      GANGLION CYST EXCISION Right     wrist    HAND SURGERY      IR BIOPSY LIVER RANDOM  2022    LIVER BIOPSY      RESECTION TONSIL RADICAL      TUBAL LIGATION      UPPER GASTROINTESTINAL ENDOSCOPY      VARICOSE VEIN SURGERY Bilateral 2009    x3 procedures    WISDOM TOOTH EXTRACTION       Social History   Social History     Substance and Sexual Activity   Alcohol Use Not Currently    Comment: rare     Social History     Substance and Sexual Activity   Drug Use No     Social History     Tobacco Use   Smoking Status Former    Current packs/day: 0.00    Average packs/day: 1 pack/day for 10.0 years (10.0 ttl pk-yrs)    Types: Cigarettes    Start date: 1977    Quit date: 1987    Years since quittin.2    Passive exposure: Past   Smokeless Tobacco Never     Family History:   Family History   Problem Relation Age of Onset    Stroke Mother     Diabetes Mother     Diabetes type II Mother     Infertility Mother     Early death Mother     Arthritis Father     Heart disease Father         CABG    Heart disease Daughter         congenital-truncus arteriosus    Congenital heart disease Daughter     Heart disease Son         congenital-\"hole\" repair    Other Sister         meningioma    Stroke Maternal Grandfather     Hearing loss Paternal Grandfather        Meds/Allergies   Current Outpatient Medications   Medication Sig Dispense Refill    Artificial Saliva (BIOTENE MOISTURIZING MOUTH MT) Apply to the mouth or throat daily at bedtime      atorvastatin (LIPITOR) 40 mg tablet Take 1 " tablet by mouth daily. 90 tablet 0    azelastine (ASTELIN) 0.1 % nasal spray Instill 2 sprays into each nostril twice daily as directed. 30 mL 1    Cholecalciferol (D3) 50 MCG (2000 UT) CHEW       Cyanocobalamin (B-12) 1000 MCG CAPS       desloratadine (CLARINEX) 5 MG tablet Take 1 tablet by mouth daily. 90 tablet 0    enalapril (VASOTEC) 20 mg tablet Take 2 tablets by mouth daily. 180 tablet 1    ferrous sulfate 324 (65 Fe) mg Take 1 tablet (324 mg total) by mouth daily before breakfast 90 tablet 1    guaiFENesin (MUCINEX) 600 mg 12 hr tablet Take 1,200 mg by mouth every 12 (twelve) hours      insulin aspart (NovoLOG) 100 Units/mL injection pen Inject under the 34 units at breakfast, 34units at lunch, and 42 units at dinner,   5 units with snack plus sliding scale for a maximum daily total of 110 units (Patient taking differently: Inject under the 34 units at breakfast, 28-30  .units at lunch, and 40 units at dinner,   6 units with snack plus sliding scale for a maximum daily total of 110 units) 150 mL 3    insulin glargine (Toujeo SoloStar) 300 units/mL CONCENTRATED U-300 injection pen (1-unit dial) Inject 60 units under the skin in the morning and inject 78 units under the skin at bedtime. (Patient taking differently: Inject 63 units under the skin in the morning and inject 80 units under the skin at bedtime.) 33 mL 2    Insulin Pen Needle (TRUEplus Pen Needles) 31G X 6 MM MISC Use 6 times a day to inject insulin 600 each 3    Jardiance 25 MG TABS Take 1 tablet by mouth every morning. 90 tablet 2    levothyroxine 112 mcg tablet Take 1 tablet by mouth every morning. 90 tablet 2    metFORMIN (GLUCOPHAGE) 1000 MG tablet Take 1 tablet by mouth twice daily with meals. 180 tablet 2    metoprolol succinate (TOPROL-XL) 25 mg 24 hr tablet Take 1 tablet by mouth daily. 90 tablet 0    Omega-3 Fatty Acids (FISH OIL PO) Take 1,400 mg by mouth every evening       pantoprazole (PROTONIX) 20 mg tablet Take 1 tablet by mouth  "daily. 90 tablet 1    sitaGLIPtin (Januvia) 100 mg tablet Take 1 tablet (100 mg total) by mouth daily 90 tablet 3    BIOTIN PO Take 10,000 mcg by mouth daily at bedtime (Patient not taking: Reported on 2/25/2024)      clotrimazole-betamethasone (LOTRISONE) 1-0.05 % cream Apply topically 2 (two) times a day (Patient not taking: Reported on 11/2/2023) 45 g 0    halobetasol (ULTRAVATE) 0.05 % cream       hydrOXYzine HCL (ATARAX) 25 mg tablet Take 1 tablet by mouth daily at bedtime as needed for itching. 90 tablet 0    Misc Natural Products (OSTEO BI-FLEX JOINT SHIELD PO)       neomycin-polymyxin-dexamethasone (MAXITROL) 0.35%-10,000 units/g-0.1%        No current facility-administered medications for this visit.     No Known Allergies    Objective   Vitals: Blood pressure 140/68, pulse 90, height 5' 5\" (1.651 m), weight 99.3 kg (219 lb), SpO2 98%, not currently breastfeeding.    Physical Exam  Constitutional:       Appearance: She is well-developed.   HENT:      Head: Normocephalic and atraumatic.   Eyes:      Conjunctiva/sclera: Conjunctivae normal.   Neck:      Thyroid: No thyromegaly.   Cardiovascular:      Rate and Rhythm: Normal rate and regular rhythm.      Heart sounds: Normal heart sounds. No murmur heard.  Pulmonary:      Effort: Pulmonary effort is normal.      Breath sounds: Normal breath sounds. No wheezing.   Abdominal:      General: There is no distension.      Palpations: Abdomen is soft.      Tenderness: There is no abdominal tenderness.   Musculoskeletal:         General: Normal range of motion.      Cervical back: Normal range of motion and neck supple.   Skin:     General: Skin is warm and dry.   Neurological:      Mental Status: She is alert and oriented to person, place, and time.   Psychiatric:         Behavior: Behavior normal.         The history was obtained from the review of the chart, patient.    Lab Results:   Lab Results   Component Value Date/Time    Hemoglobin A1C 6.4 (H) 04/01/2024 " "10:42 AM    Hemoglobin A1C 6.5 (H) 12/12/2023 10:12 AM    Hemoglobin A1C 6.8 (H) 08/11/2023 12:32 PM    BUN 33 (H) 04/01/2024 10:42 AM    BUN 21 12/12/2023 10:12 AM    BUN 26 (H) 08/11/2023 12:32 PM    Potassium 4.9 04/01/2024 10:42 AM    Potassium 4.4 12/12/2023 10:12 AM    Potassium 4.6 08/11/2023 12:32 PM    Chloride 103 04/01/2024 10:42 AM    Chloride 103 12/12/2023 10:12 AM    Chloride 104 08/11/2023 12:32 PM    CO2 24 04/01/2024 10:42 AM    CO2 26 12/12/2023 10:12 AM    CO2 24 08/11/2023 12:32 PM    Creatinine 1.18 04/01/2024 10:42 AM    Creatinine 1.05 12/12/2023 10:12 AM    Creatinine 1.12 08/11/2023 12:32 PM    AST 45 (H) 04/01/2024 10:42 AM    AST 40 (H) 12/12/2023 10:12 AM    ALT 59 (H) 04/01/2024 10:42 AM    ALT 43 12/12/2023 10:12 AM    Total Protein 7.9 04/01/2024 10:42 AM    Total Protein 7.6 12/12/2023 10:12 AM    Albumin 4.3 04/01/2024 10:42 AM    Albumin 4.3 12/12/2023 10:12 AM    HDL, Direct 35 (L) 04/01/2024 10:42 AM    HDL, Direct 34 (L) 12/12/2023 10:12 AM    HDL, Direct 33 (L) 08/11/2023 12:32 PM    Triglycerides 243 (H) 04/01/2024 10:42 AM    Triglycerides 249 (H) 12/12/2023 10:12 AM    Triglycerides 222 (H) 08/11/2023 12:32 PM         Imaging Studies: I have personally reviewed pertinent reports.      Portions of the record may have been created with voice recognition software. Occasional wrong word or \"sound a like\" substitutions may have occurred due to the inherent limitations of voice recognition software. Read the chart carefully and recognize, using context, where substitutions have occurred.    "

## 2024-04-10 NOTE — PROGRESS NOTES
HCC coding opportunities          Chart Reviewed number of suggestions sent to Provider: 4     Patients Insurance        Commercial Insurance: [employer] Commercial Insurance          E11.36  E11.22  E11.43  E66.01

## 2024-04-15 ENCOUNTER — HOSPITAL ENCOUNTER (OUTPATIENT)
Dept: RADIOLOGY | Facility: HOSPITAL | Age: 64
Discharge: HOME | End: 2024-04-15
Attending: INTERNAL MEDICINE
Payer: COMMERCIAL

## 2024-04-15 DIAGNOSIS — K74.60 CIRRHOSIS OF LIVER WITHOUT ASCITES, UNSPECIFIED HEPATIC CIRRHOSIS TYPE (HCC): ICD-10-CM

## 2024-04-15 PROCEDURE — 76705 ECHO EXAM OF ABDOMEN: CPT

## 2024-04-17 ENCOUNTER — OFFICE VISIT (OUTPATIENT)
Dept: FAMILY MEDICINE CLINIC | Facility: CLINIC | Age: 64
End: 2024-04-17
Payer: COMMERCIAL

## 2024-04-17 VITALS
DIASTOLIC BLOOD PRESSURE: 82 MMHG | TEMPERATURE: 97.8 F | RESPIRATION RATE: 16 BRPM | SYSTOLIC BLOOD PRESSURE: 138 MMHG | WEIGHT: 221.8 LBS | BODY MASS INDEX: 36.96 KG/M2 | HEART RATE: 81 BPM | HEIGHT: 65 IN

## 2024-04-17 DIAGNOSIS — Z79.4 TYPE 2 DIABETES MELLITUS WITHOUT COMPLICATION, WITH LONG-TERM CURRENT USE OF INSULIN (HCC): Primary | ICD-10-CM

## 2024-04-17 DIAGNOSIS — E53.8 B12 DEFICIENCY: ICD-10-CM

## 2024-04-17 DIAGNOSIS — K76.0 FATTY LIVER: ICD-10-CM

## 2024-04-17 DIAGNOSIS — Z12.31 ENCOUNTER FOR SCREENING MAMMOGRAM FOR BREAST CANCER: ICD-10-CM

## 2024-04-17 DIAGNOSIS — E11.9 TYPE 2 DIABETES MELLITUS WITHOUT COMPLICATION, WITH LONG-TERM CURRENT USE OF INSULIN (HCC): Primary | ICD-10-CM

## 2024-04-17 DIAGNOSIS — I10 BENIGN ESSENTIAL HYPERTENSION: ICD-10-CM

## 2024-04-17 PROCEDURE — 99214 OFFICE O/P EST MOD 30 MIN: CPT | Performed by: FAMILY MEDICINE

## 2024-04-17 NOTE — PROGRESS NOTES
Name: Luis Rodriguez      : 1960      MRN: [id number]  Encounter Provider: Jaz Lee DO  Encounter Date: 2024   [facility]    Assessment & Plan     1. Type 2 diabetes mellitus without complication, with long-term current use of insulin (HCC)  Assessment & Plan:    Lab Results   Component Value Date    HGBA1C 6.4 (H) 2024     Well controlled  Exercise encouraged   Continue management with endocrinologist.        2. Encounter for screening mammogram for breast cancer  -     Mammo screening bilateral w 3d & cad; Future; Expected date: 2024    3. B12 deficiency  Assessment & Plan:  Stable       4. Fatty liver  Assessment & Plan:  Stable       5. Benign essential hypertension  Assessment & Plan:  Well controlled  Continue enalapril 20 mg a day and metoprolol 25 mg a day       Return in about 6 months (around 10/17/2024) for Annual physical and pap smear .       Reviewed hemoglobin A1c, CMP, lipid panel, vitamin D, TSH and T4 done on 2024    Subjective      She has been feeling well.  She is happy with her sugars.   Her stomach symptoms resolved once she was off the trulicity.       Review of Systems    Current Outpatient Medications on File Prior to Visit   Medication Sig   • Artificial Saliva (BIOTENE MOISTURIZING MOUTH MT) Apply to the mouth or throat if needed   • atorvastatin (LIPITOR) 40 mg tablet Take 1 tablet by mouth daily.   • azelastine (ASTELIN) 0.1 % nasal spray Instill 2 sprays into each nostril twice daily as directed.   • BIOTIN PO Take 10,000 mcg by mouth daily at bedtime   • Cholecalciferol (D3) 50 MCG ( UT) CHEW    • Cyanocobalamin (B-12) 1000 MCG CAPS    • desloratadine (CLARINEX) 5 MG tablet Take 1 tablet by mouth daily.   • enalapril (VASOTEC) 20 mg tablet Take 2 tablets by mouth daily.   • ferrous sulfate 324 (65 Fe) mg Take 1 tablet (324 mg total) by mouth daily before breakfast   • guaiFENesin (MUCINEX) 600 mg 12 hr  tablet Take 1,200 mg by mouth every 12 (twelve) hours   • hydrOXYzine HCL (ATARAX) 25 mg tablet Take 1 tablet by mouth daily at bedtime as needed for itching.   • insulin aspart (NovoLOG) 100 Units/mL injection pen Inject under the 34 units at breakfast, 34units at lunch, and 42 units at dinner,   5 units with snack plus sliding scale for a maximum daily total of 110 units (Patient taking differently: Inject under the 34 units at breakfast, 28-30  .units at lunch, and 40 units at dinner,   6 units with snack plus sliding scale for a maximum daily total of 110 units)   • insulin glargine (Toujeo SoloStar) 300 units/mL CONCENTRATED U-300 injection pen (1-unit dial) Inject 60 units under the skin in the morning and inject 78 units under the skin at bedtime. (Patient taking differently: Inject 63 units under the skin in the morning and inject 80 units under the skin at bedtime.)   • Insulin Pen Needle (TRUEplus Pen Needles) 31G X 6 MM MISC Use 6 times a day to inject insulin   • Jardiance 25 MG TABS Take 1 tablet by mouth every morning.   • levothyroxine 112 mcg tablet Take 1 tablet by mouth every morning.   • metFORMIN (GLUCOPHAGE) 1000 MG tablet Take 1 tablet by mouth twice daily with meals.   • metoprolol succinate (TOPROL-XL) 25 mg 24 hr tablet Take 1 tablet by mouth daily.   • Misc Natural Products (OSTEO BI-FLEX JOINT SHIELD PO)    • neomycin-polymyxin-dexamethasone (MAXITROL) 0.35%-10,000 units/g-0.1% as needed   • Omega-3 Fatty Acids (FISH OIL PO) Take 1,400 mg by mouth every evening    • pantoprazole (PROTONIX) 20 mg tablet Take 1 tablet by mouth daily.   • sitaGLIPtin (Januvia) 100 mg tablet Take 1 tablet (100 mg total) by mouth daily   • [DISCONTINUED] clotrimazole-betamethasone (LOTRISONE) 1-0.05 % cream Apply topically 2 (two) times a day (Patient taking differently: Apply topically if needed)   • [DISCONTINUED] halobetasol (ULTRAVATE) 0.05 % cream  (Patient not taking: Reported on 4/17/2024)  "      Objective     /82   Pulse 81   Temp 97.8 °F (36.6 °C)   Resp 16   Ht 5' 5\" (1.651 m)   Wt 101 kg (221 lb 12.8 oz)   BMI 36.91 kg/m²     Physical Exam  Vitals and nursing note reviewed.   Constitutional:       Appearance: She is well-developed.   HENT:      Head: Normocephalic and atraumatic.      Right Ear: Tympanic membrane and external ear normal.      Left Ear: Tympanic membrane and external ear normal.   Cardiovascular:      Rate and Rhythm: Normal rate and regular rhythm.      Pulses: no weak pulses.           Dorsalis pedis pulses are 2+ on the right side and 2+ on the left side.        Posterior tibial pulses are 2+ on the right side and 2+ on the left side.      Heart sounds: Murmur heard.      No friction rub.   Pulmonary:      Effort: Pulmonary effort is normal. No respiratory distress.      Breath sounds: Normal breath sounds. No wheezing or rales.   Musculoskeletal:      Right lower leg: No edema.      Left lower leg: No edema.   Feet:      Right foot:      Skin integrity: No ulcer, skin breakdown, erythema, warmth, callus or dry skin.      Left foot:      Skin integrity: No ulcer, skin breakdown, erythema, warmth, callus or dry skin.      Patient's shoes and socks removed.    Right Foot/Ankle   Right Foot Inspection  Skin Exam: skin normal. Skin not intact, no dry skin, no warmth, no callus, no erythema, no maceration, no abnormal color, no pre-ulcer, no ulcer and no callus.     Toe Exam: ROM and strength within normal limits.     Sensory   Monofilament testing: intact    Vascular  Capillary refills: < 3 seconds  The right DP pulse is 2+. The right PT pulse is 2+.     Left Foot/Ankle  Left Foot Inspection  Skin Exam: skin normal. Skin not intact, no dry skin, no warmth, no erythema, no maceration, normal color, no pre-ulcer, no ulcer and no callus.     Toe Exam: ROM and strength within normal limits.     Sensory   Monofilament testing: intact    Vascular  Capillary refills: < 3 " seconds  The left DP pulse is 2+. The left PT pulse is 2+.     Assign Risk Category  No deformity present  No loss of protective sensation  No weak pulses  Risk: 0    Jaz Lee DO

## 2024-04-17 NOTE — ASSESSMENT & PLAN NOTE
Lab Results   Component Value Date    HGBA1C 6.4 (H) 04/01/2024     Well controlled  Exercise encouraged   Continue management with endocrinologist.

## 2024-04-23 ENCOUNTER — TELEPHONE (OUTPATIENT)
Dept: ENDOCRINOLOGY | Facility: CLINIC | Age: 64
End: 2024-04-23

## 2024-04-29 ENCOUNTER — OFFICE VISIT (OUTPATIENT)
Dept: FAMILY MEDICINE CLINIC | Facility: CLINIC | Age: 64
End: 2024-04-29
Payer: COMMERCIAL

## 2024-04-29 VITALS
RESPIRATION RATE: 16 BRPM | HEIGHT: 65 IN | HEART RATE: 96 BPM | DIASTOLIC BLOOD PRESSURE: 60 MMHG | SYSTOLIC BLOOD PRESSURE: 128 MMHG | TEMPERATURE: 97.3 F | WEIGHT: 218 LBS | BODY MASS INDEX: 36.32 KG/M2

## 2024-04-29 DIAGNOSIS — H92.01 EARACHE ON RIGHT: ICD-10-CM

## 2024-04-29 DIAGNOSIS — R50.9 FEVER, UNSPECIFIED FEVER CAUSE: Primary | ICD-10-CM

## 2024-04-29 LAB
SARS-COV-2 AG UPPER RESP QL IA: NEGATIVE
VALID CONTROL: NORMAL

## 2024-04-29 PROCEDURE — 87811 SARS-COV-2 COVID19 W/OPTIC: CPT | Performed by: FAMILY MEDICINE

## 2024-04-29 PROCEDURE — 3074F SYST BP LT 130 MM HG: CPT | Performed by: FAMILY MEDICINE

## 2024-04-29 PROCEDURE — 3078F DIAST BP <80 MM HG: CPT | Performed by: FAMILY MEDICINE

## 2024-04-29 PROCEDURE — 99213 OFFICE O/P EST LOW 20 MIN: CPT | Performed by: FAMILY MEDICINE

## 2024-04-29 NOTE — PROGRESS NOTES
Name: Luis Rodriguez      : 1960      MRN: [id number]  Encounter Provider: Jaz Lee DO  Encounter Date: 2024   Encounter department: [facility]    Assessment & Plan     1. Fever, unspecified fever cause  -     Poct Covid 19 Rapid Antigen Test    2. Earache on right  Comments:  improving, suspect viral illness     Return if symptoms worsen or fail to improve.    Subjective      Her symptoms started as an earache on the right side a few days ago.  Her earache is better this morning.    Earache   There is pain in the right ear. This is a new problem. The current episode started in the past 7 days. The problem occurs hourly. The problem has been waxing and waning. The maximum temperature recorded prior to her arrival was 100.4 - 100.9 F. The fever has been present for 1 to 2 days. The pain is at a severity of 5/10. Associated symptoms include abdominal pain, diarrhea, rhinorrhea and vomiting. Pertinent negatives include no coughing, ear discharge, headaches, hearing loss, neck pain, rash or sore throat.     Review of Systems   HENT:  Positive for ear pain and rhinorrhea. Negative for ear discharge, hearing loss and sore throat.    Respiratory:  Negative for cough.    Gastrointestinal:  Positive for abdominal pain, diarrhea and vomiting.   Musculoskeletal:  Negative for neck pain.   Skin:  Negative for rash.   Neurological:  Negative for headaches.       Current Outpatient Medications on File Prior to Visit   Medication Sig   • Artificial Saliva (BIOTENE MOISTURIZING MOUTH MT) Apply to the mouth or throat if needed   • atorvastatin (LIPITOR) 40 mg tablet Take 1 tablet by mouth daily.   • azelastine (ASTELIN) 0.1 % nasal spray Instill 2 sprays into each nostril twice daily as directed.   • BIOTIN PO Take 10,000 mcg by mouth daily at bedtime   • Cholecalciferol (D3) 50 MCG ( UT) CHEW    • Cyanocobalamin (B-12) 1000 MCG CAPS    • desloratadine (CLARINEX) 5 MG tablet Take 1 tablet by  mouth daily.   • enalapril (VASOTEC) 20 mg tablet Take 2 tablets by mouth daily.   • ferrous sulfate 324 (65 Fe) mg Take 1 tablet (324 mg total) by mouth daily before breakfast   • guaiFENesin (MUCINEX) 600 mg 12 hr tablet Take 1,200 mg by mouth every 12 (twelve) hours   • hydrOXYzine HCL (ATARAX) 25 mg tablet Take 1 tablet by mouth daily at bedtime as needed for itching.   • insulin aspart (NovoLOG) 100 Units/mL injection pen Inject under the 34 units at breakfast, 34units at lunch, and 42 units at dinner,   5 units with snack plus sliding scale for a maximum daily total of 110 units (Patient taking differently: Inject under the 34 units at breakfast, 28-30  .units at lunch, and 40 units at dinner,   6 units with snack plus sliding scale for a maximum daily total of 110 units)   • insulin glargine (Toujeo SoloStar) 300 units/mL CONCENTRATED U-300 injection pen (1-unit dial) Inject 60 units under the skin in the morning and inject 78 units under the skin at bedtime. (Patient taking differently: Inject 63 units under the skin in the morning and inject 80 units under the skin at bedtime.)   • Insulin Pen Needle (TRUEplus Pen Needles) 31G X 6 MM MISC Use 6 times a day to inject insulin   • Jardiance 25 MG TABS Take 1 tablet by mouth every morning.   • levothyroxine 112 mcg tablet Take 1 tablet by mouth every morning.   • metFORMIN (GLUCOPHAGE) 1000 MG tablet Take 1 tablet by mouth twice daily with meals.   • metoprolol succinate (TOPROL-XL) 25 mg 24 hr tablet Take 1 tablet by mouth daily.   • Misc Natural Products (OSTEO BI-FLEX JOINT SHIELD PO)    • neomycin-polymyxin-dexamethasone (MAXITROL) 0.35%-10,000 units/g-0.1% as needed   • Omega-3 Fatty Acids (FISH OIL PO) Take 1,400 mg by mouth every evening    • pantoprazole (PROTONIX) 20 mg tablet Take 1 tablet by mouth daily.   • sitaGLIPtin (Januvia) 100 mg tablet Take 1 tablet (100 mg total) by mouth daily       Objective     /60   Pulse 96   Temp (!) 97.3 °F  "(36.3 °C) (Temporal)   Resp 16   Ht 5' 5\" (1.651 m)   Wt 98.9 kg (218 lb)   BMI 36.28 kg/m²     Physical Exam  Vitals and nursing note reviewed.   Constitutional:       Appearance: She is well-developed.   HENT:      Head: Normocephalic and atraumatic.      Right Ear: External ear normal. A middle ear effusion is present. Tympanic membrane is not erythematous or bulging.      Left Ear: External ear normal. A middle ear effusion is present. Tympanic membrane is not erythematous or bulging.      Nose: Nose normal.   Cardiovascular:      Rate and Rhythm: Normal rate and regular rhythm.      Heart sounds: Murmur heard.   Pulmonary:      Effort: Pulmonary effort is normal. No respiratory distress.      Breath sounds: Normal breath sounds. No wheezing.     Jaz Lee, DO    "

## 2024-05-01 ENCOUNTER — ANESTHESIA (OUTPATIENT)
Dept: ANESTHESIOLOGY | Facility: HOSPITAL | Age: 64
End: 2024-05-01

## 2024-05-01 ENCOUNTER — ANESTHESIA EVENT (OUTPATIENT)
Dept: ANESTHESIOLOGY | Facility: HOSPITAL | Age: 64
End: 2024-05-01

## 2024-05-01 DIAGNOSIS — I10 BENIGN ESSENTIAL HYPERTENSION: ICD-10-CM

## 2024-05-03 RX ORDER — METOPROLOL SUCCINATE 25 MG/1
TABLET, EXTENDED RELEASE ORAL
Qty: 90 TABLET | Refills: 1 | Status: SHIPPED | OUTPATIENT
Start: 2024-05-03

## 2024-05-06 DIAGNOSIS — E11.9 TYPE 2 DIABETES MELLITUS WITHOUT COMPLICATION, WITHOUT LONG-TERM CURRENT USE OF INSULIN (HCC): ICD-10-CM

## 2024-05-06 NOTE — TELEPHONE ENCOUNTER
JAYNE corrigan    Submitted via    []CMM-KEY    [x][employer]-Case ID # [id number]  []Faxed to plan   []Other website    []Phone call Case ID #      Office notes sent, clinical questions answered. Awaiting determination    Turnaround time for your insurance to make a decision on your Prior Authorization can take 7-21 business days.

## 2024-05-08 NOTE — TELEPHONE ENCOUNTER
PA for Januvia Approved     Date(s) approved April 7, 2024 to May 7, 2025     Case #     Patient advised by          [x] E la [place]art Message  [] Phone call   []LMOM  []L/M to call office as no active Communication consent on file  []Unable to leave detailed message as VM not approved on Communication consent       Pharmacy advised by    [x]Fax  []Phone call    Approval letter scanned into Media No

## 2024-05-13 ENCOUNTER — TELEPHONE (OUTPATIENT)
Dept: GASTROENTEROLOGY | Facility: CLINIC | Age: 64
End: 2024-05-13

## 2024-05-13 NOTE — TELEPHONE ENCOUNTER
MAGDALENA Norwood; Pieter Tran MD  Hello,    I just wanted to let you know that when I called patient today with her instructions she had not known to stop her Jardiance for 4 days prior. She took it today and will hold it tomorrow and Wednesday. She also didn't stop her iron tablet for 7 days because she didn't know.      Thank You,    Leena Desai RN    My chart notification came back unread. I spoke to pt who has her diabetic and egd instructions  and a . She will call back if she has any questions. c to call with arrival time. Sb

## 2024-05-14 ENCOUNTER — ANESTHESIA EVENT (OUTPATIENT)
Dept: ANESTHESIOLOGY | Facility: HOSPITAL | Age: 64
End: 2024-05-14

## 2024-05-14 ENCOUNTER — ANESTHESIA (OUTPATIENT)
Dept: ANESTHESIOLOGY | Facility: HOSPITAL | Age: 64
End: 2024-05-14

## 2024-05-14 NOTE — ANESTHESIA PREPROCEDURE EVALUATION
Procedure:  PRE-OP ONLY    EGD hx liver cirrhosis no prior varices for 1 yr follow up     DM-2 Jardiance     Relevant Problems   CARDIO   (+) Benign essential hypertension   (+) Mixed hyperlipidemia      ENDO   (+) Primary hypothyroidism   (+) Type 2 diabetes mellitus without complication, with long-term current use of insulin (HCC)      GI/HEPATIC   (+) Cirrhosis of liver (HCC)   (+) Fatty liver   (+) Gastroesophageal reflux disease without esophagitis      /RENAL   (+) Stage 3a chronic kidney disease (HCC)      HEMATOLOGY   (+) Anemia   (+) Iron deficiency anemia      NEURO/PSYCH   (+) Numbness and tingling             Anesthesia Plan  ASA Score- 3     Anesthesia Type- IV sedation with anesthesia with ASA Monitors.         Additional Monitors:     Airway Plan:            Plan Factors-    Chart reviewed.   Existing labs reviewed. Patient summary reviewed.                  Induction-     Postoperative Plan-     Informed Consent-

## 2024-05-15 ENCOUNTER — ANESTHESIA EVENT (OUTPATIENT)
Dept: GASTROENTEROLOGY | Facility: AMBULARY SURGERY CENTER | Age: 64
End: 2024-05-15

## 2024-05-15 ENCOUNTER — ANESTHESIA (OUTPATIENT)
Dept: GASTROENTEROLOGY | Facility: AMBULARY SURGERY CENTER | Age: 64
End: 2024-05-15

## 2024-05-15 ENCOUNTER — HOSPITAL ENCOUNTER (OUTPATIENT)
Dept: GASTROENTEROLOGY | Facility: AMBULARY SURGERY CENTER | Age: 64
Setting detail: OUTPATIENT SURGERY
Discharge: HOME | End: 2024-05-15
Attending: INTERNAL MEDICINE
Payer: COMMERCIAL

## 2024-05-15 VITALS
RESPIRATION RATE: 18 BRPM | HEART RATE: 81 BPM | DIASTOLIC BLOOD PRESSURE: 71 MMHG | TEMPERATURE: 98.4 F | SYSTOLIC BLOOD PRESSURE: 132 MMHG | OXYGEN SATURATION: 97 %

## 2024-05-15 DIAGNOSIS — K31.9 ANTRAL ERYTHEMA, GASTRIC: ICD-10-CM

## 2024-05-15 DIAGNOSIS — K74.60 CIRRHOSIS OF LIVER WITHOUT ASCITES, UNSPECIFIED HEPATIC CIRRHOSIS TYPE (HCC): ICD-10-CM

## 2024-05-15 LAB — GLUCOSE SERPL-MCNC: 128 MG/DL (ref 65–140)

## 2024-05-15 PROCEDURE — 43235 EGD DIAGNOSTIC BRUSH WASH: CPT | Performed by: INTERNAL MEDICINE

## 2024-05-15 PROCEDURE — 82948 REAGENT STRIP/BLOOD GLUCOSE: CPT

## 2024-05-15 RX ORDER — PROPOFOL 10 MG/ML
INJECTION, EMULSION INTRAVENOUS AS NEEDED
Status: DISCONTINUED | OUTPATIENT
Start: 2024-05-15 | End: 2024-05-15

## 2024-05-15 RX ORDER — SODIUM CHLORIDE, SODIUM LACTATE, POTASSIUM CHLORIDE, CALCIUM CHLORIDE 600; 310; 30; 20 MG/100ML; MG/100ML; MG/100ML; MG/100ML
125 INJECTION, SOLUTION INTRAVENOUS CONTINUOUS
Status: DISCONTINUED | OUTPATIENT
Start: 2024-05-15 | End: 2024-05-19 | Stop reason: HOSPADM

## 2024-05-15 RX ORDER — LIDOCAINE HYDROCHLORIDE 20 MG/ML
INJECTION, SOLUTION EPIDURAL; INFILTRATION; INTRACAUDAL; PERINEURAL AS NEEDED
Status: DISCONTINUED | OUTPATIENT
Start: 2024-05-15 | End: 2024-05-15

## 2024-05-15 RX ADMIN — SODIUM CHLORIDE, SODIUM LACTATE, POTASSIUM CHLORIDE, AND CALCIUM CHLORIDE 125 ML/HR: .6; .31; .03; .02 INJECTION, SOLUTION INTRAVENOUS at 08:50

## 2024-05-15 RX ADMIN — PROPOFOL 100 MG: 10 INJECTION, EMULSION INTRAVENOUS at 10:27

## 2024-05-15 RX ADMIN — LIDOCAINE HYDROCHLORIDE 100 MG: 20 INJECTION, SOLUTION EPIDURAL; INFILTRATION; INTRACAUDAL; PERINEURAL at 10:27

## 2024-05-15 RX ADMIN — PROPOFOL 20 MG: 10 INJECTION, EMULSION INTRAVENOUS at 10:31

## 2024-05-15 RX ADMIN — PROPOFOL 50 MG: 10 INJECTION, EMULSION INTRAVENOUS at 10:29

## 2024-05-15 NOTE — ANESTHESIA POSTPROCEDURE EVALUATION
Post-Op Assessment Note    CV Status:  Stable  Pain Score: 0    Pain management: adequate       Mental Status:  Sleepy   Hydration Status:  Stable   PONV Controlled:  None   Airway Patency:  Patent     Post Op Vitals Reviewed: Yes    No anethesia notable event occurred.    Staff: Anesthesiologist, CRNA               BP   114/58   Temp      Pulse  83   Resp   14   SpO2   98

## 2024-05-15 NOTE — H&P
History and Physical -  [facility]  Lusi Rodriguez 64 y.o. female MRN: [id number]                  HPI: Luis Rodriguez is a 64 y.o. year old female who presents for cirrhosis, iron deficiency      REVIEW OF SYSTEMS: Per the HPI, and otherwise unremarkable.    Historical Information   Past Medical History:   Diagnosis Date    Anemia     Celiac disease     R/O due to genetic marker-no family hx    Colon polyp     Deviated nasal septum     Diabetes mellitus (HCC)     Disease of thyroid gland     hypo    Fatty liver     Hearing aid worn     right ear only    Hepatitis A     Hyperlipidemia     Hypertension     Wears glasses     on occ     Past Surgical History:   Procedure Laterality Date    ABDOMINAL SURGERY      C section     SECTION, LOW TRANSVERSE      x2    COLONOSCOPY N/A 2016    Procedure: COLONOSCOPY;  Surgeon: Good Kim MD;  Location: [facility] GI [facility];  Service:     CYST REMOVAL Left     elbow    DILATION AND CURETTAGE OF UTERUS      EGD      GANGLION CYST EXCISION Right     wrist    HAND SURGERY      IR BIOPSY LIVER RANDOM  2022    LIVER BIOPSY      RESECTION TONSIL RADICAL      TUBAL LIGATION      UPPER GASTROINTESTINAL ENDOSCOPY      VARICOSE VEIN SURGERY Bilateral 2009    x3 procedures    WISDOM TOOTH EXTRACTION       Social History   Social History     Substance and Sexual Activity   Alcohol Use Not Currently    Comment: rare     Social History     Substance and Sexual Activity   Drug Use No     Social History     Tobacco Use   Smoking Status Former    Current packs/day: 0.00    Average packs/day: 1 pack/day for 10.0 years (10.0 ttl pk-yrs)    Types: Cigarettes    Start date: 1977    Quit date: 1987    Years since quittin.3    Passive exposure: Past   Smokeless Tobacco Never     Family History   Problem Relation Age of Onset    Stroke Mother     Diabetes Mother     Diabetes type II Mother     Infertility Mother     Early death Mother      "Arthritis Father     Heart disease Father         CABG    Heart disease Daughter         congenital-truncus arteriosus    Congenital heart disease Daughter     Heart disease Son         congenital-\"hole\" repair    Other Sister         meningioma    Stroke Maternal Grandfather     Hearing loss Paternal Grandfather        Meds/Allergies       Current Outpatient Medications:     atorvastatin (LIPITOR) 40 mg tablet    azelastine (ASTELIN) 0.1 % nasal spray    BIOTIN PO    Cholecalciferol (D3) 50 MCG (2000 UT) CHEW    desloratadine (CLARINEX) 5 MG tablet    enalapril (VASOTEC) 20 mg tablet    ferrous sulfate 324 (65 Fe) mg    guaiFENesin (MUCINEX) 600 mg 12 hr tablet    insulin aspart (NovoLOG) 100 Units/mL injection pen    insulin glargine (Toujeo SoloStar) 300 units/mL CONCENTRATED U-300 injection pen (1-unit dial)    Insulin Pen Needle (TRUEplus Pen Needles) 31G X 6 MM MISC    Jardiance 25 MG TABS    levothyroxine 112 mcg tablet    metFORMIN (GLUCOPHAGE) 1000 MG tablet    metoprolol succinate (TOPROL-XL) 25 mg 24 hr tablet    [facility] Natural Products (OSTEO BI-FLEX JOINT SHIELD PO)    neomycin-polymyxin-dexamethasone (MAXITROL) 0.35%-10,000 units/g-0.1%    Omega-3 Fatty Acids (FISH OIL PO)    pantoprazole (PROTONIX) 20 mg tablet    sitaGLIPtin (Januvia) 100 mg tablet    Artificial Saliva (BIOTENE MOISTURIZING MOUTH MT)    Cyanocobalamin (B-12) 1000 MCG CAPS    hydrOXYzine HCL (ATARAX) 25 mg tablet    Current Facility-Administered Medications:     lactated ringers infusion, 125 mL/hr, Intravenous, Continuous, 125 mL/hr at 05/15/24 0850    No Known Allergies    Objective     /66   Pulse 83   Temp 98.4 °F (36.9 °C) (Tympanic)   Resp 18   SpO2 96%       PHYSICAL EXAM    Gen: NAD  Head: NCAT  CV: RRR  CHEST: Clear  ABD: soft, NT/ND  EXT: no edema      ASSESSMENT/PLAN:  This is a 64 y.o. year old female here for EGD, and she is stable and optimized for her procedure.        "

## 2024-05-15 NOTE — ANESTHESIA PREPROCEDURE EVALUATION
Procedure:  EGD  EGD hx liver cirrhosis no prior varices for 1 yr follow up      DM-2 Jardiance - last took Monday     Denies the following: CP/SOB with exertion, asthma, COPD, ABDIFATAH, stroke/TIA, seizure           Relevant Problems   CARDIO   (+) Benign essential hypertension   (+) Mixed hyperlipidemia      ENDO   (+) Primary hypothyroidism   (+) Type 2 diabetes mellitus without complication, with long-term current use of insulin (HCC)      GI/HEPATIC   (+) Cirrhosis of liver (HCC)   (+) Fatty liver   (+) Gastroesophageal reflux disease without esophagitis      /RENAL   (+) Stage 3a chronic kidney disease (HCC)      HEMATOLOGY   (+) Anemia   (+) Iron deficiency anemia      NEURO/PSYCH   (+) Numbness and tingling        Physical Exam    Airway    Mallampati score: II  TM Distance: >3 FB  Neck ROM: full     Dental       Cardiovascular      Pulmonary      Other Findings  post-pubertal.      Anesthesia Plan  ASA Score- 2     Anesthesia Type- IV sedation with anesthesia with ASA Monitors.         Additional Monitors:     Airway Plan:            Plan Factors-Exercise tolerance (METS): >4 METS.    Chart reviewed.   Existing labs reviewed. Patient summary reviewed.    Patient is not a current smoker.      Obstructive sleep apnea risk education given perioperatively.        Induction-     Postoperative Plan-         Informed Consent- Anesthetic plan and risks discussed with patient.  I personally reviewed this patient with the CRNA. Discussed and agreed on the Anesthesia Plan with the CRNA..

## 2024-05-22 DIAGNOSIS — E11.65 TYPE 2 DIABETES MELLITUS WITH HYPERGLYCEMIA, WITH LONG-TERM CURRENT USE OF INSULIN (HCC): ICD-10-CM

## 2024-05-22 DIAGNOSIS — Z79.4 TYPE 2 DIABETES MELLITUS WITH HYPERGLYCEMIA, WITH LONG-TERM CURRENT USE OF INSULIN (HCC): ICD-10-CM

## 2024-05-22 RX ORDER — INSULIN ASPART 100 [IU]/ML
INJECTION, SOLUTION INTRAVENOUS; SUBCUTANEOUS
Qty: 150 ML | Refills: 1 | Status: SHIPPED | OUTPATIENT
Start: 2024-05-22

## 2024-05-28 ENCOUNTER — APPOINTMENT (OUTPATIENT)
Dept: LAB | Facility: CLINIC | Age: 64
End: 2024-05-28
Payer: COMMERCIAL

## 2024-05-28 DIAGNOSIS — K74.60 CIRRHOSIS OF LIVER WITHOUT ASCITES, UNSPECIFIED HEPATIC CIRRHOSIS TYPE (HCC): Primary | ICD-10-CM

## 2024-05-28 LAB
AFP-TM SERPL-MCNC: 1.33 NG/ML (ref 0–9)
INR PPP: 1.14 (ref 0.84–1.19)
PROTHROMBIN TIME: 14.5 SECONDS (ref 11.6–14.5)

## 2024-05-28 PROCEDURE — 85610 PROTHROMBIN TIME: CPT

## 2024-05-28 PROCEDURE — 82105 ALPHA-FETOPROTEIN SERUM: CPT

## 2024-05-28 PROCEDURE — 36415 COLL VENOUS BLD VENIPUNCTURE: CPT

## 2024-06-03 ENCOUNTER — OFFICE VISIT (OUTPATIENT)
Dept: GASTROENTEROLOGY | Facility: CLINIC | Age: 64
End: 2024-06-03
Payer: COMMERCIAL

## 2024-06-03 VITALS
DIASTOLIC BLOOD PRESSURE: 52 MMHG | HEIGHT: 66 IN | SYSTOLIC BLOOD PRESSURE: 105 MMHG | BODY MASS INDEX: 35.77 KG/M2 | HEART RATE: 82 BPM | WEIGHT: 222.6 LBS

## 2024-06-03 DIAGNOSIS — D64.9 ANEMIA, UNSPECIFIED TYPE: ICD-10-CM

## 2024-06-03 DIAGNOSIS — I85.10 SECONDARY ESOPHAGEAL VARICES WITHOUT BLEEDING (HCC): Primary | ICD-10-CM

## 2024-06-03 DIAGNOSIS — K74.60 CIRRHOSIS OF LIVER WITHOUT ASCITES, UNSPECIFIED HEPATIC CIRRHOSIS TYPE (HCC): ICD-10-CM

## 2024-06-03 DIAGNOSIS — R89.4 ABNORMAL CELIAC ANTIBODY PANEL: ICD-10-CM

## 2024-06-03 PROCEDURE — 99213 OFFICE O/P EST LOW 20 MIN: CPT | Performed by: INTERNAL MEDICINE

## 2024-06-03 NOTE — PROGRESS NOTES
[facility] - Outpatient Follow-up Note  Luis Rodriguez 64 y.o. female MRN: [id number]  Encounter: [id number]          ASSESSMENT AND PLAN:      1. Cirrhosis of liver without ascites, unspecified hepatic cirrhosis type (HCC)  MELD 3.0 10.  AFP negative.  Ultrasound with no evidence of hepatoma.  Will repeat in 6 months.  Continue low-sodium diet, alcohol avoidance continue to work toward weight loss    2. Secondary esophageal varices without bleeding (HCC)  We discussed low risk of bleeding.  Would consider initiation of a nonselective beta-blocker in place of metoprolol such as carvedilol 6.25 mg twice daily.  Will plan repeat EGD in 1 year    3. Anemia  History of iron deficiency anemia with no bleeding source identified on EGD, colonoscopy or capsule endoscopy.  Will recheck CBC and iron studies    - CBC; Future  - Iron Panel (Includes Ferritin, Iron Sat%, Iron, and TIBC); Future    4. Abnormal celiac antibody panel  Negative duodenal biopsies 2022    ______________________________________________________________________    SUBJECTIVE: Show returns in follow-up after recent EGD.  Was noted to have a single small distal esophageal varix.  Recent labs reviewed.      REVIEW OF SYSTEMS:    Review of Systems   Constitutional: Negative for fever and weight loss.   Musculoskeletal:  Negative for myalgias.   Gastrointestinal:  Positive for abdominal pain. Negative for anorexia, constipation, diarrhea, flatus, hematochezia, melena, nausea and vomiting.   Genitourinary:  Negative for dysuria, frequency and hematuria.   Neurological:  Negative for headaches.    Answers submitted by the patient for this visit:  Abdominal Pain Questionnaire (Submitted on 5/30/2024)  Chief Complaint: Abdominal pain  arthralgias: No  belching: No        Historical Information   Past Medical History:   Diagnosis Date    Anemia     Celiac disease     R/O due to genetic marker-no family hx    Colon polyp     Deviated  "nasal septum     Diabetes mellitus (HCC)     Disease of thyroid gland     hypo    Fatty liver     Hearing aid worn     right ear only    Hepatitis A     Hyperlipidemia     Hypertension     Wears glasses     on occ     Past Surgical History:   Procedure Laterality Date    ABDOMINAL SURGERY      C section     SECTION, LOW TRANSVERSE      x2    COLONOSCOPY N/A 2016    Procedure: COLONOSCOPY;  Surgeon: Good Kim MD;  Location: [facility] GI [facility];  Service:     CYST REMOVAL Left     elbow    DILATION AND CURETTAGE OF UTERUS      EGD      GANGLION CYST EXCISION Right     wrist    HAND SURGERY      IR BIOPSY LIVER RANDOM  2022    LIVER BIOPSY      RESECTION TONSIL RADICAL      TUBAL LIGATION      UPPER GASTROINTESTINAL ENDOSCOPY      VARICOSE VEIN SURGERY Bilateral 2009    x3 procedures    WISDOM TOOTH EXTRACTION       Social History   Social History     Substance and Sexual Activity   Alcohol Use Not Currently    Comment: rare     Social History     Substance and Sexual Activity   Drug Use No     Social History     Tobacco Use   Smoking Status Former    Current packs/day: 0.00    Average packs/day: 1 pack/day for 10.0 years (10.0 ttl pk-yrs)    Types: Cigarettes    Start date: 1977    Quit date: 1987    Years since quittin.4    Passive exposure: Past   Smokeless Tobacco Never     Family History   Problem Relation Age of Onset    Stroke Mother     Diabetes Mother     Diabetes type II Mother     Infertility Mother     Early death Mother     Arthritis Father     Heart disease Father         CABG    Heart disease Daughter         congenital-truncus arteriosus    Congenital heart disease Daughter     Heart disease Son         congenital-\"hole\" repair    Other Sister         meningioma    Stroke Maternal Grandfather     Hearing loss Paternal Grandfather        Meds/Allergies       Current Outpatient Medications:     Artificial Saliva (BIOTENE MOISTURIZING MOUTH MT)    atorvastatin " "(LIPITOR) 40 mg tablet    azelastine (ASTELIN) 0.1 % nasal spray    BIOTIN PO    Cholecalciferol (D3) 50 MCG (2000 UT) CHEW    Cyanocobalamin (B-12) 1000 MCG CAPS    desloratadine (CLARINEX) 5 MG tablet    enalapril (VASOTEC) 20 mg tablet    ferrous sulfate 324 (65 Fe) mg    guaiFENesin (MUCINEX) 600 mg 12 hr tablet    hydrOXYzine HCL (ATARAX) 25 mg tablet    insulin aspart (NovoLOG FlexPen) 100 UNIT/ML injection pen    insulin glargine (Toujeo SoloStar) 300 units/mL CONCENTRATED U-300 injection pen (1-unit dial)    Insulin Pen Needle (TRUEplus Pen Needles) 31G X 6 MM MISC    Jardiance 25 MG TABS    levothyroxine 112 mcg tablet    metFORMIN (GLUCOPHAGE) 1000 MG tablet    metoprolol succinate (TOPROL-XL) 25 mg 24 hr tablet    Misc Natural Products (OSTEO BI-FLEX JOINT SHIELD PO)    neomycin-polymyxin-dexamethasone (MAXITROL) 0.35%-10,000 units/g-0.1%    Omega-3 Fatty Acids (FISH OIL PO)    pantoprazole (PROTONIX) 20 mg tablet    sitaGLIPtin (Januvia) 100 mg tablet    No Known Allergies        Objective     Blood pressure 105/52, pulse 82, height 5' 5.5\" (1.664 m), weight 101 kg (222 lb 9.6 oz), not currently breastfeeding. Body mass index is 36.48 kg/m².      PHYSICAL EXAM:      Physical Exam     Lab Results:   No visits with results within 1 Day(s) from this visit.   Latest known visit with results is:   Appointment on 05/28/2024   Component Date Value    Protime 05/28/2024 14.5     INR 05/28/2024 1.14     AFP TUMOR MARKER 05/28/2024 1.33          Radiology Results:   EGD    Result Date: 5/15/2024  Narrative: Table formatting from the original result was not included. [address] [address] [phone] DATE OF SERVICE: 5/15/24 PHYSICIAN(S): Attending: Von Villarreal MD Fellow: No Staff Documented INDICATION: Antral erythema, gastric, Cirrhosis of liver without ascites, unspecified hepatic cirrhosis type (HCC) POST-OP DIAGNOSIS: See the impression below. " PREPROCEDURE: Informed consent was obtained for the procedure, including sedation.  Risks of perforation, hemorrhage, adverse drug reaction and aspiration were discussed. The patient was placed in the left lateral decubitus position. Patient was explained about the risks and benefits of the procedure. Risks including but not limited to bleeding, infection, and perforation were explained in detail. Also explained about less than 100% sensitivity with the exam and other alternatives. PROCEDURE: EGD DETAILS OF PROCEDURE: Patient was taken to the procedure room where a time out was performed to confirm correct patient and correct procedure. The patient underwent monitored anesthesia care, which was administered by an anesthesia professional. The patient's blood pressure, heart rate, level of consciousness, respirations, oxygen, ECG and ETCO2 were monitored throughout the procedure. The scope was introduced through the mouth and advanced to the second part of the duodenum. Retroflexion was performed in the fundus. The patient experienced no blood loss. The procedure was not difficult. The patient tolerated the procedure well. There were no apparent adverse events. ANESTHESIA INFORMATION: ASA: II Anesthesia Type: IV Sedation with Anesthesia MEDICATIONS: No administrations occurring from 1017 to 1034 on 05/15/24 FINDINGS: Single small varix in the lower third of the esophagus Regular Z-line 40 cm from the incisors The stomach appeared normal. No evidence of gastric varices The duodenum appeared normal. SPECIMENS: * No specimens in log *     Impression: Single small varix in the lower third of the esophagus The stomach appeared normal. The duodenum appeared normal. RECOMMENDATION: Schedule repeat EGD Repeat EGD in 1 year for variceal surveillance    Von Villarreal MD

## 2024-06-05 DIAGNOSIS — E78.2 MIXED HYPERLIPIDEMIA: ICD-10-CM

## 2024-06-05 RX ORDER — ATORVASTATIN CALCIUM 40 MG/1
40 TABLET, FILM COATED ORAL DAILY
Qty: 90 TABLET | Refills: 1 | Status: SHIPPED | OUTPATIENT
Start: 2024-06-05

## 2024-06-09 DIAGNOSIS — J30.9 ALLERGIC RHINITIS, UNSPECIFIED SEASONALITY, UNSPECIFIED TRIGGER: ICD-10-CM

## 2024-06-09 RX ORDER — AZELASTINE 1 MG/ML
SPRAY, METERED NASAL
Qty: 30 ML | Refills: 1 | Status: SHIPPED | OUTPATIENT
Start: 2024-06-09

## 2024-06-12 DIAGNOSIS — K74.60 CIRRHOSIS OF LIVER WITHOUT ASCITES, UNSPECIFIED HEPATIC CIRRHOSIS TYPE (HCC): Primary | ICD-10-CM

## 2024-06-24 ENCOUNTER — TELEPHONE (OUTPATIENT)
Age: 64
End: 2024-06-24

## 2024-06-24 NOTE — TELEPHONE ENCOUNTER
Viviana from [facility] calling again, she said she will fax again, please call her if not received. [phone].

## 2024-06-24 NOTE — TELEPHONE ENCOUNTER
[facility] calling, they are faxing a form over to the office for the patient CGM. Confirmed fax number.

## 2024-06-25 ENCOUNTER — TELEPHONE (OUTPATIENT)
Dept: ENDOCRINOLOGY | Facility: CLINIC | Age: 64
End: 2024-06-25

## 2024-06-25 NOTE — TELEPHONE ENCOUNTER
Last visit from 04/17/2024 printed also CGM supply form filled/signed  also faxed at [phone].on 06/25/2024. Thanks

## 2024-06-28 NOTE — TELEPHONE ENCOUNTER
[facility] calling again- I verified again that the fax was sent over on 6/25. No further actions needed.

## 2024-06-28 NOTE — TELEPHONE ENCOUNTER
[facility] calling back- wanted to verify that the order for the patients diabetic supplies and most recent office notes were faxed back to them. I verified the office notes were faxed back on 6/25, was the order as well? Please advise.

## 2024-07-02 ENCOUNTER — NURSE TRIAGE (OUTPATIENT)
Age: 64
End: 2024-07-02

## 2024-07-02 ENCOUNTER — OFFICE VISIT (OUTPATIENT)
Dept: FAMILY MEDICINE CLINIC | Facility: CLINIC | Age: 64
End: 2024-07-02
Payer: COMMERCIAL

## 2024-07-02 VITALS
TEMPERATURE: 99.4 F | HEART RATE: 93 BPM | DIASTOLIC BLOOD PRESSURE: 74 MMHG | BODY MASS INDEX: 36.54 KG/M2 | RESPIRATION RATE: 16 BRPM | SYSTOLIC BLOOD PRESSURE: 140 MMHG | WEIGHT: 223 LBS

## 2024-07-02 DIAGNOSIS — Z79.4 TYPE 2 DIABETES MELLITUS WITHOUT COMPLICATION, WITH LONG-TERM CURRENT USE OF INSULIN (HCC): ICD-10-CM
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
## 2024-07-02 DIAGNOSIS — E11.9 TYPE 2 DIABETES MELLITUS WITHOUT COMPLICATION, WITH LONG-TERM CURRENT USE OF INSULIN (HCC): ICD-10-CM

## 2024-07-02 DIAGNOSIS — J02.8 ACUTE PHARYNGITIS DUE TO OTHER SPECIFIED ORGANISMS: Primary | ICD-10-CM

## 2024-07-02 PROCEDURE — 99214 OFFICE O/P EST MOD 30 MIN: CPT | Performed by: FAMILY MEDICINE

## 2024-07-02 RX ORDER — AMOXICILLIN AND CLAVULANATE POTASSIUM 875; 125 MG/1; MG/1
1 TABLET, FILM COATED ORAL 2 TIMES DAILY
Qty: 20 TABLET | Refills: 0 | Status: SHIPPED | OUTPATIENT
Start: 2024-07-02 | End: 2024-07-12

## 2024-07-02 RX ORDER — METHYLPREDNISOLONE 4 MG/1
TABLET ORAL
Qty: 21 TABLET | Refills: 0 | Status: SHIPPED | OUTPATIENT
Start: 2024-07-02 | End: 2024-07-08

## 2024-07-02 NOTE — TELEPHONE ENCOUNTER
Regarding: Red Welts  ----- Message from [employer] sent at 7/2/2024 12:12 PM EDT -----  Woke up Sun with several large red welts mostly on my  right side.  Some of them appeared to have tiny blisters.  Started using Cortisone for the itching.  Developed a sore throat last night.  Are they related and do I need to make an appt? Pictures of welts attached

## 2024-07-02 NOTE — PROGRESS NOTES
Assessment/Plan:    No problem-specific Assessment & Plan notes found for this encounter.    Rash, localized, samir right upper thigh, right arm/hand  Suspect bed bugs  Inspect luggage  Steroid risks, asmir with DM, aware  States she can cover readings with insulin    Right sided pharyngitis with exudate  Gargle  Abx  F/u if nob kanu    DM2  Controlled  A1c at 6.4  Continue meds     Diagnoses and all orders for this visit:    Acute pharyngitis due to other specified organisms  -     amoxicillin-clavulanate (AUGMENTIN) 875-125 mg per tablet; Take 1 tablet by mouth 2 (two) times a day for 10 days    Bug bite, initial encounter  -     methylPREDNISolone 4 MG tablet therapy pack; Use as directed on package    Type 2 diabetes mellitus without complication, with long-term current use of insulin (HCC)        Return if symptoms worsen or fail to improve.    Subjective:      Patient ID: Luis Rodriguez is a 64 y.o. female.    Chief Complaint   Patient presents with    Rash     Sas/cma       HPI  Was a wedding [place]  Long skirt and long sleeves  3d ago  No bite recalled  Rashes next day  No one with same  Stayed in hotel night before  Different bed than daughter  Itch  Slight bubbly appearance  No fever  Clear fluid expressed at times  No new ones   No pool or swimming or hot tub    Sore throat last pm  No cough  Some headache  Tylenol taken    No animal exposures    The following portions of the patient's history were reviewed and updated as appropriate: allergies, current medications, past family history, past medical history, past social history, past surgical history and problem list.    Review of Systems   Constitutional:  Negative for fever.   Skin:  Positive for rash.         Current Outpatient Medications   Medication Sig Dispense Refill    amoxicillin-clavulanate (AUGMENTIN) 875-125 mg per tablet Take 1 tablet by mouth 2 (two) times a day for 10 days 20 tablet 0    Artificial Saliva (BIOTENE MOISTURIZING MOUTH MT) Apply to  the mouth or throat if needed      atorvastatin (LIPITOR) 40 mg tablet Take 1 tablet by mouth daily. 90 tablet 1    azelastine (ASTELIN) 0.1 % nasal spray Instill 2 sprays into each nostril twice daily as directed. 30 mL 1    BIOTIN PO Take 10,000 mcg by mouth daily at bedtime      Cholecalciferol (D3) 50 MCG (2000 UT) CHEW       Cyanocobalamin (B-12) 1000 MCG CAPS       desloratadine (CLARINEX) 5 MG tablet Take 1 tablet by mouth daily. 90 tablet 0    enalapril (VASOTEC) 20 mg tablet Take 2 tablets by mouth daily. 180 tablet 1    ferrous sulfate 324 (65 Fe) mg Take 1 tablet (324 mg total) by mouth daily before breakfast 90 tablet 1    guaiFENesin (MUCINEX) 600 mg 12 hr tablet Take 1,200 mg by mouth every 12 (twelve) hours      hydrOXYzine HCL (ATARAX) 25 mg tablet Take 1 tablet by mouth daily at bedtime as needed for itching. 90 tablet 0    insulin aspart (NovoLOG FlexPen) 100 UNIT/ML injection pen Inject 34 units under the skin daily at breakfast and lunch, then inject 42 units under the skin daily at dinner and 5 units with snack plus sliding scale. **Maximum of 110 units daily.** 150 mL 1    insulin glargine (Toujeo SoloStar) 300 units/mL CONCENTRATED U-300 injection pen (1-unit dial) Inject 60 units under the skin in the morning and inject 78 units under the skin at bedtime. (Patient taking differently: Inject 63 units under the skin in the morning and inject 80 units under the skin at bedtime.) 33 mL 2    Insulin Pen Needle (TRUEplus Pen Needles) 31G X 6 MM MISC Use 6 times a day to inject insulin 600 each 3    Jardiance 25 MG TABS Take 1 tablet by mouth every morning. 90 tablet 2    levothyroxine 112 mcg tablet Take 1 tablet by mouth every morning. 90 tablet 2    metFORMIN (GLUCOPHAGE) 1000 MG tablet Take 1 tablet by mouth twice daily with meals. 180 tablet 2    methylPREDNISolone 4 MG tablet therapy pack Use as directed on package 21 tablet 0    metoprolol succinate (TOPROL-XL) 25 mg 24 hr tablet Take 1  tablet by mouth daily. 90 tablet 1    [facility] Natural Products (OSTEO BI-FLEX JOINT SHIELD PO)       neomycin-polymyxin-dexamethasone (MAXITROL) 0.35%-10,000 units/g-0.1% as needed      Omega-3 Fatty Acids (FISH OIL PO) Take 1,400 mg by mouth every evening       pantoprazole (PROTONIX) 20 mg tablet Take 1 tablet by mouth daily. 90 tablet 1    sitaGLIPtin (Januvia) 100 mg tablet Take 1 tablet (100 mg total) by mouth daily 90 tablet 1     No current facility-administered medications for this visit.       Objective:    /74   Pulse 93   Temp 99.4 °F (37.4 °C)   Resp 16   Wt 101 kg (223 lb)   BMI 36.54 kg/m²        Physical Exam  Vitals and nursing note reviewed.   Constitutional:       General: She is not in acute distress.     Appearance: She is well-developed. She is obese. She is not ill-appearing.   HENT:      Head: Normocephalic.      Right Ear: Tympanic membrane normal.      Left Ear: Tympanic membrane normal.      Mouth/Throat:      Mouth: Mucous membranes are moist.   Eyes:      General: No scleral icterus.     Conjunctiva/sclera: Conjunctivae normal.   Cardiovascular:      Rate and Rhythm: Normal rate and regular rhythm.      Heart sounds: No murmur heard.  Pulmonary:      Effort: Pulmonary effort is normal. No respiratory distress.      Breath sounds: No wheezing.   Abdominal:      Palpations: Abdomen is soft.   Musculoskeletal:         General: No deformity.      Cervical back: Neck supple.      Right lower leg: No edema.      Left lower leg: No edema.   Skin:     General: Skin is warm and dry.      Findings: Rash present.      Comments: Papulovesicular rash in clusters right anterior thigh and right foot  None on LLE  Some on right hand  No pustules   Neurological:      Mental Status: She is alert.   Psychiatric:         Mood and Affect: Mood normal.         Behavior: Behavior normal.         Thought Content: Thought content normal.                Rick Baker DO

## 2024-07-02 NOTE — TELEPHONE ENCOUNTER
"Patient sent My chart message with pictures of blister type rash on her right leg and bilateral arms.  They are itchy and have a clear secretion.  Denies an insect contact, however was at an outdoor wedding on 6/29 and blisters were first noticed on 6/30.  Has been using cortisone without relief.  Appointment made for today.      Answer Assessment - Initial Assessment Questions  1. APPEARANCE of RASH: \"Describe the rash.\" (e.g., spots, blisters, raised areas, skin peeling, scaly)      Blisters that \"squirt\" water.    2. SIZE: \"How big are the spots?\" (e.g., tip of pen, eraser, coin; inches, centimeters)      1 or 2 Quarters and some nickel size.   3. LOCATION: \"Where is the rash located?\"      Bilateral arms, right lower and thigh area, neck  4. COLOR: \"What color is the rash?\" (Note: It is difficult to assess rash color in people with darker-colored skin. When this situation occurs, simply ask the caller to describe what they see.)      Red  5. ONSET: \"When did the rash begin?\"      6/30  6. FEVER: \"Do you have a fever?\" If Yes, ask: \"What is your temperature, how was it measured, and when did it start?\"      Denies  7. ITCHING: \"Does the rash itch?\" If Yes, ask: \"How bad is the itch?\" (Scale 1-10; or mild, moderate, severe)      Yes, using cortisone 10, 50 mg of Benadryl taken  8. CAUSE: \"What do you think is causing the rash?\"      Unknown   9. MEDICATION FACTORS: \"Have you started any new medications within the last 2 weeks?\" (e.g., antibiotics)       Denies  10. OTHER SYMPTOMS: \"Do you have any other symptoms?\" (e.g., dizziness, headache, sore throat, joint pain)        Sore throat, headache  11. PREGNANCY: \"Is there any chance you are pregnant?\" \"When was your last menstrual period?\"        N/A    Protocols used: Rash or Redness - Widespread-ADULT-[place]    "

## 2024-07-09 ENCOUNTER — TELEPHONE (OUTPATIENT)
Dept: ENDOCRINOLOGY | Facility: CLINIC | Age: 64
End: 2024-07-09

## 2024-07-18 NOTE — PROGRESS NOTES
Last visit as of 04/10/2024 sent to [facility] since they stated fax never made it to them. Thanks

## 2024-07-18 NOTE — TELEPHONE ENCOUNTER
Maricel from [facility] called in stating that they are still waiting on the last office visit note to be faxed to them.      FAX: [phone]  TEL: [phone]

## 2024-07-25 DIAGNOSIS — I10 BENIGN ESSENTIAL HYPERTENSION: ICD-10-CM

## 2024-07-25 DIAGNOSIS — J30.9 ALLERGIC RHINITIS, UNSPECIFIED SEASONALITY, UNSPECIFIED TRIGGER: ICD-10-CM

## 2024-07-25 RX ORDER — DESLORATADINE 5 MG/1
TABLET ORAL
Qty: 200 TABLET | Refills: 1 | Status: SHIPPED | OUTPATIENT
Start: 2024-07-25

## 2024-07-25 RX ORDER — ENALAPRIL MALEATE 20 MG/1
40 TABLET ORAL DAILY
Qty: 200 TABLET | Refills: 1 | Status: SHIPPED | OUTPATIENT
Start: 2024-07-25

## 2024-07-31 DIAGNOSIS — D64.9 ANEMIA, UNSPECIFIED TYPE: ICD-10-CM

## 2024-07-31 RX ORDER — PANTOPRAZOLE SODIUM 20 MG/1
20 TABLET, DELAYED RELEASE ORAL DAILY
Qty: 90 TABLET | Refills: 1 | Status: SHIPPED | OUTPATIENT
Start: 2024-07-31

## 2024-08-01 PROBLEM — J02.8 ACUTE PHARYNGITIS DUE TO OTHER SPECIFIED ORGANISMS: Status: RESOLVED | Noted: 2024-07-02 | Resolved: 2024-08-01

## 2024-08-13 DIAGNOSIS — N30.10 CHRONIC INTERSTITIAL CYSTITIS: ICD-10-CM

## 2024-08-14 RX ORDER — HYDROXYZINE HYDROCHLORIDE 25 MG/1
TABLET, FILM COATED ORAL
Qty: 90 TABLET | Refills: 1 | Status: SHIPPED | OUTPATIENT
Start: 2024-08-14

## 2024-08-20 ENCOUNTER — APPOINTMENT (OUTPATIENT)
Dept: LAB | Facility: CLINIC | Age: 64
End: 2024-08-20
Payer: COMMERCIAL

## 2024-08-20 DIAGNOSIS — E11.69 TYPE 2 DIABETES MELLITUS WITH OTHER SPECIFIED COMPLICATION, WITH LONG-TERM CURRENT USE OF INSULIN (HCC): ICD-10-CM

## 2024-08-20 DIAGNOSIS — E55.9 VITAMIN D DEFICIENCY: ICD-10-CM

## 2024-08-20 DIAGNOSIS — Z79.4 TYPE 2 DIABETES MELLITUS WITH OTHER SPECIFIED COMPLICATION, WITH LONG-TERM CURRENT USE OF INSULIN (HCC): ICD-10-CM

## 2024-08-20 DIAGNOSIS — I10 BENIGN ESSENTIAL HYPERTENSION: ICD-10-CM

## 2024-08-20 DIAGNOSIS — E03.9 PRIMARY HYPOTHYROIDISM: ICD-10-CM

## 2024-08-20 DIAGNOSIS — N18.31 STAGE 3A CHRONIC KIDNEY DISEASE (HCC): ICD-10-CM

## 2024-08-20 DIAGNOSIS — E78.2 MIXED HYPERLIPIDEMIA: ICD-10-CM

## 2024-08-20 DIAGNOSIS — D64.9 ANEMIA, UNSPECIFIED TYPE: ICD-10-CM

## 2024-08-20 DIAGNOSIS — E66.01 CLASS 2 SEVERE OBESITY WITH SERIOUS COMORBIDITY AND BODY MASS INDEX (BMI) OF 35.0 TO 35.9 IN ADULT, UNSPECIFIED OBESITY TYPE (HCC): ICD-10-CM

## 2024-08-20 DIAGNOSIS — E53.8 B12 DEFICIENCY: ICD-10-CM

## 2024-08-20 LAB
ALBUMIN SERPL BCG-MCNC: 4.2 G/DL (ref 3.5–5)
ALP SERPL-CCNC: 69 U/L (ref 34–104)
ALT SERPL W P-5'-P-CCNC: 69 U/L (ref 7–52)
ANION GAP SERPL CALCULATED.3IONS-SCNC: 10 MMOL/L (ref 4–13)
AST SERPL W P-5'-P-CCNC: 54 U/L (ref 13–39)
BILIRUB SERPL-MCNC: 0.69 MG/DL (ref 0.2–1)
BUN SERPL-MCNC: 27 MG/DL (ref 5–25)
CALCIUM SERPL-MCNC: 9.9 MG/DL (ref 8.4–10.2)
CHLORIDE SERPL-SCNC: 105 MMOL/L (ref 96–108)
CO2 SERPL-SCNC: 24 MMOL/L (ref 21–32)
CREAT SERPL-MCNC: 1.24 MG/DL (ref 0.6–1.3)
ERYTHROCYTE [DISTWIDTH] IN BLOOD BY AUTOMATED COUNT: 15.8 % (ref 11.6–15.1)
EST. AVERAGE GLUCOSE BLD GHB EST-MCNC: 148 MG/DL
FERRITIN SERPL-MCNC: 47 NG/ML (ref 11–307)
GFR SERPL CREATININE-BSD FRML MDRD: 46 ML/MIN/1.73SQ M
GLUCOSE P FAST SERPL-MCNC: 138 MG/DL (ref 65–99)
HBA1C MFR BLD: 6.8 %
HCT VFR BLD AUTO: 38.3 % (ref 34.8–46.1)
HGB BLD-MCNC: 11.9 G/DL (ref 11.5–15.4)
IRON SATN MFR SERPL: 18 % (ref 15–50)
IRON SERPL-MCNC: 67 UG/DL (ref 50–212)
MCH RBC QN AUTO: 26 PG (ref 26.8–34.3)
MCHC RBC AUTO-ENTMCNC: 31.1 G/DL (ref 31.4–37.4)
MCV RBC AUTO: 84 FL (ref 82–98)
PLATELET # BLD AUTO: 151 THOUSANDS/UL (ref 149–390)
PMV BLD AUTO: 10.8 FL (ref 8.9–12.7)
POTASSIUM SERPL-SCNC: 4.5 MMOL/L (ref 3.5–5.3)
PROT SERPL-MCNC: 7.9 G/DL (ref 6.4–8.4)
RBC # BLD AUTO: 4.57 MILLION/UL (ref 3.81–5.12)
SODIUM SERPL-SCNC: 139 MMOL/L (ref 135–147)
T4 FREE SERPL-MCNC: 1.04 NG/DL (ref 0.61–1.12)
TIBC SERPL-MCNC: 381 UG/DL (ref 250–450)
TSH SERPL DL<=0.05 MIU/L-ACNC: 2.56 UIU/ML (ref 0.45–4.5)
UIBC SERPL-MCNC: 314 UG/DL (ref 155–355)
WBC # BLD AUTO: 8.95 THOUSAND/UL (ref 4.31–10.16)

## 2024-08-20 PROCEDURE — 36415 COLL VENOUS BLD VENIPUNCTURE: CPT

## 2024-08-20 PROCEDURE — 83036 HEMOGLOBIN GLYCOSYLATED A1C: CPT

## 2024-08-20 PROCEDURE — 83550 IRON BINDING TEST: CPT

## 2024-08-20 PROCEDURE — 84443 ASSAY THYROID STIM HORMONE: CPT

## 2024-08-20 PROCEDURE — 85027 COMPLETE CBC AUTOMATED: CPT

## 2024-08-20 PROCEDURE — 84439 ASSAY OF FREE THYROXINE: CPT

## 2024-08-20 PROCEDURE — 80053 COMPREHEN METABOLIC PANEL: CPT

## 2024-08-20 PROCEDURE — 83540 ASSAY OF IRON: CPT

## 2024-08-20 PROCEDURE — 82728 ASSAY OF FERRITIN: CPT

## 2024-08-28 ENCOUNTER — HOSPITAL ENCOUNTER (OUTPATIENT)
Dept: RADIOLOGY | Facility: HOSPITAL | Age: 64
Discharge: HOME | End: 2024-08-28
Payer: COMMERCIAL

## 2024-08-28 VITALS — HEIGHT: 66 IN | BODY MASS INDEX: 35.36 KG/M2 | WEIGHT: 220 LBS

## 2024-08-28 DIAGNOSIS — Z12.31 ENCOUNTER FOR SCREENING MAMMOGRAM FOR BREAST CANCER: ICD-10-CM

## 2024-08-28 PROCEDURE — 77063 BREAST TOMOSYNTHESIS BI: CPT

## 2024-08-28 PROCEDURE — 77067 SCR MAMMO BI INCL CAD: CPT

## 2024-08-30 ENCOUNTER — OFFICE VISIT (OUTPATIENT)
Dept: ENDOCRINOLOGY | Facility: CLINIC | Age: 64
End: 2024-08-30
Payer: COMMERCIAL

## 2024-08-30 VITALS
WEIGHT: 219 LBS | OXYGEN SATURATION: 98 % | HEART RATE: 86 BPM | BODY MASS INDEX: 35.2 KG/M2 | HEIGHT: 66 IN | DIASTOLIC BLOOD PRESSURE: 63 MMHG | SYSTOLIC BLOOD PRESSURE: 130 MMHG

## 2024-08-30 DIAGNOSIS — E11.65 TYPE 2 DIABETES MELLITUS WITH HYPERGLYCEMIA, WITH LONG-TERM CURRENT USE OF INSULIN (HCC): Primary | ICD-10-CM

## 2024-08-30 DIAGNOSIS — E55.9 VITAMIN D DEFICIENCY: ICD-10-CM

## 2024-08-30 DIAGNOSIS — Z79.4 TYPE 2 DIABETES MELLITUS WITH OTHER SPECIFIED COMPLICATION, WITH LONG-TERM CURRENT USE OF INSULIN (HCC): ICD-10-CM

## 2024-08-30 DIAGNOSIS — N18.31 STAGE 3A CHRONIC KIDNEY DISEASE (HCC): ICD-10-CM

## 2024-08-30 DIAGNOSIS — E11.69 TYPE 2 DIABETES MELLITUS WITH OTHER SPECIFIED COMPLICATION, WITH LONG-TERM CURRENT USE OF INSULIN (HCC): ICD-10-CM

## 2024-08-30 DIAGNOSIS — Z79.4 TYPE 2 DIABETES MELLITUS WITH HYPERGLYCEMIA, WITH LONG-TERM CURRENT USE OF INSULIN (HCC): Primary | ICD-10-CM

## 2024-08-30 DIAGNOSIS — I10 BENIGN ESSENTIAL HYPERTENSION: ICD-10-CM

## 2024-08-30 DIAGNOSIS — E66.01 CLASS 2 SEVERE OBESITY WITH SERIOUS COMORBIDITY AND BODY MASS INDEX (BMI) OF 35.0 TO 35.9 IN ADULT, UNSPECIFIED OBESITY TYPE (HCC): ICD-10-CM

## 2024-08-30 DIAGNOSIS — E78.2 MIXED HYPERLIPIDEMIA: ICD-10-CM

## 2024-08-30 PROCEDURE — 95251 CONT GLUC MNTR ANALYSIS I&R: CPT | Performed by: INTERNAL MEDICINE

## 2024-08-30 PROCEDURE — 99214 OFFICE O/P EST MOD 30 MIN: CPT | Performed by: INTERNAL MEDICINE

## 2024-08-30 NOTE — PATIENT INSTRUCTIONS
Continue current medications  Plan for review of blood sugars in 2 to 3 weeks, please call us so we can download your Dexcom CGM  Follow-up in 3 months with repeat labs  You will be due for a follow-up eye exam around October

## 2024-08-30 NOTE — PROGRESS NOTES
Luis Rodriguez 64 y.o. female MRN: [id number]    Encounter: [id number]      Assessment & Plan     Type 2 diabetes mellitus long-term insulin therapy  CKD stage III  Obesity, BMI 35  Last A1c 6.8%, trended up however still at goal of less than 7%.  Blood sugars have been improving over the last 2 days, patient eating better    Recommend the following at this time  Continue current medications  Plan for review of blood sugars in 2 to 3 weeks  Follow-up in 3 months with repeat labs  Will be due for a follow-up eye exam around October 4. Hyperlipidemia  - continue statin therapy    5. Hypertension  Blood pressure at goal   - continue medication including ACE-I/ ARB    6 hypothyroidism- Clinically and biochemically euthyroid.     CC: Diabetes    History of Present Illness     HPI:  Luis Rodriguez is a 64 y.o. female presents for a follow-up visit regarding diabetes management.     Last seen in 4/2024  Last Eye exam: 10/2023     Current regimen:   Toujeo 63, 80 units twice a day  NovoLog 34, 28-30 units, 40 units with dinner, 6 units with snack  Januvia 100 mg orally daily  Metformin 1000 Mg twice a day  Jardiance 25 mg daily    Switched to using Dexcom G7 which she likes   July was not feeling well- sore throat ans then cold, congestion,  lasted a few weeks, ws on antibiotics.  Was also on a steroid for a few days following bug bites   August noticed higher BG, feels she was eating more as she was recovering, now better overall   Still has sone SOB on exertion, limited exercise   Last few weeks - noticed a rash on the abdomen which feels better now     Levothyroxine 112 mcg orally daily   No  symotms of hypo or hyperthyroidism     Statin: atorvastatin   ACE-I/ARB:  enalapril     Home glucose monitoring:  CGM dexcom     All other systems were reviewed and are negative.    Review of Systems      Historical Information   Past Medical History:   Diagnosis Date    Anemia     Celiac disease     R/O due to genetic  "marker-no family hx    Colon polyp     Deviated nasal septum     Diabetes mellitus (HCC)     Disease of thyroid gland     hypo    Fatty liver     Hearing aid worn     right ear only    Hepatitis A     Hyperlipidemia     Hypertension     Wears glasses     on occ     Past Surgical History:   Procedure Laterality Date    ABDOMINAL SURGERY      C section     SECTION, LOW TRANSVERSE      x2    COLONOSCOPY N/A 2016    Procedure: COLONOSCOPY;  Surgeon: Good Kim MD;  Location: [facility];  Service:     CYST REMOVAL Left     elbow    DILATION AND CURETTAGE OF UTERUS      EGD      GANGLION CYST EXCISION Right     wrist    HAND SURGERY      IR BIOPSY LIVER RANDOM  2022    LIVER BIOPSY      RESECTION TONSIL RADICAL      TUBAL LIGATION      UPPER GASTROINTESTINAL ENDOSCOPY      VARICOSE VEIN SURGERY Bilateral 2009    x3 procedures    WISDOM TOOTH EXTRACTION       Social History   Social History     Substance and Sexual Activity   Alcohol Use Not Currently    Comment: rare     Social History     Substance and Sexual Activity   Drug Use No     Social History     Tobacco Use   Smoking Status Former    Current packs/day: 0.00    Average packs/day: 1 pack/day for 10.0 years (10.0 ttl pk-yrs)    Types: Cigarettes    Start date: 1977    Quit date: 1987    Years since quittin.6    Passive exposure: Past   Smokeless Tobacco Never     Family History:   Family History   Problem Relation Age of Onset    Stroke Mother     Diabetes Mother     Diabetes type II Mother     Infertility Mother     Early death Mother     Arthritis Father     Heart disease Father         CABG    Heart disease Daughter         congenital-truncus arteriosus    Congenital heart disease Daughter     Heart disease Son         congenital-\"hole\" repair    Other Sister         meningioma    Stroke Maternal Grandfather     Hearing loss Paternal Grandfather        Meds/Allergies   Current Outpatient Medications   Medication Sig " Dispense Refill    Artificial Saliva (BIOTENE MOISTURIZING MOUTH MT) Apply to the mouth or throat if needed      atorvastatin (LIPITOR) 40 mg tablet Take 1 tablet by mouth daily. 90 tablet 1    azelastine (ASTELIN) 0.1 % nasal spray Instill 2 sprays into each nostril twice daily as directed. 30 mL 1    BIOTIN PO Take 10,000 mcg by mouth daily at bedtime      Cholecalciferol (D3) 50 MCG (2000 UT) CHEW       Cyanocobalamin (B-12) 1000 MCG CAPS       desloratadine (CLARINEX) 5 MG tablet Take 1 tablet by mouth daily. 200 tablet 1    enalapril (VASOTEC) 20 mg tablet Take 2 tablets by mouth daily. 200 tablet 1    ferrous sulfate 324 (65 Fe) mg Take 1 tablet (324 mg total) by mouth daily before breakfast 90 tablet 1    guaiFENesin (MUCINEX) 600 mg 12 hr tablet Take 1,200 mg by mouth every 12 (twelve) hours      hydrOXYzine HCL (ATARAX) 25 mg tablet Take 1 tablet by mouth daily at bedtime as needed for itching. 90 tablet 1    insulin aspart (NovoLOG FlexPen) 100 UNIT/ML injection pen Inject 34 units under the skin daily at breakfast and lunch, then inject 42 units under the skin daily at dinner and 5 units with snack plus sliding scale. **Maximum of 110 units daily.** 150 mL 1    Jardiance 25 MG TABS Take 1 tablet by mouth every morning. 90 tablet 2    metFORMIN (GLUCOPHAGE) 1000 MG tablet Take 1 tablet by mouth twice daily with meals. 180 tablet 2    metoprolol succinate (TOPROL-XL) 25 mg 24 hr tablet Take 1 tablet by mouth daily. 90 tablet 1    Misc Natural Products (OSTEO BI-FLEX JOINT SHIELD PO)       neomycin-polymyxin-dexamethasone (MAXITROL) 0.35%-10,000 units/g-0.1% as needed      Omega-3 Fatty Acids (FISH OIL PO) Take 1,400 mg by mouth every evening       pantoprazole (PROTONIX) 20 mg tablet Take 1 tablet (20 mg total) by mouth daily 90 tablet 1    sitaGLIPtin (Januvia) 100 mg tablet Take 1 tablet (100 mg total) by mouth daily 90 tablet 1    insulin glargine (Toujeo SoloStar) 300 units/mL CONCENTRATED U-300  "injection pen (1-unit dial) Inject 60 units under the skin in the morning and inject 78 units under the skin at bedtime. (Patient taking differently: Inject 63 units under the skin in the morning and inject 80 units under the skin at bedtime.) 33 mL 2    Insulin Pen Needle (TRUEplus Pen Needles) 31G X 6 MM MISC Use 6 times a day to inject insulin 600 each 3    levothyroxine 112 mcg tablet Take 1 tablet by mouth every morning. 90 tablet 2     No current facility-administered medications for this visit.     No Known Allergies    Objective   Vitals: Blood pressure 130/63, pulse 86, height 5' 5.5\" (1.664 m), weight 99.3 kg (219 lb), SpO2 98%, not currently breastfeeding.    Physical Exam  Constitutional:       Appearance: She is well-developed.   HENT:      Head: Normocephalic and atraumatic.   Eyes:      Conjunctiva/sclera: Conjunctivae normal.   Neck:      Thyroid: No thyromegaly.   Cardiovascular:      Rate and Rhythm: Normal rate and regular rhythm.      Pulses: no weak pulses.           Dorsalis pedis pulses are 2+ on the right side and 2+ on the left side.      Heart sounds: Normal heart sounds. No murmur heard.  Pulmonary:      Effort: Pulmonary effort is normal.      Breath sounds: Normal breath sounds. No wheezing.   Abdominal:      General: There is no distension.      Palpations: Abdomen is soft.      Tenderness: There is no abdominal tenderness.   Musculoskeletal:         General: Normal range of motion.      Cervical back: Normal range of motion and neck supple.   Feet:      Right foot:      Skin integrity: Dry skin present. No ulcer, skin breakdown, erythema, warmth or callus.      Left foot:      Skin integrity: Dry skin present. No ulcer, skin breakdown, erythema, warmth or callus.   Skin:     General: Skin is warm and dry.   Neurological:      Mental Status: She is alert and oriented to person, place, and time.   Psychiatric:         Behavior: Behavior normal.       Diabetic Foot Exam    Patient's shoes " and socks removed.    Right Foot/Ankle   Right Foot Inspection  Skin Exam: skin normal, skin intact and dry skin. No warmth, no callus, no erythema, no maceration, no abnormal color, no pre-ulcer, no ulcer and no callus.     Sensory   Vibration: intact  Monofilament testing: intact    Vascular  Capillary refills: < 3 seconds  The right DP pulse is 2+.     Left Foot/Ankle  Left Foot Inspection  Skin Exam: skin normal, skin intact and dry skin. No warmth, no erythema, no maceration, normal color, no pre-ulcer, no ulcer and no callus.     Sensory   Vibration: intact  Monofilament testing: intact    Vascular  Capillary refills: < 3 seconds  The left DP pulse is 2+.     Assign Risk Category  No deformity present  No loss of protective sensation  No weak pulses  Risk: 0    The history was obtained from the review of the chart, patient.    Lab Results:   Lab Results   Component Value Date/Time    Hemoglobin A1C 6.8 (H) 08/20/2024 10:25 AM    Hemoglobin A1C 6.4 (H) 04/01/2024 10:42 AM    Hemoglobin A1C 6.5 (H) 12/12/2023 10:12 AM    WBC 8.95 08/20/2024 10:25 AM    Hemoglobin 11.9 08/20/2024 10:25 AM    Hematocrit 38.3 08/20/2024 10:25 AM    MCV 84 08/20/2024 10:25 AM    Platelets 151 08/20/2024 10:25 AM    BUN 27 (H) 08/20/2024 10:25 AM    BUN 33 (H) 04/01/2024 10:42 AM    BUN 21 12/12/2023 10:12 AM    Potassium 4.5 08/20/2024 10:25 AM    Potassium 4.9 04/01/2024 10:42 AM    Potassium 4.4 12/12/2023 10:12 AM    Chloride 105 08/20/2024 10:25 AM    Chloride 103 04/01/2024 10:42 AM    Chloride 103 12/12/2023 10:12 AM    CO2 24 08/20/2024 10:25 AM    CO2 24 04/01/2024 10:42 AM    CO2 26 12/12/2023 10:12 AM    Creatinine 1.24 08/20/2024 10:25 AM    Creatinine 1.18 04/01/2024 10:42 AM    Creatinine 1.05 12/12/2023 10:12 AM    AST 54 (H) 08/20/2024 10:25 AM    AST 45 (H) 04/01/2024 10:42 AM    AST 40 (H) 12/12/2023 10:12 AM    ALT 69 (H) 08/20/2024 10:25 AM    ALT 59 (H) 04/01/2024 10:42 AM    ALT 43 12/12/2023 10:12 AM    Total  "Protein 7.9 08/20/2024 10:25 AM    Total Protein 7.9 04/01/2024 10:42 AM    Total Protein 7.6 12/12/2023 10:12 AM    Albumin 4.2 08/20/2024 10:25 AM    Albumin 4.3 04/01/2024 10:42 AM    Albumin 4.3 12/12/2023 10:12 AM    HDL, Direct 35 (L) 04/01/2024 10:42 AM    HDL, Direct 34 (L) 12/12/2023 10:12 AM    Triglycerides 243 (H) 04/01/2024 10:42 AM    Triglycerides 249 (H) 12/12/2023 10:12 AM         Imaging Studies: I have personally reviewed pertinent reports.      Portions of the record may have been created with voice recognition software. Occasional wrong word or \"sound a like\" substitutions may have occurred due to the inherent limitations of voice recognition software. Read the chart carefully and recognize, using context, where substitutions have occurred.     "

## 2024-09-03 ENCOUNTER — TELEPHONE (OUTPATIENT)
Dept: FAMILY MEDICINE CLINIC | Facility: CLINIC | Age: 64
End: 2024-09-03

## 2024-09-03 ENCOUNTER — TELEPHONE (OUTPATIENT)
Dept: RADIOLOGY | Facility: HOSPITAL | Age: 64
End: 2024-09-03

## 2024-09-03 NOTE — TELEPHONE ENCOUNTER
9/3/2024 12:55 PM Called Luis regarding her abnormal mammogram    LEFT  1) MASS [A]: There is a 0.5 cm round mass with circumscribed margins seen in the lower outer quadrant of the left breast at 5 o'clock on the CC view.      Discussed her results.  She agreed to get the follow-up testing done    Jaz Lee DO

## 2024-09-04 ENCOUNTER — TELEPHONE (OUTPATIENT)
Dept: RADIOLOGY | Facility: HOSPITAL | Age: 64
End: 2024-09-04

## 2024-09-10 DIAGNOSIS — E03.9 PRIMARY HYPOTHYROIDISM: ICD-10-CM

## 2024-09-10 DIAGNOSIS — I10 BENIGN ESSENTIAL HYPERTENSION: ICD-10-CM

## 2024-09-10 DIAGNOSIS — Z79.4 TYPE 2 DIABETES MELLITUS WITH HYPERGLYCEMIA, WITH LONG-TERM CURRENT USE OF INSULIN (HCC): ICD-10-CM

## 2024-09-10 DIAGNOSIS — E78.2 MIXED HYPERLIPIDEMIA: ICD-10-CM

## 2024-09-10 DIAGNOSIS — E11.65 TYPE 2 DIABETES MELLITUS WITH HYPERGLYCEMIA, WITH LONG-TERM CURRENT USE OF INSULIN (HCC): ICD-10-CM

## 2024-09-10 RX ORDER — INSULIN GLARGINE 300 U/ML
INJECTION, SOLUTION SUBCUTANEOUS
Qty: 33 ML | Refills: 1 | Status: SHIPPED | OUTPATIENT
Start: 2024-09-10

## 2024-09-11 ENCOUNTER — TELEPHONE (OUTPATIENT)
Dept: FAMILY MEDICINE CLINIC | Facility: CLINIC | Age: 64
End: 2024-09-11

## 2024-09-11 ENCOUNTER — HOSPITAL ENCOUNTER (OUTPATIENT)
Dept: RADIOLOGY | Facility: HOSPITAL | Age: 64
Discharge: HOME | End: 2024-09-11
Payer: COMMERCIAL

## 2024-09-11 DIAGNOSIS — R92.8 ABNORMALITY OF LEFT BREAST ON SCREENING MAMMOGRAM: ICD-10-CM

## 2024-09-11 PROCEDURE — 76642 ULTRASOUND BREAST LIMITED: CPT

## 2024-09-11 NOTE — TELEPHONE ENCOUNTER
9/11/2024 12:34 PM Called Luis regarding her abnormal breast study and need for biopsy.  I see the biopsy is already scheduled.    I'd like to know if she has any further questions.  Left message on her voicemail to call the office.   Jaz Lee, DO

## 2024-09-23 ENCOUNTER — HOSPITAL ENCOUNTER (OUTPATIENT)
Dept: RADIOLOGY | Facility: HOSPITAL | Age: 64
Discharge: HOME | End: 2024-09-23

## 2024-09-23 ENCOUNTER — HOSPITAL ENCOUNTER (OUTPATIENT)
Dept: RADIOLOGY | Facility: HOSPITAL | Age: 64
Discharge: HOME | End: 2024-09-23
Payer: COMMERCIAL

## 2024-09-23 VITALS — DIASTOLIC BLOOD PRESSURE: 70 MMHG | SYSTOLIC BLOOD PRESSURE: 128 MMHG

## 2024-09-23 DIAGNOSIS — R92.8 ABNORMAL MAMMOGRAM OF LEFT BREAST: ICD-10-CM

## 2024-09-23 PROCEDURE — 19083 BX BREAST 1ST LESION US IMAG: CPT

## 2024-09-23 PROCEDURE — 88341 IMHCHEM/IMCYTCHM EA ADD ANTB: CPT | Performed by: PATHOLOGY

## 2024-09-23 PROCEDURE — A4648 IMPLANTABLE TISSUE MARKER: HCPCS

## 2024-09-23 PROCEDURE — 88342 IMHCHEM/IMCYTCHM 1ST ANTB: CPT | Performed by: PATHOLOGY

## 2024-09-23 PROCEDURE — 88305 TISSUE EXAM BY PATHOLOGIST: CPT | Performed by: PATHOLOGY

## 2024-09-23 RX ORDER — LIDOCAINE HYDROCHLORIDE 10 MG/ML
5 INJECTION, SOLUTION EPIDURAL; INFILTRATION; INTRACAUDAL; PERINEURAL ONCE
Status: COMPLETED | OUTPATIENT
Start: 2024-09-23 | End: 2024-09-23

## 2024-09-23 RX ADMIN — LIDOCAINE HYDROCHLORIDE 5 ML: 10 INJECTION, SOLUTION EPIDURAL; INFILTRATION; INTRACAUDAL; PERINEURAL at 08:20

## 2024-09-23 NOTE — PROGRESS NOTES
Ice pack given:    ___x__yes _____no    Discharge instructions reviewed & given to patient:    __x___yes _____no    Discharged via:    ___x__ambulatory    _____wheelchair    _____stretcher    Stable on discharge:    ___x__yes ____no  
Patient arrived via:    ___X__ambulatory    _____wheelchair    _____stretcher      Domestic violence screen    ___X___negative______positive    Breast Implants:    _______yes ____X____no  
Patient arrived via:    ___X__ambulatory    _____wheelchair    _____stretcher      Domestic violence screen    ___X___negative______positive    Breast Implants:    _______yes ____X____no  
Procedure type:    __x___ultrasound guided _____stereotactic    Breast:    ___x__Left _____Right    Location: 4:00 5 cm     Needle: 12G     # of passes:3    Clip: Heart    Performed by: Dr Banuelos    Pressure held for 5 minutes by: Leatha Maldonado Strips:    ___x__yes _____no    Band aid:    _____yes___x__no    Tape and guaze:    __x___yes _____no    Tolerated procedure:    ___x__yes _____no  
none

## 2024-09-23 NOTE — DISCHARGE INSTR - OTHER ORDERS
POST LARGE CORE BREAST BIOPSY PROCEDURE: PATIENT INFORMATION      Place an ice pack inside your bra over the top of the gauze dressing every hour for 20 minutes (20 minutes on, 60 minutes off). Do this until bedtime. The ice pack should be used whether pain is or is not present, as it significantly reduces the chance for bruising, bleeding, or hematoma development at home after your procedure. Please use as instructed.    Do not shower or bathe until the following morning Tuesday 09/24/2024 @ 9am.    You may shower/bathe your breast carefully with the steri-strips in place.  Be careful not to loosen them.  The steri-strips should remain in place 3-5 days to allow adequate time for your body to heal the breast biopsy incision site. If steri-strips have not fallen off on their own by Friday evening 09/27/2024, it would be appropriate to remove them.    You may have mild discomfort, and you may have some bruising where the needle entered the skin. Following a breast biopsy, it can be very common to have bruising around the biopsy site & in some cases the underside of the breast due to positioning during the procedure. This should clear within 1-2 weeks time post-procedure.    If you need medicine for discomfort, take acetaminophen products such as Tylenol. You may also take Advil or Motrin products. Avoid using any aspirin based products (avoid aleve, avoid naproxen), as these medications can increase the risk for bleeding/ hematoma development at breast biopsy site.    Do not participate in strenuous activities such as-tennis, aerobics, skiing, weight lifting > 10 lbs, etc. for 24 hours. Refrain from swimming/soaking until biopsy incision is fully healed to reduce any risk for infection.    Wearing a bra for sleeping may be more comfortable for the first 24-48 hours.    Watch for continued bleeding, pain or fever over 101. If any of these symptoms occur, please contact our breast nurse navigator at the location where  your biopsy was performed.    During normal business hours (7:30 am-4:00 pm) please call the nurse navigator at the site where your   procedure was performed:    [facility] [place] [place]:  [phone], [phone] or [phone]  [facility]:  Shadia Bourgeois Radiology RN P# [phone]         or      Leatha NOGUEIRA P# [phone]              After 4 PM - please call your physician or go to the nearest Emergency Department location.          9.         The final results of your biopsy are usually available within one week.

## 2024-09-24 ENCOUNTER — TELEPHONE (OUTPATIENT)
Dept: RADIOLOGY | Facility: HOSPITAL | Age: 64
End: 2024-09-24

## 2024-09-24 NOTE — PROGRESS NOTES
Post-procedure call completed: Tuesday 09/24/2024    Bleeding: _____ yes __x__ no    Pain: __x___ yes ______ no    *Patient reports mild pain/ breast discomfort. She reports using the ice pack Monday afternoon & evening as instructed to help with pain and risk reduction of post-procedural bruising/bleeding/ or hematoma development. She did require Tylenol for additional pain relief x1 dose yesterday evening, but had no additional needs for OTC medication so far today. She is aware she may continue to use OTC pain control medications over the remainder of the week as her breast is continuing to heal post-procedure (tylenol, motrin, ibuprofen, advil all ok to use; avoid aspirin based products due to bleeding risk).    Redness/Swelling: ______ yes ___x___ no    Band aid removed: __x___ yes _____ no    *Patient is aware gauze and tape can be removed today. She is aware she may shower with steri-strips in place as they are water-resistant. Avoid scrubbing directly near the incision site, so that she may avoid loosening steri-strip bandages. She is aware to avoid soaking to the breast, swimming, or tub baths until biopsy incision is fully healed to reduce the risk for infection.    Steri-Strips intact: ___x___ yes _____ no    *Patient instructed to leave steri-strips in place until Friday 09/27/2024. If steri-strips do not fall off on their own at that time, it would be appropriate to remove.    Patient with no additional questions at this time. I relayed to her that either myself or Dr Banuelos will call when her breast biopsy pathology results are available. Patient does have my name & office phone number if she has any questions or concerns in the interim of time until her pathology results are finalized.

## 2024-09-25 ENCOUNTER — TELEPHONE (OUTPATIENT)
Dept: RADIOLOGY | Facility: HOSPITAL | Age: 64
End: 2024-09-25

## 2024-09-25 PROCEDURE — 88342 IMHCHEM/IMCYTCHM 1ST ANTB: CPT | Performed by: PATHOLOGY

## 2024-09-25 PROCEDURE — 88305 TISSUE EXAM BY PATHOLOGIST: CPT | Performed by: PATHOLOGY

## 2024-09-25 PROCEDURE — 88341 IMHCHEM/IMCYTCHM EA ADD ANTB: CPT | Performed by: PATHOLOGY

## 2024-10-09 DIAGNOSIS — E11.69 TYPE 2 DIABETES MELLITUS WITH OTHER SPECIFIED COMPLICATION, WITH LONG-TERM CURRENT USE OF INSULIN (HCC): ICD-10-CM

## 2024-10-09 DIAGNOSIS — I10 BENIGN ESSENTIAL HYPERTENSION: ICD-10-CM

## 2024-10-09 DIAGNOSIS — Z79.4 TYPE 2 DIABETES MELLITUS WITH OTHER SPECIFIED COMPLICATION, WITH LONG-TERM CURRENT USE OF INSULIN (HCC): ICD-10-CM

## 2024-10-10 RX ORDER — METOPROLOL SUCCINATE 25 MG/1
TABLET, EXTENDED RELEASE ORAL
Qty: 90 TABLET | Refills: 1 | Status: SHIPPED | OUTPATIENT
Start: 2024-10-10

## 2024-10-10 RX ORDER — PEN NEEDLE, DIABETIC 31 G X1/4"
NEEDLE, DISPOSABLE MISCELLANEOUS
Qty: 600 EACH | Refills: 1 | Status: SHIPPED | OUTPATIENT
Start: 2024-10-10

## 2024-10-14 ENCOUNTER — RA CDI HCC (OUTPATIENT)
Dept: OTHER | Facility: HOSPITAL | Age: 64
End: 2024-10-14

## 2024-10-14 PROBLEM — E11.22 TYPE 2 DIABETES MELLITUS WITH DIABETIC CHRONIC KIDNEY DISEASE (HCC): Status: ACTIVE | Noted: 2024-10-14

## 2024-10-14 NOTE — PROGRESS NOTES
HCC coding opportunities          Chart Reviewed number of suggestions sent to Provider: 3     Patients Insurance        Commercial Insurance: [employer]     E11.36  E11.22  E66.01

## 2024-10-16 ENCOUNTER — TELEPHONE (OUTPATIENT)
Dept: ENDOCRINOLOGY | Facility: CLINIC | Age: 64
End: 2024-10-16

## 2024-10-17 LAB
LEFT EYE DIABETIC RETINOPATHY: NORMAL
RIGHT EYE DIABETIC RETINOPATHY: NORMAL

## 2024-10-21 ENCOUNTER — OFFICE VISIT (OUTPATIENT)
Dept: FAMILY MEDICINE CLINIC | Facility: CLINIC | Age: 64
End: 2024-10-21
Payer: COMMERCIAL

## 2024-10-21 VITALS
HEART RATE: 84 BPM | RESPIRATION RATE: 18 BRPM | TEMPERATURE: 97.6 F | WEIGHT: 221.4 LBS | SYSTOLIC BLOOD PRESSURE: 128 MMHG | BODY MASS INDEX: 36.89 KG/M2 | DIASTOLIC BLOOD PRESSURE: 84 MMHG | HEIGHT: 65 IN

## 2024-10-21 DIAGNOSIS — Z12.4 CERVICAL CANCER SCREENING: ICD-10-CM

## 2024-10-21 DIAGNOSIS — Z23 NEEDS FLU SHOT: ICD-10-CM

## 2024-10-21 DIAGNOSIS — Z23 NEED FOR VACCINATION: ICD-10-CM

## 2024-10-21 DIAGNOSIS — Z00.00 ANNUAL PHYSICAL EXAM: Primary | ICD-10-CM

## 2024-10-21 PROCEDURE — G0145 SCR C/V CYTO,THINLAYER,RESCR: HCPCS | Performed by: FAMILY MEDICINE

## 2024-10-21 PROCEDURE — G0476 HPV COMBO ASSAY CA SCREEN: HCPCS | Performed by: FAMILY MEDICINE

## 2024-10-21 PROCEDURE — 90673 RIV3 VACCINE NO PRESERV IM: CPT

## 2024-10-21 PROCEDURE — 99396 PREV VISIT EST AGE 40-64: CPT | Performed by: FAMILY MEDICINE

## 2024-10-21 PROCEDURE — 90480 ADMN SARSCOV2 VAC 1/ONLY CMP: CPT

## 2024-10-21 PROCEDURE — 90471 IMMUNIZATION ADMIN: CPT

## 2024-10-21 PROCEDURE — 91320 SARSCV2 VAC 30MCG TRS-SUC IM: CPT

## 2024-10-21 NOTE — PROGRESS NOTES
"Adult Annual Physical  Name: Luis Rodriguez      : 1960      MRN: [id number]  Encounter Provider: Jaz Lee DO  Encounter Date: 10/21/2024   Encounter department: [facility]    Assessment & Plan  Annual physical exam         Cervical cancer screening    Orders:    Liquid-based pap, screening    Needs flu shot    Orders:    influenza vaccine, recombinant, PF, 0.5 mL IM (Flublok)    Need for vaccination    Orders:    COVID-19 Pfizer mRNA vaccine 12 yr and older (Comirnaty pre-filled syringe)      Immunizations and preventive care screenings were discussed with patient today. Appropriate education was printed on patient's after visit summary.    Counseling:  Dental Health: discussed importance of regular tooth brushing, flossing, and dental visits.  Exercise: the importance of regular exercise/physical activity was discussed. Recommend exercise 3-5 times per week for at least 30 minutes.      Return in about 6 months (around 2025) for Next scheduled follow up.    History of Present Illness     Adult Annual Physical:  Patient presents for annual physical.     Diet and Physical Activity:  - Diet/Nutrition: diabetic diet.  - Exercise: no formal exercise.    Depression Screening:  - PHQ-2 Score: 0    General Health:  - Sleep: 7-8 hours of sleep on average.  - Hearing: requires use of hearing aids.  - Vision: wears glasses.  - Dental: regular dental visits.    /GYN Health:  - Follows with GYN: no.   - Menopause: postmenopausal.     Advanced Care Planning:  - Has an advanced directive?: no    - ACP document given to patient?: yes      Review of Systems   Constitutional: Negative.    Respiratory: Negative.     Cardiovascular: Negative.          Objective     /84   Pulse 84   Temp 97.6 °F (36.4 °C)   Resp 18   Ht 5' 5.25\" (1.657 m)   Wt 100 kg (221 lb 6.4 oz)   BMI 36.56 kg/m²     Physical Exam  Vitals and nursing note reviewed. Exam conducted with a chaperone present. "   Constitutional:       Appearance: She is well-developed.   HENT:      Head: Normocephalic and atraumatic.      Right Ear: External ear normal.      Left Ear: External ear normal.      Nose: Nose normal.   Cardiovascular:      Rate and Rhythm: Normal rate and regular rhythm.      Heart sounds: Murmur heard.      No friction rub.   Pulmonary:      Effort: No respiratory distress.      Breath sounds: Normal breath sounds. No wheezing or rales.   Chest:   Breasts:     Right: Normal. No mass, skin change or tenderness.      Left: Normal. No mass, skin change or tenderness.   Abdominal:      Palpations: Abdomen is soft.      Tenderness: There is no abdominal tenderness.   Genitourinary:     Vagina: Normal.      Cervix: Normal.      Uterus: Normal.       Adnexa: Right adnexa normal and left adnexa normal.   Musculoskeletal:      Right lower leg: No edema.      Left lower leg: No edema.   Neurological:      Mental Status: She is oriented to person, place, and time.      Cranial Nerves: No cranial nerve deficit.        Vision Screening    Right eye Left eye Both eyes   Without correction      With correction 20/13 20/20 20/15

## 2024-10-21 NOTE — PATIENT INSTRUCTIONS
"Patient Education     Routine physical for adults   The Basics   Written by the doctors and editors at [facility]   What is a physical? -- A physical is a routine visit, or \"check-up,\" with your doctor. You might also hear it called a \"wellness visit\" or \"preventive visit.\"  During each visit, the doctor will:   Ask about your physical and mental health   Ask about your habits, behaviors, and lifestyle   Do an exam   Give you vaccines if needed   Talk to you about any medicines you take   Give advice about your health   Answer your questions  Getting regular check-ups is an important part of taking care of your health. It can help your doctor find and treat any problems you have. But it's also important for preventing health problems.  A routine physical is different from a \"sick visit.\" A sick visit is when you see a doctor because of a health concern or problem. Since physicals are scheduled ahead of time, you can think about what you want to ask the doctor.  How often should I get a physical? -- It depends on your age and health. In general, for people age 21 years and older:   If you are younger than 50 years, you might be able to get a physical every 3 years.   If you are 50 years or older, your doctor might recommend a physical every year.  If you have an ongoing health condition, like diabetes or high blood pressure, your doctor will probably want to see you more often.  What happens during a physical? -- In general, each visit will include:   Physical exam - The doctor or nurse will check your height, weight, heart rate, and blood pressure. They will also look at your eyes and ears. They will ask about how you are feeling and whether you have any symptoms that bother you.   Medicines - It's a good idea to bring a list of all the medicines you take to each doctor visit. Your doctor will talk to you about your medicines and answer any questions. Tell them if you are having any side effects that bother you. You " "should also tell them if you are having trouble paying for any of your medicines.   Habits and behaviors - This includes:   Your diet   Your exercise habits   Whether you smoke, drink alcohol, or use drugs   Whether you are sexually active   Whether you feel safe at home  Your doctor will talk to you about things you can do to improve your health and lower your risk of health problems. They will also offer help and support. For example, if you want to quit smoking, they can give you advice and might prescribe medicines. If you want to improve your diet or get more physical activity, they can help you with this, too.   Lab tests, if needed - The tests you get will depend on your age and situation. For example, your doctor might want to check your:   Cholesterol   Blood sugar   Iron level   Vaccines - The recommended vaccines will depend on your age, health, and what vaccines you already had. Vaccines are very important because they can prevent certain serious or deadly infections.   Discussion of screening - \"Screening\" means checking for diseases or other health problems before they cause symptoms. Your doctor can recommend screening based on your age, risk, and preferences. This might include tests to check for:   Cancer, such as breast, prostate, cervical, ovarian, colorectal, prostate, lung, or skin cancer   Sexually transmitted infections, such as chlamydia and gonorrhea   Mental health conditions like depression and anxiety  Your doctor will talk to you about the different types of screening tests. They can help you decide which screenings to have. They can also explain what the results might mean.   Answering questions - The physical is a good time to ask the doctor or nurse questions about your health. If needed, they can refer you to other doctors or specialists, too.  Adults older than 65 years often need other care, too. As you get older, your doctor will talk to you about:   How to prevent falling at " home   Hearing or vision tests   Memory testing   How to take your medicines safely   Making sure that you have the help and support you need at home  All topics are updated as new evidence becomes available and our peer review process is complete.  This topic retrieved from [employer] on: May 02, 2024.  Topic 982593 Version 1.0  Release: 32.4.3 - C32.122  © 2024 UpToDate, Inc. and/or its affiliates. All rights reserved.  Consumer Information Use and Disclaimer   Disclaimer: This generalized information is a limited summary of diagnosis, treatment, and/or medication information. It is not meant to be comprehensive and should be used as a tool to help the user understand and/or assess potential diagnostic and treatment options. It does NOT include all information about conditions, treatments, medications, side effects, or risks that may apply to a specific patient. It is not intended to be medical advice or a substitute for the medical advice, diagnosis, or treatment of a health care provider based on the health care provider's examination and assessment of a patient's specific and unique circumstances. Patients must speak with a health care provider for complete information about their health, medical questions, and treatment options, including any risks or benefits regarding use of medications. This information does not endorse any treatments or medications as safe, effective, or approved for treating a specific patient. UpToDate, Inc. and its affiliates disclaim any warranty or liability relating to this information or the use thereof.The use of this information is governed by the Terms of Use, available at https://www.woltersIsoPlexisuwer.com/en/know/clinical-effectiveness-terms. 2024© UpToDate, Inc. and its affiliates and/or licensors. All rights reserved.  Copyright   © 2024 UpToDate, Inc. and/or its affiliates. All rights reserved.

## 2024-10-22 LAB
HPV HR 12 DNA CVX QL NAA+PROBE: NEGATIVE
HPV16 DNA CVX QL NAA+PROBE: NEGATIVE
HPV18 DNA CVX QL NAA+PROBE: NEGATIVE

## 2024-10-25 DIAGNOSIS — Z79.4 TYPE 2 DIABETES MELLITUS WITH HYPERGLYCEMIA, WITH LONG-TERM CURRENT USE OF INSULIN (HCC): ICD-10-CM

## 2024-10-25 DIAGNOSIS — E11.65 TYPE 2 DIABETES MELLITUS WITH HYPERGLYCEMIA, WITH LONG-TERM CURRENT USE OF INSULIN (HCC): ICD-10-CM

## 2024-10-25 LAB
LAB AP GYN PRIMARY INTERPRETATION: NORMAL
Lab: NORMAL

## 2024-10-25 RX ORDER — INSULIN ASPART 100 [IU]/ML
INJECTION, SOLUTION INTRAVENOUS; SUBCUTANEOUS
Qty: 150 ML | Refills: 1 | Status: SHIPPED | OUTPATIENT
Start: 2024-10-25

## 2024-10-25 NOTE — TELEPHONE ENCOUNTER
Medication refill      insulin aspart (NovoLOG FlexPen) 100 UNIT/ML injection pen     90 day supply     Please send to [employer]

## 2024-10-28 RX ORDER — INSULIN ASPART 100 [IU]/ML
INJECTION, SOLUTION INTRAVENOUS; SUBCUTANEOUS
Qty: 150 ML | Refills: 1 | Status: SHIPPED | OUTPATIENT
Start: 2024-10-28

## 2024-11-09 ENCOUNTER — VBI (OUTPATIENT)
Dept: ADMINISTRATIVE | Facility: OTHER | Age: 64
End: 2024-11-09

## 2024-11-09 NOTE — TELEPHONE ENCOUNTER
11/09/24 2:05 PM     Chart reviewed for Pap Smear (HPV) aka Cervical Cancer Screening was/were submitted to the patient's insurance.     Maryan Eckert MA   [facility]

## 2024-11-11 DIAGNOSIS — E78.2 MIXED HYPERLIPIDEMIA: ICD-10-CM

## 2024-11-11 DIAGNOSIS — E11.9 TYPE 2 DIABETES MELLITUS WITHOUT COMPLICATION, WITHOUT LONG-TERM CURRENT USE OF INSULIN (HCC): ICD-10-CM

## 2024-11-11 DIAGNOSIS — J30.9 ALLERGIC RHINITIS, UNSPECIFIED SEASONALITY, UNSPECIFIED TRIGGER: ICD-10-CM

## 2024-11-12 RX ORDER — LEVOTHYROXINE SODIUM 112 UG/1
TABLET ORAL
Qty: 90 TABLET | Refills: 1 | Status: SHIPPED | OUTPATIENT
Start: 2024-11-12

## 2024-11-12 RX ORDER — AZELASTINE 1 MG/ML
SPRAY, METERED NASAL
Qty: 30 ML | Refills: 2 | Status: SHIPPED | OUTPATIENT
Start: 2024-11-12

## 2024-11-12 RX ORDER — SITAGLIPTIN 100 MG/1
100 TABLET, FILM COATED ORAL DAILY
Qty: 90 TABLET | Refills: 1 | Status: SHIPPED | OUTPATIENT
Start: 2024-11-12

## 2024-11-12 RX ORDER — ATORVASTATIN CALCIUM 40 MG/1
40 TABLET, FILM COATED ORAL DAILY
Qty: 90 TABLET | Refills: 1 | Status: SHIPPED | OUTPATIENT
Start: 2024-11-12

## 2024-12-01 DIAGNOSIS — E11.65 TYPE 2 DIABETES MELLITUS WITH HYPERGLYCEMIA, WITHOUT LONG-TERM CURRENT USE OF INSULIN (HCC): ICD-10-CM

## 2024-12-03 RX ORDER — EMPAGLIFLOZIN 25 MG/1
25 TABLET, FILM COATED ORAL EVERY MORNING
Qty: 90 TABLET | Refills: 1 | Status: SHIPPED | OUTPATIENT
Start: 2024-12-03

## 2024-12-12 ENCOUNTER — HOSPITAL ENCOUNTER (OUTPATIENT)
Dept: RADIOLOGY | Facility: HOSPITAL | Age: 64
Discharge: HOME | End: 2024-12-12
Attending: INTERNAL MEDICINE
Payer: COMMERCIAL

## 2024-12-12 DIAGNOSIS — K74.60 CIRRHOSIS OF LIVER WITHOUT ASCITES, UNSPECIFIED HEPATIC CIRRHOSIS TYPE (HCC): ICD-10-CM

## 2024-12-12 PROCEDURE — 76705 ECHO EXAM OF ABDOMEN: CPT

## 2024-12-18 DIAGNOSIS — E11.65 TYPE 2 DIABETES MELLITUS WITH HYPERGLYCEMIA, WITHOUT LONG-TERM CURRENT USE OF INSULIN (HCC): ICD-10-CM

## 2024-12-23 ENCOUNTER — APPOINTMENT (OUTPATIENT)
Dept: LAB | Facility: CLINIC | Age: 64
End: 2024-12-23
Payer: COMMERCIAL

## 2024-12-23 DIAGNOSIS — Z79.4 TYPE 2 DIABETES MELLITUS WITH HYPERGLYCEMIA, WITH LONG-TERM CURRENT USE OF INSULIN (HCC): ICD-10-CM

## 2024-12-23 DIAGNOSIS — Z79.4 TYPE 2 DIABETES MELLITUS WITH OTHER SPECIFIED COMPLICATION, WITH LONG-TERM CURRENT USE OF INSULIN (HCC): ICD-10-CM

## 2024-12-23 DIAGNOSIS — E78.2 MIXED HYPERLIPIDEMIA: ICD-10-CM

## 2024-12-23 DIAGNOSIS — I10 BENIGN ESSENTIAL HYPERTENSION: ICD-10-CM

## 2024-12-23 DIAGNOSIS — E66.812 CLASS 2 SEVERE OBESITY WITH SERIOUS COMORBIDITY AND BODY MASS INDEX (BMI) OF 35.0 TO 35.9 IN ADULT, UNSPECIFIED OBESITY TYPE (HCC): ICD-10-CM

## 2024-12-23 DIAGNOSIS — E55.9 VITAMIN D DEFICIENCY: ICD-10-CM

## 2024-12-23 DIAGNOSIS — E11.65 TYPE 2 DIABETES MELLITUS WITH HYPERGLYCEMIA, WITH LONG-TERM CURRENT USE OF INSULIN (HCC): ICD-10-CM

## 2024-12-23 DIAGNOSIS — E66.01 CLASS 2 SEVERE OBESITY WITH SERIOUS COMORBIDITY AND BODY MASS INDEX (BMI) OF 35.0 TO 35.9 IN ADULT, UNSPECIFIED OBESITY TYPE (HCC): ICD-10-CM

## 2024-12-23 DIAGNOSIS — N18.31 STAGE 3A CHRONIC KIDNEY DISEASE (HCC): ICD-10-CM

## 2024-12-23 DIAGNOSIS — E11.69 TYPE 2 DIABETES MELLITUS WITH OTHER SPECIFIED COMPLICATION, WITH LONG-TERM CURRENT USE OF INSULIN (HCC): ICD-10-CM

## 2024-12-23 LAB
ALBUMIN SERPL BCG-MCNC: 4.4 G/DL (ref 3.5–5)
ALP SERPL-CCNC: 60 U/L (ref 34–104)
ALT SERPL W P-5'-P-CCNC: 44 U/L (ref 7–52)
ANION GAP SERPL CALCULATED.3IONS-SCNC: 9 MMOL/L (ref 4–13)
AST SERPL W P-5'-P-CCNC: 42 U/L (ref 13–39)
BILIRUB SERPL-MCNC: 0.71 MG/DL (ref 0.2–1)
BUN SERPL-MCNC: 24 MG/DL (ref 5–25)
CALCIUM SERPL-MCNC: 9.7 MG/DL (ref 8.4–10.2)
CHLORIDE SERPL-SCNC: 104 MMOL/L (ref 96–108)
CHOLEST SERPL-MCNC: 154 MG/DL (ref ?–200)
CO2 SERPL-SCNC: 26 MMOL/L (ref 21–32)
CREAT SERPL-MCNC: 1.25 MG/DL (ref 0.6–1.3)
EST. AVERAGE GLUCOSE BLD GHB EST-MCNC: 143 MG/DL
GFR SERPL CREATININE-BSD FRML MDRD: 45 ML/MIN/1.73SQ M
GLUCOSE P FAST SERPL-MCNC: 119 MG/DL (ref 65–99)
HBA1C MFR BLD: 6.6 %
HDLC SERPL-MCNC: 36 MG/DL
LDLC SERPL CALC-MCNC: 76 MG/DL (ref 0–100)
NONHDLC SERPL-MCNC: 118 MG/DL
POTASSIUM SERPL-SCNC: 4.1 MMOL/L (ref 3.5–5.3)
PROT SERPL-MCNC: 7.8 G/DL (ref 6.4–8.4)
SODIUM SERPL-SCNC: 139 MMOL/L (ref 135–147)
T4 FREE SERPL-MCNC: 1.1 NG/DL (ref 0.61–1.12)
TRIGL SERPL-MCNC: 212 MG/DL (ref ?–150)
TSH SERPL DL<=0.05 MIU/L-ACNC: 3.56 UIU/ML (ref 0.45–4.5)

## 2024-12-23 PROCEDURE — 80053 COMPREHEN METABOLIC PANEL: CPT

## 2024-12-23 PROCEDURE — 84443 ASSAY THYROID STIM HORMONE: CPT

## 2024-12-23 PROCEDURE — 83036 HEMOGLOBIN GLYCOSYLATED A1C: CPT

## 2024-12-23 PROCEDURE — 36415 COLL VENOUS BLD VENIPUNCTURE: CPT

## 2024-12-23 PROCEDURE — 84439 ASSAY OF FREE THYROXINE: CPT

## 2024-12-23 PROCEDURE — 80061 LIPID PANEL: CPT

## 2024-12-24 ENCOUNTER — RESULTS FOLLOW-UP (OUTPATIENT)
Dept: ENDOCRINOLOGY | Facility: CLINIC | Age: 64
End: 2024-12-24

## 2024-12-26 DIAGNOSIS — D50.9 IRON DEFICIENCY ANEMIA, UNSPECIFIED IRON DEFICIENCY ANEMIA TYPE: Primary | ICD-10-CM

## 2024-12-30 ENCOUNTER — OFFICE VISIT (OUTPATIENT)
Dept: ENDOCRINOLOGY | Facility: CLINIC | Age: 64
End: 2024-12-30
Payer: COMMERCIAL

## 2024-12-30 VITALS
OXYGEN SATURATION: 96 % | HEART RATE: 85 BPM | WEIGHT: 222 LBS | SYSTOLIC BLOOD PRESSURE: 131 MMHG | DIASTOLIC BLOOD PRESSURE: 62 MMHG | HEIGHT: 65 IN | BODY MASS INDEX: 36.99 KG/M2

## 2024-12-30 DIAGNOSIS — E11.65 TYPE 2 DIABETES MELLITUS WITH HYPERGLYCEMIA, WITH LONG-TERM CURRENT USE OF INSULIN (HCC): ICD-10-CM

## 2024-12-30 DIAGNOSIS — E03.9 PRIMARY HYPOTHYROIDISM: Primary | ICD-10-CM

## 2024-12-30 DIAGNOSIS — I10 BENIGN ESSENTIAL HYPERTENSION: ICD-10-CM

## 2024-12-30 DIAGNOSIS — Z79.4 TYPE 2 DIABETES MELLITUS WITH HYPERGLYCEMIA, WITH LONG-TERM CURRENT USE OF INSULIN (HCC): ICD-10-CM

## 2024-12-30 DIAGNOSIS — E78.2 MIXED HYPERLIPIDEMIA: ICD-10-CM

## 2024-12-30 PROCEDURE — 95251 CONT GLUC MNTR ANALYSIS I&R: CPT | Performed by: NURSE PRACTITIONER

## 2024-12-30 PROCEDURE — 99204 OFFICE O/P NEW MOD 45 MIN: CPT | Performed by: NURSE PRACTITIONER

## 2024-12-30 NOTE — PROGRESS NOTES
Name: Luis Rodriguez      : 1960      MRN: [id number]  Encounter Provider: KHOA Thomas  Encounter Date: 2024   Encounter department: [facility]  :  Assessment & Plan  Type 2 diabetes mellitus with hyperglycemia, with long-term current use of insulin ([facility])    Lab Results   Component Value Date    HGBA1C 6.6 (H) 2024     HGA1C close to goal, continue on current regimen.     Discussed risks/complications associated with uncontrolled diabetes including organ involvement, heart attack, stroke, death.    Advised lifestyle modifications including attention to diet including the amount and types of carbohydrates consumed and regular activity.     Call for blood sugars less than 70 mg/dl or patterns over 250 mg/dl. Continue monitoring glucose levels with CGM indication is multiple daily injections.     Discussed symptoms and treatment of hypoglycemia.  Reviewed risks associated with hypoglycemia. Always carry rapid acting carbohydrates and a glucometer (a way to check your blood sugar).    Recommendation for medical identification either bracelet, necklace.    Recommendation for glucagon if on insulin.     Routine follow up for diabetic eye and foot exams.     Ordered blood work to complete prior to next visit.    Send glucose logs/CGM download in 1-2 weeks for review    Follow up in 3 months.     Orders:    Albumin / creatinine urine ratio; Future    Comprehensive metabolic panel; Future    Hemoglobin A1C; Future    Primary hypothyroidism  Clinically and biochemically euthyroid on levothyroxine 112 mcg daily.  Recommend rechecking thyroid function tests a few days before next follow up or sooner, if symptomatic.   Orders:    TSH, 3rd generation; Future    T4, free; Future    Benign essential hypertension  BP stable close to goal.   Continues on ACE.         Mixed hyperlipidemia  Continues on statin.              History of Present Illness    HPI  Luis Rodriguez is a 64 y.o. female who presents for follow up of diabetes mellitus. Last seen by Dr. Manley in August 2024. UTD diabetic eye and foot exams without complication.    Denies recent hospitalization or illness.     Denies severe hypo/hyperglycemia requiring medical or third party assistance.     Denies polyuria, polydipsia, vision changes or changes in sensation or skin integrity in feet.     Monitors glucose levels with CGM.    CGM Interpretation  Luis Rodriguez   Device used Dexcom for Personal Use  Indication: Type of Diabetes: Type 2 Diabetes  More than 72 hours of data was reviewed. Report to be scanned to chart.   Date Range: December 14-27, 2024  Analysis of data:   Average Glucose: 172 mg/dl  GMI: 7.4%  SD : 41 mg/dl  Time in Target Range: 58%  Time Above Range: 42%  Time Below Range: 0%   Interpretation of data:   Post-prandial and fasting hyperglycemia.     Current regimen:   Toujeo 63, 80 units twice a day  NovoLog 34, 28-30 units, 40 units with dinner, 6 units with snack  Januvia 100 mg orally daily  Metformin 1000 Mg twice a day  Jardiance 25 mg daily    Denies dysuria, genital yeast or skin infection.    No history of pancreatitis.     On ACE and ARB.    For hypothyroidism, continues on levothyroxine 112 mcg daily. Denies symptoms consistent with hypo/hyperthyroidism.       History obtained from: patient    Review of Systems  See HPI.   All other systems reviewed and are negative.'    Medical History Reviewed by provider this encounter:  Tobacco  Allergies  Meds  Problems  Med Hx  Surg Hx  Fam Hx     .  Current Outpatient Medications on File Prior to Visit   Medication Sig Dispense Refill    Artificial Saliva (BIOTENE MOISTURIZING MOUTH MT) Apply to the mouth or throat if needed      atorvastatin (LIPITOR) 40 mg tablet Take 1 tablet by mouth daily. 90 tablet 1    azelastine (ASTELIN) 0.1 % nasal spray Instill 2 sprays into each nostril twice daily as directed. 30 mL 2     BIOTIN PO Take 10,000 mcg by mouth daily at bedtime      Cholecalciferol (D3) 50 MCG (2000 UT) CHEW       Cyanocobalamin (B-12) 1000 MCG CAPS       desloratadine (CLARINEX) 5 MG tablet Take 1 tablet by mouth daily. 200 tablet 1    enalapril (VASOTEC) 20 mg tablet Take 2 tablets by mouth daily. 200 tablet 1    ferrous sulfate 324 (65 Fe) mg Take 1 tablet (324 mg total) by mouth daily before breakfast 90 tablet 1    guaiFENesin (MUCINEX) 600 mg 12 hr tablet Take 1,200 mg by mouth daily      hydrOXYzine HCL (ATARAX) 25 mg tablet Take 1 tablet by mouth daily at bedtime as needed for itching. 90 tablet 1    insulin aspart (NovoLOG FlexPen) 100 UNIT/ML injection pen Inject 34 units under the skin daily at breakfast and lunch, then inject 42 units under the skin daily at dinner and 5 units with snack plus sliding scale. Maximum of 130 units daily. 150 mL 1    Insulin Pen Needle (TRUEplus Pen Needles) 31G X 6 MM MISC Use 6 times a day to inject insulin 600 each 1    Jardiance 25 MG TABS Take 1 tablet by mouth every morning. 90 tablet 1    levothyroxine 112 mcg tablet Take 1 tablet by mouth every morning. 90 tablet 1    metFORMIN (GLUCOPHAGE) 1000 MG tablet Take 1 tablet by mouth twice daily with meals. 180 tablet 1    metoprolol succinate (TOPROL-XL) 25 mg 24 hr tablet Take 1 tablet by mouth daily. 90 tablet 1    Misc Natural Products (OSTEO BI-FLEX JOINT SHIELD PO)       neomycin-polymyxin-dexamethasone (MAXITROL) 0.35%-10,000 units/g-0.1% as needed      Omega-3 Fatty Acids (FISH OIL PO) Take 1,400 mg by mouth every evening       pantoprazole (PROTONIX) 20 mg tablet Take 1 tablet (20 mg total) by mouth daily 90 tablet 1    sitaGLIPtin (Januvia) 100 mg tablet Take 1 tablet by mouth daily. 90 tablet 1    Toujeo SoloStar 300 units/mL CONCENTRATED U-300 injection pen (1-unit dial) Inject 60 units under the skin in the morning and inject 78 units under the skin at bedtime. (Patient taking differently: Inject 63 units under the  "skin in the morning and inject 80 units under the skin at bedtime.) 33 mL 1     No current facility-administered medications on file prior to visit.         Objective   /62 (BP Location: Right arm, Patient Position: Sitting, Cuff Size: Large)   Pulse 85   Ht 5' 5.25\" (1.657 m)   Wt 101 kg (222 lb)   SpO2 96%   BMI 36.66 kg/m²      Physical Exam    Labs:   Component      Latest Ref Rng 12/23/2024   Sodium      135 - 147 mmol/L 139    Potassium      3.5 - 5.3 mmol/L 4.1    Chloride      96 - 108 mmol/L 104    Carbon Dioxide      21 - 32 mmol/L 26    ANION GAP      4 - 13 mmol/L 9    BUN      5 - 25 mg/dL 24    Creatinine      0.60 - 1.30 mg/dL 1.25    GLUCOSE, FASTING      65 - 99 mg/dL 119 (H)    Calcium      8.4 - 10.2 mg/dL 9.7    AST      13 - 39 U/L 42 (H)    ALT      7 - 52 U/L 44    ALK PHOS      34 - 104 U/L 60    Total Protein      6.4 - 8.4 g/dL 7.8    Albumin      3.5 - 5.0 g/dL 4.4    Total Bilirubin      0.20 - 1.00 mg/dL 0.71    GFR, Calculated      ml/min/1.73sq m 45    Cholesterol      See Comment mg/dL 154    Triglycerides      See Comment mg/dL 212 (H)    HDL      >=50 mg/dL 36 (L)    LDL Calculated      0 - 100 mg/dL 76    Non-HDL Cholesterol      mg/dl 118    Hemoglobin A1C      Normal 4.0-5.6%; PreDiabetic 5.7-6.4%; Diabetic >=6.5%; Glycemic control for adults with diabetes <7.0% % 6.6 (H)    eAG, EST AVG Glucose      mg/dl 143    TSH 3RD GENERATON      0.450 - 4.500 uIU/mL 3.555    FREE T4      0.61 - 1.12 ng/dL 1.10         Administrative Statements     "

## 2024-12-30 NOTE — ASSESSMENT & PLAN NOTE
Clinically and biochemically euthyroid on levothyroxine 112 mcg daily.  Recommend rechecking thyroid function tests a few days before next follow up or sooner, if symptomatic.   Orders:    TSH, 3rd generation; Future    T4, free; Future

## 2025-01-10 ENCOUNTER — RESULTS FOLLOW-UP (OUTPATIENT)
Dept: GASTROENTEROLOGY | Facility: CLINIC | Age: 65
End: 2025-01-10

## 2025-01-10 DIAGNOSIS — K74.60 CIRRHOSIS OF LIVER WITHOUT ASCITES, UNSPECIFIED HEPATIC CIRRHOSIS TYPE (HCC): Primary | ICD-10-CM

## 2025-01-10 NOTE — RESULT ENCOUNTER NOTE
Please call the patient regarding results.  Ultrasound shows no evidence of liver mass.  Will repeat in 6 months

## 2025-01-14 DIAGNOSIS — J30.9 ALLERGIC RHINITIS, UNSPECIFIED SEASONALITY, UNSPECIFIED TRIGGER: ICD-10-CM

## 2025-01-14 DIAGNOSIS — E11.9 TYPE 2 DIABETES MELLITUS WITHOUT COMPLICATION, WITHOUT LONG-TERM CURRENT USE OF INSULIN (HCC): ICD-10-CM

## 2025-01-14 RX ORDER — DESLORATADINE 5 MG/1
5 TABLET ORAL DAILY
Qty: 200 TABLET | Refills: 1 | Status: SHIPPED | OUTPATIENT
Start: 2025-01-14

## 2025-01-15 DIAGNOSIS — I10 BENIGN ESSENTIAL HYPERTENSION: ICD-10-CM

## 2025-01-15 DIAGNOSIS — E11.65 TYPE 2 DIABETES MELLITUS WITH HYPERGLYCEMIA, WITH LONG-TERM CURRENT USE OF INSULIN (HCC): ICD-10-CM

## 2025-01-15 DIAGNOSIS — Z79.4 TYPE 2 DIABETES MELLITUS WITH HYPERGLYCEMIA, WITH LONG-TERM CURRENT USE OF INSULIN (HCC): ICD-10-CM

## 2025-01-15 DIAGNOSIS — E03.9 PRIMARY HYPOTHYROIDISM: ICD-10-CM

## 2025-01-15 DIAGNOSIS — E78.2 MIXED HYPERLIPIDEMIA: ICD-10-CM

## 2025-01-15 RX ORDER — INSULIN GLARGINE 300 U/ML
INJECTION, SOLUTION SUBCUTANEOUS
Qty: 33 ML | Refills: 1 | Status: SHIPPED | OUTPATIENT
Start: 2025-01-15

## 2025-01-16 DIAGNOSIS — N30.10 CHRONIC INTERSTITIAL CYSTITIS: ICD-10-CM

## 2025-01-16 RX ORDER — HYDROXYZINE HYDROCHLORIDE 25 MG/1
TABLET, FILM COATED ORAL
Qty: 90 TABLET | Refills: 1 | Status: SHIPPED | OUTPATIENT
Start: 2025-01-16

## 2025-01-21 ENCOUNTER — APPOINTMENT (OUTPATIENT)
Dept: LAB | Facility: CLINIC | Age: 65
End: 2025-01-21
Payer: COMMERCIAL

## 2025-01-21 ENCOUNTER — TELEPHONE (OUTPATIENT)
Age: 65
End: 2025-01-21

## 2025-01-21 DIAGNOSIS — D50.9 IRON DEFICIENCY ANEMIA, UNSPECIFIED IRON DEFICIENCY ANEMIA TYPE: ICD-10-CM

## 2025-01-21 LAB
ERYTHROCYTE [DISTWIDTH] IN BLOOD BY AUTOMATED COUNT: 15.8 % (ref 11.6–15.1)
FERRITIN SERPL-MCNC: 32 NG/ML (ref 11–307)
HCT VFR BLD AUTO: 39.2 % (ref 34.8–46.1)
HGB BLD-MCNC: 12 G/DL (ref 11.5–15.4)
IRON SATN MFR SERPL: 15 % (ref 15–50)
IRON SERPL-MCNC: 59 UG/DL (ref 50–212)
MCH RBC QN AUTO: 25.3 PG (ref 26.8–34.3)
MCHC RBC AUTO-ENTMCNC: 30.6 G/DL (ref 31.4–37.4)
MCV RBC AUTO: 83 FL (ref 82–98)
PLATELET # BLD AUTO: 144 THOUSANDS/UL (ref 149–390)
PMV BLD AUTO: 10.9 FL (ref 8.9–12.7)
RBC # BLD AUTO: 4.74 MILLION/UL (ref 3.81–5.12)
TIBC SERPL-MCNC: 403.2 UG/DL (ref 250–450)
TRANSFERRIN SERPL-MCNC: 288 MG/DL (ref 203–362)
UIBC SERPL-MCNC: 344 UG/DL (ref 155–355)
WBC # BLD AUTO: 9.05 THOUSAND/UL (ref 4.31–10.16)

## 2025-01-21 PROCEDURE — 36415 COLL VENOUS BLD VENIPUNCTURE: CPT

## 2025-01-21 PROCEDURE — 83540 ASSAY OF IRON: CPT

## 2025-01-21 PROCEDURE — 85027 COMPLETE CBC AUTOMATED: CPT

## 2025-01-21 PROCEDURE — 83550 IRON BINDING TEST: CPT

## 2025-01-21 PROCEDURE — 82728 ASSAY OF FERRITIN: CPT

## 2025-01-21 NOTE — TELEPHONE ENCOUNTER
Liz from  [employer] is calling asking if we can fax patients last office visit to [phone]. Office visit was faxed.   Nursing notes reviewed and accepted.    Saniya Bustamante is a 14 year old female who presents for  well child exam.  Patient presents with Mother.    Concerns raised today include:    Skin: Patient with acne.  Feels that it is low bit better.  Using over-the-counter acne washes.  Has some acne on her back.  Some on her forehead and into her temples.  Sometimes is picking at it.  No large pustules.    Sleep issues: Patient with waking up at night and can not go back to sleep.  Happens about 2 to 3 times a week.  Sometimes mother gets up early and wakes her up.  Denies any pain or need to urinate that wakes her up.  Does not feel that she is thinking too much, denies excessive worrying.    Patient doing well at school.  Is proficient.  Getting good grades.  Doing her homework well.  Denies any trouble at school, no behavioral issues at home or at school.  No violence or bullying.  Friends are doing well.  Has a good group of friends.  No significant other.  Diet is balance.  Taking in fruits and vegetables.  No big stresses in life, family is doing well.  Lives with mother brother and father.  Mother smokes, patient does not smoke.    Will listen to music or play guitar.    Social History:   School:  [employer] / 8th grade  Interests / Activities: music, camp and fish  Future Plans:  Uncertain  Tobacco:no  ETOH: no  Illicit drugs or Homeopathic medicines: no  Sexually activity: no  Family History: Negative for athletic cardiac events      REVIEW OF SYSTEMS:  General: Feeling well, no fever/chills, no recent rapid weight changes  Dermatologic: No new or changes in existing moles or lesions. No rashes.  Neurologic: No dizziness, no headaches, numbness/tingling  Respiratory: No cough, wheeze, shortness of breath  Nodes: No noted lymphadenopathy, swollen glands  Cardiac: No chest pain, palpitations, skipped beats. No prior history of murmur. No history of syncope.  Gastrointestinal: No emesis, diarrhea,  constipation, or blood in stools  Genitourinary: No polyuria or dysuria, urgency, or frequency  Menstrual:  regularly every 28-30 days  Musculoskeletal: No joint swelling/warmth, no recent injuries  Hematologic: No easy bruising or bleeding  Psychiatric: No depressive symptoms. No sleeping issues.  No safety concerns.    PHYSICAL EXAM:  Blood pressure 136/78, pulse 76, temperature 98.2 °F (36.8 °C), temperature source Temporal, height 5' 6\" (1.676 m), weight (!) 89.1 kg (196 lb 8 oz), last menstrual period 11/23/2023, SpO2 99 %.  General: Well appearing female, no acute distress  Dermatologic: No lesions or rashes noted  HEENT: Pupils equal, round, reactive to light, extraocular movements intact, no conjunctival injection. No scleral icterus. Tympanic membranes with normal landmarks bilaterally.  Oropharynx with moist mucous membranes, clear.  Neck: supple  Nodes: no adenopathy  Breasts:  Not examined    Lungs: Clear to auscultation. No wheezes, rales, rhonchi. Normal work of breathing.  Cardiac: Regular rate and rhythm, normal S1, S2, no murmur appreciated.  Abdomen: Soft, nontender, nondistended. Normoactive bowel sounds. No organomegaly or masses.  Genital:  Not examined  Extremities: Full range of motion, upper extremities/lower extremities. Warm, well perfused. No clubbing/cyanosis/edema  Back: No scoliosis  Neurologic: Grossly intact. Strength 5/5 bilaterally. Normal tone. Gait normal.    ASSESSMENT: Well 14 year old female adolescent.    BEHAVIOR/GUIDANCE:      Tobacco, ETOH, drug avoidance - DISCUSSED      Sexual activity & avoidance / safe sex - DISCUSSED      Puberty / menstruation - DISCUSSED      Self breast examination - DISCUSSED      Accident prevention - DISCUSSED      School achievement - DISCUSSED      Family/Peer relationships - DISCUSSED      Regular physical activity - DISCUSSED      Healthy food choices - DISCUSSED    PLAN:  Anticipatory guidance sheet   Immunizations per orders  Return to  clinic in 1 year for well child exam or sooner prn illness/concerns.    Encounter for routine child health examination without abnormal findings  Patient growing and developing well.  Encouraged to eat healthy, fruits and vegetables.  Regular exercise.  Will monitor height and weight.  Recommended HPV COVID and hepatitis a vaccine.  Encouraged control stresses, avoid substance use.  Discussed STDs and pregnancy.  Encourage balance diet with fruits and vegetables.  Patient doing well socially.    Acne, unspecified acne type  Patient was some mild acne on her forehead and temples.  We use some topical clindamycin gel.  Patient's skin tends to be more oily.  Should let me know if it is too drying.  Encourage some good skin care, gentle cleansers, good acne washes, benzo peroxide as needed.  I do not see any large pustules.  Should let me know if symptoms worsen  - clindamycin (CLINDAGEL) 1 % gel; Apply topically 2 times daily.  Dispense: 60 g; Refill: 5    Insomnia, unspecified type  Patient was some mild sleeping issues.  Encouraged to keep it quiet, may use some ear plugs.  Encouraged to avoid fluids in the evening that may make her get up and urinate.  She should let us know if she is having pain.  Encouraged to find good stress relievers, encourage good relaxing routine before bed.  Encourage good sleep cycle, regular routine.  If this worsens or persists will consider further treatment.

## 2025-01-28 ENCOUNTER — OFFICE VISIT (OUTPATIENT)
Age: 65
End: 2025-01-28
Payer: COMMERCIAL

## 2025-01-28 ENCOUNTER — TELEPHONE (OUTPATIENT)
Dept: GASTROENTEROLOGY | Facility: CLINIC | Age: 65
End: 2025-01-28

## 2025-01-28 ENCOUNTER — TELEPHONE (OUTPATIENT)
Dept: FAMILY MEDICINE CLINIC | Facility: CLINIC | Age: 65
End: 2025-01-28

## 2025-01-28 VITALS
DIASTOLIC BLOOD PRESSURE: 87 MMHG | SYSTOLIC BLOOD PRESSURE: 134 MMHG | WEIGHT: 220 LBS | HEART RATE: 82 BPM | BODY MASS INDEX: 36.65 KG/M2 | HEIGHT: 65 IN

## 2025-01-28 DIAGNOSIS — K74.60 CIRRHOSIS OF LIVER WITHOUT ASCITES, UNSPECIFIED HEPATIC CIRRHOSIS TYPE (HCC): Primary | ICD-10-CM

## 2025-01-28 DIAGNOSIS — D69.6 THROMBOCYTOPENIA (HCC): ICD-10-CM

## 2025-01-28 DIAGNOSIS — E66.01 CLASS 2 SEVERE OBESITY WITH SERIOUS COMORBIDITY AND BODY MASS INDEX (BMI) OF 35.0 TO 35.9 IN ADULT, UNSPECIFIED OBESITY TYPE (HCC): ICD-10-CM

## 2025-01-28 DIAGNOSIS — I10 BENIGN ESSENTIAL HYPERTENSION: Primary | ICD-10-CM

## 2025-01-28 DIAGNOSIS — Z86.0100 HISTORY OF COLON POLYPS: ICD-10-CM

## 2025-01-28 DIAGNOSIS — I85.10 SECONDARY ESOPHAGEAL VARICES WITHOUT BLEEDING (HCC): ICD-10-CM

## 2025-01-28 DIAGNOSIS — E66.812 CLASS 2 SEVERE OBESITY WITH SERIOUS COMORBIDITY AND BODY MASS INDEX (BMI) OF 35.0 TO 35.9 IN ADULT, UNSPECIFIED OBESITY TYPE (HCC): ICD-10-CM

## 2025-01-28 DIAGNOSIS — K21.9 GASTROESOPHAGEAL REFLUX DISEASE WITHOUT ESOPHAGITIS: ICD-10-CM

## 2025-01-28 PROCEDURE — 99213 OFFICE O/P EST LOW 20 MIN: CPT | Performed by: INTERNAL MEDICINE

## 2025-01-28 RX ORDER — CARVEDILOL 6.25 MG/1
6.25 TABLET ORAL 2 TIMES DAILY WITH MEALS
Qty: 200 TABLET | Refills: 3 | Status: SHIPPED | OUTPATIENT
Start: 2025-01-28

## 2025-01-28 RX ORDER — SODIUM CHLORIDE, SODIUM LACTATE, POTASSIUM CHLORIDE, CALCIUM CHLORIDE 600; 310; 30; 20 MG/100ML; MG/100ML; MG/100ML; MG/100ML
125 INJECTION, SOLUTION INTRAVENOUS CONTINUOUS
OUTPATIENT
Start: 2025-01-28

## 2025-01-28 NOTE — ASSESSMENT & PLAN NOTE
Would consider switching from metoprolol to carvedilol.  Will evaluate further with EGD  Orders:    EGD; Future

## 2025-01-28 NOTE — ASSESSMENT & PLAN NOTE
Stable.  No complaints.  Recent ultrasound unremarkable.  Will order MELD labs, AFP.  Continue low-sodium diet      Orders:    AFP tumor marker; Standing    Protime-INR; Standing    Comprehensive metabolic panel; Standing    CBC; Standing    EGD; Future

## 2025-01-28 NOTE — TELEPHONE ENCOUNTER
----- Message from Von Villarreal MD sent at 1/28/2025 10:30 AM EST -----  Regarding: Beta blocker  Luis Aguilar is on Metoprolol for treatment of hypertension. Would you be willing to consider switching her to Carvedilol, which would have the additional benefit of prophylaxing against bleeding from her esophageal varices? We typically use a dose of 6.25 mg BID  Braydon Hernandez

## 2025-01-28 NOTE — PROGRESS NOTES
"Name: Lius Rodriguez      : 1960      MRN: [id number]  Encounter Provider: Von Villarreal MD  Encounter Date: 2025   Encounter department: [facility]  :  Assessment & Plan  Cirrhosis of liver without ascites, unspecified hepatic cirrhosis type (HCC)  Stable.  No complaints.  Recent ultrasound unremarkable.  Will order MELD labs, AFP.  Continue low-sodium diet      Orders:    AFP tumor marker; Standing    Protime-INR; Standing    Comprehensive metabolic panel; Standing    CBC; Standing    EGD; Future    Secondary esophageal varices without bleeding (HCC)  Would consider switching from metoprolol to carvedilol.  Will evaluate further with EGD  Orders:    EGD; Future    Thrombocytopenia (HCC)  Likely secondary to cirrhosis       Class 2 severe obesity with serious comorbidity and body mass index (BMI) of 35.0 to 35.9 in adult, unspecified obesity type (HCC)  Continue to work toward weight loss       History of colon polyps  Due for surveillance colonoscopy  Orders:    Colonoscopy; Future    Gastroesophageal reflux disease without esophagitis  Symptoms controlled on pantoprazole 20 mg daily           History of Present Illness   Abdominal Pain      Luis Rodriguez is a 65 y.o. female who presents in follow-up of [facility] cirrhosis.  No nausea vomiting, fever or chills, jaundice or rash, bruising or bleeding, change in bowel habit unexplained weight loss or weight gain, lower extremity edema, confusion or forgetfulness         Review of Systems   Gastrointestinal:  Negative for abdominal pain.          Objective   /87 (BP Location: Right arm, Patient Position: Sitting, Cuff Size: Standard)   Pulse 82   Ht 5' 5.25\" (1.657 m)   Wt 99.8 kg (220 lb)   BMI 36.33 kg/m²      Physical Exam  Vitals and nursing note reviewed.   Constitutional:       General: She is not in acute distress.     Appearance: She is obese. She is not ill-appearing.   HENT:      Head: " Normocephalic and atraumatic.   Eyes:      General: No scleral icterus.     Extraocular Movements: Extraocular movements intact.   Cardiovascular:      Rate and Rhythm: Normal rate.   Pulmonary:      Effort: Pulmonary effort is normal. No respiratory distress.   Abdominal:      General: There is no distension.      Palpations: Abdomen is soft.      Tenderness: There is no abdominal tenderness. There is no guarding or rebound.   Musculoskeletal:         General: No swelling or tenderness.      Right lower leg: No edema.      Left lower leg: No edema.   Skin:     General: Skin is warm and dry.      Coloration: Skin is not cyanotic.      Findings: No erythema.   Neurological:      General: No focal deficit present.      Mental Status: She is alert and oriented to person, place, and time.   Psychiatric:         Mood and Affect: Mood normal.         Behavior: Behavior normal.

## 2025-01-28 NOTE — H&P (VIEW-ONLY)
"Name: Luis Rodriguez      : 1960      MRN: [id number]  Encounter Provider: Von Villarreal MD  Encounter Date: 2025   Encounter department: [facility]  :  Assessment & Plan  Cirrhosis of liver without ascites, unspecified hepatic cirrhosis type (HCC)  Stable.  No complaints.  Recent ultrasound unremarkable.  Will order MELD labs, AFP.  Continue low-sodium diet      Orders:    AFP tumor marker; Standing    Protime-INR; Standing    Comprehensive metabolic panel; Standing    CBC; Standing    EGD; Future    Secondary esophageal varices without bleeding (HCC)  Would consider switching from metoprolol to carvedilol.  Will evaluate further with EGD  Orders:    EGD; Future    Thrombocytopenia (HCC)  Likely secondary to cirrhosis       Class 2 severe obesity with serious comorbidity and body mass index (BMI) of 35.0 to 35.9 in adult, unspecified obesity type (HCC)  Continue to work toward weight loss       History of colon polyps  Due for surveillance colonoscopy  Orders:    Colonoscopy; Future    Gastroesophageal reflux disease without esophagitis  Symptoms controlled on pantoprazole 20 mg daily           History of Present Illness   Abdominal Pain      Luis Rodriguez is a 65 y.o. female who presents in follow-up of [facility] cirrhosis.  No nausea vomiting, fever or chills, jaundice or rash, bruising or bleeding, change in bowel habit unexplained weight loss or weight gain, lower extremity edema, confusion or forgetfulness         Review of Systems   Gastrointestinal:  Negative for abdominal pain.          Objective   /87 (BP Location: Right arm, Patient Position: Sitting, Cuff Size: Standard)   Pulse 82   Ht 5' 5.25\" (1.657 m)   Wt 99.8 kg (220 lb)   BMI 36.33 kg/m²      Physical Exam  Vitals and nursing note reviewed.   Constitutional:       General: She is not in acute distress.     Appearance: She is obese. She is not ill-appearing.   HENT:      Head: " Normocephalic and atraumatic.   Eyes:      General: No scleral icterus.     Extraocular Movements: Extraocular movements intact.   Cardiovascular:      Rate and Rhythm: Normal rate.   Pulmonary:      Effort: Pulmonary effort is normal. No respiratory distress.   Abdominal:      General: There is no distension.      Palpations: Abdomen is soft.      Tenderness: There is no abdominal tenderness. There is no guarding or rebound.   Musculoskeletal:         General: No swelling or tenderness.      Right lower leg: No edema.      Left lower leg: No edema.   Skin:     General: Skin is warm and dry.      Coloration: Skin is not cyanotic.      Findings: No erythema.   Neurological:      General: No focal deficit present.      Mental Status: She is alert and oriented to person, place, and time.   Psychiatric:         Mood and Affect: Mood normal.         Behavior: Behavior normal.

## 2025-01-28 NOTE — TELEPHONE ENCOUNTER
1/28/2025 10:45 AM Called patient regarding the message from Dr. Villarreal    I would like to discuss changing her metoprolol to carvedilol to help prevent bleeding from her esophageal varices.    Voice mail left as well as Mobile [employer] message sent  Jaz Lee, DO

## 2025-01-28 NOTE — TELEPHONE ENCOUNTER
Scheduled date of EGD/colonoscopy (as of today):Feb. 13, 2025  Physician performing EGD/colonoscopy:Dr. Villarreal  Location of EGD/colonoscopy:[facility]  Desired bowel prep reviewed with patient:Miralax/ Dulcolax  Instructions reviewed with patient by:cherise  Clearances:  na    Pt takes Iron - hold 7 days  Diabetic - Novolog, Metformin, Jardiance, Toujeo

## 2025-01-29 ENCOUNTER — ANESTHESIA (OUTPATIENT)
Dept: ANESTHESIOLOGY | Facility: HOSPITAL | Age: 65
End: 2025-01-29

## 2025-01-29 ENCOUNTER — ANESTHESIA EVENT (OUTPATIENT)
Dept: ANESTHESIOLOGY | Facility: HOSPITAL | Age: 65
End: 2025-01-29

## 2025-02-13 ENCOUNTER — ANESTHESIA EVENT (OUTPATIENT)
Dept: GASTROENTEROLOGY | Facility: AMBULARY SURGERY CENTER | Age: 65
End: 2025-02-13
Payer: COMMERCIAL

## 2025-02-13 ENCOUNTER — HOSPITAL ENCOUNTER (OUTPATIENT)
Dept: GASTROENTEROLOGY | Facility: AMBULARY SURGERY CENTER | Age: 65
Setting detail: OUTPATIENT SURGERY
End: 2025-02-13
Attending: INTERNAL MEDICINE
Payer: COMMERCIAL

## 2025-02-13 VITALS
OXYGEN SATURATION: 99 % | SYSTOLIC BLOOD PRESSURE: 122 MMHG | RESPIRATION RATE: 18 BRPM | DIASTOLIC BLOOD PRESSURE: 59 MMHG | TEMPERATURE: 97.4 F | HEART RATE: 82 BPM

## 2025-02-13 DIAGNOSIS — I85.10 SECONDARY ESOPHAGEAL VARICES WITHOUT BLEEDING (HCC): ICD-10-CM

## 2025-02-13 DIAGNOSIS — K74.60 CIRRHOSIS OF LIVER WITHOUT ASCITES, UNSPECIFIED HEPATIC CIRRHOSIS TYPE (HCC): ICD-10-CM

## 2025-02-13 DIAGNOSIS — Z86.0100 HISTORY OF COLON POLYPS: ICD-10-CM

## 2025-02-13 LAB — GLUCOSE SERPL-MCNC: 99 MG/DL (ref 65–140)

## 2025-02-13 PROCEDURE — 82948 REAGENT STRIP/BLOOD GLUCOSE: CPT

## 2025-02-13 PROCEDURE — 43239 EGD BIOPSY SINGLE/MULTIPLE: CPT | Performed by: INTERNAL MEDICINE

## 2025-02-13 PROCEDURE — 45380 COLONOSCOPY AND BIOPSY: CPT | Performed by: INTERNAL MEDICINE

## 2025-02-13 PROCEDURE — 45385 COLONOSCOPY W/LESION REMOVAL: CPT | Performed by: INTERNAL MEDICINE

## 2025-02-13 PROCEDURE — 88305 TISSUE EXAM BY PATHOLOGIST: CPT | Performed by: PATHOLOGY

## 2025-02-13 RX ORDER — SODIUM CHLORIDE, SODIUM LACTATE, POTASSIUM CHLORIDE, CALCIUM CHLORIDE 600; 310; 30; 20 MG/100ML; MG/100ML; MG/100ML; MG/100ML
125 INJECTION, SOLUTION INTRAVENOUS CONTINUOUS
Status: DISCONTINUED | OUTPATIENT
Start: 2025-02-13 | End: 2025-02-17 | Stop reason: HOSPADM

## 2025-02-13 RX ORDER — PROPOFOL 10 MG/ML
INJECTION, EMULSION INTRAVENOUS CONTINUOUS PRN
Status: DISCONTINUED | OUTPATIENT
Start: 2025-02-13 | End: 2025-02-13

## 2025-02-13 RX ORDER — PHENYLEPHRINE HCL IN 0.9% NACL 1 MG/10 ML
SYRINGE (ML) INTRAVENOUS AS NEEDED
Status: DISCONTINUED | OUTPATIENT
Start: 2025-02-13 | End: 2025-02-13

## 2025-02-13 RX ORDER — LIDOCAINE HYDROCHLORIDE 10 MG/ML
INJECTION, SOLUTION EPIDURAL; INFILTRATION; INTRACAUDAL; PERINEURAL AS NEEDED
Status: DISCONTINUED | OUTPATIENT
Start: 2025-02-13 | End: 2025-02-13

## 2025-02-13 RX ORDER — SODIUM CHLORIDE, SODIUM LACTATE, POTASSIUM CHLORIDE, CALCIUM CHLORIDE 600; 310; 30; 20 MG/100ML; MG/100ML; MG/100ML; MG/100ML
INJECTION, SOLUTION INTRAVENOUS CONTINUOUS PRN
Status: DISCONTINUED | OUTPATIENT
Start: 2025-02-13 | End: 2025-02-13

## 2025-02-13 RX ORDER — PROPOFOL 10 MG/ML
INJECTION, EMULSION INTRAVENOUS AS NEEDED
Status: DISCONTINUED | OUTPATIENT
Start: 2025-02-13 | End: 2025-02-13

## 2025-02-13 RX ADMIN — PROPOFOL 100 MCG/KG/MIN: 10 INJECTION, EMULSION INTRAVENOUS at 09:16

## 2025-02-13 RX ADMIN — SODIUM CHLORIDE, SODIUM LACTATE, POTASSIUM CHLORIDE, AND CALCIUM CHLORIDE: .6; .31; .03; .02 INJECTION, SOLUTION INTRAVENOUS at 08:37

## 2025-02-13 RX ADMIN — SODIUM CHLORIDE, SODIUM LACTATE, POTASSIUM CHLORIDE, AND CALCIUM CHLORIDE 125 ML/HR: .6; .31; .03; .02 INJECTION, SOLUTION INTRAVENOUS at 08:40

## 2025-02-13 RX ADMIN — Medication 200 MCG: at 09:31

## 2025-02-13 RX ADMIN — PROPOFOL 100 MG: 10 INJECTION, EMULSION INTRAVENOUS at 09:11

## 2025-02-13 RX ADMIN — PROPOFOL 100 MG: 10 INJECTION, EMULSION INTRAVENOUS at 09:14

## 2025-02-13 RX ADMIN — LIDOCAINE HYDROCHLORIDE 5 ML: 10 INJECTION, SOLUTION EPIDURAL; INFILTRATION; INTRACAUDAL; PERINEURAL at 09:08

## 2025-02-13 RX ADMIN — PROPOFOL 100 MG: 10 INJECTION, EMULSION INTRAVENOUS at 09:08

## 2025-02-13 NOTE — ANESTHESIA POSTPROCEDURE EVALUATION
Post-Op Assessment Note    CV Status:  Stable         Mental Status:  Alert   PONV Controlled:  Controlled   Airway Patency:  Patent     Post Op Vitals Reviewed: Yes    No anethesia notable event occurred.    Staff: CRNA           Last Filed PACU Vitals:  Vitals Value Taken Time   Temp     Pulse 79    /55    Resp 20    SpO2 99

## 2025-02-13 NOTE — ANESTHESIA PREPROCEDURE EVALUATION
Procedure:  COLONOSCOPY  EGD    Relevant Problems   CARDIO   (+) Benign essential hypertension   (+) Mixed hyperlipidemia   (+) Secondary esophageal varices without bleeding (HCC)      ENDO   (+) Primary hypothyroidism   (+) Type 2 diabetes mellitus with diabetic chronic kidney disease (HCC)   (+) Type 2 diabetes mellitus without complication, with long-term current use of insulin (HCC)      GI/HEPATIC   (+) Cirrhosis of liver (HCC)   (+) Fatty liver   (+) Gastroesophageal reflux disease without esophagitis      /RENAL   (+) Stage 3a chronic kidney disease (HCC)   (+) Type 2 diabetes mellitus with diabetic chronic kidney disease (HCC)      HEMATOLOGY   (+) Anemia   (+) Iron deficiency anemia   (+) Thrombocytopenia (HCC)      NEURO/PSYCH   (+) Numbness and tingling        Physical Exam    Airway    Mallampati score: I  TM Distance: >3 FB  Neck ROM: full     Dental   No notable dental hx     Cardiovascular      Pulmonary      Other Findings  post-pubertal.      Anesthesia Plan  ASA Score- 3     Anesthesia Type- IV sedation with anesthesia with ASA Monitors.         Additional Monitors:     Airway Plan:            Plan Factors-Exercise tolerance (METS): >4 METS.    Chart reviewed.    Patient summary reviewed.    Patient is not a current smoker.      There is medical exclusion for perioperative obstructive sleep apnea risk education.        Induction- intravenous.    Postoperative Plan-         Informed Consent- Anesthetic plan and risks discussed with patient.  I personally reviewed this patient with the CRNA. Discussed and agreed on the Anesthesia Plan with the CRNA..      NPO Status:  Vitals Value Taken Time   Date of last liquid 02/13/25 02/13/25 0824   Time of last liquid 0700 02/13/25 0824   Date of last solid 02/11/25 02/13/25 0824   Time of last solid 2200 02/13/25 0824

## 2025-02-13 NOTE — PERIOPERATIVE NURSING NOTE
Patient received into post GI room via stretcher from GI procedure room. Report received from  RN. patient reactive, drowsy, bed in low position, siderails up bilaterally, call bell within reach.

## 2025-02-17 PROCEDURE — 88305 TISSUE EXAM BY PATHOLOGIST: CPT | Performed by: PATHOLOGY

## 2025-03-02 DIAGNOSIS — E03.9 PRIMARY HYPOTHYROIDISM: ICD-10-CM

## 2025-03-02 DIAGNOSIS — E78.2 MIXED HYPERLIPIDEMIA: ICD-10-CM

## 2025-03-02 DIAGNOSIS — E11.65 TYPE 2 DIABETES MELLITUS WITH HYPERGLYCEMIA, WITH LONG-TERM CURRENT USE OF INSULIN (HCC): ICD-10-CM

## 2025-03-02 DIAGNOSIS — Z79.4 TYPE 2 DIABETES MELLITUS WITH HYPERGLYCEMIA, WITH LONG-TERM CURRENT USE OF INSULIN (HCC): ICD-10-CM

## 2025-03-02 DIAGNOSIS — I10 BENIGN ESSENTIAL HYPERTENSION: ICD-10-CM

## 2025-03-03 RX ORDER — INSULIN GLARGINE 300 U/ML
INJECTION, SOLUTION SUBCUTANEOUS
Qty: 33 ML | Refills: 1 | Status: SHIPPED | OUTPATIENT
Start: 2025-03-03

## 2025-03-06 ENCOUNTER — RESULTS FOLLOW-UP (OUTPATIENT)
Age: 65
End: 2025-03-06

## 2025-03-06 NOTE — RESULT ENCOUNTER NOTE
Please call the patient regarding results.  Gastric biopsies were benign.  No evidence of H. pylori or precancerous changes.  Colon polyps were adenomas.  Repeat colonoscopy in 7 years.

## 2025-03-12 DIAGNOSIS — E11.65 TYPE 2 DIABETES MELLITUS WITH HYPERGLYCEMIA, WITHOUT LONG-TERM CURRENT USE OF INSULIN (HCC): ICD-10-CM

## 2025-03-19 DIAGNOSIS — D64.9 ANEMIA, UNSPECIFIED TYPE: ICD-10-CM

## 2025-03-19 DIAGNOSIS — E11.9 TYPE 2 DIABETES MELLITUS WITHOUT COMPLICATION, WITHOUT LONG-TERM CURRENT USE OF INSULIN (HCC): ICD-10-CM

## 2025-03-19 RX ORDER — PANTOPRAZOLE SODIUM 20 MG/1
20 TABLET, DELAYED RELEASE ORAL DAILY
Qty: 90 TABLET | Refills: 1 | Status: SHIPPED | OUTPATIENT
Start: 2025-03-19

## 2025-03-20 RX ORDER — LEVOTHYROXINE SODIUM 112 UG/1
TABLET ORAL
Qty: 90 TABLET | Refills: 1 | Status: SHIPPED | OUTPATIENT
Start: 2025-03-20

## 2025-04-10 DIAGNOSIS — Z79.4 TYPE 2 DIABETES MELLITUS WITH OTHER SPECIFIED COMPLICATION, WITH LONG-TERM CURRENT USE OF INSULIN (HCC): ICD-10-CM

## 2025-04-10 DIAGNOSIS — E11.65 TYPE 2 DIABETES MELLITUS WITH HYPERGLYCEMIA, WITHOUT LONG-TERM CURRENT USE OF INSULIN (HCC): Primary | ICD-10-CM

## 2025-04-10 DIAGNOSIS — E11.69 TYPE 2 DIABETES MELLITUS WITH OTHER SPECIFIED COMPLICATION, WITH LONG-TERM CURRENT USE OF INSULIN (HCC): ICD-10-CM

## 2025-04-10 RX ORDER — PEN NEEDLE, DIABETIC 31 G X1/4"
NEEDLE, DISPOSABLE MISCELLANEOUS
Qty: 600 EACH | Refills: 0 | Status: SHIPPED | OUTPATIENT
Start: 2025-04-10

## 2025-04-10 RX ORDER — ACYCLOVIR 400 MG/1
1 TABLET ORAL
Qty: 3 EACH | Refills: 2 | Status: SHIPPED | OUTPATIENT
Start: 2025-04-10

## 2025-04-11 ENCOUNTER — TELEPHONE (OUTPATIENT)
Dept: GASTROENTEROLOGY | Facility: CLINIC | Age: 65
End: 2025-04-11

## 2025-04-22 ENCOUNTER — OFFICE VISIT (OUTPATIENT)
Dept: FAMILY MEDICINE CLINIC | Facility: CLINIC | Age: 65
End: 2025-04-22
Payer: COMMERCIAL

## 2025-04-22 VITALS
RESPIRATION RATE: 18 BRPM | HEIGHT: 65 IN | SYSTOLIC BLOOD PRESSURE: 132 MMHG | TEMPERATURE: 98.3 F | HEART RATE: 96 BPM | BODY MASS INDEX: 37.15 KG/M2 | WEIGHT: 223 LBS | DIASTOLIC BLOOD PRESSURE: 72 MMHG

## 2025-04-22 DIAGNOSIS — Z78.0 POSTMENOPAUSAL: ICD-10-CM

## 2025-04-22 DIAGNOSIS — E11.69 TYPE 2 DIABETES MELLITUS WITH OTHER SPECIFIED COMPLICATION, WITH LONG-TERM CURRENT USE OF INSULIN (HCC): Primary | ICD-10-CM

## 2025-04-22 DIAGNOSIS — L30.9 ECZEMA, UNSPECIFIED TYPE: ICD-10-CM

## 2025-04-22 DIAGNOSIS — K76.0 FATTY LIVER: ICD-10-CM

## 2025-04-22 DIAGNOSIS — R20.0 NUMBNESS AND TINGLING IN LEFT HAND: ICD-10-CM

## 2025-04-22 DIAGNOSIS — I10 BENIGN ESSENTIAL HYPERTENSION: ICD-10-CM

## 2025-04-22 DIAGNOSIS — J30.9 ALLERGIC RHINITIS, UNSPECIFIED SEASONALITY, UNSPECIFIED TRIGGER: ICD-10-CM

## 2025-04-22 DIAGNOSIS — Z79.4 TYPE 2 DIABETES MELLITUS WITH OTHER SPECIFIED COMPLICATION, WITH LONG-TERM CURRENT USE OF INSULIN (HCC): Primary | ICD-10-CM

## 2025-04-22 DIAGNOSIS — R20.2 NUMBNESS AND TINGLING IN LEFT HAND: ICD-10-CM

## 2025-04-22 DIAGNOSIS — E78.2 MIXED HYPERLIPIDEMIA: ICD-10-CM

## 2025-04-22 PROCEDURE — 99214 OFFICE O/P EST MOD 30 MIN: CPT | Performed by: FAMILY MEDICINE

## 2025-04-22 PROCEDURE — G2211 COMPLEX E/M VISIT ADD ON: HCPCS | Performed by: FAMILY MEDICINE

## 2025-04-22 RX ORDER — DESLORATADINE 5 MG/1
5 TABLET ORAL DAILY
Qty: 200 TABLET | Refills: 1 | Status: SHIPPED | OUTPATIENT
Start: 2025-04-22

## 2025-04-22 RX ORDER — HALOBETASOL PROPIONATE 0.5 MG/G
CREAM TOPICAL 2 TIMES DAILY
Qty: 50 G | Refills: 0 | Status: SHIPPED | OUTPATIENT
Start: 2025-04-22

## 2025-04-22 RX ORDER — CARVEDILOL 6.25 MG/1
TABLET ORAL
Qty: 300 TABLET | Refills: 3 | Status: SHIPPED | OUTPATIENT
Start: 2025-04-22

## 2025-04-22 NOTE — ASSESSMENT & PLAN NOTE
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 I have discussed at length the anatomy and causation of the problem. The risks and benefits of a surgical repair was discussed in detail as well as the consequences of leaving an entropion untreated. We also discussed the location of the incision and the recovery process. The patient understands and desires to proceed with the surgery as explained. Managed by Dr. Villarreal

## 2025-04-22 NOTE — ASSESSMENT & PLAN NOTE
Stable  Continue atorvastatin 40 mg a day   Orders:  •  Lipid Panel with Direct LDL reflex; Future

## 2025-04-22 NOTE — ASSESSMENT & PLAN NOTE
Metoprolol was switched to carvedilol as requested by GI   Orders:  •  carvedilol (COREG) 6.25 mg tablet; Take 2 in the morning and 1 in the evening

## 2025-04-22 NOTE — PROGRESS NOTES
"Name: Luis Rodriguez      : 1960      MRN: [id number]  Encounter Provider: Jaz Lee DO  Encounter Date: 2025   Encounter department: [facility]  :  Assessment & Plan  Type 2 diabetes mellitus with other specified complication, with long-term current use of insulin (HCC)  Well controlled  Managed by endocrinology  Lab Results   Component Value Date    HGBA1C 6.6 (H) 2024            Benign essential hypertension  Metoprolol was switched to carvedilol as requested by GI   Orders:  •  carvedilol (COREG) 6.25 mg tablet; Take 2 in the morning and 1 in the evening    Mixed hyperlipidemia  Stable  Continue atorvastatin 40 mg a day   Orders:  •  Lipid Panel with Direct LDL reflex; Future    Postmenopausal    Orders:  •  DXA bone density spine hip and pelvis; Future    Fatty liver  Managed by Dr. Villarreal        Allergic rhinitis, unspecified seasonality, unspecified trigger    Orders:  •  desloratadine (CLARINEX) 5 MG tablet; Take 1 tablet (5 mg total) by mouth daily    Eczema, unspecified type    Orders:  •  halobetasol (ULTRAVATE) 0.05 % cream; Apply topically 2 (two) times a day    Numbness and tingling in left hand  Will try over the counter wrist brace        Return in about 6 months (around 10/22/2025) for Annual Wellness Visit.  Shingles vaccine recommended at pharmacy   RSV vaccine recommended as well      History of Present Illness   She is following with endocrinology and gastroenterology.   She is checking her sugars regularly.       Review of Systems    Objective   /72   Pulse 96   Temp 98.3 °F (36.8 °C)   Resp 18   Ht 5' 5.25\" (1.657 m)   Wt 101 kg (223 lb)   BMI 36.83 kg/m²      Physical Exam  Vitals and nursing note reviewed.   Constitutional:       General: She is not in acute distress.     Appearance: She is well-developed.   HENT:      Head: Normocephalic and atraumatic.      Right Ear: Tympanic membrane normal.      Left Ear: Tympanic membrane normal. "   Cardiovascular:      Rate and Rhythm: Normal rate and regular rhythm.      Heart sounds: No murmur heard.  Pulmonary:      Effort: Pulmonary effort is normal. No respiratory distress.      Breath sounds: Normal breath sounds.   Musculoskeletal:      Cervical back: Neck supple.   Neurological:      Mental Status: She is alert.

## 2025-04-23 ENCOUNTER — APPOINTMENT (OUTPATIENT)
Dept: LAB | Facility: CLINIC | Age: 65
End: 2025-04-23
Attending: NURSE PRACTITIONER
Payer: COMMERCIAL

## 2025-04-23 DIAGNOSIS — E11.65 TYPE 2 DIABETES MELLITUS WITH HYPERGLYCEMIA, WITH LONG-TERM CURRENT USE OF INSULIN (HCC): ICD-10-CM

## 2025-04-23 DIAGNOSIS — E78.2 MIXED HYPERLIPIDEMIA: ICD-10-CM

## 2025-04-23 DIAGNOSIS — E03.9 PRIMARY HYPOTHYROIDISM: ICD-10-CM

## 2025-04-23 DIAGNOSIS — Z79.4 TYPE 2 DIABETES MELLITUS WITH HYPERGLYCEMIA, WITH LONG-TERM CURRENT USE OF INSULIN (HCC): ICD-10-CM

## 2025-04-23 DIAGNOSIS — K74.60 CIRRHOSIS OF LIVER WITHOUT ASCITES, UNSPECIFIED HEPATIC CIRRHOSIS TYPE (HCC): ICD-10-CM

## 2025-04-23 LAB
AFP-TM SERPL-MCNC: 1.46 NG/ML (ref 0–9)
ALBUMIN SERPL BCG-MCNC: 4.3 G/DL (ref 3.5–5)
ALP SERPL-CCNC: 64 U/L (ref 34–104)
ALT SERPL W P-5'-P-CCNC: 46 U/L (ref 7–52)
ANION GAP SERPL CALCULATED.3IONS-SCNC: 11 MMOL/L (ref 4–13)
AST SERPL W P-5'-P-CCNC: 41 U/L (ref 13–39)
BILIRUB SERPL-MCNC: 0.68 MG/DL (ref 0.2–1)
BUN SERPL-MCNC: 25 MG/DL (ref 5–25)
CALCIUM SERPL-MCNC: 10 MG/DL (ref 8.4–10.2)
CHLORIDE SERPL-SCNC: 107 MMOL/L (ref 96–108)
CHOLEST SERPL-MCNC: 172 MG/DL (ref ?–200)
CO2 SERPL-SCNC: 22 MMOL/L (ref 21–32)
CREAT SERPL-MCNC: 1.31 MG/DL (ref 0.6–1.3)
CREAT UR-MCNC: 84.3 MG/DL
ERYTHROCYTE [DISTWIDTH] IN BLOOD BY AUTOMATED COUNT: 16.2 % (ref 11.6–15.1)
EST. AVERAGE GLUCOSE BLD GHB EST-MCNC: 148 MG/DL
GFR SERPL CREATININE-BSD FRML MDRD: 42 ML/MIN/1.73SQ M
GLUCOSE P FAST SERPL-MCNC: 168 MG/DL (ref 65–99)
HBA1C MFR BLD: 6.8 %
HCT VFR BLD AUTO: 36.9 % (ref 34.8–46.1)
HDLC SERPL-MCNC: 35 MG/DL
HGB BLD-MCNC: 11.5 G/DL (ref 11.5–15.4)
INR PPP: 1.05 (ref 0.85–1.19)
LDLC SERPL CALC-MCNC: 99 MG/DL (ref 0–100)
MCH RBC QN AUTO: 26.3 PG (ref 26.8–34.3)
MCHC RBC AUTO-ENTMCNC: 31.2 G/DL (ref 31.4–37.4)
MCV RBC AUTO: 84 FL (ref 82–98)
MICROALBUMIN UR-MCNC: 476.9 MG/L
MICROALBUMIN/CREAT 24H UR: 566 MG/G CREATININE (ref 0–30)
POTASSIUM SERPL-SCNC: 4.4 MMOL/L (ref 3.5–5.3)
PROT SERPL-MCNC: 7.6 G/DL (ref 6.4–8.4)
PROTHROMBIN TIME: 14 SECONDS (ref 12.3–15)
RBC # BLD AUTO: 4.37 MILLION/UL (ref 3.81–5.12)
SODIUM SERPL-SCNC: 140 MMOL/L (ref 135–147)
T4 FREE SERPL-MCNC: 1 NG/DL (ref 0.61–1.12)
TRIGL SERPL-MCNC: 189 MG/DL (ref ?–150)
TSH SERPL DL<=0.05 MIU/L-ACNC: 2.87 UIU/ML (ref 0.45–4.5)
WBC # BLD AUTO: 6.69 THOUSAND/UL (ref 4.31–10.16)

## 2025-04-23 PROCEDURE — 83036 HEMOGLOBIN GLYCOSYLATED A1C: CPT

## 2025-04-23 PROCEDURE — 36415 COLL VENOUS BLD VENIPUNCTURE: CPT

## 2025-04-23 PROCEDURE — 84443 ASSAY THYROID STIM HORMONE: CPT

## 2025-04-23 PROCEDURE — 82570 ASSAY OF URINE CREATININE: CPT

## 2025-04-23 PROCEDURE — 80061 LIPID PANEL: CPT

## 2025-04-23 PROCEDURE — 85027 COMPLETE CBC AUTOMATED: CPT

## 2025-04-23 PROCEDURE — 80053 COMPREHEN METABOLIC PANEL: CPT

## 2025-04-23 PROCEDURE — 84439 ASSAY OF FREE THYROXINE: CPT

## 2025-04-23 PROCEDURE — 82105 ALPHA-FETOPROTEIN SERUM: CPT

## 2025-04-23 PROCEDURE — 82043 UR ALBUMIN QUANTITATIVE: CPT

## 2025-04-23 PROCEDURE — 85610 PROTHROMBIN TIME: CPT

## 2025-04-24 ENCOUNTER — RESULTS FOLLOW-UP (OUTPATIENT)
Dept: ENDOCRINOLOGY | Facility: CLINIC | Age: 65
End: 2025-04-24

## 2025-04-24 ENCOUNTER — RESULTS FOLLOW-UP (OUTPATIENT)
Dept: FAMILY MEDICINE CLINIC | Facility: CLINIC | Age: 65
End: 2025-04-24

## 2025-04-25 DIAGNOSIS — I10 BENIGN ESSENTIAL HYPERTENSION: ICD-10-CM

## 2025-04-25 RX ORDER — ENALAPRIL MALEATE 20 MG/1
40 TABLET ORAL DAILY
Qty: 200 TABLET | Refills: 1 | Status: SHIPPED | OUTPATIENT
Start: 2025-04-25

## 2025-04-27 DIAGNOSIS — E11.9 TYPE 2 DIABETES MELLITUS WITHOUT COMPLICATION, WITHOUT LONG-TERM CURRENT USE OF INSULIN (HCC): ICD-10-CM

## 2025-04-29 RX ORDER — SITAGLIPTIN 100 MG/1
100 TABLET, FILM COATED ORAL DAILY
Qty: 100 TABLET | Refills: 1 | Status: SHIPPED | OUTPATIENT
Start: 2025-04-29 | End: 2025-05-01

## 2025-05-01 ENCOUNTER — OFFICE VISIT (OUTPATIENT)
Dept: ENDOCRINOLOGY | Facility: CLINIC | Age: 65
End: 2025-05-01
Payer: COMMERCIAL

## 2025-05-01 ENCOUNTER — PATIENT MESSAGE (OUTPATIENT)
Dept: ENDOCRINOLOGY | Facility: CLINIC | Age: 65
End: 2025-05-01

## 2025-05-01 VITALS
OXYGEN SATURATION: 97 % | DIASTOLIC BLOOD PRESSURE: 61 MMHG | WEIGHT: 227 LBS | SYSTOLIC BLOOD PRESSURE: 120 MMHG | HEIGHT: 62 IN | HEART RATE: 89 BPM | BODY MASS INDEX: 41.77 KG/M2

## 2025-05-01 DIAGNOSIS — E03.9 HYPOTHYROIDISM, UNSPECIFIED TYPE: ICD-10-CM

## 2025-05-01 DIAGNOSIS — I10 BENIGN ESSENTIAL HYPERTENSION: ICD-10-CM

## 2025-05-01 DIAGNOSIS — E78.2 MIXED HYPERLIPIDEMIA: ICD-10-CM

## 2025-05-01 DIAGNOSIS — E11.65 TYPE 2 DIABETES MELLITUS WITH HYPERGLYCEMIA, WITH LONG-TERM CURRENT USE OF INSULIN (HCC): Primary | ICD-10-CM

## 2025-05-01 DIAGNOSIS — Z79.4 TYPE 2 DIABETES MELLITUS WITH HYPERGLYCEMIA, WITH LONG-TERM CURRENT USE OF INSULIN (HCC): Primary | ICD-10-CM

## 2025-05-01 PROBLEM — E66.01 SEVERE OBESITY (BMI 35.0-39.9) WITH COMORBIDITY (HCC): Status: RESOLVED | Noted: 2019-11-26 | Resolved: 2025-05-01

## 2025-05-01 PROCEDURE — 99214 OFFICE O/P EST MOD 30 MIN: CPT | Performed by: NURSE PRACTITIONER

## 2025-05-01 RX ORDER — INSULIN GLARGINE 300 U/ML
INJECTION, SOLUTION SUBCUTANEOUS
Qty: 33 ML | Refills: 1 | Status: SHIPPED | OUTPATIENT
Start: 2025-05-01 | End: 2025-05-01

## 2025-05-01 RX ORDER — INSULIN ASPART 100 [IU]/ML
INJECTION, SOLUTION INTRAVENOUS; SUBCUTANEOUS
Qty: 150 ML | Refills: 1 | Status: SHIPPED | OUTPATIENT
Start: 2025-05-01

## 2025-05-01 RX ORDER — INSULIN GLARGINE 300 U/ML
INJECTION, SOLUTION SUBCUTANEOUS
Qty: 33 ML | Refills: 1 | Status: SHIPPED | OUTPATIENT
Start: 2025-05-01

## 2025-05-01 RX ORDER — GLUCAGON 3 MG/1
3 POWDER NASAL ONCE AS NEEDED
Qty: 2 EACH | Refills: 3 | Status: SHIPPED | OUTPATIENT
Start: 2025-05-01

## 2025-05-01 RX ORDER — GLUCAGON 3 MG/1
3 POWDER NASAL ONCE AS NEEDED
Qty: 2 EACH | Refills: 3 | Status: SHIPPED | OUTPATIENT
Start: 2025-05-01 | End: 2025-05-01

## 2025-05-01 NOTE — PATIENT INSTRUCTIONS
"Low blood sugar in people with diabetes   The Basics   Written by the doctors and editors at [facility]   What is low blood sugar? -- This is when the level of sugar in a person's blood gets too low. It is also called \"hypoglycemia.\"  Low blood sugar can cause symptoms ranging from sweating and feeling hungry to passing out.  Low blood sugar can happen in people with diabetes who take certain medicines, including insulin, other medicines given as shots, and some types of pills.  When can people with diabetes get low blood sugar? -- People with diabetes can get low blood sugar when they:   Take too much medicine, including insulin, other medicines given as shots, or certain diabetes pills   Do not eat enough food   Exercise too much without eating a snack or reducing their insulin dose   Wait too long between meals   Drink too much alcohol or drink alcohol on an empty stomach  What are the symptoms of low blood sugar? -- The symptoms can be different from person to person, and can change over time. During the early stages of low blood sugar, a person can:   Sweat or tremble   Feel hungry   Feel worried  People who have early symptoms should check their blood sugar level to see if it is low and needs to be treated. If low blood sugar levels are not treated, severe symptoms can occur. These can include:   Trouble walking or feeling weak   Trouble seeing clearly   Being confused or acting in a strange way   Passing out or having a seizure  Some people do not get symptoms during the early stages of low blood sugar. Doctors sometimes call this \"hypoglycemia unawareness.\" People with hypoglycemia unawareness are more likely to have severe symptoms, because they might not know that they have low blood sugar until they have severe symptoms. Hypoglycemia unawareness is more likely in people who:   Have had type 1 diabetes for more than 5 to 10 years   Have frequent episodes of low blood sugar   Use insulin to keep their blood " sugar level tightly managed   Are tired   Drink a lot of alcohol   Take certain medicines for high blood pressure or diabetes  How is low blood sugar treated? -- It can be treated with:   Quick sources of sugar - People can eat or drink quick sources of sugar (table 1). Foods that have fat, such as chocolate or cheese, do not treat low blood sugar as quickly. You and a family member should carry a quick source of sugar at all times.   A dose of glucagon - Glucagon is a hormone that can quickly raise blood sugar levels and stop severe symptoms. It comes as a shot (figure 1) or a nose spray. If your doctor recommends that you carry glucagon with you, they will tell you when and how to use it. If possible, it's also a good idea to have a family member, friend, or roommate learn how to give you glucagon. That way, they can give it to you if you can't do it yourself.  After treating low blood sugar, it is very important to recheck your blood sugar level to make sure that it rises and stays in the normal range. Once your blood sugar is normal, eat a small snack that contains protein, fat, and carbohydrate. This can help keep your blood sugar stable.  What should I do after treatment? -- After treatment for low blood sugar, most people can get back to their usual routine. But your doctor or nurse might recommend that you check your blood sugar level more often during the next 2 to 3 days.  If your low blood sugar was treated with glucagon, call your doctor or nurse. They might change the dose of your diabetes medicine.  How can I prevent low blood sugar? -- The best way is to:   Check your blood sugar levels often - Your doctor or nurse will tell you how and when to check your blood sugar levels at home. They will also tell you what your blood sugar levels should be, and when to treat low blood sugar.   Learn the symptoms of low blood sugar, and be ready to treat it in the early stages. Treating low blood sugar early can  "prevent severe symptoms.  When should I go to a hospital or call for an ambulance? -- A family member or friend should take you to a hospital or call for an ambulance (in the [place] and [place], call 9-1-1) if you:   Are still confused 15 minutes after being treated with a dose of glucagon   Have passed out, and there is no glucagon nearby   Still have low blood sugar after treatment  If you have low blood sugar, do not try to drive yourself to the hospital. Driving with low blood sugar can be dangerous.  All topics are updated as new evidence becomes available and our peer review process is complete.  This topic retrieved from [employer] on: Apr 11, 2024.  Topic 65441 Version 22.0  Release: 32.3.2 - C32.100  © 2024 UpToDate, Inc. and/or its affiliates. All rights reserved.  table 1: Quick sources of sugar to treat low blood sugar  3 or 4 glucose tablets   ½ cup of juice or regular soda (not sugar-free)   2 tablespoons of raisins   4 or 5 saltine crackers   1 tablespoon of sugar   1 tablespoon of honey or corn syrup   6 to 8 hard candies   These sources of sugar act quickly to treat low blood sugar levels. People with diabetes who use insulin or certain other diabetes medicines should carry at least 1 of these items at all times.  Graphic 16235 Version 4.0  figure 1: Glucagon autoinjector     Some people carry glucagon in the form of an autoinjector \"pen.\" This makes it easy to give a dose into the upper arm, thigh, or belly.  Graphic 362647 Version 2.0  Consumer Information Use and Disclaimer   Disclaimer: This generalized information is a limited summary of diagnosis, treatment, and/or medication information. It is not meant to be comprehensive and should be used as a tool to help the user understand and/or assess potential diagnostic and treatment options. It does NOT include all information about conditions, treatments, medications, side effects, or risks that may apply to a specific patient. It is not intended to be " medical advice or a substitute for the medical advice, diagnosis, or treatment of a health care provider based on the health care provider's examination and assessment of a patient's specific and unique circumstances. Patients must speak with a health care provider for complete information about their health, medical questions, and treatment options, including any risks or benefits regarding use of medications. This information does not endorse any treatments or medications as safe, effective, or approved for treating a specific patient. UpToDate, Inc. and its affiliates disclaim any warranty or liability relating to this information or the use thereof.The use of this information is governed by the Terms of Use, available at https://www.[employer].com/en/know/clinical-effectiveness-terms. 2024© UpToDate, Inc. and its affiliates and/or licensors. All rights reserved.  Copyright   © 2024 UpToDate, Inc. and/or its affiliates. All rights reserved.

## 2025-05-01 NOTE — ASSESSMENT & PLAN NOTE
Orders:    insulin glargine (Toujeo SoloStar) 300 units/mL CONCENTRATED U-300 injection pen (1-unit dial); Inject 65 units under the skin in the morning and inject 82 units under the skin at bedtime.

## 2025-05-01 NOTE — PROGRESS NOTES
Name: Luis Rodriguez      : 1960      MRN: [id number]  Encounter Provider: KHOA Thomas  Encounter Date: 2025   Encounter department: [facility] [place]    Assessment & Plan  Type 2 diabetes mellitus with hyperglycemia, with long-term current use of insulin ([facility])    Lab Results   Component Value Date    HGBA1C 6.8 (H) 2025     HGA1C at goal with mild elevation in GMI. Will reduce metformin to 1000 mg once daily and Januvia to 50 mg due to eGFR 42. Will Increase Toujeo to 65 units am and 82 units pm. Continue Novolog regimen, and Jardiance for now.     Discussed risks/complications associated with uncontrolled diabetes including organ involvement, heart attack, stroke, death.  Recommended referral to diabetes education.      Advised lifestyle modifications including attention to diet including the amount and types of carbohydrates consumed and regular activity.     Call for blood sugars less than 70 mg/dl or patterns over 250 mg/dl.     Monitor blood glucose levels at least 2 times a day, if on insulin ideally before all insulin administration times.     Discussed use of CGM to collect additional blood glucose data to reveal patterns that can be used to improve glucose levels and adjust insulin regimen.    Discussed symptoms and treatment of hypoglycemia.  Reviewed risks associated with hypoglycemia. Always carry rapid acting carbohydrates and a glucometer (a way to check your blood sugar).    Recommendation for medical identification either bracelet, necklace.    Recommendation for glucagon if on insulin.     Routine follow up for diabetic eye and foot exams.     Ordered blood work to complete prior to next visit.    Send glucose logs/CGM download in 1-2 weeks for review    Follow up in 3 months.     Orders:    insulin aspart (NovoLOG FlexPen) 100 UNIT/ML injection pen; Inject 34 units under the skin daily at breakfast and 28-30 lunch, then inject 40  units under the skin daily at dinner and 6 units with snack plus sliding scale. Maximum of 130 units daily.    insulin glargine (Toujeo SoloStar) 300 units/mL CONCENTRATED U-300 injection pen (1-unit dial); Inject 65 units under the skin in the morning and inject 82 units under the skin at bedtime.    Glucagon (Baqsimi Two Pack) 3 MG/DOSE POWD; 3 mg by Intranasal route once as needed (severe hypoglycemia) for up to 1 dose . If no response, may repeat another dose in 15 minutes using a new intranasal device.    metFORMIN (GLUCOPHAGE) 1000 MG tablet; Take 1 tablet (1,000 mg total) by mouth daily with breakfast    Hemoglobin A1C; Future    Comprehensive metabolic panel; Future    Albumin / creatinine urine ratio; Future    Hypothyroidism, unspecified type  Clinically euthyroid with TSH at goal from April 2025.   Orders:    TSH, 3rd generation; Future    T4, free; Future    Mixed hyperlipidemia  Triglycerides improving but remain elevated. LDL slightly above goal at 76 from December 2024. Recommend repeat lipid panel.   Orders:    Lipid panel; Future    Benign essential hypertension  BP at goal on current regimen including ACE.            History of Present Illness     Luis Rodriguez is a 65 y.o. female  who presents for follow up of diabetes mellitus. Last seen by Dr. Manley in August 2024. UTD diabetic eye and foot exams without complication.     Denies recent hospitalization or illness.      Denies severe hypo/hyperglycemia requiring medical or third party assistance.      Denies polyuria, polydipsia, vision changes or changes in sensation or skin integrity in feet.      Monitors glucose levels with CGM.     CGM Interpretation  Luis Rodriguez   Device used Dexcom for Personal Use  Indication: Type of Diabetes: Type 2 Diabetes  More than 72 hours of data was reviewed. Report to be scanned to chart.   Date Range: April 18-May 1, 2025  Analysis of data:   Average Glucose: 194 mg/dl  GMI: 7.9%  SD : 41 mg/dl  Time in  Target Range: 41%  Time Above Range: 59%  Time Below Range: 0%   Interpretation of data:   Post-prandial and fasting hyperglycemia.      Current regimen:   Toujeo 63, 80 units twice a day  NovoLog 34, 28-30 units, 40 units with dinner, 6 units with snack  Januvia 100 mg orally daily  Metformin 1000 Mg twice a day  Jardiance 25 mg daily     Denies dysuria, genital yeast or skin infection.     No history of pancreatitis.      On ACE and statin.      For hypothyroidism, continues on levothyroxine 112 mcg daily. Denies symptoms consistent with hypo/hyperthyroidism.          Review of Systems as per HPI  Medical History Reviewed by provider this encounter:  Tobacco  Allergies  Meds  Problems  Med Hx  Surg Hx  Fam Hx     .  Current Outpatient Medications on File Prior to Visit   Medication Sig Dispense Refill    Artificial Saliva (BIOTENE MOISTURIZING MOUTH MT) Apply to the mouth or throat if needed      atorvastatin (LIPITOR) 40 mg tablet Take 1 tablet by mouth daily. 90 tablet 1    azelastine (ASTELIN) 0.1 % nasal spray Instill 2 sprays into each nostril twice daily as directed. 30 mL 2    BIOTIN PO Take 10,000 mcg by mouth daily at bedtime      carvedilol (COREG) 6.25 mg tablet Take 2 in the morning and 1 in the evening 300 tablet 3    Cholecalciferol (D3) 50 MCG (2000 UT) CHEW       Continuous Glucose Sensor (Dexcom G7 Sensor) Use 1 Device every 10 days 3 each 2    Cyanocobalamin (B-12) 1000 MCG CAPS       desloratadine (CLARINEX) 5 MG tablet Take 1 tablet (5 mg total) by mouth daily 200 tablet 1    Empagliflozin (Jardiance) 25 MG TABS Take 1 tablet (25 mg total) by mouth every morning 90 tablet 1    enalapril (VASOTEC) 20 mg tablet Take 2 tablets (40 mg total) by mouth daily 200 tablet 1    ferrous sulfate 324 (65 Fe) mg Take 1 tablet (324 mg total) by mouth daily before breakfast 90 tablet 1    guaiFENesin (MUCINEX) 600 mg 12 hr tablet Take 1,200 mg by mouth daily      halobetasol (ULTRAVATE) 0.05 % cream  "Apply topically 2 (two) times a day 50 g 0    hydrOXYzine HCL (ATARAX) 25 mg tablet Take 1 tablet by mouth daily at bedtime as needed for itching. 90 tablet 1    Insulin Pen Needle (TRUEplus Pen Needles) 31G X 6 MM MISC Use 6 times a day to inject insulin 600 each 0    levothyroxine 112 mcg tablet TAKE 1 TABLET BY MOUTH EVERY MORNING 90 tablet 1    Misc Natural Products (OSTEO BI-FLEX JOINT SHIELD PO)       Omega-3 Fatty Acids (FISH OIL PO) Take 1,400 mg by mouth every evening       pantoprazole (PROTONIX) 20 mg tablet Take 1 tablet (20 mg total) by mouth daily 90 tablet 1    [DISCONTINUED] insulin aspart (NovoLOG FlexPen) 100 UNIT/ML injection pen Inject 34 units under the skin daily at breakfast and lunch, then inject 42 units under the skin daily at dinner and 5 units with snack plus sliding scale. Maximum of 130 units daily. 150 mL 1    [DISCONTINUED] insulin glargine (Toujeo SoloStar) 300 units/mL CONCENTRATED U-300 injection pen (1-unit dial) Inject 60 units under the skin in the morning and inject 78 units under the skin at bedtime. 33 mL 1    [DISCONTINUED] metFORMIN (GLUCOPHAGE) 1000 MG tablet Take 1 tablet (1,000 mg total) by mouth 2 (two) times a day with meals 180 tablet 1    [DISCONTINUED] sitaGLIPtin (Januvia) 100 mg tablet TAKE 1 TABLET BY MOUTH DAILY 100 tablet 1     No current facility-administered medications on file prior to visit.         Medical History Reviewed by provider this encounter:     .    Objective   Ht 5' 2.25\" (1.581 m)   Wt 103 kg (227 lb)   BMI 41.19 kg/m²      Body mass index is 41.19 kg/m².  Wt Readings from Last 3 Encounters:   05/01/25 103 kg (227 lb)   04/22/25 101 kg (223 lb)   01/28/25 99.8 kg (220 lb)        Physical Exam  Vitals reviewed.   Constitutional:       Appearance: Normal appearance.   Cardiovascular:      Rate and Rhythm: Normal rate and regular rhythm.   Pulmonary:      Effort: Pulmonary effort is normal.   Skin:     General: Skin is warm and dry. "   Neurological:      General: No focal deficit present.      Mental Status: She is alert and oriented to person, place, and time.   Psychiatric:         Mood and Affect: Mood normal.         Behavior: Behavior normal.       Labs:   Lab Results   Component Value Date    HGBA1C 6.8 (H) 04/23/2025    HGBA1C 6.6 (H) 12/23/2024    HGBA1C 6.8 (H) 08/20/2024     Lab Results   Component Value Date    CREATININE 1.31 (H) 04/23/2025    CREATININE 1.25 12/23/2024    CREATININE 1.24 08/20/2024    BUN 25 04/23/2025     09/14/2017    K 4.4 04/23/2025     04/23/2025    CO2 22 04/23/2025     eGFR   Date Value Ref Range Status   04/23/2025 42 ml/min/1.73sq m Final     Lab Results   Component Value Date    CHOL 194 09/14/2017    HDL 35 (L) 04/23/2025    TRIG 189 (H) 04/23/2025     Lab Results   Component Value Date    ALT 46 04/23/2025    AST 41 (H) 04/23/2025    ALKPHOS 64 04/23/2025    BILITOT 0.5 09/14/2017     Lab Results   Component Value Date    AMP0KPAUKWHB 2.871 04/23/2025    ZDE0LGDKBQJP 3.555 12/23/2024    JQM2XGJLKJVK 2.561 08/20/2024     Lab Results   Component Value Date    FREET4 1.00 04/23/2025     Discussed with the patient and all questioned fully answered. She will call me if any problems arise.

## 2025-05-01 NOTE — ASSESSMENT & PLAN NOTE
Triglycerides improving but remain elevated. LDL slightly above goal at 76 from December 2024. Recommend repeat lipid panel.   Orders:    Lipid panel; Future

## 2025-05-08 DIAGNOSIS — E78.2 MIXED HYPERLIPIDEMIA: ICD-10-CM

## 2025-05-08 RX ORDER — ATORVASTATIN CALCIUM 40 MG/1
40 TABLET, FILM COATED ORAL DAILY
Qty: 90 TABLET | Refills: 1 | Status: SHIPPED | OUTPATIENT
Start: 2025-05-08

## 2025-05-12 NOTE — TELEPHONE ENCOUNTER
Do we have a CGM download for review? Thank you! PHQ over the last two weeks 8/28/2018   Little interest or pleasure in doing things Not at all   Feeling down, depressed, irritable, or hopeless Not at all   Total Score PHQ 2 0     Fall Risk Assessment, last 12 mths 8/28/2018   Able to walk? Yes   Fall in past 12 months? No     Abuse Screening Questionnaire 8/28/2018   Do you ever feel afraid of your partner? N   Are you in a relationship with someone who physically or mentally threatens you? N   Is it safe for you to go home?  Abhinav Joseph

## 2025-05-17 DIAGNOSIS — E11.65 TYPE 2 DIABETES MELLITUS WITH HYPERGLYCEMIA, WITH LONG-TERM CURRENT USE OF INSULIN (HCC): ICD-10-CM

## 2025-05-17 DIAGNOSIS — Z79.4 TYPE 2 DIABETES MELLITUS WITH HYPERGLYCEMIA, WITH LONG-TERM CURRENT USE OF INSULIN (HCC): ICD-10-CM

## 2025-05-19 RX ORDER — INSULIN GLARGINE 300 U/ML
INJECTION, SOLUTION SUBCUTANEOUS
Qty: 49.5 ML | Refills: 2 | Status: SHIPPED | OUTPATIENT
Start: 2025-05-19

## 2025-05-21 DIAGNOSIS — E11.65 TYPE 2 DIABETES MELLITUS WITH HYPERGLYCEMIA, WITHOUT LONG-TERM CURRENT USE OF INSULIN (HCC): ICD-10-CM

## 2025-05-21 RX ORDER — EMPAGLIFLOZIN 25 MG/1
25 TABLET, FILM COATED ORAL EVERY MORNING
Qty: 100 TABLET | Refills: 1 | Status: SHIPPED | OUTPATIENT
Start: 2025-05-21

## 2025-05-22 DIAGNOSIS — E11.65 TYPE 2 DIABETES MELLITUS WITH HYPERGLYCEMIA, WITH LONG-TERM CURRENT USE OF INSULIN (HCC): ICD-10-CM

## 2025-05-22 DIAGNOSIS — Z79.4 TYPE 2 DIABETES MELLITUS WITH HYPERGLYCEMIA, WITH LONG-TERM CURRENT USE OF INSULIN (HCC): ICD-10-CM

## 2025-05-27 DIAGNOSIS — D64.9 ANEMIA, UNSPECIFIED TYPE: ICD-10-CM

## 2025-05-27 RX ORDER — PANTOPRAZOLE SODIUM 20 MG/1
20 TABLET, DELAYED RELEASE ORAL DAILY
Qty: 100 TABLET | Refills: 1 | Status: SHIPPED | OUTPATIENT
Start: 2025-05-27

## 2025-05-28 DIAGNOSIS — E11.9 TYPE 2 DIABETES MELLITUS WITHOUT COMPLICATION, WITHOUT LONG-TERM CURRENT USE OF INSULIN (HCC): ICD-10-CM

## 2025-05-29 RX ORDER — LEVOTHYROXINE SODIUM 112 UG/1
112 TABLET ORAL EVERY MORNING
Qty: 100 TABLET | Refills: 1 | Status: SHIPPED | OUTPATIENT
Start: 2025-05-29

## 2025-06-03 DIAGNOSIS — E11.65 TYPE 2 DIABETES MELLITUS WITH HYPERGLYCEMIA, WITHOUT LONG-TERM CURRENT USE OF INSULIN (HCC): ICD-10-CM

## 2025-06-04 RX ORDER — ACYCLOVIR 400 MG/1
1 TABLET ORAL
Qty: 3 EACH | Refills: 1 | Status: SHIPPED | OUTPATIENT
Start: 2025-06-04

## 2025-06-09 ENCOUNTER — TREATMENT (OUTPATIENT)
Dept: ENDOCRINOLOGY | Facility: CLINIC | Age: 65
End: 2025-06-09
Payer: COMMERCIAL

## 2025-06-09 DIAGNOSIS — E11.65 TYPE 2 DIABETES MELLITUS WITH HYPERGLYCEMIA, WITH LONG-TERM CURRENT USE OF INSULIN (HCC): ICD-10-CM

## 2025-06-09 DIAGNOSIS — Z79.4 TYPE 2 DIABETES MELLITUS WITH HYPERGLYCEMIA, WITH LONG-TERM CURRENT USE OF INSULIN (HCC): ICD-10-CM

## 2025-06-09 PROCEDURE — 95251 CONT GLUC MNTR ANALYSIS I&R: CPT | Performed by: INTERNAL MEDICINE

## 2025-06-09 RX ORDER — INSULIN ASPART 100 [IU]/ML
INJECTION, SOLUTION INTRAVENOUS; SUBCUTANEOUS
Qty: 150 ML | Refills: 1 | Status: SHIPPED | OUTPATIENT
Start: 2025-06-09 | End: 2025-06-11 | Stop reason: CLARIF

## 2025-06-09 NOTE — PROGRESS NOTES
I called and spoke with the patient and she stated that she is only taking her metformin once a day.

## 2025-06-09 NOTE — PROGRESS NOTES
Dexcom download reviewed.    Reviewed prior documentation and doses that the patient is currently using which are as follows Toujeo 70 units in the morning, 85 units at night  Short acting insulin 34 units with breakfast, 34 with lunch, 42-44 units with dinner and 6 units with a snack  Metformin 500 mg orally twice a day, Jardiance 25 mg orally daily      There is  variability in blood sugars which appears to be diet related.    Recommend the following for now  - Increase Humalog to 36 units with breakfast  If patient is eating a higher carbohydrate meal for lunch, dinner, recommend taking additional 2 to 3 units of short acting insulin otherwise continue with current doses  -if the patient is eating a high carbohydrate snack at bedtime, recommend taking 7-8 units since the high blood sugars are most prominent overnight.    Continue current dose of Toujeo, metformin, Jardiance

## 2025-06-11 DIAGNOSIS — E11.65 TYPE 2 DIABETES MELLITUS WITH HYPERGLYCEMIA, WITH LONG-TERM CURRENT USE OF INSULIN (HCC): Primary | ICD-10-CM

## 2025-06-11 DIAGNOSIS — Z79.4 TYPE 2 DIABETES MELLITUS WITH HYPERGLYCEMIA, WITH LONG-TERM CURRENT USE OF INSULIN (HCC): Primary | ICD-10-CM

## 2025-06-11 NOTE — TELEPHONE ENCOUNTER
Please review the following medication for refill. Patient insurance not longer cover Novolog. I called and left a voicemail for the patient letting her know that her short acting insulin is going to be change to Lispro.

## 2025-06-12 ENCOUNTER — TELEPHONE (OUTPATIENT)
Age: 65
End: 2025-06-12

## 2025-06-12 DIAGNOSIS — E11.9 TYPE 2 DIABETES MELLITUS WITHOUT COMPLICATION, WITHOUT LONG-TERM CURRENT USE OF INSULIN (HCC): ICD-10-CM

## 2025-06-12 DIAGNOSIS — Z79.4 TYPE 2 DIABETES MELLITUS WITH OTHER SPECIFIED COMPLICATION, WITH LONG-TERM CURRENT USE OF INSULIN (HCC): ICD-10-CM

## 2025-06-12 DIAGNOSIS — E11.69 TYPE 2 DIABETES MELLITUS WITH OTHER SPECIFIED COMPLICATION, WITH LONG-TERM CURRENT USE OF INSULIN (HCC): ICD-10-CM

## 2025-06-12 DIAGNOSIS — E11.65 TYPE 2 DIABETES MELLITUS WITH HYPERGLYCEMIA, WITH LONG-TERM CURRENT USE OF INSULIN (HCC): Primary | ICD-10-CM

## 2025-06-12 DIAGNOSIS — E03.9 PRIMARY HYPOTHYROIDISM: ICD-10-CM

## 2025-06-12 DIAGNOSIS — Z79.4 TYPE 2 DIABETES MELLITUS WITH HYPERGLYCEMIA, WITH LONG-TERM CURRENT USE OF INSULIN (HCC): Primary | ICD-10-CM

## 2025-06-12 DIAGNOSIS — E11.65 TYPE 2 DIABETES MELLITUS WITH HYPERGLYCEMIA, WITHOUT LONG-TERM CURRENT USE OF INSULIN (HCC): ICD-10-CM

## 2025-06-12 RX ORDER — INSULIN LISPRO 100 [IU]/ML
INJECTION, SOLUTION SUBCUTANEOUS
Qty: 150 ML | Refills: 3 | Status: SHIPPED | OUTPATIENT
Start: 2025-06-12 | End: 2025-06-12 | Stop reason: CLARIF

## 2025-06-12 NOTE — TELEPHONE ENCOUNTER
[facility] calling in regards to insulin lispro(Humalog) script sent to them today. States the patient is 65 years old and the script received is for a Humalog Jr Kwikpen. They are asking for clarification on script asking if Saravanan kwikpen was sent in error? If Saravanan Kwikpen was sent in error, please send new script for insulin lispro (Humalog) to [facility].   IF patient is to be using the Humalog saravanan kwikpen, please call [facility] at P#: [phone] to notify them so they can fill. Please advise, thank you.

## 2025-06-13 RX ORDER — INSULIN LISPRO 100 [IU]/ML
INJECTION, SOLUTION INTRAVENOUS; SUBCUTANEOUS
Qty: 150 ML | Refills: 3 | Status: SHIPPED | OUTPATIENT
Start: 2025-06-13

## 2025-06-18 ENCOUNTER — TELEPHONE (OUTPATIENT)
Dept: ENDOCRINOLOGY | Facility: CLINIC | Age: 65
End: 2025-06-18

## 2025-06-18 NOTE — TELEPHONE ENCOUNTER
Korin<  Looks like Humalog was done on 6-.   This is for Reymundo Log Flexpen     All still same order number for [facility] #[id number]

## 2025-07-09 DIAGNOSIS — E11.69 TYPE 2 DIABETES MELLITUS WITH OTHER SPECIFIED COMPLICATION, WITH LONG-TERM CURRENT USE OF INSULIN (HCC): ICD-10-CM

## 2025-07-09 DIAGNOSIS — Z79.4 TYPE 2 DIABETES MELLITUS WITH OTHER SPECIFIED COMPLICATION, WITH LONG-TERM CURRENT USE OF INSULIN (HCC): ICD-10-CM

## 2025-07-09 RX ORDER — PEN NEEDLE, DIABETIC 31 G X1/4"
NEEDLE, DISPOSABLE MISCELLANEOUS
Qty: 600 EACH | Refills: 0 | Status: SHIPPED | OUTPATIENT
Start: 2025-07-09

## 2025-07-27 DIAGNOSIS — E78.2 MIXED HYPERLIPIDEMIA: ICD-10-CM

## 2025-07-29 RX ORDER — ATORVASTATIN CALCIUM 40 MG/1
40 TABLET, FILM COATED ORAL DAILY
Qty: 100 TABLET | Refills: 1 | Status: SHIPPED | OUTPATIENT
Start: 2025-07-29

## 2025-08-01 ENCOUNTER — APPOINTMENT (OUTPATIENT)
Dept: LAB | Facility: CLINIC | Age: 65
End: 2025-08-01
Payer: COMMERCIAL

## 2025-08-01 DIAGNOSIS — E78.2 MIXED HYPERLIPIDEMIA: ICD-10-CM

## 2025-08-01 DIAGNOSIS — K74.60 CIRRHOSIS OF LIVER WITHOUT ASCITES, UNSPECIFIED HEPATIC CIRRHOSIS TYPE (HCC): ICD-10-CM

## 2025-08-01 DIAGNOSIS — E11.65 TYPE 2 DIABETES MELLITUS WITH HYPERGLYCEMIA, WITH LONG-TERM CURRENT USE OF INSULIN (HCC): ICD-10-CM

## 2025-08-01 DIAGNOSIS — Z79.4 TYPE 2 DIABETES MELLITUS WITH HYPERGLYCEMIA, WITH LONG-TERM CURRENT USE OF INSULIN (HCC): ICD-10-CM

## 2025-08-01 DIAGNOSIS — E03.9 HYPOTHYROIDISM, UNSPECIFIED TYPE: ICD-10-CM

## 2025-08-01 LAB
AFP-TM SERPL-MCNC: 1.77 NG/ML (ref 0–9)
ALBUMIN SERPL BCG-MCNC: 4 G/DL (ref 3.5–5)
ALP SERPL-CCNC: 72 U/L (ref 34–104)
ALT SERPL W P-5'-P-CCNC: 56 U/L (ref 7–52)
ANION GAP SERPL CALCULATED.3IONS-SCNC: 11 MMOL/L (ref 4–13)
AST SERPL W P-5'-P-CCNC: 47 U/L (ref 13–39)
BILIRUB SERPL-MCNC: 0.79 MG/DL (ref 0.2–1)
BUN SERPL-MCNC: 18 MG/DL (ref 5–25)
CALCIUM SERPL-MCNC: 9.7 MG/DL (ref 8.4–10.2)
CHLORIDE SERPL-SCNC: 103 MMOL/L (ref 96–108)
CHOLEST SERPL-MCNC: 164 MG/DL (ref ?–200)
CO2 SERPL-SCNC: 25 MMOL/L (ref 21–32)
CREAT SERPL-MCNC: 1.18 MG/DL (ref 0.6–1.3)
CREAT UR-MCNC: 94.7 MG/DL
ERYTHROCYTE [DISTWIDTH] IN BLOOD BY AUTOMATED COUNT: 16 % (ref 11.6–15.1)
EST. AVERAGE GLUCOSE BLD GHB EST-MCNC: 171 MG/DL
GFR SERPL CREATININE-BSD FRML MDRD: 48 ML/MIN/1.73SQ M
GLUCOSE P FAST SERPL-MCNC: 175 MG/DL (ref 65–99)
HBA1C MFR BLD: 7.6 %
HCT VFR BLD AUTO: 36.6 % (ref 34.8–46.1)
HDLC SERPL-MCNC: 38 MG/DL
HGB BLD-MCNC: 11.3 G/DL (ref 11.5–15.4)
INR PPP: 1.14 (ref 0.85–1.19)
LDLC SERPL CALC-MCNC: 90 MG/DL (ref 0–100)
MCH RBC QN AUTO: 26.2 PG (ref 26.8–34.3)
MCHC RBC AUTO-ENTMCNC: 30.9 G/DL (ref 31.4–37.4)
MCV RBC AUTO: 85 FL (ref 82–98)
MICROALBUMIN UR-MCNC: 308.7 MG/L
MICROALBUMIN/CREAT 24H UR: 326 MG/G CREATININE (ref 0–30)
NONHDLC SERPL-MCNC: 126 MG/DL
PLATELET # BLD AUTO: 107 THOUSANDS/UL (ref 149–390)
PMV BLD AUTO: 11.1 FL (ref 8.9–12.7)
POTASSIUM SERPL-SCNC: 4.9 MMOL/L (ref 3.5–5.3)
PROT SERPL-MCNC: 7.5 G/DL (ref 6.4–8.4)
PROTHROMBIN TIME: 14.9 SECONDS (ref 12.3–15)
RBC # BLD AUTO: 4.32 MILLION/UL (ref 3.81–5.12)
SODIUM SERPL-SCNC: 139 MMOL/L (ref 135–147)
T4 FREE SERPL-MCNC: 0.97 NG/DL (ref 0.61–1.12)
TRIGL SERPL-MCNC: 179 MG/DL (ref ?–150)
TSH SERPL DL<=0.05 MIU/L-ACNC: 3.01 UIU/ML (ref 0.45–4.5)
WBC # BLD AUTO: 6.99 THOUSAND/UL (ref 4.31–10.16)

## 2025-08-01 PROCEDURE — 80053 COMPREHEN METABOLIC PANEL: CPT

## 2025-08-01 PROCEDURE — 82043 UR ALBUMIN QUANTITATIVE: CPT

## 2025-08-01 PROCEDURE — 80061 LIPID PANEL: CPT

## 2025-08-01 PROCEDURE — 82105 ALPHA-FETOPROTEIN SERUM: CPT

## 2025-08-01 PROCEDURE — 84443 ASSAY THYROID STIM HORMONE: CPT

## 2025-08-01 PROCEDURE — 85610 PROTHROMBIN TIME: CPT

## 2025-08-01 PROCEDURE — 84439 ASSAY OF FREE THYROXINE: CPT

## 2025-08-01 PROCEDURE — 85027 COMPLETE CBC AUTOMATED: CPT

## 2025-08-01 PROCEDURE — 83036 HEMOGLOBIN GLYCOSYLATED A1C: CPT

## 2025-08-01 PROCEDURE — 82570 ASSAY OF URINE CREATININE: CPT

## 2025-08-01 PROCEDURE — 36415 COLL VENOUS BLD VENIPUNCTURE: CPT

## 2025-08-18 DIAGNOSIS — E11.65 TYPE 2 DIABETES MELLITUS WITH HYPERGLYCEMIA, WITHOUT LONG-TERM CURRENT USE OF INSULIN (HCC): ICD-10-CM

## 2025-08-19 RX ORDER — ACYCLOVIR 400 MG/1
TABLET ORAL
Qty: 6 EACH | Refills: 2 | Status: SHIPPED | OUTPATIENT
Start: 2025-08-19